# Patient Record
Sex: FEMALE | Race: WHITE | NOT HISPANIC OR LATINO | Employment: FULL TIME | URBAN - METROPOLITAN AREA
[De-identification: names, ages, dates, MRNs, and addresses within clinical notes are randomized per-mention and may not be internally consistent; named-entity substitution may affect disease eponyms.]

---

## 2017-01-04 ENCOUNTER — TELEPHONE (OUTPATIENT)
Dept: SURGERY | Facility: CLINIC | Age: 37
End: 2017-01-04

## 2017-01-04 NOTE — TELEPHONE ENCOUNTER
----- Message from Emily Monroy sent at 1/4/2017 10:00 AM CST -----  [phone]//pt states that she needs to speak with nurse in ref to changing the time of her appt//please call//thank you

## 2017-01-05 ENCOUNTER — OFFICE VISIT (OUTPATIENT)
Dept: SURGERY | Facility: CLINIC | Age: 37
End: 2017-01-05
Payer: COMMERCIAL

## 2017-01-05 VITALS
WEIGHT: 119 LBS | DIASTOLIC BLOOD PRESSURE: 79 MMHG | HEART RATE: 83 BPM | BODY MASS INDEX: 19.13 KG/M2 | HEIGHT: 66 IN | TEMPERATURE: 99 F | SYSTOLIC BLOOD PRESSURE: 118 MMHG

## 2017-01-05 DIAGNOSIS — K31.84 GASTROPARESIS: Primary | ICD-10-CM

## 2017-01-05 DIAGNOSIS — Z09 POSTOP CHECK: ICD-10-CM

## 2017-01-05 PROCEDURE — 99024 POSTOP FOLLOW-UP VISIT: CPT | Mod: S$GLB,,, | Performed by: SURGERY

## 2017-01-05 PROCEDURE — 99999 PR PBB SHADOW E&M-EST. PATIENT-LVL III: CPT | Mod: PBBFAC,,, | Performed by: SURGERY

## 2017-01-05 RX ORDER — AMITRIPTYLINE HYDROCHLORIDE 25 MG/1
25 TABLET, FILM COATED ORAL NIGHTLY
COMMUNITY
Start: 2016-06-03 | End: 2018-03-15

## 2017-01-05 RX ORDER — METOCLOPRAMIDE HYDROCHLORIDE 5 MG/5ML
10 SOLUTION ORAL 3 TIMES DAILY
COMMUNITY
Start: 2016-07-08 | End: 2018-03-15

## 2017-01-05 NOTE — LETTER
[facility] - General Surgery  [address]  [facility] [address]  Phone: [phone] January 5, 2017      Jeancarlos Angel MD  [address]  [address]  [facility] [id number]    Patient: Nena Mendez   MR Number: [id number]   YOB: 1980   Date of Visit: 1/5/2017     Dear Dr. Angel:    Thank you for referring Nena Mendez to me for evaluation. Below are the relevant portions of my assessment and plan of care.    Patient is status post lap gastric neurostimulator placement.    PLAN: Patient still has significant gastroparesis symptoms.  Will adjust once more and if there is not significant change will do pyloroplasty.    If you have questions, please do not hesitate to call me. I look forward to following Nena along with you.    Sincerely,      Robert Kumar MD   Section Head - General, Laparoscopic, Bariatric  Acute Care and Oncologic Surgery   - Surgical Weight Loss Program  [facility]    WSSENAIT/shelley

## 2017-01-05 NOTE — PROGRESS NOTES
"Nena Mendez is a 36 y.o. female patient.   1. S/P lap gastric neurostimulator placement     2. Postop check      Past Medical History   Diagnosis Date    Gastroparesis      Past Surgical History Pertinent Negatives   Procedure Date Noted    Brain surgery 8/4/2016    Breast surgery 8/4/2016    Coronary artery bypass graft 8/4/2016    Colon surgery 8/4/2016    Eye surgery 8/4/2016    Fracture surgery 8/4/2016    Hernia repair 8/4/2016    Joint replacement 8/4/2016    Kidney transplant 8/4/2016    Liver transplant 8/4/2016    Prostate surgery 8/4/2016    Small intestine surgery 8/4/2016    Spine surgery 8/4/2016     Scheduled Meds:  Continuous Infusions:  PRN Meds:    Review of patient's allergies indicates:   Allergen Reactions    Iodine Swelling    Iodine and iodide containing products Swelling     There are no hospital problems to display for this patient.    Blood pressure 118/79, pulse 83, temperature 98.5 °F (36.9 °C), height 5' 6" (1.676 m), weight 54 kg (119 lb).    Subjective S/p gastric neurostimulator and recently j tube placement.  Her last stimulator settings were: Imp 397 volt 4.5 curr 11.3 pw 330 rate 14 on 1 off 4.  She has nausea, vomiting and epigastric pain.  She has been worse especially the last few days.  She is doing well with the j tube but will get nausea if she has the flow too high.  She is taking nausea medication prior to j tube feeds.   Objective Abdomen benign, j tube site clear,  Adjustment: imp 401 volt 4.5 cur 11.2 pw 330 rate 28 on 1off 4  Labs reviewed and they are ok.   Assessment & Plan She still has significant gastroparesis symptoms.  Will adjust once more and if there is not significant change will do pyloroplasty.       Robert Kumar MD  1/5/2017  "

## 2017-01-13 ENCOUNTER — TELEPHONE (OUTPATIENT)
Dept: SURGERY | Facility: CLINIC | Age: 37
End: 2017-01-13

## 2017-01-13 NOTE — TELEPHONE ENCOUNTER
Notified pt that Dr. Kumar has recommended she start on Bactrim DS BID x 14 days.  Pt verbalizes understanding and prescription called in to pt preferred pharmacy.      -also pt to see local physician to have it looked at on Monday.      -will call with any updates.

## 2017-01-13 NOTE — TELEPHONE ENCOUNTER
"Returned home health nurse's phone call-states pt is having some jtube complications.  Pt is complaining of burning/redness to site.  Also states the area feels very "hard".  Home health nurse states that she is having slight dge as well.    Pt is not taking anything for pain at this time.      -Explained to Suzy ( nurse) that i would discuss with Dr. Kumar on what we can do to help pt and would get back with her asap.  "

## 2017-01-13 NOTE — TELEPHONE ENCOUNTER
----- Message from Lincoln Negro sent at 1/13/2017 11:25 AM CST -----  Contact: Suzy with [facility]  Caller needs to speak with someone regarding drainage from pt J tube. Please call regarding this at [phone]

## 2017-01-16 ENCOUNTER — TELEPHONE (OUTPATIENT)
Dept: SURGERY | Facility: CLINIC | Age: 37
End: 2017-01-16

## 2017-01-16 NOTE — TELEPHONE ENCOUNTER
----- Message from Dorina Retana sent at 1/16/2017  1:06 PM CST -----  Contact: self   Nena States that they need a call returned by a nurse in reference to the her going to the Er, last Friday night and her feeding tube wasn't flushing right, she states she is swollen and in pain.  Please call Nena  @ [phone] . Thanks :)

## 2017-01-17 ENCOUNTER — OFFICE VISIT (OUTPATIENT)
Dept: SURGERY | Facility: CLINIC | Age: 37
End: 2017-01-17
Payer: COMMERCIAL

## 2017-01-17 VITALS
WEIGHT: 120 LBS | DIASTOLIC BLOOD PRESSURE: 67 MMHG | HEIGHT: 66 IN | BODY MASS INDEX: 19.29 KG/M2 | HEART RATE: 70 BPM | SYSTOLIC BLOOD PRESSURE: 115 MMHG | TEMPERATURE: 98 F

## 2017-01-17 DIAGNOSIS — K94.19 JEJUNOSTOMY TUBE SITE PAIN: ICD-10-CM

## 2017-01-17 DIAGNOSIS — K31.84 GASTROPARESIS: Primary | ICD-10-CM

## 2017-01-17 DIAGNOSIS — K31.84 GASTROPARESIS: ICD-10-CM

## 2017-01-17 PROCEDURE — 1159F MED LIST DOCD IN RCRD: CPT | Mod: S$GLB,,, | Performed by: SURGERY

## 2017-01-17 PROCEDURE — 99999 PR PBB SHADOW E&M-EST. PATIENT-LVL III: CPT | Mod: PBBFAC,,, | Performed by: SURGERY

## 2017-01-17 PROCEDURE — 99024 POSTOP FOLLOW-UP VISIT: CPT | Mod: S$GLB,,, | Performed by: SURGERY

## 2017-01-17 RX ORDER — PROMETHAZINE HYDROCHLORIDE 6.25 MG/5ML
25 SYRUP ORAL DAILY
Qty: 473 ML | Refills: 11 | Status: SHIPPED | OUTPATIENT
Start: 2017-01-17 | End: 2017-05-09 | Stop reason: SDUPTHER

## 2017-01-17 RX ORDER — NYSTATIN 100000 U/G
OINTMENT TOPICAL
COMMUNITY
Start: 2017-01-14 | End: 2017-05-09

## 2017-01-17 RX ORDER — SULFAMETHOXAZOLE AND TRIMETHOPRIM 800; 160 MG/1; MG/1
TABLET ORAL
COMMUNITY
Start: 2017-01-13 | End: 2017-05-09 | Stop reason: ALTCHOICE

## 2017-01-17 RX ORDER — GRISEOFULVIN 125 MG/1
125 TABLET ORAL DAILY
Qty: 30 TABLET | Refills: 0 | Status: SHIPPED | OUTPATIENT
Start: 2017-01-17 | End: 2017-02-16

## 2017-01-17 NOTE — PROGRESS NOTES
"Nena Mendez is a 36 y.o. female patient.   1. S/P lap gastric neurostimulator placement     2. Jejunostomy tube site pain      Past Medical History   Diagnosis Date    Gastroparesis      Past Surgical History Pertinent Negatives   Procedure Date Noted    Brain surgery 8/4/2016    Breast surgery 8/4/2016    Coronary artery bypass graft 8/4/2016    Colon surgery 8/4/2016    Eye surgery 8/4/2016    Fracture surgery 8/4/2016    Hernia repair 8/4/2016    Joint replacement 8/4/2016    Kidney transplant 8/4/2016    Liver transplant 8/4/2016    Prostate surgery 8/4/2016    Small intestine surgery 8/4/2016    Spine surgery 8/4/2016     Scheduled Meds:  Continuous Infusions:  PRN Meds:    Review of patient's allergies indicates:   Allergen Reactions    Iodine Swelling    Iodine and iodide containing products Swelling     There are no hospital problems to display for this patient.    Blood pressure 115/67, pulse 70, temperature 98.2 °F (36.8 °C), height 5' 6" (1.676 m), weight 54.4 kg (120 lb).    Subjective S/p gastric stimulator and more recently j tube.  She has pain at the j tube site.  She went to a local ER 4 days ago and CT was ok.  She has drainage around the site with redness.  She was started on antibiotics by me last week.  In terms of gastroparesis her symptoms continue to be severe.  Objective Tender with some pus at tube site.  Very little erythema.  Stimulator checked and settings are ok.   Assessment & Plan S/p stimulator without much change in symptoms.  Pain at j tube site.  Antibiotic ointment around tube site.  For robotic pyloroplasty and exchange tube for non-latex tube while under anesthesia.       Robert Kumar MD  1/17/2017  "

## 2017-01-17 NOTE — LETTER
January 17, 2017        Jeancarlos Angel MD  [address]  [address]  [facility] [address]             [facility] - General Surgery  [address]  [place] [address]  Phone: [phone]   Patient: Nena Mendez   MR Number: [id number]   YOB: 1980   Date of Visit: 1/17/2017       Dear Dr. Angel:    Thank you for referring Nena Mendez to me for evaluation. Below are the relevant portions of my assessment and plan of care.    Assessment & Plan S/p stimulator without much change in symptoms.  Pain at j tube site.  Will change to non-latex tube.  Antibiotic ointment around tube site.  Rtc one month and obtain ct result.         If you have questions, please do not hesitate to call me. I look forward to following Nena along with you.    Sincerely,      Robert Kumar MD           CC  No Recipients

## 2017-01-18 RX ORDER — TRAMADOL HYDROCHLORIDE 50 MG/1
50 TABLET ORAL EVERY 6 HOURS PRN
Qty: 30 TABLET | Refills: 0 | Status: SHIPPED | OUTPATIENT
Start: 2017-01-18 | End: 2017-01-28

## 2017-01-26 ENCOUNTER — ANESTHESIA EVENT (OUTPATIENT)
Dept: SURGERY | Facility: HOSPITAL | Age: 37
DRG: 327 | End: 2017-01-26
Payer: COMMERCIAL

## 2017-01-26 ENCOUNTER — TELEPHONE (OUTPATIENT)
Dept: VASCULAR SURGERY | Facility: CLINIC | Age: 37
End: 2017-01-26

## 2017-01-26 NOTE — PRE-PROCEDURE INSTRUCTIONS
Preop instructions: NPO after midnight, shower instructions, directions, leave all valuables at home, medication instructions for PM prior & am of procedure reviewed. Patient verbalized understanding.    Patient denies any issues, side effects or complications with past anesthesia or sedations    Pt does not know arrival time. Explained this information came from the surgeons clinic and that if they have not heard from them by 2pm to call the office. Patient stated an understanding

## 2017-01-26 NOTE — TELEPHONE ENCOUNTER
Nena Mendez notified that her surgery is scheduled for 700am on 01/27/17. she needs to arrive to the 2nd floor [facility] in the hospital @ 0530am. she should not eat or drink anything after midnight.eNna Mendez verbalize understanding.

## 2017-01-26 NOTE — ANESTHESIA PREPROCEDURE EVALUATION
Pre-operative evaluation for ROBOTIC ASSISTED LAPAROSCOPIC PYLOROPLASTY (N/A), TUBE-JEJUNOSTOMY-PLACEMENT-LAPAROSCOPIC//replacement (N/A)    ID:   Nena Mendez is a 36 y.o. female with Gastroparesis s/p Neurostimulator      Chief Complaint: Abdominal pain, n/v s/p stimulator. Plan for procedure above. No issues with previous anesthesia.      LDA:               Gastrostomy/Enterostomy 08/10/16 1323 Jejunostomy tube LUQ feeding (Active)       Previous Airway:  16; n: Direct laryngoscopy; Inserted by: Other (KARISSA Chen); Mask Ventilation: Easy; Tarsha #3; Grade II; Complicating Factors: None; Intubation Findings: Positive EtCO2, Bilateral breath sounds, Atraumatic/Condition of teeth unchanged;        Past Surgical History   Procedure Laterality Date    Appendectomy      Cholecystectomy      Cosmetic surgery       section      Hysterectomy      Tonsillectomy      Gastrostomy-jejeunostomy tube change/placement  08/10/2016    Gastric stimulator implant surgery      Laparoscopic jejunostomy tube           Vital Signs Range (Last 24H):         CBC:   No results for input(s): WBC, RBC, HGB, HCT, PLT, MCV, MCH, MCHC in the last 720 hours.    CMP: No results for input(s): NA, K, CL, CO2, BUN, CREATININE, GLU, MG, PHOS, CALCIUM, ALBUMIN, PROT, ALKPHOS, ALT, AST, BILITOT in the last 720 hours.    INR:  No results for input(s): INR, PROTIME, APTT in the last 720 hours.    Invalid input(s): PT      Diagnostic Studies:      EKD Echo:      [facility] Pre-op Assessment    I have reviewed the Patient Summary Reports.      I have reviewed the Medications.     Review of Systems  Anesthesia Hx:  No problems with previous Anesthesia    Cardiovascular:  Cardiovascular Normal     Pulmonary:  Pulmonary Normal    Hepatic/GI:   Gastroparesis   Neurological:  Neurology Normal    Endocrine:   Denies Diabetes. Denies Hypothyroidism. Denies Hyperthyroidism.        Physical Exam  General:  Well nourished     Airway/Jaw/Neck:  Airway Findings: Mouth Opening: Normal Tongue: Normal  General Airway Assessment: Adult  Mallampati: II  TM Distance: 4 - 6 cm  Jaw/Neck Findings:  Neck ROM: Normal ROM      Dental:  Dental Findings: In tact        Mental Status:  Mental Status Findings:  Cooperative         Anesthesia Plan  Type of Anesthesia, risks & benefits discussed:  Anesthesia Type:  general  Patient's Preference:   Intra-op Monitoring Plan:   Intra-op Monitoring Plan Comments:   Post Op Pain Control Plan:   Post Op Pain Control Plan Comments:   Induction:   IV  Beta Blocker:  Patient is not currently on a Beta-Blocker (No further documentation required).       Informed Consent: Patient understands risks and agrees with Anesthesia plan.  Questions answered. Anesthesia consent signed with patient.  ASA Score: 3     Day of Surgery Review of History & Physical:

## 2017-01-27 ENCOUNTER — ANESTHESIA (OUTPATIENT)
Dept: SURGERY | Facility: HOSPITAL | Age: 37
DRG: 327 | End: 2017-01-27
Payer: COMMERCIAL

## 2017-01-27 ENCOUNTER — SURGERY (OUTPATIENT)
Age: 37
End: 2017-01-27

## 2017-01-27 ENCOUNTER — HOSPITAL ENCOUNTER (INPATIENT)
Facility: HOSPITAL | Age: 37
LOS: 2 days | Discharge: HOME | DRG: 327 | End: 2017-01-29
Attending: SURGERY | Admitting: SURGERY
Payer: COMMERCIAL

## 2017-01-27 DIAGNOSIS — K31.84 GASTROPARESIS: ICD-10-CM

## 2017-01-27 DIAGNOSIS — K94.19 JEJUNOSTOMY TUBE SITE PAIN: ICD-10-CM

## 2017-01-27 DIAGNOSIS — E46 MALNUTRITION: Primary | ICD-10-CM

## 2017-01-27 LAB — POCT GLUCOSE: 83 MG/DL (ref 70–110)

## 2017-01-27 PROCEDURE — 8E0W4CZ ROBOTIC ASSISTED PROCEDURE OF TRUNK REGION, PERCUTANEOUS ENDOSCOPIC APPROACH: ICD-10-PCS | Performed by: SURGERY

## 2017-01-27 PROCEDURE — 25000003 PHARM REV CODE 250: Performed by: SURGERY

## 2017-01-27 PROCEDURE — 94760 N-INVAS EAR/PLS OXIMETRY 1: CPT

## 2017-01-27 PROCEDURE — 94799 UNLISTED PULMONARY SVC/PX: CPT

## 2017-01-27 PROCEDURE — 63600175 PHARM REV CODE 636 W HCPCS: Performed by: SURGERY

## 2017-01-27 PROCEDURE — 25000003 PHARM REV CODE 250: Performed by: ANESTHESIOLOGY

## 2017-01-27 PROCEDURE — 71000039 HC RECOVERY, EACH ADD'L HOUR: Performed by: SURGERY

## 2017-01-27 PROCEDURE — D9220A PRA ANESTHESIA: Mod: ANES,,, | Performed by: ANESTHESIOLOGY

## 2017-01-27 PROCEDURE — 63600175 PHARM REV CODE 636 W HCPCS: Performed by: ANESTHESIOLOGY

## 2017-01-27 PROCEDURE — 44015 INSERT NEEDLE CATH BOWEL: CPT | Mod: 51,,, | Performed by: SURGERY

## 2017-01-27 PROCEDURE — 0DQ74ZZ REPAIR STOMACH, PYLORUS, PERCUTANEOUS ENDOSCOPIC APPROACH: ICD-10-PCS | Performed by: SURGERY

## 2017-01-27 PROCEDURE — 27201423 OPTIME MED/SURG SUP & DEVICES STERILE SUPPLY: Performed by: SURGERY

## 2017-01-27 PROCEDURE — 37000008 HC ANESTHESIA 1ST 15 MINUTES: Performed by: SURGERY

## 2017-01-27 PROCEDURE — 43659 UNLISTED LAPS PX STOMACH: CPT | Mod: ,,, | Performed by: SURGERY

## 2017-01-27 PROCEDURE — 11000001 HC ACUTE MED/SURG PRIVATE ROOM

## 2017-01-27 PROCEDURE — 36000712 HC OR TIME LEV V 1ST 15 MIN: Performed by: SURGERY

## 2017-01-27 PROCEDURE — 71000033 HC RECOVERY, INTIAL HOUR: Performed by: SURGERY

## 2017-01-27 PROCEDURE — C1894 INTRO/SHEATH, NON-LASER: HCPCS | Performed by: SURGERY

## 2017-01-27 PROCEDURE — 27000221 HC OXYGEN, UP TO 24 HOURS

## 2017-01-27 PROCEDURE — 36000713 HC OR TIME LEV V EA ADD 15 MIN: Performed by: SURGERY

## 2017-01-27 PROCEDURE — 63600175 PHARM REV CODE 636 W HCPCS

## 2017-01-27 PROCEDURE — 0D2DXUZ CHANGE FEEDING DEVICE IN LOWER INTESTINAL TRACT, EXTERNAL APPROACH: ICD-10-PCS | Performed by: SURGERY

## 2017-01-27 PROCEDURE — 37000009 HC ANESTHESIA EA ADD 15 MINS: Performed by: SURGERY

## 2017-01-27 PROCEDURE — D9220A PRA ANESTHESIA: Mod: CRNA,,, | Performed by: NURSE ANESTHETIST, CERTIFIED REGISTERED

## 2017-01-27 PROCEDURE — 63600175 PHARM REV CODE 636 W HCPCS: Performed by: NURSE ANESTHETIST, CERTIFIED REGISTERED

## 2017-01-27 PROCEDURE — 25000003 PHARM REV CODE 250: Performed by: NURSE ANESTHETIST, CERTIFIED REGISTERED

## 2017-01-27 RX ORDER — HYDROMORPHONE HCL IN 0.9% NACL 6 MG/30 ML
PATIENT CONTROLLED ANALGESIA SYRINGE INTRAVENOUS CONTINUOUS
Status: DISCONTINUED | OUTPATIENT
Start: 2017-01-27 | End: 2017-01-28

## 2017-01-27 RX ORDER — ONDANSETRON 2 MG/ML
INJECTION INTRAMUSCULAR; INTRAVENOUS
Status: DISCONTINUED | OUTPATIENT
Start: 2017-01-27 | End: 2017-01-27

## 2017-01-27 RX ORDER — SODIUM CHLORIDE 9 MG/ML
INJECTION, SOLUTION INTRAVENOUS CONTINUOUS
Status: DISCONTINUED | OUTPATIENT
Start: 2017-01-27 | End: 2017-01-27

## 2017-01-27 RX ORDER — DIPHENHYDRAMINE HYDROCHLORIDE 50 MG/ML
12.5 INJECTION INTRAMUSCULAR; INTRAVENOUS 2 TIMES DAILY PRN
Status: DISCONTINUED | OUTPATIENT
Start: 2017-01-27 | End: 2017-01-27

## 2017-01-27 RX ORDER — ROCURONIUM BROMIDE 10 MG/ML
INJECTION, SOLUTION INTRAVENOUS
Status: DISCONTINUED | OUTPATIENT
Start: 2017-01-27 | End: 2017-01-27

## 2017-01-27 RX ORDER — HYDROMORPHONE HYDROCHLORIDE 2 MG/ML
INJECTION, SOLUTION INTRAMUSCULAR; INTRAVENOUS; SUBCUTANEOUS
Status: DISCONTINUED | OUTPATIENT
Start: 2017-01-27 | End: 2017-01-27

## 2017-01-27 RX ORDER — GLYCOPYRROLATE 0.2 MG/ML
INJECTION INTRAMUSCULAR; INTRAVENOUS
Status: DISCONTINUED | OUTPATIENT
Start: 2017-01-27 | End: 2017-01-27

## 2017-01-27 RX ORDER — HYDROMORPHONE HYDROCHLORIDE 1 MG/ML
0.2 INJECTION, SOLUTION INTRAMUSCULAR; INTRAVENOUS; SUBCUTANEOUS EVERY 5 MIN PRN
Status: DISCONTINUED | OUTPATIENT
Start: 2017-01-27 | End: 2017-01-27 | Stop reason: HOSPADM

## 2017-01-27 RX ORDER — BUPIVACAINE HYDROCHLORIDE 2.5 MG/ML
INJECTION, SOLUTION EPIDURAL; INFILTRATION; INTRACAUDAL
Status: DISCONTINUED | OUTPATIENT
Start: 2017-01-27 | End: 2017-01-27 | Stop reason: HOSPADM

## 2017-01-27 RX ORDER — SODIUM CHLORIDE 0.9 % (FLUSH) 0.9 %
3 SYRINGE (ML) INJECTION
Status: DISCONTINUED | OUTPATIENT
Start: 2017-01-27 | End: 2017-01-27 | Stop reason: HOSPADM

## 2017-01-27 RX ORDER — ENOXAPARIN SODIUM 100 MG/ML
40 INJECTION SUBCUTANEOUS
Status: DISCONTINUED | OUTPATIENT
Start: 2017-01-27 | End: 2017-01-29 | Stop reason: HOSPADM

## 2017-01-27 RX ORDER — SODIUM CHLORIDE, SODIUM LACTATE, POTASSIUM CHLORIDE, CALCIUM CHLORIDE 600; 310; 30; 20 MG/100ML; MG/100ML; MG/100ML; MG/100ML
INJECTION, SOLUTION INTRAVENOUS CONTINUOUS
Status: DISCONTINUED | OUTPATIENT
Start: 2017-01-27 | End: 2017-01-29 | Stop reason: HOSPADM

## 2017-01-27 RX ORDER — LIDOCAINE HCL/PF 100 MG/5ML
SYRINGE (ML) INTRAVENOUS
Status: DISCONTINUED | OUTPATIENT
Start: 2017-01-27 | End: 2017-01-27

## 2017-01-27 RX ORDER — ACETAMINOPHEN 10 MG/ML
1000 INJECTION, SOLUTION INTRAVENOUS ONCE
Status: COMPLETED | OUTPATIENT
Start: 2017-01-27 | End: 2017-01-27

## 2017-01-27 RX ORDER — PROPOFOL 10 MG/ML
VIAL (ML) INTRAVENOUS
Status: DISCONTINUED | OUTPATIENT
Start: 2017-01-27 | End: 2017-01-27

## 2017-01-27 RX ORDER — ACETAMINOPHEN 10 MG/ML
INJECTION, SOLUTION INTRAVENOUS
Status: DISCONTINUED | OUTPATIENT
Start: 2017-01-27 | End: 2017-01-27

## 2017-01-27 RX ORDER — ACETAMINOPHEN 10 MG/ML
1000 INJECTION, SOLUTION INTRAVENOUS EVERY 8 HOURS
Status: DISCONTINUED | OUTPATIENT
Start: 2017-01-27 | End: 2017-01-28

## 2017-01-27 RX ORDER — NEOSTIGMINE METHYLSULFATE 1 MG/ML
INJECTION, SOLUTION INTRAVENOUS
Status: DISCONTINUED | OUTPATIENT
Start: 2017-01-27 | End: 2017-01-27

## 2017-01-27 RX ORDER — DIPHENHYDRAMINE HYDROCHLORIDE 50 MG/ML
25 INJECTION INTRAMUSCULAR; INTRAVENOUS EVERY 6 HOURS PRN
Status: DISCONTINUED | OUTPATIENT
Start: 2017-01-27 | End: 2017-01-29 | Stop reason: HOSPADM

## 2017-01-27 RX ORDER — FENTANYL CITRATE 50 UG/ML
INJECTION, SOLUTION INTRAMUSCULAR; INTRAVENOUS
Status: DISCONTINUED | OUTPATIENT
Start: 2017-01-27 | End: 2017-01-27

## 2017-01-27 RX ORDER — NALOXONE HCL 0.4 MG/ML
0.02 VIAL (ML) INJECTION
Status: DISCONTINUED | OUTPATIENT
Start: 2017-01-27 | End: 2017-01-29 | Stop reason: HOSPADM

## 2017-01-27 RX ORDER — METRONIDAZOLE 500 MG/100ML
500 INJECTION, SOLUTION INTRAVENOUS
Status: COMPLETED | OUTPATIENT
Start: 2017-01-27 | End: 2017-01-27

## 2017-01-27 RX ORDER — ONDANSETRON 2 MG/ML
4 INJECTION INTRAMUSCULAR; INTRAVENOUS EVERY 8 HOURS PRN
Status: DISCONTINUED | OUTPATIENT
Start: 2017-01-27 | End: 2017-01-28

## 2017-01-27 RX ORDER — MIDAZOLAM HYDROCHLORIDE 1 MG/ML
INJECTION, SOLUTION INTRAMUSCULAR; INTRAVENOUS
Status: DISCONTINUED | OUTPATIENT
Start: 2017-01-27 | End: 2017-01-27

## 2017-01-27 RX ORDER — HYDROMORPHONE HCL IN 0.9% NACL 6 MG/30 ML
PATIENT CONTROLLED ANALGESIA SYRINGE INTRAVENOUS
Status: COMPLETED
Start: 2017-01-27 | End: 2017-01-27

## 2017-01-27 RX ORDER — METRONIDAZOLE 500 MG/100ML
500 INJECTION, SOLUTION INTRAVENOUS
Status: COMPLETED | OUTPATIENT
Start: 2017-01-27 | End: 2017-01-28

## 2017-01-27 RX ADMIN — ONDANSETRON 4 MG: 2 INJECTION INTRAMUSCULAR; INTRAVENOUS at 09:01

## 2017-01-27 RX ADMIN — SODIUM CHLORIDE: 0.9 INJECTION, SOLUTION INTRAVENOUS at 06:01

## 2017-01-27 RX ADMIN — PROMETHAZINE HYDROCHLORIDE 6.25 MG: 25 INJECTION INTRAMUSCULAR; INTRAVENOUS at 06:01

## 2017-01-27 RX ADMIN — PROMETHAZINE HYDROCHLORIDE 6.25 MG: 25 INJECTION INTRAMUSCULAR; INTRAVENOUS at 12:01

## 2017-01-27 RX ADMIN — NEOSTIGMINE METHYLSULFATE 3 MG: 1 INJECTION INTRAVENOUS at 08:01

## 2017-01-27 RX ADMIN — FENTANYL CITRATE 100 MCG: 50 INJECTION, SOLUTION INTRAMUSCULAR; INTRAVENOUS at 07:01

## 2017-01-27 RX ADMIN — ROCURONIUM BROMIDE 40 MG: 10 INJECTION, SOLUTION INTRAVENOUS at 07:01

## 2017-01-27 RX ADMIN — Medication: at 04:01

## 2017-01-27 RX ADMIN — FENTANYL CITRATE 50 MCG: 50 INJECTION, SOLUTION INTRAMUSCULAR; INTRAVENOUS at 07:01

## 2017-01-27 RX ADMIN — ONDANSETRON 4 MG: 2 INJECTION INTRAMUSCULAR; INTRAVENOUS at 10:01

## 2017-01-27 RX ADMIN — SODIUM CHLORIDE, SODIUM LACTATE, POTASSIUM CHLORIDE, AND CALCIUM CHLORIDE: .6; .31; .03; .02 INJECTION, SOLUTION INTRAVENOUS at 09:01

## 2017-01-27 RX ADMIN — METRONIDAZOLE 500 MG: 500 INJECTION, SOLUTION INTRAVENOUS at 01:01

## 2017-01-27 RX ADMIN — PROMETHAZINE HYDROCHLORIDE 6.25 MG: 25 INJECTION INTRAMUSCULAR; INTRAVENOUS at 10:01

## 2017-01-27 RX ADMIN — METRONIDAZOLE 500 MG: 500 INJECTION, SOLUTION INTRAVENOUS at 06:01

## 2017-01-27 RX ADMIN — SODIUM CHLORIDE, SODIUM GLUCONATE, SODIUM ACETATE, POTASSIUM CHLORIDE, MAGNESIUM CHLORIDE, SODIUM PHOSPHATE, DIBASIC, AND POTASSIUM PHOSPHATE: .53; .5; .37; .037; .03; .012; .00082 INJECTION, SOLUTION INTRAVENOUS at 07:01

## 2017-01-27 RX ADMIN — PROMETHAZINE HYDROCHLORIDE 6.25 MG: 25 INJECTION INTRAMUSCULAR; INTRAVENOUS at 11:01

## 2017-01-27 RX ADMIN — BUPIVACAINE HYDROCHLORIDE 30 ML: 2.5 INJECTION, SOLUTION EPIDURAL; INFILTRATION; INTRACAUDAL; PERINEURAL at 07:01

## 2017-01-27 RX ADMIN — Medication: at 09:01

## 2017-01-27 RX ADMIN — ONDANSETRON 4 MG: 2 INJECTION INTRAMUSCULAR; INTRAVENOUS at 08:01

## 2017-01-27 RX ADMIN — PROPOFOL 150 MG: 10 INJECTION, EMULSION INTRAVENOUS at 07:01

## 2017-01-27 RX ADMIN — ACETAMINOPHEN 1000 MG: 10 INJECTION, SOLUTION INTRAVENOUS at 01:01

## 2017-01-27 RX ADMIN — ENOXAPARIN SODIUM 40 MG: 100 INJECTION SUBCUTANEOUS at 06:01

## 2017-01-27 RX ADMIN — MIDAZOLAM HYDROCHLORIDE 2 MG: 1 INJECTION, SOLUTION INTRAMUSCULAR; INTRAVENOUS at 07:01

## 2017-01-27 RX ADMIN — GLYCOPYRROLATE 0.4 MG: 0.2 INJECTION, SOLUTION INTRAMUSCULAR; INTRAVENOUS at 08:01

## 2017-01-27 RX ADMIN — LIDOCAINE HYDROCHLORIDE 40 MG: 20 INJECTION, SOLUTION INTRAVENOUS at 07:01

## 2017-01-27 RX ADMIN — CEFTRIAXONE 2 G: 2 INJECTION, SOLUTION INTRAVENOUS at 08:01

## 2017-01-27 RX ADMIN — HYDROMORPHONE HYDROCHLORIDE 0.4 MG: 2 INJECTION INTRAMUSCULAR; INTRAVENOUS; SUBCUTANEOUS at 08:01

## 2017-01-27 RX ADMIN — ACETAMINOPHEN 1000 MG: 10 INJECTION, SOLUTION INTRAVENOUS at 08:01

## 2017-01-27 RX ADMIN — ACETAMINOPHEN 1000 MG: 10 INJECTION, SOLUTION INTRAVENOUS at 06:01

## 2017-01-27 RX ADMIN — DIPHENHYDRAMINE HYDROCHLORIDE 25 MG: 50 INJECTION, SOLUTION INTRAMUSCULAR; INTRAVENOUS at 08:01

## 2017-01-27 RX ADMIN — CEFTRIAXONE 2 G: 2 INJECTION, SOLUTION INTRAVENOUS at 07:01

## 2017-01-27 RX ADMIN — ROCURONIUM BROMIDE 10 MG: 10 INJECTION, SOLUTION INTRAVENOUS at 07:01

## 2017-01-27 RX ADMIN — Medication 6 MG: at 04:01

## 2017-01-27 RX ADMIN — HYDROMORPHONE HYDROCHLORIDE 0.6 MG: 2 INJECTION INTRAMUSCULAR; INTRAVENOUS; SUBCUTANEOUS at 08:01

## 2017-01-27 RX ADMIN — METRONIDAZOLE 500 MG: 500 INJECTION, SOLUTION INTRAVENOUS at 10:01

## 2017-01-27 RX ADMIN — ACETAMINOPHEN 1000 MG: 10 INJECTION, SOLUTION INTRAVENOUS at 10:01

## 2017-01-27 RX ADMIN — SODIUM CHLORIDE, SODIUM LACTATE, POTASSIUM CHLORIDE, AND CALCIUM CHLORIDE: .6; .31; .03; .02 INJECTION, SOLUTION INTRAVENOUS at 08:01

## 2017-01-27 RX ADMIN — PROMETHAZINE HYDROCHLORIDE 6.25 MG: 25 INJECTION INTRAMUSCULAR; INTRAVENOUS at 04:01

## 2017-01-27 RX ADMIN — DIPHENHYDRAMINE HYDROCHLORIDE 12.5 MG: 50 INJECTION, SOLUTION INTRAMUSCULAR; INTRAVENOUS at 02:01

## 2017-01-27 NOTE — BRIEF OP NOTE
Operative Note       Surgery Date: 1/27/2017     Surgeon(s) and Role:     * Duke Sinclair MD - Resident - Assisting     * Robert Kumar MD - Primary    Pre-op Diagnosis:  Gastroparesis [K31.84]  Jejunostomy tube site pain [K94.19]    Post-op Diagnosis:  Gastroparesis [K31.84]  Jejunostomy tube site pain [K94.19]    Procedure(s) (LRB):  ROBOTIC ASSISTED LAPAROSCOPIC PYLOROPLASTY (N/A)  TUBE-JEJUNOSTOMY-PLACEMENT-LAPAROSCOPIC//replacement (N/A)    Anesthesia: General    Procedure in Detail/Findings:  Pyloroplasty without complication.  J tube switched out.    Estimated Blood Loss: Minimal           Specimens     None        Implants: * No implants in log *           Disposition: PACU - hemodynamically stable.           Condition: Good    Attestation:  I was present and scrubbed for the entire procedure.

## 2017-01-27 NOTE — NURSING TRANSFER
Nursing Transfer Note      1/27/2017     Transfer To: 509    Transfer via stretcher    Transfer with purse, wallet, phone, iv pole    Transported by pct    Medicines sent: iv fluids, dilaudid pca    Chart send with patient: Yes    Notified: spouse    Patient reassessed at: 1/27/17

## 2017-01-27 NOTE — NURSING
Pt admitted to floor, stable, VSS, no complaints at this time noted or stated, I.S at bedside, SCDs intact, will hand over to nurse. BITE pain menu, TV guide, pain control pamphlet, given, explained, and offered to patient. I.S at bedside and explained to patient. Meliza Nur RN

## 2017-01-27 NOTE — ANESTHESIA RELEASE NOTE
Anesthesia Release from PACU Note    Patient: Nena Mendez    Procedure(s) Performed: Procedure(s) (LRB):  ROBOTIC ASSISTED LAPAROSCOPIC PYLOROPLASTY (N/A)  TUBE-JEJUNOSTOMY-PLACEMENT-LAPAROSCOPIC//replacement (N/A)    Anesthesia type: general    Post pain: Adequate analgesia    Post assessment: no apparent anesthetic complications    Last Vitals:   Vitals:    01/27/17 1045   BP: 119/75   Pulse: 62   Resp: 18   Temp:    SpO2: 99%       Post vital signs: stable    Level of consciousness: awake    Complications: none    Airway Patency: patent    Respiratory: unassisted    Cardiovascular: stable and blood pressure at baseline    Hydration: euvolemic

## 2017-01-27 NOTE — BRIEF OP NOTE
[facility]  Brief Operative Note    SUMMARY     Surgery Date: 1/27/2017     Surgeon(s) and Role:     * Duke Sinclair MD - Resident - Assisting     * Robert Kumar MD - Primary        Pre-op Diagnosis:  Gastroparesis [K31.84]  Jejunostomy tube site pain [K94.19]    Post-op Diagnosis:  Post-Op Diagnosis Codes:     * Gastroparesis [K31.84]     * Jejunostomy tube site pain [K94.19]    Procedure(s) (LRB):  ROBOTIC ASSISTED LAPAROSCOPIC PYLOROPLASTY (N/A)  TUBE-JEJUNOSTOMY-PLACEMENT-LAPAROSCOPIC//replacement (N/A)    Anesthesia: General    Description of Procedure: robotic pyloroplasty    Description of the findings of the procedure: robotic pyloroplasty and j-tube exchange.     Estimated Blood Loss: 15 mL         Specimens:   Specimen     None          Duke Sinclair PGY5

## 2017-01-27 NOTE — OP NOTE
Surgery Date: 1/27/2017     Surgeon(s) and Role:     * Duke Sinclair MD - Resident - Assisting     * Robert Kumar MD - Primary    Pre-op Diagnosis:  Gastroparesis [K31.84]  Jejunostomy tube site pain [K94.19]    Post-op Diagnosis:  Gastroparesis [K31.84]  Jejunostomy tube site pain [K94.19]    Procedure(s) (LRB):  ROBOTIC ASSISTED LAPAROSCOPIC PYLOROPLASTY (N/A)  TUBE-JEJUNOSTOMY-PLACEMENT-LAPAROSCOPIC//replacement (N/A)    Anesthesia: General    PROCEDURE IN DETAIL: The patient was placed under general anesthesia. The   abdomen was prepped and draped in the usual manner. Access to peritoneum was   gained through the umbilicus using Optiview trocar under direct vision.   Pneumoperitoneum to 15 mmHg with CO2 gas was obtained. A 5-mm trocar was placed  10 cm to the left lateral of the primary trocar and 2 cm cephalad. A 5-mm   trocar was placed 8 cm to the right lateral and a second 5 mm trocar was placed   16 cm on the primary trocar and 2 cm cephalad. The robot was docked. There   were upper abdominal omental adhesions that were taken down with the hook   cautery. The pylorus was identified, it was divided transversely using the hook  cautery and sewn shut horizontally with a 2-0 silk. The abdomen was inspected   for hemostasis. The trocars were removed under direct vision. Prior to   removing the last trocar, pneumoperitoneum was allowed to escape. The fascia at  the naval was closed with 0 Vicryl. Skin incisions were closed with 4-0 plain   catgut and reinforced with Mastisol, Steri-Strips, and Band-Aids.  The j tube was exchanged for non-latex tube and sutured in place. The patient   tolerated procedure well and was brought to Recovery Room in stable condition.   Sponge and needle counts were correct at the end of the case.  PATHOLOGY: None.  ESTIMATED BLOOD LOSS: Minimal.  COMPLICATIONS: None.

## 2017-01-27 NOTE — IP AVS SNAPSHOT
[facility]  [address]  [facility] [address]  Phone: [phone]           Patient Discharge Instructions     Our goal is to set you up for success. This packet includes information on your condition, medications, and your home care. It will help you to care for yourself so you don't get sicker and need to go back to the hospital.     Please ask your nurse if you have any questions.        There are many details to remember when preparing to leave the hospital. Here is what you will need to do:    1. Take your medicine. If you are prescribed medications, review your Medication List in the following pages. You may have new medications to  at the pharmacy and others that you'll need to stop taking. Review the instructions for how and when to take your medications. Talk with your doctor or nurses if you are unsure of what to do.     2. Go to your follow-up appointments. Specific follow-up information is listed in the following pages. Your may be contacted by a transition nurse or clinical provider about future appointments. Be sure we have all of the phone numbers to reach you, if needed. Please contact your provider's office if you are unable to make an appointment.     3. Watch for warning signs. Your doctor or nurse will give you detailed warning signs to watch for and when to call for assistance. These instructions may also include educational information about your condition. If you experience any of warning signs to your health, call your doctor.               [facility]  Unless otherwise directed by your provider, please contact [facility], our nurse care line that is available for 24/7 assistance.     [phone] (toll-free)    Registered nurses in the [facility] provide clinical advisement, health education, appointment booking, and other advisory services.                    ** Verify the list of medication(s) below is accurate and up  to date. Carry this with you in case of emergency. If your medications have changed, please notify your healthcare provider.             Medication List      CHANGE how you take these medications        Additional Info                      * hydrocodone-acetaminophen 5-325mg 5-325 mg per tablet   Commonly known as:  NORCO   Quantity:  40 tablet   Refills:  0   Dose:  1 tablet   What changed:  Another medication with the same name was added. Make sure you understand how and when to take each.    Instructions:  Take 1 tablet by mouth every 6 (six) hours as needed for Pain.     Begin Date    AM    Noon    PM    Bedtime       * hydrocodone-apap 2.5-108 MG/5 ML oral solution   Commonly known as:  HYCET   Quantity:  473 mL   Refills:  0   Dose:  15 mL   What changed:  You were already taking a medication with the same name, and this prescription was added. Make sure you understand how and when to take each.    Last time this was given:  15 mLs on 1/29/2017 10:06 AM   Instructions:  Take 15 mLs by mouth every 4 (four) hours as needed.     Begin Date    AM    Noon    PM    Bedtime       * promethazine 25 MG tablet   Commonly known as:  PHENERGAN   Refills:  0   What changed:  Another medication with the same name was added. Make sure you understand how and when to take each.    Instructions:  TK 1 T PO Q 4 TO 6 H PRN     Begin Date    AM    Noon    PM    Bedtime       * promethazine 25 MG suppository   Commonly known as:  PHENERGAN   Quantity:  40 suppository   Refills:  2   Dose:  25 mg   What changed:  Another medication with the same name was added. Make sure you understand how and when to take each.    Instructions:  Place 1 suppository (25 mg total) rectally every 6 (six) hours as needed for Nausea.     Begin Date    AM    Noon    PM    Bedtime       * promethazine 6.25 mg/5 mL syrup   Commonly known as:  PHENERGAN   Quantity:  473 mL   Refills:  11   Dose:  25 mg   What changed:  Another medication with the same name  was added. Make sure you understand how and when to take each.    Instructions:  Take 20 mLs (25 mg total) by mouth once daily.     Begin Date    AM    Noon    PM    Bedtime       * promethazine 6.25 mg/5 mL syrup   Commonly known as:  PHENERGAN   Quantity:  240 mL   Refills:  1   Dose:  12.5 mg   What changed:  You were already taking a medication with the same name, and this prescription was added. Make sure you understand how and when to take each.    Instructions:  Take 10 mLs (12.5 mg total) by mouth once daily.     Begin Date    AM    Noon    PM    Bedtime       * Notice:  This list has 6 medication(s) that are the same as other medications prescribed for you. Read the directions carefully, and ask your doctor or other care provider to review them with you.      CONTINUE taking these medications        Additional Info                      acetaminophen 160 mg/5 mL Elix   Commonly known as:  TYLENOL   Quantity:  480 mL   Refills:  0   Dose:  650 mg    Instructions:  Take 20.3 mLs (649.6 mg total) by mouth every 6 (six) hours as needed.     Begin Date    AM    Noon    PM    Bedtime       amitriptyline 25 MG tablet   Commonly known as:  ELAVIL   Refills:  0      Begin Date    AM    Noon    PM    Bedtime       griseofulvin 125 MG tablet   Commonly known as:  MARY ALICE-PEG   Quantity:  30 tablet   Refills:  0   Dose:  125 mg    Instructions:  Take 1 tablet (125 mg total) by mouth once daily.     Begin Date    AM    Noon    PM    Bedtime       * metoclopramide HCl 5 MG tablet   Commonly known as:  REGLAN   Refills:  0   Dose:  5 mg    Instructions:  Take 5 mg by mouth 4 (four) times daily.     Begin Date    AM    Noon    PM    Bedtime       * metoclopramide HCl 5 mg/5 mL Soln   Commonly known as:  REGLAN   Refills:  0    Instructions:  3 (three) times daily.     Begin Date    AM    Noon    PM    Bedtime       nystatin ointment   Commonly known as:  MYCOSTATIN   Refills:  0      Begin Date    AM    Noon    PM    Bedtime        ondansetron 8 MG Tbdl   Commonly known as:  ZOFRAN-ODT   Quantity:  30 tablet   Refills:  2   Dose:  8 mg    Last time this was given:  8 mg on 1/28/2017  7:16 PM   Instructions:  Take 1 tablet (8 mg total) by mouth every 8 (eight) hours as needed.     Begin Date    AM    Noon    PM    Bedtime       senna-docusate 8.6-50 mg 8.6-50 mg per tablet   Commonly known as:  PERICOLACE   Quantity:  30 tablet   Refills:  0   Dose:  1 tablet    Instructions:  Take 1 tablet by mouth 2 (two) times daily.     Begin Date    AM    Noon    PM    Bedtime       sulfamethoxazole-trimethoprim 800-160mg 800-160 mg Tab   Commonly known as:  BACTRIM DS   Refills:  0      Begin Date    AM    Noon    PM    Bedtime       * Notice:  This list has 2 medication(s) that are the same as other medications prescribed for you. Read the directions carefully, and ask your doctor or other care provider to review them with you.      ASK your doctor about these medications        Additional Info                      tramadol 50 mg tablet   Commonly known as:  ULTRAM   Quantity:  30 tablet   Refills:  0   Dose:  50 mg   Ask about: Should I take this medication?    Instructions:  Take 1 tablet (50 mg total) by mouth every 6 (six) hours as needed for Pain.     Begin Date    AM    Noon    PM    Bedtime            Where to Get Your Medications      These medications were sent to [facility] [address] [facility], [place] - [address] [facility] [place] [place] [address]    Hours:  24-hours Phone:  [phone]     promethazine 6.25 mg/5 mL syrup         You can get these medications from any pharmacy     Bring a paper prescription for each of these medications     hydrocodone-apap 2.5-108 MG/5 ML oral solution                  Please bring to all follow up appointments:    1. A copy of your discharge instructions.  2. All medicines you are currently taking in their original bottles.  3. Identification and insurance  card.    Please arrive 15 minutes ahead of scheduled appointment time.    Please call 24 hours in advance if you must reschedule your appointment and/or time.        Your Scheduled Appointments     Feb 09, 2017 11:00 AM CST   Post OP with MD Camron Flynn Roycesanjay - General Surgery ([address] )    [address]  [facility] [id number] [phone]              Follow-up Information     Follow up with Robert Kumar MD In 2 weeks.    Specialties:  General Surgery, Bariatrics    Contact information:    [address]  [facility] [id number] [phone]          Discharge Instructions     Future Orders    Call MD for:  difficulty breathing or increased cough     Call MD for:  increased confusion or weakness     Call MD for:  persistent dizziness, light-headedness, or visual disturbances     Call MD for:  persistent nausea and vomiting or diarrhea     Call MD for:  redness, tenderness, or signs of infection (pain, swelling, redness, odor or green/yellow discharge around incision site)     Call MD for:  severe persistent headache     Call MD for:  severe uncontrolled pain     Call MD for:  temperature >100.4     Call MD for:  worsening rash     No dressing needed     Comments:    May remove band aids.  Keep steri-strips (white tape) intact over wound, they will be removed in clinic. If they fall off before that's ok    Other restrictions (specify):     Comments:    Ok to shower on Sunday.   Crush all meds  Liquid diet for one week, and then soft diet until clinic visit  No driving while still taking pain medications daily.   Take a stool softener while taking pain medications daily.   No lifting more than 10 lbs        Primary Diagnosis     Your primary diagnosis was:  Digestive System Disorder      Admission Information     Date & Time Provider Department CSN    1/27/2017  5:22 AM Robert Kumar MD [facility] [id number]      Care Providers     Provider Role  "Specialty Primary office phone    Robert Kumar MD Attending Provider General Surgery [phone]    Robert Kumar MD Surgeon  General Surgery [phone]      Your Vitals Were     BP Pulse Temp Resp Height Weight    113/75 (BP Location: Right arm, Patient Position: Lying, BP Method: Automatic) 67 98.5 °F (36.9 °C) (Oral) 16 5' 6" (1.676 m) 54.4 kg (120 lb)    SpO2 BMI             95% 19.37 kg/m2         Recent Lab Values     No lab values to display.      Allergies as of 1/29/2017        Reactions    Latex, Natural Rubber Rash    Iodine Swelling    Iodine And Iodide Containing Products Swelling      Advance Directives     An advance directive is a document which, in the event you are no longer able to make decisions for yourself, tells your healthcare team what kind of treatment you do or do not want to receive, or who you would like to make those decisions for you.  If you do not currently have an advance directive, [facility] encourages you to create one.  For more information call:  [phone]), [phone] ([phone]),  or log on to [email or web address].        Smoking Cessation     If you would like to quit smoking:   You may be eligible for free services if you are a [place] resident and started smoking cigarettes before September 1, 1988.  Call the Smoking Cessation Trust ([facility]) toll free at (493) 622-5400 or (260) 050-8013.   Call 1-800-QUIT-NOW if you do not meet the above criteria.            Language Assistance Services     ATTENTION: Language assistance services are available, free of charge. Please call [phone].      ATENCIÓN: Si habla español, tiene a rodriguez disposición servicios gratuitos de asistencia lingüística. Llame al [phone].     LAYTON Ý: N?u b?n nói Ti?ng Vi?t, có các d?ch v? h? tr? ngôn ng? mi?n phí dành cho b?n. G?i s? [phone].        [facility] Sign-Up     Activating your [facility] account is as easy as 1-2-3!     1) Visit " [email or web address], select Sign Up Now, enter this activation code and your date of birth, then select Next.  [email or web address]  Expires: 1/30/2017  4:56 PM      2) Create a username and password to use when you visit [facility] in the future and select a security question in case you lose your password and select Next.    3) Enter your e-mail address and click Sign Up!    Additional Information  If you have questions, please e-mail [email or web address] or call [phone] to talk to our [facility] staff. Remember, [facility] is NOT to be used for urgent needs. For medical emergencies, dial 911.          [facility] complies with applicable Federal civil rights laws and does not discriminate on the basis of race, color, national origin, age, disability, or sex.

## 2017-01-27 NOTE — PROGRESS NOTES
Dr. Naylor with anesthesia notified of Pt's c/o nausea. MD informed that Pt reports no relief from zofran in the past and typically takes phenergan at home. Order given for 6.25 mg of phenergan IVP once in pre-op.

## 2017-01-27 NOTE — TRANSFER OF CARE
"Anesthesia Transfer of Care Note    Patient: Nena Mendez    Procedure(s) Performed: Procedure(s) (LRB):  ROBOTIC ASSISTED LAPAROSCOPIC PYLOROPLASTY (N/A)  TUBE-JEJUNOSTOMY-PLACEMENT-LAPAROSCOPIC//replacement (N/A)    Patient location: PACU    Anesthesia Type: general    Transport from OR: Transported from OR on 6-10 L/min O2 by face mask with adequate spontaneous ventilation    Post pain: adequate analgesia    Post assessment: no apparent anesthetic complications    Post vital signs: stable    Level of consciousness: awake and alert    Nausea/Vomiting: no nausea/vomiting    Complications: none          Last vitals:   Visit Vitals    /69 (BP Location: Left arm, Patient Position: Lying, BP Method: Automatic)    Temp 36.9 °C (98.5 °F) (Oral)    Resp 18    Ht 5' 6" (1.676 m)    Wt 54.4 kg (120 lb)    SpO2 100%    Breastfeeding No    BMI 19.37 kg/m2     "

## 2017-01-27 NOTE — ANESTHESIA POSTPROCEDURE EVALUATION
"Anesthesia Post Evaluation    Patient: Nena Mendez    Procedure(s) Performed: Procedure(s) (LRB):  ROBOTIC ASSISTED LAPAROSCOPIC PYLOROPLASTY (N/A)  TUBE-JEJUNOSTOMY-PLACEMENT-LAPAROSCOPIC//replacement (N/A)    Final Anesthesia Type: general  Patient location during evaluation: PACU  Patient participation: Yes- Able to Participate  Level of consciousness: awake and alert and oriented  Post-procedure vital signs: reviewed and stable  Pain management: adequate  Airway patency: patent  PONV status at discharge: nausea (controlled)  Anesthetic complications: no      Cardiovascular status: hemodynamically stable  Respiratory status: unassisted  Hydration status: euvolemic  Follow-up not needed.        Visit Vitals    /75    Pulse 62    Temp 36.5 °C (97.7 °F)    Resp 18    Ht 5' 6" (1.676 m)    Wt 54.4 kg (120 lb)    SpO2 99%    Breastfeeding No    BMI 19.37 kg/m2       Pain/Baljit Score: Pain Assessment Performed: Yes (1/27/2017 10:15 AM)  Presence of Pain: denies (1/27/2017 10:15 AM)  Pain Rating Prior to Med Admin: 8 (1/27/2017  9:23 AM)  Baljit Score: 10 (1/27/2017 10:15 AM)      "

## 2017-01-27 NOTE — ANESTHESIA PREPROCEDURE EVALUATION
2017  Nena Mendez is a 36 y.o., female.  Pre-operative evaluation for ROBOTIC ASSISTED LAPAROSCOPIC PYLOROPLASTY (N/A), TUBE-JEJUNOSTOMY-PLACEMENT-LAPAROSCOPIC//replacement (N/A)    Chief Complaint:gastroparesis    PMH:  endometriosis s/p laparoscopic surgery with complication (gastroparesis)  S/p gastric stimulator with ongoing weight loss, continued nausea    Past Surgical History   Procedure Laterality Date    Appendectomy      Cholecystectomy      Cosmetic surgery       section      Hysterectomy      Tonsillectomy      Gastrostomy-jejeunostomy tube change/placement  08/10/2016    Gastric stimulator implant surgery      Laparoscopic jejunostomy tube           Vital Signs Range (Last 24H):         CBC:   No results for input(s): WBC, RBC, HGB, HCT, PLT, MCV, MCH, MCHC in the last 720 hours.    CMP: No results for input(s): NA, K, CL, CO2, BUN, CREATININE, GLU, MG, PHOS, CALCIUM, ALBUMIN, PROT, ALKPHOS, ALT, AST, BILITOT in the last 720 hours.    INR:  No results for input(s): INR, PROTIME, APTT in the last 720 hours.    Invalid input(s): PT      Diagnostic Studies:      [facility] Echo:    [facility] Anesthesia Evaluation    I have reviewed the Patient Summary Reports.    I have reviewed the Nursing Notes.   I have reviewed the Medications.     Review of Systems  Anesthesia Hx:  No problems with previous Anesthesia    Social:  Smoker Smoked for last 8 years-quit 2 days ago   Cardiovascular:  Cardiovascular Normal     Pulmonary:  Pulmonary Normal    Neurological:  Neurology Normal        Physical Exam  General:  Well nourished    Airway/Jaw/Neck:  Airway Findings: Mouth Opening: Normal Tongue: Normal  General Airway Assessment: Good  Mallampati: I  TM Distance: Normal, at least 6 cm  Jaw/Neck Findings:  Neck ROM: Normal ROM      Dental:  Dental Findings: In tact    Chest/Lungs:  Chest/Lungs Findings: Clear to auscultation, Normal Respiratory Rate     Heart/Vascular:  Heart Findings: Rate: Normal  Rhythm: Regular Rhythm  Sounds: Normal        Mental Status:  Mental Status Findings:  Cooperative, Alert and Oriented         Anesthesia Plan  Type of Anesthesia, risks & benefits discussed:  Anesthesia Type:  general  Patient's Preference:   Intra-op Monitoring Plan:   Intra-op Monitoring Plan Comments:   Post Op Pain Control Plan:   Post Op Pain Control Plan Comments:   Induction:   IV  Beta Blocker:  Patient is not currently on a Beta-Blocker (No further documentation required).       Informed Consent: Patient understands risks and agrees with Anesthesia plan.  Questions answered. Anesthesia consent signed with patient.  ASA Score: 3     Day of Surgery Review of History & Physical:    H&P update referred to the surgeon.     Anesthesia Plan Notes: RSI-tube feedings stopped 2 days ago per patient        Ready For Surgery From Anesthesia Perspective.

## 2017-01-28 LAB
ALBUMIN SERPL BCP-MCNC: 3 G/DL
ALBUMIN SERPL BCP-MCNC: 3.1 G/DL
ALP SERPL-CCNC: 81 U/L
ALP SERPL-CCNC: 82 U/L
ALT SERPL W/O P-5'-P-CCNC: 33 U/L
ALT SERPL W/O P-5'-P-CCNC: 37 U/L
ANION GAP SERPL CALC-SCNC: 8 MMOL/L
ANION GAP SERPL CALC-SCNC: 8 MMOL/L
AST SERPL-CCNC: 27 U/L
AST SERPL-CCNC: 35 U/L
BASOPHILS # BLD AUTO: 0.02 K/UL
BASOPHILS # BLD AUTO: 0.03 K/UL
BASOPHILS NFR BLD: 0.4 %
BASOPHILS NFR BLD: 0.4 %
BILIRUB SERPL-MCNC: 0.2 MG/DL
BILIRUB SERPL-MCNC: 0.3 MG/DL
BUN SERPL-MCNC: 5 MG/DL
BUN SERPL-MCNC: 6 MG/DL
CALCIUM SERPL-MCNC: 8.7 MG/DL
CALCIUM SERPL-MCNC: 8.8 MG/DL
CHLORIDE SERPL-SCNC: 106 MMOL/L
CHLORIDE SERPL-SCNC: 108 MMOL/L
CO2 SERPL-SCNC: 25 MMOL/L
CO2 SERPL-SCNC: 27 MMOL/L
CREAT SERPL-MCNC: 0.7 MG/DL
CREAT SERPL-MCNC: 0.7 MG/DL
DIFFERENTIAL METHOD: ABNORMAL
DIFFERENTIAL METHOD: ABNORMAL
EOSINOPHIL # BLD AUTO: 0.1 K/UL
EOSINOPHIL # BLD AUTO: 0.1 K/UL
EOSINOPHIL NFR BLD: 2 %
EOSINOPHIL NFR BLD: 2.6 %
ERYTHROCYTE [DISTWIDTH] IN BLOOD BY AUTOMATED COUNT: 13.3 %
ERYTHROCYTE [DISTWIDTH] IN BLOOD BY AUTOMATED COUNT: 13.4 %
EST. GFR  (AFRICAN AMERICAN): >60 ML/MIN/1.73 M^2
EST. GFR  (AFRICAN AMERICAN): >60 ML/MIN/1.73 M^2
EST. GFR  (NON AFRICAN AMERICAN): >60 ML/MIN/1.73 M^2
EST. GFR  (NON AFRICAN AMERICAN): >60 ML/MIN/1.73 M^2
GLUCOSE SERPL-MCNC: 78 MG/DL
GLUCOSE SERPL-MCNC: 83 MG/DL
HCT VFR BLD AUTO: 31.9 %
HCT VFR BLD AUTO: 32 %
HGB BLD-MCNC: 10.4 G/DL
HGB BLD-MCNC: 10.8 G/DL
LYMPHOCYTES # BLD AUTO: 1.4 K/UL
LYMPHOCYTES # BLD AUTO: 2.4 K/UL
LYMPHOCYTES NFR BLD: 28.4 %
LYMPHOCYTES NFR BLD: 34.8 %
MAGNESIUM SERPL-MCNC: 1.8 MG/DL
MCH RBC QN AUTO: 28.7 PG
MCH RBC QN AUTO: 29.2 PG
MCHC RBC AUTO-ENTMCNC: 32.5 %
MCHC RBC AUTO-ENTMCNC: 33.9 %
MCV RBC AUTO: 86 FL
MCV RBC AUTO: 88 FL
MONOCYTES # BLD AUTO: 0.4 K/UL
MONOCYTES # BLD AUTO: 0.4 K/UL
MONOCYTES NFR BLD: 5.6 %
MONOCYTES NFR BLD: 6.9 %
NEUTROPHILS # BLD AUTO: 3.1 K/UL
NEUTROPHILS # BLD AUTO: 4 K/UL
NEUTROPHILS NFR BLD: 57.1 %
NEUTROPHILS NFR BLD: 61.5 %
PHOSPHATE SERPL-MCNC: 4.4 MG/DL
PLATELET # BLD AUTO: 306 K/UL
PLATELET # BLD AUTO: 312 K/UL
PMV BLD AUTO: 8.5 FL
PMV BLD AUTO: 8.6 FL
POTASSIUM SERPL-SCNC: 4 MMOL/L
POTASSIUM SERPL-SCNC: 4.1 MMOL/L
PROT SERPL-MCNC: 5.5 G/DL
PROT SERPL-MCNC: 5.7 G/DL
RBC # BLD AUTO: 3.63 M/UL
RBC # BLD AUTO: 3.7 M/UL
SODIUM SERPL-SCNC: 141 MMOL/L
SODIUM SERPL-SCNC: 141 MMOL/L
WBC # BLD AUTO: 5.04 K/UL
WBC # BLD AUTO: 6.93 K/UL

## 2017-01-28 PROCEDURE — 85025 COMPLETE CBC W/AUTO DIFF WBC: CPT

## 2017-01-28 PROCEDURE — 63600175 PHARM REV CODE 636 W HCPCS: Performed by: SURGERY

## 2017-01-28 PROCEDURE — 36415 COLL VENOUS BLD VENIPUNCTURE: CPT

## 2017-01-28 PROCEDURE — 80053 COMPREHEN METABOLIC PANEL: CPT | Mod: 91

## 2017-01-28 PROCEDURE — 25000003 PHARM REV CODE 250: Performed by: STUDENT IN AN ORGANIZED HEALTH CARE EDUCATION/TRAINING PROGRAM

## 2017-01-28 PROCEDURE — 80053 COMPREHEN METABOLIC PANEL: CPT

## 2017-01-28 PROCEDURE — 97802 MEDICAL NUTRITION INDIV IN: CPT

## 2017-01-28 PROCEDURE — 25000003 PHARM REV CODE 250: Performed by: SURGERY

## 2017-01-28 PROCEDURE — 11000001 HC ACUTE MED/SURG PRIVATE ROOM

## 2017-01-28 PROCEDURE — 84100 ASSAY OF PHOSPHORUS: CPT

## 2017-01-28 PROCEDURE — 83735 ASSAY OF MAGNESIUM: CPT

## 2017-01-28 RX ORDER — PROMETHAZINE HYDROCHLORIDE 6.25 MG/5ML
12.5 SYRUP ORAL DAILY
Qty: 240 ML | Refills: 1 | Status: SHIPPED | OUTPATIENT
Start: 2017-01-28 | End: 2017-05-09

## 2017-01-28 RX ORDER — HYDROCODONE BITARTRATE AND ACETAMINOPHEN 7.5; 325 MG/15ML; MG/15ML
15 SOLUTION ORAL EVERY 4 HOURS PRN
Qty: 473 ML | Refills: 0 | Status: SHIPPED | OUTPATIENT
Start: 2017-01-28 | End: 2018-03-15

## 2017-01-28 RX ORDER — MAGNESIUM SULFATE HEPTAHYDRATE 40 MG/ML
2 INJECTION, SOLUTION INTRAVENOUS ONCE
Status: COMPLETED | OUTPATIENT
Start: 2017-01-28 | End: 2017-01-28

## 2017-01-28 RX ORDER — ONDANSETRON 8 MG/1
8 TABLET, ORALLY DISINTEGRATING ORAL EVERY 6 HOURS PRN
Status: DISCONTINUED | OUTPATIENT
Start: 2017-01-28 | End: 2017-01-29 | Stop reason: HOSPADM

## 2017-01-28 RX ORDER — HYDROCODONE BITARTRATE AND ACETAMINOPHEN 7.5; 325 MG/15ML; MG/15ML
15 SOLUTION ORAL EVERY 4 HOURS PRN
Status: DISCONTINUED | OUTPATIENT
Start: 2017-01-28 | End: 2017-01-29 | Stop reason: HOSPADM

## 2017-01-28 RX ORDER — ONDANSETRON 8 MG/1
8 TABLET, ORALLY DISINTEGRATING ORAL ONCE
Status: DISCONTINUED | OUTPATIENT
Start: 2017-01-28 | End: 2017-01-28

## 2017-01-28 RX ADMIN — ONDANSETRON 4 MG: 2 INJECTION INTRAMUSCULAR; INTRAVENOUS at 11:01

## 2017-01-28 RX ADMIN — HYDROCODONE BITARTRATE AND ACETAMINOPHEN 15 ML: 7.5; 325 SOLUTION ORAL at 06:01

## 2017-01-28 RX ADMIN — PROMETHAZINE HYDROCHLORIDE 6.25 MG: 25 INJECTION INTRAMUSCULAR; INTRAVENOUS at 09:01

## 2017-01-28 RX ADMIN — PROMETHAZINE HYDROCHLORIDE 6.25 MG: 25 INJECTION INTRAMUSCULAR; INTRAVENOUS at 06:01

## 2017-01-28 RX ADMIN — ENOXAPARIN SODIUM 40 MG: 100 INJECTION SUBCUTANEOUS at 05:01

## 2017-01-28 RX ADMIN — HYDROCODONE BITARTRATE AND ACETAMINOPHEN 15 ML: 7.5; 325 SOLUTION ORAL at 01:01

## 2017-01-28 RX ADMIN — METRONIDAZOLE 500 MG: 500 INJECTION, SOLUTION INTRAVENOUS at 06:01

## 2017-01-28 RX ADMIN — MAGNESIUM SULFATE IN WATER 2 G: 40 INJECTION, SOLUTION INTRAVENOUS at 06:01

## 2017-01-28 RX ADMIN — ONDANSETRON 8 MG: 8 TABLET, ORALLY DISINTEGRATING ORAL at 07:01

## 2017-01-28 RX ADMIN — CEFTRIAXONE 2 G: 2 INJECTION, SOLUTION INTRAVENOUS at 06:01

## 2017-01-28 RX ADMIN — HYDROCODONE BITARTRATE AND ACETAMINOPHEN 15 ML: 7.5; 325 SOLUTION ORAL at 09:01

## 2017-01-28 NOTE — PROGRESS NOTES
Resumed care of patient from [facility]on. VSS. Patient resting in bed. No complaints of pain. Pt still reports feeling nauseated and not ready to start tube feeding. Per MD Hillary Junior aware and ok with tube feeding being started when pt's nausea subsides. Will cont to monitor.

## 2017-01-28 NOTE — PLAN OF CARE
Problem: Patient Care Overview  Goal: Plan of Care Review  Outcome: Ongoing (interventions implemented as appropriate)  Pt aaox3, vss, and has pain controlled with prn meds.  Pt able to void and ambulate without difficulty, remains npo.  C/o headache with pain medications, md notified and no new orders received.  Remains free from falls or injuries at this time, will continue to monitor.

## 2017-01-28 NOTE — PROGRESS NOTES
Progress Note  General Surgery    Admit Date: 1/27/2017  Post-operative Day: 1 Day Post-Op  Hospital Day: 2    SUBJECTIVE: NAEON. C/o headache, a little nausea, no emesis. Pain controlled.      Follow-up For:  Procedure(s) (LRB):  ROBOTIC ASSISTED LAPAROSCOPIC PYLOROPLASTY (N/A)  TUBE-JEJUNOSTOMY-PLACEMENT-LAPAROSCOPIC//replacement (N/A)    Scheduled Meds:   cefTRIAXone (ROCEPHIN) IVPB  2 g Intravenous Q12H    And    metronidazole  500 mg Intravenous Q8H    enoxaparin  40 mg Subcutaneous Q24H    magnesium sulfate IVPB  2 g Intravenous Once     Continuous Infusions:   lactated ringers 125 mL/hr at 01/27/17 2050     PRN Meds:diphenhydrAMINE, hydrocodone-apap 2.5-108 MG/5 ML, naloxone, ondansetron, promethazine (PHENERGAN) IVPB    Review of patient's allergies indicates:   Allergen Reactions    Latex, natural rubber Rash    Iodine Swelling    Iodine and iodide containing products Swelling         OBJECTIVE:     Vital Signs (Most Recent)  Temp: 98.1 °F (36.7 °C) (01/28/17 0400)  Pulse: 68 (01/28/17 0400)  Resp: 16 (01/28/17 0400)  BP: 120/75 (01/28/17 0400)  SpO2: 99 % (01/28/17 0400)    Vital Signs Range (Last 24H):  Temp:  [97.2 °F (36.2 °C)-98.9 °F (37.2 °C)]   Pulse:  []   Resp:  [10-23]   BP: (106-139)/(56-93)   SpO2:  [95 %-100 %]     I & O (Last 24H):  Intake/Output Summary (Last 24 hours) at 01/28/17 0628  Last data filed at 01/27/17 2145   Gross per 24 hour   Intake             2100 ml   Output                0 ml   Net             2100 ml     Physical Exam:  NAD  RRR  Unlabored breathing  Abd soft, nondistended, appropriately TTP, incisions C/D/I  Alert and oriented    Laboratory:  CBC:   Recent Labs  Lab 01/28/17  0339   WBC 6.93   RBC 3.63*   HGB 10.4*   HCT 32.0*      MCV 88   MCH 28.7   MCHC 32.5     BMP:   Recent Labs  Lab 01/28/17  0339   GLU 78      K 4.0      CO2 25   BUN 6   CREATININE 0.7   CALCIUM 8.8     Labs within the past 24 hours have been  reviewed.      ASSESSMENT/PLAN:   37 yo F POD#1 robotic pyloroplasty for gastroparesis and j-tube replacement    -start clears  -d/c PCA, start Hycet  -hypomagnesemia- replace  -encourage ambulation  -start home j-tube feeds    Marta Thornton MD  PGY-3 General Surgery  Pager: [phone]

## 2017-01-28 NOTE — PROGRESS NOTES
[facility]  Adult Nutrition  Consult Note    SUMMARY     Recommendations    Recommendation/Intervention:   1. Current TF order to provide excess calories and protein. Recommend modifying TF to Isosource 1.5 at 45mL/hr.     -Will provide 1440kcal, 65g protein, and 733mL fluid.   2. Advance diet as tolerated to Light/GI soft. Will monitor.   Goals: Pt to tolerate TF to meet % EEN and EPN.   Nutrition Goal Status: new  Communication of RD Recs: reviewed with RN    Reason for Assessment    Reason for Assessment: new tube feeding  Diagnosis: other (see comments) (s/p j-tube replacement, pyloroplasty)  Relevent Medical History: gastroparesis, s/p kidney and liver tx   Nutrition Discharge Planning: Home with TF, see recs.     Nutrition Prescription Ordered    Current Diet Order: Clear Liquid  Nutrition Order Comments: TF ordered, not running  Current Nutrition Support Formula Ordered: Impact Peptide 1.5  Current Nutrition Support Rate Ordered: 60 (ml)  Current Nutrition Support Frequency Ordered: mL/hr     Nutrition Risk Screen     Nutrition Risk Screen: tube feeding or parenteral nutrition    Nutrition/Diet History    Typical Food/Fluid Intake: TF ordered, not yet started. Pt reports receiving TF pta - Impact Peptide, only tolerating at 10mL/hr. Pt was eating some by mouth but not much was tolerated. Does report 50lb wt loss over 6 months last year but has maintained wt since Aug/Sept.   Factors Affecting Nutritional Intake: altered gastrointestinal function, nausea/vomiting    Labs/Tests/Procedures/Meds    Pertinent Labs Reviewed: reviewed  Pertinent Labs Comments: Alb 3  Pertinent Medications Reviewed: reviewed    Physical Findings    Overall Physical Appearance: generalized wasting (weakness)  Tubes: jejunostomy tube  Oral/Mouth Cavity: WDL  Skin: other (see comments) (incision in abd)    Anthropometrics    Height (inches): 65.98 in  Weight Method: Stated  Weight (kg): 54.4 kg  Ideal Body  Weight (IBW), Female: 129.9 lb  % Ideal Body Weight, Female (lb): 92.32   BMI (kg/m2): 19.37  BMI Grade: 18.5-24.9 - normal  Usual Body Weight (UBW), k kg  % Usual Body Weight: 70.65  Weight Loss:  (maintenance of 120lb since Aug/sept)    Estimated/Assessed Needs    Weight Used For Calorie Calculations: 54.4 kg (119 lb 14.9 oz)   Height (cm): 167.6 cm  Energy Need Method: Brewton-St Jeor (1.3 PAL: 1640kcal)  RMR (Brewton-St. Jeor Equation): 1262.68  Weight Used For Protein Calculations: 54.4 kg (119 lb 14.9 oz)  Protein Requirements: 54-65g (1-1.2g/kg)  Fluid Need Method: RDA Method (or per MD)    Malnutrition (Undernutrition) Diagnosis    % Intake of Estimated Energy Needs: 0 - 25%  % Meal Intake: 0%    Nutrition Diagnosis    Nutrition Problem: Inadequate energy intake  Etiology/Related To: decreased ability to consume adequate energy  Nutrition Diagnosis Signs/Symptoms As Evidenced By: pt with poor tolerance of foods, TF not yet started  Nutrition Diagnosis Status: New    Monitor and Evaluation    Food and Nutrient Intake: energy intake, enteral nutrition intake  Food and Nutrient Adminstration: diet order, enteral and parenteral nutrition administration  Anthropometric Measurements: weight, weight change  Biochemical Data, Medical Tests and Procedures: other (specify) (All labs)  Nutrition-Focused Physical Findings: overall appearance    Nutrition Risk    Level of Risk: high    Nutrition Follow-Up    RD Follow-up?: Yes

## 2017-01-29 VITALS
HEART RATE: 63 BPM | OXYGEN SATURATION: 95 % | BODY MASS INDEX: 19.29 KG/M2 | SYSTOLIC BLOOD PRESSURE: 112 MMHG | DIASTOLIC BLOOD PRESSURE: 74 MMHG | HEIGHT: 66 IN | RESPIRATION RATE: 16 BRPM | TEMPERATURE: 98 F | WEIGHT: 120 LBS

## 2017-01-29 LAB
ALBUMIN SERPL BCP-MCNC: 2.9 G/DL
ALP SERPL-CCNC: 77 U/L
ALT SERPL W/O P-5'-P-CCNC: 27 U/L
ANION GAP SERPL CALC-SCNC: 6 MMOL/L
AST SERPL-CCNC: 17 U/L
BASOPHILS # BLD AUTO: 0.02 K/UL
BASOPHILS NFR BLD: 0.4 %
BILIRUB SERPL-MCNC: 0.3 MG/DL
BUN SERPL-MCNC: 4 MG/DL
CALCIUM SERPL-MCNC: 8.4 MG/DL
CHLORIDE SERPL-SCNC: 107 MMOL/L
CO2 SERPL-SCNC: 28 MMOL/L
CREAT SERPL-MCNC: 0.6 MG/DL
DIFFERENTIAL METHOD: ABNORMAL
EOSINOPHIL # BLD AUTO: 0.3 K/UL
EOSINOPHIL NFR BLD: 4.7 %
ERYTHROCYTE [DISTWIDTH] IN BLOOD BY AUTOMATED COUNT: 13.4 %
EST. GFR  (AFRICAN AMERICAN): >60 ML/MIN/1.73 M^2
EST. GFR  (NON AFRICAN AMERICAN): >60 ML/MIN/1.73 M^2
GLUCOSE SERPL-MCNC: 82 MG/DL
HCT VFR BLD AUTO: 31 %
HGB BLD-MCNC: 10.4 G/DL
LYMPHOCYTES # BLD AUTO: 2.3 K/UL
LYMPHOCYTES NFR BLD: 43.2 %
MAGNESIUM SERPL-MCNC: 1.8 MG/DL
MCH RBC QN AUTO: 29.1 PG
MCHC RBC AUTO-ENTMCNC: 33.5 %
MCV RBC AUTO: 87 FL
MONOCYTES # BLD AUTO: 0.4 K/UL
MONOCYTES NFR BLD: 8.1 %
NEUTROPHILS # BLD AUTO: 2.3 K/UL
NEUTROPHILS NFR BLD: 43.4 %
PHOSPHATE SERPL-MCNC: 3.2 MG/DL
PLATELET # BLD AUTO: 303 K/UL
PMV BLD AUTO: 8.8 FL
POTASSIUM SERPL-SCNC: 3.9 MMOL/L
PROT SERPL-MCNC: 5.2 G/DL
RBC # BLD AUTO: 3.58 M/UL
SODIUM SERPL-SCNC: 141 MMOL/L
WBC # BLD AUTO: 5.28 K/UL

## 2017-01-29 PROCEDURE — 25000003 PHARM REV CODE 250: Performed by: SURGERY

## 2017-01-29 PROCEDURE — 84100 ASSAY OF PHOSPHORUS: CPT

## 2017-01-29 PROCEDURE — 83735 ASSAY OF MAGNESIUM: CPT

## 2017-01-29 PROCEDURE — 36415 COLL VENOUS BLD VENIPUNCTURE: CPT

## 2017-01-29 PROCEDURE — 63600175 PHARM REV CODE 636 W HCPCS: Performed by: SURGERY

## 2017-01-29 PROCEDURE — 80053 COMPREHEN METABOLIC PANEL: CPT

## 2017-01-29 PROCEDURE — 85025 COMPLETE CBC W/AUTO DIFF WBC: CPT

## 2017-01-29 RX ORDER — MAGNESIUM SULFATE HEPTAHYDRATE 40 MG/ML
2 INJECTION, SOLUTION INTRAVENOUS ONCE
Status: COMPLETED | OUTPATIENT
Start: 2017-01-29 | End: 2017-01-29

## 2017-01-29 RX ADMIN — HYDROCODONE BITARTRATE AND ACETAMINOPHEN 15 ML: 7.5; 325 SOLUTION ORAL at 10:01

## 2017-01-29 RX ADMIN — MAGNESIUM SULFATE IN WATER 2 G: 40 INJECTION, SOLUTION INTRAVENOUS at 09:01

## 2017-01-29 RX ADMIN — PROMETHAZINE HYDROCHLORIDE 12.5 MG: 25 INJECTION INTRAMUSCULAR; INTRAVENOUS at 11:01

## 2017-01-29 RX ADMIN — HYDROCODONE BITARTRATE AND ACETAMINOPHEN 15 ML: 7.5; 325 SOLUTION ORAL at 04:01

## 2017-01-29 RX ADMIN — PROMETHAZINE HYDROCHLORIDE 6.25 MG: 25 INJECTION INTRAMUSCULAR; INTRAVENOUS at 04:01

## 2017-01-29 NOTE — PROGRESS NOTES
Dr. Morton called to see about patient being discharged. Stated that if patient feels well enough, she can be d/c today (per Dr. Thornton) & TF can be started when patient is home (doesn't need to be started prior to discharge). Pt states she would like to stay another night and leave in the morning. Dr. Morton aware and ordered PO zofran for patient to start and IV phenergan for break through. Will inform pt.

## 2017-01-29 NOTE — PLAN OF CARE
Problem: Patient Care Overview  Goal: Plan of Care Review  Outcome: Ongoing (interventions implemented as appropriate)  Plan of care reviewed with patient with no questions or concerns at this time. Pain was assessed and managed throughout shift. Patient voids without difficulty. IV intact with continuous fluids infusing. SCD's in place. Fall precautions in place with bed alarm set and call light within reach. Family at bedside. Will continue to monitor.

## 2017-01-29 NOTE — DISCHARGE SUMMARY
[facility]  Discharge Summary  General Surgery      Admit Date: 1/27/2017    Discharge Date and Time: 1/29/2017 8:35 AM    Attending Physician: Robert Kumar MD     Discharge Provider: Marta Thornton    Reason for Admission: Pyloroplasty    Procedures Performed: Procedure(s) (LRB):  ROBOTIC ASSISTED LAPAROSCOPIC PYLOROPLASTY (N/A)  TUBE-JEJUNOSTOMY-PLACEMENT-LAPAROSCOPIC//replacement (N/A)    Hospital Course (synopsis of major diagnoses, care, treatment, and services provided during the course of the hospital stay): 35 yo F with h/o gastroparesis admitted for planned robotic assisted pyloroplasty and replacement of her feeding j-tube. She tolerated the procedure well. She was started on a liquid diet. Her pain was controlled. She was discharged to home on POD#2.     Consults: none    Significant Diagnostic Studies: Labs:   CBC   Recent Labs  Lab 01/28/17  0339 01/28/17  1300 01/29/17  0423   WBC 6.93 5.04 5.28   HGB 10.4* 10.8* 10.4*   HCT 32.0* 31.9* 31.0*    306 303       Final Diagnoses:   Principal Problem: Gastroparesis   Secondary Diagnoses:   Active Hospital Problems    Diagnosis  POA    *S/P lap gastric neurostimulator placement  [K31.84]  Yes      Resolved Hospital Problems    Diagnosis Date Resolved POA   No resolved problems to display.       Discharged Condition: stable    Disposition: Home or Self Care    Follow Up/Patient Instructions:     Medications:  Reconciled Home Medications:   Current Discharge Medication List      START taking these medications    Details   hydrocodone-acetaminophen (HYCET) solution 7.5-325 mg/15mL Take 15 mLs by mouth every 4 (four) hours as needed.  Qty: 473 mL, Refills: 0      !! promethazine (PHENERGAN) 6.25 mg/5 mL syrup Take 10 mLs (12.5 mg total) by mouth once daily.  Qty: 240 mL, Refills: 1       !! - Potential duplicate medications found. Please discuss with provider.      CONTINUE these medications which have NOT CHANGED     Details   metoclopramide HCl (REGLAN) 5 mg/5 mL Soln 3 (three) times daily.       nystatin (MYCOSTATIN) ointment       ondansetron (ZOFRAN-ODT) 8 MG TbDL Take 1 tablet (8 mg total) by mouth every 8 (eight) hours as needed.  Qty: 30 tablet, Refills: 2      promethazine (PHENERGAN) 25 MG tablet TK 1 T PO Q 4 TO 6 H PRN  Refills: 0      acetaminophen (TYLENOL) 160 mg/5 mL Elix Take 20.3 mLs (649.6 mg total) by mouth every 6 (six) hours as needed.  Qty: 480 mL, Refills: 0      amitriptyline (ELAVIL) 25 MG tablet       griseofulvin (MARY ALICE-PEG) 125 MG tablet Take 1 tablet (125 mg total) by mouth once daily.  Qty: 30 tablet, Refills: 0      hydrocodone-acetaminophen 5-325mg (NORCO) 5-325 mg per tablet Take 1 tablet by mouth every 6 (six) hours as needed for Pain.  Qty: 40 tablet, Refills: 0      metoclopramide HCl (REGLAN) 5 MG tablet Take 5 mg by mouth 4 (four) times daily.      promethazine (PHENERGAN) 25 MG suppository Place 1 suppository (25 mg total) rectally every 6 (six) hours as needed for Nausea.  Qty: 40 suppository, Refills: 2      !! promethazine (PHENERGAN) 6.25 mg/5 mL syrup Take 20 mLs (25 mg total) by mouth once daily.  Qty: 473 mL, Refills: 11      senna-docusate 8.6-50 mg (PERICOLACE) 8.6-50 mg per tablet Take 1 tablet by mouth 2 (two) times daily.  Qty: 30 tablet, Refills: 0      sulfamethoxazole-trimethoprim 800-160mg (BACTRIM DS) 800-160 mg Tab        !! - Potential duplicate medications found. Please discuss with provider.      STOP taking these medications       tramadol (ULTRAM) 50 mg tablet Comments:   Reason for Stopping:               Discharge Procedure Orders  Other restrictions (specify):   Order Comments: Ok to shower on Sunday.   Crush all meds  Liquid diet for one week, and then soft diet until clinic visit  No driving while still taking pain medications daily.   Take a stool softener while taking pain medications daily.   No lifting more than 10 lbs     Call MD for:  temperature >100.4      Call MD for:  persistent nausea and vomiting or diarrhea     Call MD for:  severe uncontrolled pain     Call MD for:  redness, tenderness, or signs of infection (pain, swelling, redness, odor or green/yellow discharge around incision site)     Call MD for:  difficulty breathing or increased cough     Call MD for:  severe persistent headache     Call MD for:  worsening rash     Call MD for:  persistent dizziness, light-headedness, or visual disturbances     Call MD for:  increased confusion or weakness     No dressing needed   Order Comments: May remove band aids.  Keep steri-strips (white tape) intact over wound, they will be removed in clinic. If they fall off before that's ok       Follow-up Information     Follow up with Robert Kumar MD In 2 weeks.    Specialties:  General Surgery, Bariatrics    Contact information:    Janel HECK  [facility] [id number] [phone]

## 2017-01-29 NOTE — PLAN OF CARE
Problem: Patient Care Overview  Goal: Plan of Care Review  Outcome: Ongoing (interventions implemented as appropriate)  Pt alert and oriented to name, , sittuation and place. Pt pain managed and assessed. BITE MENU activities promoted. Will continue to assess and manage pain. Pt nausea and vomiting assessed and managed. Will continue to assess and manage. Pt bed in lowest position with call light within reach. No falls reported or observed at this time. Pt voids independently. Pt encouraged to ambulate as tolerated.

## 2017-01-29 NOTE — PROGRESS NOTES
Progress Note  General Surgery    Admit Date: 1/27/2017  Post-operative Day: 2 Days Post-Op  Hospital Day: 3    SUBJECTIVE: NAEON. Baseline nausea. Tolerating liquids. Pain controlled.      Follow-up For:  Procedure(s) (LRB):  ROBOTIC ASSISTED LAPAROSCOPIC PYLOROPLASTY (N/A)  TUBE-JEJUNOSTOMY-PLACEMENT-LAPAROSCOPIC//replacement (N/A)    Scheduled Meds:   enoxaparin  40 mg Subcutaneous Q24H    magnesium sulfate IVPB  2 g Intravenous Once    promethazine (PHENERGAN) IVPB  12.5 mg Intravenous Once     Continuous Infusions:   lactated ringers 125 mL/hr at 01/27/17 2050     PRN Meds:diphenhydrAMINE, hydrocodone-apap 2.5-108 MG/5 ML, naloxone, ondansetron, promethazine (PHENERGAN) IVPB    Review of patient's allergies indicates:   Allergen Reactions    Latex, natural rubber Rash    Iodine Swelling    Iodine and iodide containing products Swelling         OBJECTIVE:     Vital Signs (Most Recent)  Temp: 97.1 °F (36.2 °C) (01/29/17 0433)  Pulse: 64 (01/29/17 0433)  Resp: 16 (01/29/17 0433)  BP: 125/69 (01/29/17 0433)  SpO2: 97 % (01/29/17 0433)    Vital Signs Range (Last 24H):  Temp:  [96.9 °F (36.1 °C)-98.8 °F (37.1 °C)]   Pulse:  [61-76]   Resp:  [14-16]   BP: (114-125)/(69-77)   SpO2:  [95 %-97 %]     I & O (Last 24H):    Intake/Output Summary (Last 24 hours) at 01/29/17 0834  Last data filed at 01/29/17 0600   Gross per 24 hour   Intake             3280 ml   Output                0 ml   Net             3280 ml     Physical Exam:  NAD  RRR  Unlabored breathing  Abd soft, nondistended, appropriately TTP, incisions C/D/I  Alert and oriented    Laboratory:  CBC:     Recent Labs  Lab 01/29/17 0423   WBC 5.28   RBC 3.58*   HGB 10.4*   HCT 31.0*      MCV 87   MCH 29.1   MCHC 33.5     BMP:     Recent Labs  Lab 01/29/17 0423   GLU 82      K 3.9      CO2 28   BUN 4*   CREATININE 0.6   CALCIUM 8.4*     Labs within the past 24 hours have been reviewed.      ASSESSMENT/PLAN:   35 yo F POD#2 robotic  pyloroplasty for gastroparesis and j-tube replacement    -Cont clears  -Cont Hycet  -hypomagnesemia- replace  -encourage ambulation  -start home j-tube feeds  -dispo- home today    Marta Thornton MD  PGY-3 General Surgery  Pager: [phone]

## 2017-02-01 ENCOUNTER — TELEPHONE (OUTPATIENT)
Dept: SURGERY | Facility: CLINIC | Age: 37
End: 2017-02-01

## 2017-02-01 NOTE — TELEPHONE ENCOUNTER
----- Message from Robert Kumar MD sent at 2/1/2017  4:24 PM CST -----  Contact: Kelli with [facility]  See note below.  ----- Message -----     From: Marta Thornton MD     Sent: 2/1/2017   2:54 PM       To: Robert Kumar MD    I didn't think she needed it because I thought she already had everything she needed, but I can order it if she'd like.     ----- Message -----     From: Robert Kumar MD     Sent: 2/1/2017   2:38 PM       To: Marta Thornton MD, Chhaya Morillo, RN    Does she need home health orders?  ----- Message -----     From: Chhaya Morillo RN     Sent: 2/1/2017   2:20 PM       To: Robert Kumar MD    Any orders?  ----- Message -----     From: Robert Kumar MD     Sent: 1/31/2017   4:04 PM       To: Duke Sinclair MD, Chhaya Morillo, RN    Duke, did we order home health?  ----- Message -----     From: Chhaya Morillo RN     Sent: 1/30/2017   3:59 PM       To: Robert Kumar MD    Do we need to order home health for this pt?    ----- Message -----     From: Lincoln Negro     Sent: 1/30/2017  10:25 AM       To: José Nieto Staff    Caller needs to speak with you regarding pt. Please call pt at [phone]

## 2017-02-09 ENCOUNTER — OFFICE VISIT (OUTPATIENT)
Dept: SURGERY | Facility: CLINIC | Age: 37
End: 2017-02-09
Payer: COMMERCIAL

## 2017-02-09 VITALS
HEIGHT: 66 IN | DIASTOLIC BLOOD PRESSURE: 79 MMHG | SYSTOLIC BLOOD PRESSURE: 114 MMHG | WEIGHT: 119 LBS | HEART RATE: 74 BPM | TEMPERATURE: 99 F | BODY MASS INDEX: 19.13 KG/M2

## 2017-02-09 DIAGNOSIS — K31.84 GASTROPARESIS: ICD-10-CM

## 2017-02-09 DIAGNOSIS — Z09 POSTOP CHECK: Primary | ICD-10-CM

## 2017-02-09 PROCEDURE — 99024 POSTOP FOLLOW-UP VISIT: CPT | Mod: S$GLB,,, | Performed by: SURGERY

## 2017-02-09 PROCEDURE — 99999 PR PBB SHADOW E&M-EST. PATIENT-LVL III: CPT | Mod: PBBFAC,,, | Performed by: SURGERY

## 2017-02-09 RX ORDER — BUTALBITAL, ACETAMINOPHEN, CAFFEINE AND CODEINE PHOSPHATE 50; 325; 40; 30 MG/1; MG/1; MG/1; MG/1
CAPSULE ORAL
Refills: 3 | COMMUNITY
Start: 2017-01-20 | End: 2023-02-02 | Stop reason: CLARIF

## 2017-02-09 NOTE — PROGRESS NOTES
"Nena Mendez is a 36 y.o. female patient.   No diagnosis found.  Past Medical History   Diagnosis Date    Gastroparesis      Past Surgical History Pertinent Negatives   Procedure Date Noted    Brain surgery 8/4/2016    Breast surgery 8/4/2016    Coronary artery bypass graft 8/4/2016    Colon surgery 8/4/2016    Eye surgery 8/4/2016    Fracture surgery 8/4/2016    Hernia repair 8/4/2016    Joint replacement 8/4/2016    Kidney transplant 8/4/2016    Liver transplant 8/4/2016    Prostate surgery 8/4/2016    Small intestine surgery 8/4/2016    Spine surgery 8/4/2016     Scheduled Meds:  Continuous Infusions:  PRN Meds:    Review of patient's allergies indicates:   Allergen Reactions    Latex, natural rubber Rash    Iodine Swelling    Iodine and iodide containing products Swelling     There are no hospital problems to display for this patient.    Blood pressure 114/79, pulse 74, temperature 98.5 °F (36.9 °C), height 5' 6" (1.676 m), weight 54 kg (119 lb).    Subjective S/p gastric stimulator 10/16 and pyloroplasty and j tube switched for non-latex tube 1/27/17.  She has had no improvement in gastroparesis symptoms (nausea, vomiting and epigastric pain) but has had decrease in pain at the j tube site.  Objective  Wounds clear, abdomen benign.  Stimulator programming: imp 409 volt 4.5 cur 11 pw 330 rate 28 on 2 off 3   Assessment & Plan No significant change in gastroparesis symptoms but it is still early postop.  Light duty for one more month and rtc one month.  Gastroparesis diet sheet given.       Robert Kumar MD  2/9/2017  "

## 2017-02-09 NOTE — LETTER
[facility] - General Surgery  [address]  [facility] [address]  Phone: [phone] February 9, 2017      Jeancarlos Angel MD  [address]  [address]  [facility] [address]    Patient: Nena Mendez   MR Number: [id number]   YOB: 1980   Date of Visit: 2/9/2017     Dear Dr. Angel:    Thank you for referring Nena Mendez to me for evaluation. Below are the relevant portions of my assessment and plan of care.    Nena Mendez is a 36-year-old female patient gastric stimulator 10/16 and pyloroplasty and J - tube switched for non-latex tube 1/27/17. She has had no improvement in gastroparesis symptoms (nausea, vomiting and epigastric pain), but has had decrease in pain at the J - tube site.     PLAN: No significant change in gastroparesis symptoms, but it is still early post-op. Light duty for one more month and RTC in one month. Gastroparesis diet sheet given.    If you have questions, please do not hesitate to call me. I look forward to following Nena along with you.    Sincerely,      Robert Kumar MD   Section Head - General, Laparoscopic, Bariatric  Acute Care and Oncologic Surgery   - Surgical Weight Loss Program  [facility]    DUNG/shelley

## 2017-02-13 ENCOUNTER — TELEPHONE (OUTPATIENT)
Dept: SURGERY | Facility: CLINIC | Age: 37
End: 2017-02-13

## 2017-02-13 NOTE — TELEPHONE ENCOUNTER
----- Message from Lincoln Negro sent at 2/13/2017  1:50 PM CST -----  Pt wants to know if prescription was called in. Please call pt regarding this at [phone]

## 2017-02-21 ENCOUNTER — TELEPHONE (OUTPATIENT)
Dept: SURGERY | Facility: CLINIC | Age: 37
End: 2017-02-21

## 2017-02-21 RX ORDER — GRISEOFULVIN 125 MG/1
125 TABLET ORAL DAILY
Qty: 15 TABLET | Refills: 0 | Status: SHIPPED | OUTPATIENT
Start: 2017-02-21 | End: 2017-02-22 | Stop reason: CLARIF

## 2017-02-21 RX ORDER — SCOLOPAMINE TRANSDERMAL SYSTEM 1 MG/1
1 PATCH, EXTENDED RELEASE TRANSDERMAL
Qty: 30 PATCH | Refills: 3 | Status: SHIPPED | OUTPATIENT
Start: 2017-02-21 | End: 2018-03-15

## 2017-02-21 NOTE — TELEPHONE ENCOUNTER
----- Message from Dorina Retana sent at 2/20/2017  4:42 PM CST -----  Contact: Jhonny/   Jhonny States that they need a call returned by a nurse in reference to her being in the E.R with bad stomach pains.Please call Jhonny  @ [phone] . Thanks :)

## 2017-02-22 ENCOUNTER — TELEPHONE (OUTPATIENT)
Dept: SURGERY | Facility: CLINIC | Age: 37
End: 2017-02-22

## 2017-02-22 RX ORDER — TRAMADOL HYDROCHLORIDE 50 MG/1
50 TABLET ORAL EVERY 6 HOURS PRN
Status: ON HOLD | COMMUNITY
End: 2017-05-11 | Stop reason: HOSPADM

## 2017-02-22 NOTE — TELEPHONE ENCOUNTER
----- Message from Lincoln Negro sent at 2/22/2017 10:25 AM CST -----  Contact:   Caller said the wrong prescription was called in. Caller said pain medication was suppose to be called in. Please call regarding this at [phone]

## 2017-02-22 NOTE — TELEPHONE ENCOUNTER
Called pharmacy and canceled medication that was escribed incorrectly.  Called in Ultram 50mg for pt per Dr. Kumar and pt was notified.

## 2017-02-24 RX ORDER — SULFAMETHOXAZOLE AND TRIMETHOPRIM 800; 160 MG/1; MG/1
1 TABLET ORAL 2 TIMES DAILY
Qty: 20 TABLET | Refills: 0 | Status: SHIPPED | OUTPATIENT
Start: 2017-02-24 | End: 2017-03-06

## 2017-03-09 ENCOUNTER — OFFICE VISIT (OUTPATIENT)
Dept: WOUND CARE | Facility: CLINIC | Age: 37
End: 2017-03-09
Payer: COMMERCIAL

## 2017-03-09 ENCOUNTER — OFFICE VISIT (OUTPATIENT)
Dept: SURGERY | Facility: CLINIC | Age: 37
End: 2017-03-09
Payer: COMMERCIAL

## 2017-03-09 ENCOUNTER — TELEPHONE (OUTPATIENT)
Dept: SURGERY | Facility: CLINIC | Age: 37
End: 2017-03-09

## 2017-03-09 VITALS
SYSTOLIC BLOOD PRESSURE: 126 MMHG | HEIGHT: 66 IN | HEART RATE: 88 BPM | DIASTOLIC BLOOD PRESSURE: 74 MMHG | TEMPERATURE: 99 F | BODY MASS INDEX: 19.13 KG/M2 | WEIGHT: 119 LBS

## 2017-03-09 DIAGNOSIS — K94.19 JEJUNOSTOMY TUBE SITE PAIN: ICD-10-CM

## 2017-03-09 DIAGNOSIS — Z93.4 JEJUNOSTOMY PRESENT: Primary | ICD-10-CM

## 2017-03-09 DIAGNOSIS — K31.84 GASTROPARESIS: Primary | ICD-10-CM

## 2017-03-09 PROCEDURE — 1160F RVW MEDS BY RX/DR IN RCRD: CPT | Mod: S$GLB,,, | Performed by: CLINICAL NURSE SPECIALIST

## 2017-03-09 PROCEDURE — 99999 PR PBB SHADOW E&M-EST. PATIENT-LVL III: CPT | Mod: PBBFAC,,, | Performed by: CLINICAL NURSE SPECIALIST

## 2017-03-09 PROCEDURE — 99202 OFFICE O/P NEW SF 15 MIN: CPT | Mod: S$GLB,,, | Performed by: CLINICAL NURSE SPECIALIST

## 2017-03-09 PROCEDURE — 99999 PR PBB SHADOW E&M-EST. PATIENT-LVL III: CPT | Mod: PBBFAC,,, | Performed by: SURGERY

## 2017-03-09 PROCEDURE — 99024 POSTOP FOLLOW-UP VISIT: CPT | Mod: S$GLB,,, | Performed by: SURGERY

## 2017-03-09 RX ORDER — PROMETHAZINE HYDROCHLORIDE 25 MG/1
25 TABLET ORAL EVERY 6 HOURS PRN
Qty: 120 TABLET | Refills: 3 | Status: SHIPPED | OUTPATIENT
Start: 2017-03-09 | End: 2018-04-05 | Stop reason: SDUPTHER

## 2017-03-09 RX ORDER — CIPROFLOXACIN 500 MG/1
TABLET ORAL
COMMUNITY
Start: 2017-02-24 | End: 2017-05-09 | Stop reason: ALTCHOICE

## 2017-03-09 RX ORDER — METOCLOPRAMIDE 10 MG/1
10 TABLET ORAL 4 TIMES DAILY
Qty: 120 TABLET | Refills: 3 | Status: SHIPPED | OUTPATIENT
Start: 2017-03-09 | End: 2017-05-09 | Stop reason: CLARIF

## 2017-03-09 NOTE — LETTER
March 9, 2017      Robert Kumar MD  [address]  [facility] [address]           [facility]  [address]  [facility] [address]  Phone: [phone]          Patient: Nena Mendez   MR Number: [id number]   YOB: 1980   Date of Visit: 3/9/2017       Dear Dr. Robert Kumar:    Thank you for referring Nena Mendez to me for evaluation. Attached you will find relevant portions of my assessment and plan of care.    If you have questions, please do not hesitate to call me. I look forward to following Nena Mendez along with you.    Sincerely,    Karla Gao, CNS    Enclosure  CC:  No Recipients    If you would like to receive this communication electronically, please contact [email or web address] or [phone] to request more information on [employer] access.    For providers and/or their staff who would like to refer a patient to [facility], please contact us through our one-stop-shop provider referral line, [facility], at [phone].    If you feel you have received this communication in error or would no longer like to receive these types of communications, please e-mail [email or web address]

## 2017-03-09 NOTE — PROGRESS NOTES
Subjective:       Patient ID: Nena Mendez is a 36 y.o. female.    Chief Complaint: Jejunostomy and Skin Problem    HPI Comments: This is new pt sent from surgery clinic for help with jejunostomy tube,  Silicone tube replaced her previous latex and she is still having trouble and pain at site, comes for assess and advise. She comes with spouse     Review of Systems   Constitutional: Negative for activity change, appetite change and fever.   HENT: Negative for congestion and rhinorrhea.    Respiratory: Negative for cough and shortness of breath.    Cardiovascular: Negative for chest pain and leg swelling.   Gastrointestinal: Negative.         Gastroparesis and cannot eat, uses Jtube for nutrition   Genitourinary: Negative.    Musculoskeletal: Negative.    Skin: Positive for rash.   Neurological: Negative.    Psychiatric/Behavioral: Negative.        Objective:      Physical Exam   Constitutional: She is oriented to person, place, and time. She appears well-developed and well-nourished.   Pulmonary/Chest: Effort normal. No respiratory distress.   Abdominal: Soft. There is tenderness.   J tube site is slightly red but skin intact, the entrance site is dry and looks tender but pain is inside per pt,    Musculoskeletal: Normal range of motion. She exhibits no edema.   Neurological: She is alert and oriented to person, place, and time.   Skin: Skin is warm and dry.   Psychiatric: She has a normal mood and affect.       I replaced the cap with correct adaptor  I placed a [place] HDTAD for securement and pt said immediately it felt more secure   Gave her skin care ideas, oint or Cavilon   To call me if HDTAD works well and will request from DME    Assessment:       1. Jejunostomy present    2. Jejunostomy tube site pain        Plan:       Tube site eval and placed new securement device in hopes it reduces pain  Skin care remedies discussed and samples given  Call for any other assistance with supplies  Return as  needed  I have reviewed the plan of care with the patient and/ spouse and they express understanding. I spent over 50% of this 20 minute visit in face to face counseling.

## 2017-03-09 NOTE — TELEPHONE ENCOUNTER
----- Message from Lincoln Negro sent at 3/9/2017  3:19 PM CST -----  Richmond Shaver needs to give you update on wound care and ask about the other doctors she is suppose to see. Pt also has a question about a medication. Please call pt at [phone]

## 2017-03-09 NOTE — LETTER
[facility] - General Surgery  [address]  [facility] [address]  Phone: [phone] March 9, 2017      Jeancarlos Angel MD  [address]  [address]  [place] [address]    Patient: Nena Mendez   MR Number: [id number]   YOB: 1980   Date of Visit: 3/9/2017     Dear Dr. Angel:    Thank you for referring Nena Mendez to me for evaluation. Below are the relevant portions of my assessment and plan of care.    Nena Mendez is a 36-year-old female patient.   1. Status post lap gastric neurostimulator placement    2. Jejunostomy tube site pain      PLAN: We are considering subtotal gastrectomy but will have her see Dr. Orozco in GI first for GI motility consult. Consult ostomy nurse. She doesn't want her tube changed at this time and will continue to work on it with 7 up. I have increased her Phenergan and reglan. Obtain EGD.    If you have questions, please do not hesitate to call me. I look forward to following Nena along with you.    Sincerely,      Robert Kumar MD   Section Head - General, Laparoscopic, Bariatric  Acute Care and Oncologic Surgery   - Surgical Weight Loss Program  [facility]    WSSENAIT/shelley

## 2017-03-09 NOTE — TELEPHONE ENCOUNTER
Returned pt phone call-notified her of sending in prescription to pharmacy and also that i would get her an appt with Dr. Ernesto hay.      Also notified her that Dr. Kumar would like an EGD prior to surgery.  She will call her gastro phys.  And see what she needs to do so she can have that at home.  She will contact us if they need an order from Dr. Kumar.    Dr. Kumar ok to schedule surgery in 1 mo.  Pt to come to clinic then and discuss surgery.

## 2017-03-09 NOTE — PROGRESS NOTES
"Nena Mendez is a 36 y.o. female patient.   1. S/P lap gastric neurostimulator placement     2. Jejunostomy tube site pain      Past Medical History:   Diagnosis Date    Gastroparesis      Past Surgical History Pertinent Negatives:   Procedure Date Noted    BRAIN SURGERY 08/04/2016    BREAST SURGERY 08/04/2016    COLON SURGERY 08/04/2016    CORONARY ARTERY BYPASS GRAFT 08/04/2016    EYE SURGERY 08/04/2016    FRACTURE SURGERY 08/04/2016    HERNIA REPAIR 08/04/2016    JOINT REPLACEMENT 08/04/2016    KIDNEY TRANSPLANT 08/04/2016    LIVER TRANSPLANT 08/04/2016    PROSTATE SURGERY 08/04/2016    SMALL INTESTINE SURGERY 08/04/2016    SPINE SURGERY 08/04/2016     Scheduled Meds:  Continuous Infusions:  PRN Meds:    Review of patient's allergies indicates:   Allergen Reactions    Latex, natural rubber Rash    Iodine Swelling    Iodine and iodide containing products Swelling     There are no hospital problems to display for this patient.    Blood pressure 126/74, pulse 88, temperature 98.6 °F (37 °C), height 5' 6" (1.676 m), weight 54 kg (119 lb).    Subjective S/p gastric stimulator 10/7/16, s/p j tube 12/16/16 and s/p pyloroplasty 1/27/17.  She has had no significant change in her symptoms.  She is taking phenergan 2 times a day, tramadol at night and reglan 5 mg tid.  She is in 9/10 pain.  She says that zofran and reglan don't help.  Her j tube is partially clogged and she still has pain and exudate at the exit site (she is using antibiotics around the tube).  She has stopped her tube feeds for now and is maintaining her weight with difficulty.  Objective    Assessment & Plan We are considering subtotal gastrectomy but will have her see Dr. Orozco in GI first for GI motility consult.  Consult ostomy nurse.  She doesn't want her tube changed at this time and will continue to work on it with 7 up.  I have increased her phenergan and reglan.  Obtain egd.       Robert Kumar MD  3/9/2017  "

## 2017-03-14 ENCOUNTER — TELEPHONE (OUTPATIENT)
Dept: GASTROENTEROLOGY | Facility: CLINIC | Age: 37
End: 2017-03-14

## 2017-03-14 NOTE — TELEPHONE ENCOUNTER
----- Message from Patti Orozco MD sent at 3/13/2017  5:33 PM CDT -----  Lets to 8:30 on 4/7   ML  ----- Message -----     From: Yamila Chowdhury MA     Sent: 3/9/2017   2:51 PM       To: MD Dr José Fay wants to know if you can work this patient in soon

## 2017-04-04 ENCOUNTER — TELEPHONE (OUTPATIENT)
Dept: GASTROENTEROLOGY | Facility: CLINIC | Age: 37
End: 2017-04-04

## 2017-04-05 NOTE — TELEPHONE ENCOUNTER
Pt states that she might not be able to make appt time for 08:30. Pt will call back if appt needs to be moved to 1pm.

## 2017-04-07 ENCOUNTER — TELEPHONE (OUTPATIENT)
Dept: SURGERY | Facility: CLINIC | Age: 37
End: 2017-04-07

## 2017-04-07 NOTE — TELEPHONE ENCOUNTER
----- Message from Dorina Retana sent at 4/7/2017  4:31 PM CDT -----  Contact: / Jhonny Barry States that she needs a call returned by a nurse in reference to his wife condition.                   Please call jhonny  @ [phone]. Thanks :)

## 2017-04-07 NOTE — TELEPHONE ENCOUNTER
----- Message from Payal Elaine sent at 4/7/2017  3:59 PM CDT -----  Contact: self  Pt states she would like to speak with nurse.Please call Nena canas @ [phone].THank you.

## 2017-04-10 ENCOUNTER — TELEPHONE (OUTPATIENT)
Dept: SURGERY | Facility: CLINIC | Age: 37
End: 2017-04-10

## 2017-04-10 NOTE — TELEPHONE ENCOUNTER
Returned ED nurse phone call in reference to pt-She states that j-tube has partially come out and pt is wanting it removed d/t pain its causing and her not being able to use it any way.    -Advised it was best to leave it d/t nutritional status.   Pt has a scheduled appt with us on 4/13 to discuss further surgery-ER to advise pt to wait until she see's us in clinic to have it removed.

## 2017-04-10 NOTE — TELEPHONE ENCOUNTER
----- Message from Payal Elaine sent at 4/10/2017  9:53 AM CDT -----  Contact: Meche/indio  Caller states she would like to speak with  in ref to pt's feeding tube needing to possibly come out.Please call Meche back @ [phone].Thank you.

## 2017-04-11 ENCOUNTER — TELEPHONE (OUTPATIENT)
Dept: SURGERY | Facility: CLINIC | Age: 37
End: 2017-04-11

## 2017-04-11 NOTE — TELEPHONE ENCOUNTER
----- Message from Payal Elaine sent at 4/11/2017 12:27 PM CDT -----  Contact: TIFFANIE/  Caller states pt is back in the emergency room and he states they said the tube won't come out. He also states pt is in a lot of pain.Please call Tiffanie back @ [phone].Thank you.

## 2017-04-12 ENCOUNTER — OFFICE VISIT (OUTPATIENT)
Dept: SURGERY | Facility: CLINIC | Age: 37
End: 2017-04-12
Payer: COMMERCIAL

## 2017-04-12 DIAGNOSIS — K94.19 JEJUNOSTOMY TUBE SITE PAIN: ICD-10-CM

## 2017-04-12 DIAGNOSIS — K31.84 GASTROPARESIS: Primary | ICD-10-CM

## 2017-04-12 PROCEDURE — 99024 POSTOP FOLLOW-UP VISIT: CPT | Mod: S$GLB,,, | Performed by: SURGERY

## 2017-04-12 PROCEDURE — 99999 PR PBB SHADOW E&M-EST. PATIENT-LVL I: CPT | Mod: PBBFAC,,, | Performed by: SURGERY

## 2017-04-12 NOTE — LETTER
[facility] - General Surgery  [address]  [facility] [address]  Phone: [phone] April 12, 2017      Jeancarlos Angel MD  [address]  [address]  [facility] [address]    Patient: Nena Mendez   MR Number: [id number]   YOB: 1980   Date of Visit: 4/12/2017     Dear Dr. Angel:    Thank you for referring Nena Mendez to me for evaluation. Below are the relevant portions of my assessment and plan of care.    Nena Mendez is a 36-year-old female patient.   1. S/P lap gastric neurostimulator placement    2. Jejunostomy tube site pain      Plan: Although she has gained 2 pounds she is not any better. Will see if her insurance will pay for a sleeve or subtotal gastrectomy. RTC to schedule.    If you have questions, please do not hesitate to call me. I look forward to following Nena along with you.    Sincerely,      Robert Kumar MD   Section Head - General, Laparoscopic, Bariatric  Acute Care and Oncologic Surgery   - Surgical Weight Loss Program  [facility]    WSSENAIT/shelley

## 2017-04-12 NOTE — PROGRESS NOTES
Nena Mendez is a 36 y.o. female patient.   1. S/P lap gastric neurostimulator placement     2. Jejunostomy tube site pain      Past Medical History:   Diagnosis Date    Gastroparesis      Past Surgical History Pertinent Negatives:   Procedure Date Noted    BRAIN SURGERY 08/04/2016    BREAST SURGERY 08/04/2016    COLON SURGERY 08/04/2016    CORONARY ARTERY BYPASS GRAFT 08/04/2016    EYE SURGERY 08/04/2016    FRACTURE SURGERY 08/04/2016    HERNIA REPAIR 08/04/2016    JOINT REPLACEMENT 08/04/2016    KIDNEY TRANSPLANT 08/04/2016    LIVER TRANSPLANT 08/04/2016    PROSTATE SURGERY 08/04/2016    SMALL INTESTINE SURGERY 08/04/2016    SPINE SURGERY 08/04/2016     Scheduled Meds:  Continuous Infusions:  PRN Meds:    Review of patient's allergies indicates:   Allergen Reactions    Latex, natural rubber Rash    Iodine Swelling    Iodine and iodide containing products Swelling   Domperidone    There are no hospital problems to display for this patient.    There were no vitals taken for this visit.    Subjective Still with a lot of nausea and vomiting s/p stimulator and pyloroplasty.  Now with a j tube that has pulled back with a lot of pain.  She went to a local ER and they were not able to remove it.  She has been to [place] and had a trial of domperidone but had a reaction to that.  Objective J tube site with tenderness.  Otherwise it looks clear.  Injected with 2% xylocain with epi and removed without any difficulty (slipped out).  Dressing placed.  EGD from [place]: OK   Assessment & Plan Although she has gained 2 pounds she is not any better.  Will see if her insurance will pay for a sleeve or subtotal gastrectomy.  Rtc to schedule.        Robert Kumar MD  4/12/2017

## 2017-04-28 ENCOUNTER — TELEPHONE (OUTPATIENT)
Dept: SURGERY | Facility: CLINIC | Age: 37
End: 2017-04-28

## 2017-04-28 NOTE — TELEPHONE ENCOUNTER
----- Message from Dorina Rteana sent at 4/28/2017  4:29 PM CDT -----  Contact: self  Nena States that  She needs a call returned by a nurse in reference to her surgery next Friday. Please call Nena @ [phone]  . Thanks :)

## 2017-04-28 NOTE — TELEPHONE ENCOUNTER
Pt called to k up on auth status for sleeve or subtotal gastrectomy.    Informed her I will have Chhaya or Ulises call pt on Monday to f/u w/ pt.

## 2017-05-01 ENCOUNTER — TELEPHONE (OUTPATIENT)
Dept: SURGERY | Facility: CLINIC | Age: 37
End: 2017-05-01

## 2017-05-01 DIAGNOSIS — K31.84 GASTROPARESIS: Primary | ICD-10-CM

## 2017-05-09 ENCOUNTER — ANESTHESIA EVENT (OUTPATIENT)
Dept: SURGERY | Facility: HOSPITAL | Age: 37
DRG: 328 | End: 2017-05-09
Payer: COMMERCIAL

## 2017-05-09 ENCOUNTER — TELEPHONE (OUTPATIENT)
Dept: SURGERY | Facility: CLINIC | Age: 37
End: 2017-05-09

## 2017-05-09 RX ORDER — POLYETHYLENE GLYCOL 3350 17 G/17G
POWDER, FOR SOLUTION ORAL DAILY PRN
COMMUNITY
End: 2018-03-15

## 2017-05-09 NOTE — ANESTHESIA PREPROCEDURE EVALUATION
05/09/2017   Pre-operative evaluation for Procedure(s) (LRB):  GASTRECTOMY-SLEEVE-LAPAROSCOPIC (N/A)    Nena Mendez is a 36 y.o. female with PMH of former smoker quit 1/2017, gastroparesis, appendectomy, hysterectomy, cholecystectomy, robotic pyloroplasty, jejunostomy tube, gastric stimulator placement (10/2016).  Presents for procedures listed above.  Patient reports nausea and vomiting with pain at J tube site.    LDA: Jejunostomy tube    Prev airway:   Robotic Lap Pyloroplasty with Jejunostomy Tube Placement 1/27/2017  Present Prior to Hospital Arrival?: No; Placement Date: 01/27/17; Placement Time: 0715; Method of Intubation: Direct laryngoscopy; Inserted by: Other (KARISSA Chen); Airway Device: Endotracheal Tube; Mask Ventilation: Easy; Intubated: Postinduction; Blade: Tarsha #3; Airway Device Size: 7.0; Style: Cuffed; Cuff Inflation: Minimal occlusive pressure; Inflation Amount: 7; Placement Verified By: Auscultation, Capnometry, ETT Condensation; Grade: Grade II; Complicating Factors: None; Intubation Findings: Positive EtCO2, Bilateral breath sounds, Atraumatic/Condition of teeth unchanged;  Depth of Insertion: 21; Securment: Lips; Complications: None; Breath Sounds: Equal Bilateral; Insertion Attempts: 1; Removal Date: 01/27/17;  Removal Time: 0904    Drips:     Patient Active Problem List   Diagnosis    S/P lap gastric neurostimulator placement     Malnutrition    Jejunostomy tube site pain       Review of patient's allergies indicates:   Allergen Reactions    Domperidone Itching    Latex, natural rubber Rash    Iodine Swelling    Iodine and iodide containing products Swelling        No current facility-administered medications on file prior to encounter.      Current Outpatient Prescriptions on File Prior to Encounter   Medication Sig Dispense Refill    amitriptyline (ELAVIL)  25 MG tablet Take 25 mg by mouth every evening.       metoclopramide HCl (REGLAN) 5 mg/5 mL Soln 10 mg 3 (three) times daily.       promethazine (PHENERGAN) 25 MG tablet Take 1 tablet (25 mg total) by mouth every 6 (six) hours as needed for Nausea. 120 tablet 3    acetaminophen (TYLENOL) 160 mg/5 mL Elix Take 20.3 mLs (649.6 mg total) by mouth every 6 (six) hours as needed. 480 mL 0    butalbital-acetaminop-caf-cod -80-30 mg Cap TK 1 C PO Q 4 TO 6 H PRN P  3    hydrocodone-acetaminophen (HYCET) solution 7.5-325 mg/15mL Take 15 mLs by mouth every 4 (four) hours as needed. 473 mL 0    ondansetron (ZOFRAN-ODT) 8 MG TbDL Take 1 tablet (8 mg total) by mouth every 8 (eight) hours as needed. 30 tablet 2    promethazine (PHENERGAN) 25 MG suppository Place 1 suppository (25 mg total) rectally every 6 (six) hours as needed for Nausea. 40 suppository 2    scopolamine (TRANSDERM-SCOP) 1.5 mg (1 mg over 3 days) Place 1 patch (1.5 mg total) onto the skin every 72 hours. 30 patch 3    tramadol (ULTRAM) 50 mg tablet Take 50 mg by mouth every 6 (six) hours as needed for Pain.         Past Surgical History:   Procedure Laterality Date    APPENDECTOMY       SECTION      CHOLECYSTECTOMY      COSMETIC SURGERY      GASTRIC STIMULATOR IMPLANT SURGERY      GASTROSTOMY-JEJEUNOSTOMY TUBE CHANGE/PLACEMENT  08/10/2016    HYSTERECTOMY      laparoscopic jejunostomy tube      TONSILLECTOMY         Social History     Social History    Marital status:      Spouse name: N/A    Number of children: 2    Years of education: N/A     Occupational History    Not on file.     Social History Main Topics    Smoking status: Former Smoker     Packs/day: 0.00    Smokeless tobacco: Not on file      Comment: recently quit    Alcohol use No    Drug use: No    Sexual activity: Not on file     Other Topics Concern    Not on file     Social History Narrative         Vital Signs Range (Last 24H):         CBC: No results  for input(s): WBC, RBC, HGB, HCT, PLT, MCV, MCH, MCHC in the last 72 hours.    CMP: No results for input(s): NA, K, CL, CO2, BUN, CREATININE, GLU, MG, PHOS, CALCIUM, ALBUMIN, PROT, ALKPHOS, ALT, AST, BILITOT in the last 72 hours.    INR  No results for input(s): INR, PROTIME, APTT in the last 72 hours.    Invalid input(s): PT        Diagnostic Studies:  N/A    EKG:  N/A    2D Echo:  N/A        Anesthesia Evaluation    I have reviewed the Patient Summary Reports.    I have reviewed the Nursing Notes.   I have reviewed the Medications.     Review of Systems  Anesthesia Hx:  No problems with previous Anesthesia Denies Hx of Anesthetic complications  History of prior surgery of interest to airway management or planning: esophageal. Previous anesthesia: General robotic lap pyloroplasty 1/27/2017 with general anesthesia.  Denies Family Hx of Anesthesia complications.   Denies Personal Hx of Anesthesia complications.   Social:  Former Smoker    Hematology/Oncology:     Oncology Normal    -- Anemia:   EENT/Dental:EENT/Dental Normal   Cardiovascular:  Cardiovascular Normal     Pulmonary:  Pulmonary Normal    Renal/:  Renal/ Normal     Hepatic/GI:   S/p appendectomy, cholecystectomy, jejunostomy tube, gastric stimulator implant, hysterectomy.   Musculoskeletal:  Musculoskeletal Normal    OB/GYN/PEDS:  S/p hysterectomy   Neurological:  Neurology Normal    Endocrine:  Endocrine Normal    Dermatological:  Skin Normal    Psych:  Psychiatric Normal           Physical Exam  General:  Well nourished    Airway/Jaw/Neck:  Airway Findings: Mouth Opening: Normal Tongue: Normal  General Airway Assessment: Adult  Mallampati: I  Improves to I with phonation.  TM Distance: Normal, at least 6 cm        Eyes/Ears/Nose:  EYES/EARS/NOSE FINDINGS: Normal   Dental:  DENTAL FINDINGS: Normal   Chest/Lungs:  Chest/Lungs Clear    Heart/Vascular:  Heart Findings: Normal Heart murmur: negative       Mental Status:  Mental Status Findings: Normal         Anesthesia Plan  Type of Anesthesia, risks & benefits discussed:  Anesthesia Type:  general  Patient's Preference:   Intra-op Monitoring Plan: standard ASA monitors  Intra-op Monitoring Plan Comments:   Post Op Pain Control Plan:   Post Op Pain Control Plan Comments:   Induction:   IV  Beta Blocker:  Patient is not currently on a Beta-Blocker (No further documentation required).       Informed Consent: Patient understands risks and agrees with Anesthesia plan.  Questions answered. Anesthesia consent signed with patient.  ASA Score: 2     Day of Surgery Review of History & Physical: I have interviewed and examined the patient. I have reviewed the patient's H&P dated: 5/10/2017. There are no significant changes.          Ready For Surgery From Anesthesia Perspective.

## 2017-05-09 NOTE — PRE-PROCEDURE INSTRUCTIONS
Preop instructions: NPO after midnight or 8 hours prior to procedure time, shower instructions, directions, leave all valuables at home, medication instructions for PM prior & am of procedure explained. Patient stated an understanding.    Patient denies any side effects or issues with anesthesia or sedation.    Patient does not know arrival time. Explained that this information comes from the surgeons office and if they have not heard from them by 3pm to call office. Patient stated an understanding.

## 2017-05-10 ENCOUNTER — HOSPITAL ENCOUNTER (INPATIENT)
Facility: HOSPITAL | Age: 37
LOS: 1 days | Discharge: HOME | DRG: 328 | End: 2017-05-11
Attending: SURGERY | Admitting: SURGERY
Payer: COMMERCIAL

## 2017-05-10 ENCOUNTER — ANESTHESIA (OUTPATIENT)
Dept: SURGERY | Facility: HOSPITAL | Age: 37
DRG: 328 | End: 2017-05-10
Payer: COMMERCIAL

## 2017-05-10 DIAGNOSIS — K31.84 GASTROPARESIS: Primary | ICD-10-CM

## 2017-05-10 PROCEDURE — 25000003 PHARM REV CODE 250: Performed by: STUDENT IN AN ORGANIZED HEALTH CARE EDUCATION/TRAINING PROGRAM

## 2017-05-10 PROCEDURE — D9220A PRA ANESTHESIA: Mod: ,,, | Performed by: ANESTHESIOLOGY

## 2017-05-10 PROCEDURE — 63600175 PHARM REV CODE 636 W HCPCS: Performed by: STUDENT IN AN ORGANIZED HEALTH CARE EDUCATION/TRAINING PROGRAM

## 2017-05-10 PROCEDURE — 88307 TISSUE EXAM BY PATHOLOGIST: CPT | Mod: 26,,, | Performed by: PATHOLOGY

## 2017-05-10 PROCEDURE — 36000710: Performed by: SURGERY

## 2017-05-10 PROCEDURE — 37000008 HC ANESTHESIA 1ST 15 MINUTES: Performed by: SURGERY

## 2017-05-10 PROCEDURE — C9113 INJ PANTOPRAZOLE SODIUM, VIA: HCPCS | Performed by: STUDENT IN AN ORGANIZED HEALTH CARE EDUCATION/TRAINING PROGRAM

## 2017-05-10 PROCEDURE — 71000033 HC RECOVERY, INTIAL HOUR: Performed by: SURGERY

## 2017-05-10 PROCEDURE — 27000221 HC OXYGEN, UP TO 24 HOURS

## 2017-05-10 PROCEDURE — 94761 N-INVAS EAR/PLS OXIMETRY MLT: CPT

## 2017-05-10 PROCEDURE — 0DB64Z3 EXCISION OF STOMACH, PERCUTANEOUS ENDOSCOPIC APPROACH, VERTICAL: ICD-10-PCS | Performed by: SURGERY

## 2017-05-10 PROCEDURE — 88307 TISSUE EXAM BY PATHOLOGIST: CPT | Performed by: PATHOLOGY

## 2017-05-10 PROCEDURE — 25000003 PHARM REV CODE 250: Performed by: SURGERY

## 2017-05-10 PROCEDURE — 27201423 OPTIME MED/SURG SUP & DEVICES STERILE SUPPLY: Performed by: SURGERY

## 2017-05-10 PROCEDURE — 43659 UNLISTED LAPS PX STOMACH: CPT | Mod: ,,, | Performed by: SURGERY

## 2017-05-10 PROCEDURE — 36000711: Performed by: SURGERY

## 2017-05-10 PROCEDURE — 71000039 HC RECOVERY, EACH ADD'L HOUR: Performed by: SURGERY

## 2017-05-10 PROCEDURE — 37000009 HC ANESTHESIA EA ADD 15 MINS: Performed by: SURGERY

## 2017-05-10 PROCEDURE — 12000002 HC ACUTE/MED SURGE SEMI-PRIVATE ROOM

## 2017-05-10 RX ORDER — DIPHENHYDRAMINE HYDROCHLORIDE 50 MG/ML
12.5 INJECTION INTRAMUSCULAR; INTRAVENOUS EVERY 4 HOURS PRN
Status: DISCONTINUED | OUTPATIENT
Start: 2017-05-10 | End: 2017-05-11

## 2017-05-10 RX ORDER — ONDANSETRON 2 MG/ML
4 INJECTION INTRAMUSCULAR; INTRAVENOUS EVERY 6 HOURS PRN
Status: DISCONTINUED | OUTPATIENT
Start: 2017-05-10 | End: 2017-05-11

## 2017-05-10 RX ORDER — HYDROMORPHONE HCL IN 0.9% NACL 6 MG/30 ML
PATIENT CONTROLLED ANALGESIA SYRINGE INTRAVENOUS CONTINUOUS
Status: DISCONTINUED | OUTPATIENT
Start: 2017-05-10 | End: 2017-05-11

## 2017-05-10 RX ORDER — SODIUM CHLORIDE 0.9 % (FLUSH) 0.9 %
3 SYRINGE (ML) INJECTION
Status: DISCONTINUED | OUTPATIENT
Start: 2017-05-10 | End: 2017-05-11 | Stop reason: HOSPADM

## 2017-05-10 RX ORDER — LIDOCAINE HYDROCHLORIDE ANHYDROUS AND DEXTROSE MONOHYDRATE .8; 5 G/100ML; G/100ML
INJECTION, SOLUTION INTRAVENOUS CONTINUOUS PRN
Status: DISCONTINUED | OUTPATIENT
Start: 2017-05-10 | End: 2017-05-10

## 2017-05-10 RX ORDER — BUPIVACAINE HYDROCHLORIDE 2.5 MG/ML
INJECTION, SOLUTION EPIDURAL; INFILTRATION; INTRACAUDAL
Status: DISCONTINUED | OUTPATIENT
Start: 2017-05-10 | End: 2017-05-10 | Stop reason: HOSPADM

## 2017-05-10 RX ORDER — SUCCINYLCHOLINE CHLORIDE 20 MG/ML
INJECTION INTRAMUSCULAR; INTRAVENOUS
Status: DISCONTINUED | OUTPATIENT
Start: 2017-05-10 | End: 2017-05-10

## 2017-05-10 RX ORDER — NEOSTIGMINE METHYLSULFATE 1 MG/ML
INJECTION, SOLUTION INTRAVENOUS
Status: DISCONTINUED | OUTPATIENT
Start: 2017-05-10 | End: 2017-05-10

## 2017-05-10 RX ORDER — ACETAMINOPHEN 10 MG/ML
INJECTION, SOLUTION INTRAVENOUS
Status: DISCONTINUED | OUTPATIENT
Start: 2017-05-10 | End: 2017-05-10

## 2017-05-10 RX ORDER — PHENYLEPHRINE HYDROCHLORIDE 10 MG/ML
INJECTION INTRAVENOUS
Status: DISCONTINUED | OUTPATIENT
Start: 2017-05-10 | End: 2017-05-10

## 2017-05-10 RX ORDER — SODIUM CHLORIDE 9 MG/ML
INJECTION, SOLUTION INTRAVENOUS CONTINUOUS
Status: DISCONTINUED | OUTPATIENT
Start: 2017-05-10 | End: 2017-05-11

## 2017-05-10 RX ORDER — SODIUM CHLORIDE 9 MG/ML
INJECTION, SOLUTION INTRAVENOUS CONTINUOUS PRN
Status: DISCONTINUED | OUTPATIENT
Start: 2017-05-10 | End: 2017-05-10

## 2017-05-10 RX ORDER — KETAMINE HCL IN 0.9 % NACL 50 MG/5 ML
SYRINGE (ML) INTRAVENOUS
Status: DISCONTINUED | OUTPATIENT
Start: 2017-05-10 | End: 2017-05-10

## 2017-05-10 RX ORDER — DEXMEDETOMIDINE HYDROCHLORIDE 100 UG/ML
INJECTION, SOLUTION INTRAVENOUS
Status: DISCONTINUED | OUTPATIENT
Start: 2017-05-10 | End: 2017-05-10

## 2017-05-10 RX ORDER — LIDOCAINE HCL/PF 100 MG/5ML
SYRINGE (ML) INTRAVENOUS
Status: DISCONTINUED | OUTPATIENT
Start: 2017-05-10 | End: 2017-05-10

## 2017-05-10 RX ORDER — PANTOPRAZOLE SODIUM 40 MG/10ML
40 INJECTION, POWDER, LYOPHILIZED, FOR SOLUTION INTRAVENOUS 2 TIMES DAILY
Status: DISCONTINUED | OUTPATIENT
Start: 2017-05-10 | End: 2017-05-11 | Stop reason: HOSPADM

## 2017-05-10 RX ORDER — ENOXAPARIN SODIUM 100 MG/ML
40 INJECTION SUBCUTANEOUS
Status: DISCONTINUED | OUTPATIENT
Start: 2017-05-10 | End: 2017-05-11 | Stop reason: HOSPADM

## 2017-05-10 RX ORDER — GLYCOPYRROLATE 0.2 MG/ML
INJECTION INTRAMUSCULAR; INTRAVENOUS
Status: DISCONTINUED | OUTPATIENT
Start: 2017-05-10 | End: 2017-05-10

## 2017-05-10 RX ORDER — HYDROMORPHONE HYDROCHLORIDE 1 MG/ML
0.2 INJECTION, SOLUTION INTRAMUSCULAR; INTRAVENOUS; SUBCUTANEOUS EVERY 5 MIN PRN
Status: DISCONTINUED | OUTPATIENT
Start: 2017-05-10 | End: 2017-05-10 | Stop reason: HOSPADM

## 2017-05-10 RX ORDER — PROPOFOL 10 MG/ML
VIAL (ML) INTRAVENOUS
Status: DISCONTINUED | OUTPATIENT
Start: 2017-05-10 | End: 2017-05-10

## 2017-05-10 RX ORDER — ACETAMINOPHEN 10 MG/ML
1000 INJECTION, SOLUTION INTRAVENOUS EVERY 8 HOURS
Status: DISCONTINUED | OUTPATIENT
Start: 2017-05-10 | End: 2017-05-11 | Stop reason: HOSPADM

## 2017-05-10 RX ORDER — NALOXONE HCL 0.4 MG/ML
0.02 VIAL (ML) INJECTION
Status: DISCONTINUED | OUTPATIENT
Start: 2017-05-10 | End: 2017-05-11

## 2017-05-10 RX ORDER — ROCURONIUM BROMIDE 10 MG/ML
INJECTION, SOLUTION INTRAVENOUS
Status: DISCONTINUED | OUTPATIENT
Start: 2017-05-10 | End: 2017-05-10

## 2017-05-10 RX ORDER — DEXAMETHASONE SODIUM PHOSPHATE 4 MG/ML
INJECTION, SOLUTION INTRA-ARTICULAR; INTRALESIONAL; INTRAMUSCULAR; INTRAVENOUS; SOFT TISSUE
Status: DISCONTINUED | OUTPATIENT
Start: 2017-05-10 | End: 2017-05-10

## 2017-05-10 RX ORDER — ONDANSETRON 2 MG/ML
INJECTION INTRAMUSCULAR; INTRAVENOUS
Status: DISCONTINUED | OUTPATIENT
Start: 2017-05-10 | End: 2017-05-10

## 2017-05-10 RX ADMIN — DEXTROSE 2 G: 50 INJECTION, SOLUTION INTRAVENOUS at 12:05

## 2017-05-10 RX ADMIN — PHENYLEPHRINE HYDROCHLORIDE 50 MCG: 10 INJECTION INTRAVENOUS at 01:05

## 2017-05-10 RX ADMIN — DEXMEDETOMIDINE HYDROCHLORIDE 0.5 MCG/KG/HR: 100 INJECTION, SOLUTION, CONCENTRATE INTRAVENOUS at 12:05

## 2017-05-10 RX ADMIN — GLYCOPYRROLATE 0.6 MG: 0.2 INJECTION, SOLUTION INTRAMUSCULAR; INTRAVENOUS at 02:05

## 2017-05-10 RX ADMIN — ENOXAPARIN SODIUM 40 MG: 100 INJECTION SUBCUTANEOUS at 06:05

## 2017-05-10 RX ADMIN — NEOSTIGMINE METHYLSULFATE 5 MG: 1 INJECTION INTRAVENOUS at 02:05

## 2017-05-10 RX ADMIN — SODIUM CHLORIDE: 0.9 INJECTION, SOLUTION INTRAVENOUS at 11:05

## 2017-05-10 RX ADMIN — PROPOFOL 200 MG: 10 INJECTION, EMULSION INTRAVENOUS at 12:05

## 2017-05-10 RX ADMIN — LIDOCAINE HYDROCHLORIDE 0.03 MG/KG/MIN: 8 INJECTION, SOLUTION INTRAVENOUS at 12:05

## 2017-05-10 RX ADMIN — DEXMEDETOMIDINE HYDROCHLORIDE 4 MCG: 100 INJECTION, SOLUTION, CONCENTRATE INTRAVENOUS at 01:05

## 2017-05-10 RX ADMIN — SODIUM CHLORIDE: 0.9 INJECTION, SOLUTION INTRAVENOUS at 12:05

## 2017-05-10 RX ADMIN — ROCURONIUM BROMIDE 20 MG: 10 INJECTION, SOLUTION INTRAVENOUS at 12:05

## 2017-05-10 RX ADMIN — PANTOPRAZOLE SODIUM 40 MG: 40 INJECTION, POWDER, FOR SOLUTION INTRAVENOUS at 09:05

## 2017-05-10 RX ADMIN — ROCURONIUM BROMIDE 5 MG: 10 INJECTION, SOLUTION INTRAVENOUS at 12:05

## 2017-05-10 RX ADMIN — PROMETHAZINE HYDROCHLORIDE 6.25 MG: 25 INJECTION INTRAMUSCULAR; INTRAVENOUS at 04:05

## 2017-05-10 RX ADMIN — PROMETHAZINE HYDROCHLORIDE 6.25 MG: 25 INJECTION, SOLUTION INTRAMUSCULAR; INTRAVENOUS at 09:05

## 2017-05-10 RX ADMIN — ONDANSETRON 4 MG: 2 INJECTION INTRAMUSCULAR; INTRAVENOUS at 12:05

## 2017-05-10 RX ADMIN — DEXAMETHASONE SODIUM PHOSPHATE 8 MG: 4 INJECTION, SOLUTION INTRAMUSCULAR; INTRAVENOUS at 12:05

## 2017-05-10 RX ADMIN — ROCURONIUM BROMIDE 15 MG: 10 INJECTION, SOLUTION INTRAVENOUS at 12:05

## 2017-05-10 RX ADMIN — Medication 10 MG: at 01:05

## 2017-05-10 RX ADMIN — Medication 20 MG: at 12:05

## 2017-05-10 RX ADMIN — ACETAMINOPHEN 1000 MG: 10 INJECTION, SOLUTION INTRAVENOUS at 09:05

## 2017-05-10 RX ADMIN — ACETAMINOPHEN 1000 MG: 10 INJECTION, SOLUTION INTRAVENOUS at 12:05

## 2017-05-10 RX ADMIN — SODIUM CHLORIDE, SODIUM GLUCONATE, SODIUM ACETATE, POTASSIUM CHLORIDE, MAGNESIUM CHLORIDE, SODIUM PHOSPHATE, DIBASIC, AND POTASSIUM PHOSPHATE: .53; .5; .37; .037; .03; .012; .00082 INJECTION, SOLUTION INTRAVENOUS at 01:05

## 2017-05-10 RX ADMIN — PROMETHAZINE HYDROCHLORIDE 6.25 MG: 25 INJECTION INTRAMUSCULAR; INTRAVENOUS at 03:05

## 2017-05-10 RX ADMIN — MIDAZOLAM HYDROCHLORIDE 2 MG: 1 INJECTION, SOLUTION INTRAMUSCULAR; INTRAVENOUS at 12:05

## 2017-05-10 RX ADMIN — LIDOCAINE HYDROCHLORIDE 50 MG: 20 INJECTION, SOLUTION INTRAVENOUS at 12:05

## 2017-05-10 RX ADMIN — Medication: at 02:05

## 2017-05-10 RX ADMIN — SODIUM CHLORIDE: 0.9 INJECTION, SOLUTION INTRAVENOUS at 10:05

## 2017-05-10 RX ADMIN — SODIUM CHLORIDE: 0.9 INJECTION, SOLUTION INTRAVENOUS at 02:05

## 2017-05-10 RX ADMIN — SUCCINYLCHOLINE CHLORIDE 200 MG: 20 INJECTION, SOLUTION INTRAMUSCULAR; INTRAVENOUS at 12:05

## 2017-05-10 RX ADMIN — DIPHENHYDRAMINE HYDROCHLORIDE 12.5 MG: 50 INJECTION, SOLUTION INTRAMUSCULAR; INTRAVENOUS at 09:05

## 2017-05-10 RX ADMIN — ONDANSETRON 4 MG: 2 INJECTION INTRAMUSCULAR; INTRAVENOUS at 02:05

## 2017-05-10 NOTE — NURSING TRANSFER
Nursing Transfer Note      5/10/2017     Transfer To: [address]    Transfer via stretcher    Transfer with cardiac monitoring per centralized telemetry, iv pump, PCA    Transported by PCT    Medicines sent: none    Chart send with patient: Yes    Notified:  per waiting room liaison Eleni    Patient reassessed at: 5/10/2017 5:30 PM

## 2017-05-10 NOTE — H&P (VIEW-ONLY)
Nena Mendez is a 36 y.o. female patient.   1. S/P lap gastric neurostimulator placement     2. Jejunostomy tube site pain      Past Medical History:   Diagnosis Date    Gastroparesis      Past Surgical History Pertinent Negatives:   Procedure Date Noted    BRAIN SURGERY 08/04/2016    BREAST SURGERY 08/04/2016    COLON SURGERY 08/04/2016    CORONARY ARTERY BYPASS GRAFT 08/04/2016    EYE SURGERY 08/04/2016    FRACTURE SURGERY 08/04/2016    HERNIA REPAIR 08/04/2016    JOINT REPLACEMENT 08/04/2016    KIDNEY TRANSPLANT 08/04/2016    LIVER TRANSPLANT 08/04/2016    PROSTATE SURGERY 08/04/2016    SMALL INTESTINE SURGERY 08/04/2016    SPINE SURGERY 08/04/2016     Scheduled Meds:  Continuous Infusions:  PRN Meds:    Review of patient's allergies indicates:   Allergen Reactions    Latex, natural rubber Rash    Iodine Swelling    Iodine and iodide containing products Swelling   Domperidone    There are no hospital problems to display for this patient.    There were no vitals taken for this visit.    Subjective Still with a lot of nausea and vomiting s/p stimulator and pyloroplasty.  Now with a j tube that has pulled back with a lot of pain.  She went to a local ER and they were not able to remove it.  She has been to [place] and had a trial of domperidone but had a reaction to that.  Objective J tube site with tenderness.  Otherwise it looks clear.  Injected with 2% xylocain with epi and removed without any difficulty (slipped out).  Dressing placed.  EGD from [place]: OK   Assessment & Plan Although she has gained 2 pounds she is not any better.  Will see if her insurance will pay for a sleeve or subtotal gastrectomy.  Rtc to schedule.        Robert Kumar MD  4/12/2017

## 2017-05-10 NOTE — ANESTHESIA POSTPROCEDURE EVALUATION
"Anesthesia Post Evaluation    Patient: Nena Mendez    Procedure(s) Performed: Procedure(s) (LRB):  GASTRECTOMY-SLEEVE-LAPAROSCOPIC (N/A)    Final Anesthesia Type: general  Patient location during evaluation: PACU  Patient participation: Yes- Able to Participate  Level of consciousness: awake and alert  Post-procedure vital signs: reviewed and stable  Pain management: adequate  Airway patency: patent  PONV status at discharge: No PONV  Anesthetic complications: no      Cardiovascular status: blood pressure returned to baseline  Respiratory status: unassisted  Hydration status: euvolemic  Follow-up not needed.        Visit Vitals    BP (!) 124/57    Pulse 62    Temp 36.3 °C (97.3 °F) (Axillary)    Resp 15    Ht 5' 5" (1.651 m)    Wt 53.5 kg (118 lb)    SpO2 100%    Breastfeeding No    BMI 19.64 kg/m2       Pain/Baljit Score: Pain Assessment Performed: Yes (5/10/2017  5:25 PM)  Presence of Pain: complains of pain/discomfort (5/10/2017  5:25 PM)  Pain Rating Prior to Med Admin: 0 (5/10/2017  2:42 PM)  Baljit Score: 10 (5/10/2017  5:25 PM)      "

## 2017-05-10 NOTE — OP NOTE
DATE OF PROCEDURE: 5/10/2017    PRE OP DIAGNOSIS: Gastroparesis [K31.84]    POST OP DIAGNOSIS: Gastroparesis [K31.84]    PROCEDURE: Procedure(s) (LRB):  GASTRECTOMY-SLEEVE-LAPAROSCOPIC (N/A)    Surgeon(s) and Role:     * Jayden Sheikh MD - Resident - Assisting     * Robert Kumar MD - PrimaryANESTHESIA: General.   INDICATIONS: A 36 y.o. with severe gastroparesis symptoms despite maximal medical therapy, pylorplasty and gastric stimulator.  DESCRIPTION OF PROCEDURE: The patient was placed under general anesthesia. The   abdomen was prepped and draped in usual manner. Access to peritoneum was   gained through the umbilicus using Optiview trocar under direct vision.   Pneumoperitoneum to 15 mmHg with CO2 gas was obtained. Four 5 mm trocars were   placed medially, subcostally at the midclavicular and anterior axial lines   bilaterally. A 10 mm trocar was placed 1 handbreadth caudad to the right   midclavicular trocar. The liver retractor was placed. The greater curve was   taken down starting 6 cm from the pylorus going all the way to the base of the   left olive taking all posterior gastric attachments with the Harmonic scalpel. A   42-Samoan bougie was passed towards the pylorus and the stomach was resected  along the bougie starting 6 cm from the pylorus and coming out just a   little bit at the angle of His. The staple line was oversewn with a running   Quill stitch and Tisseel placed across it. The bougie was removed. Endoscopy was   performed. The sleeve appeared appropriate size and configuration and there   were no air leaks seen. The air was aspirated from the endoscope and the   endoscope was withdrawn. The liver retractor was removed. The gastrectomy was   removed through the primary trochar site. That incision was then closed with 0 Vicryl. The trocars removed under direct vision. Prior to   removing the last trocar, the pneumoperitoneum was allowed to escape. The skin   incisions were  infiltrated with Marcaine solution, closed with 4-0 plain catgut,   and reinforced with Mastisol, Steri-Strips, and Band-Aids. The patient   tolerated procedure well and was brought to Recovery Room in stable condition.   Sponge and needle counts were correct at the end of the case.    Blood loss was min, complications are none, consent was obtained and pathology was gastrectomy.

## 2017-05-10 NOTE — TRANSFER OF CARE
"Anesthesia Transfer of Care Note    Patient: Nena Mendez    Procedure(s) Performed: Procedure(s) (LRB):  GASTRECTOMY-SLEEVE-LAPAROSCOPIC (N/A)    Patient location: PACU    Anesthesia Type: general    Transport from OR: Transported from OR on 100% O2 by closed face mask with adequate spontaneous ventilation    Post pain: adequate analgesia    Post assessment: no apparent anesthetic complications and tolerated procedure well    Post vital signs: stable    Level of consciousness: awake    Nausea/Vomiting: no nausea/vomiting    Complications: none    Transfer of care protocol was followed      Last vitals:   Visit Vitals    /65    Pulse 63    Temp 36.8 °C (98.2 °F) (Oral)    Resp 12    Ht 5' 5" (1.651 m)    Wt 53.5 kg (118 lb)    SpO2 100%    Breastfeeding No    BMI 19.64 kg/m2     "

## 2017-05-10 NOTE — INTERVAL H&P NOTE
The patient has been examined and the H&P has been reviewed:    I concur with the findings and no changes have occurred since H&P was written.     PE: chest clear heart rrr abdomen benign    Past Medical History:   Diagnosis Date    Gastroparesis        Past Surgical History:   Procedure Laterality Date    APPENDECTOMY       SECTION      CHOLECYSTECTOMY      COSMETIC SURGERY      GASTRIC STIMULATOR IMPLANT SURGERY      GASTROSTOMY-JEJEUNOSTOMY TUBE CHANGE/PLACEMENT  08/10/2016    HYSTERECTOMY      laparoscopic jejunostomy tube      TONSILLECTOMY         Family History   Problem Relation Age of Onset    Hypothyroidism Mother     Cirrhosis Father     No Known Problems Brother     Asthma Daughter     Sleep apnea Daughter        Social History     Social History    Marital status:      Spouse name: N/A    Number of children: 2    Years of education: N/A     Social History Main Topics    Smoking status: Former Smoker     Packs/day: 0.00    Smokeless tobacco: Not on file      Comment: recently quit    Alcohol use No    Drug use: No    Sexual activity: Not on file     Other Topics Concern    Not on file     Social History Narrative       No current facility-administered medications for this encounter.        Review of patient's allergies indicates:   Allergen Reactions    Domperidone Itching    Latex, natural rubber Rash    Iodine Swelling    Iodine and iodide containing products Swelling         Anesthesia/Surgery risks, benefits and alternative options discussed and understood by patient/family.          There are no hospital problems to display for this patient.

## 2017-05-10 NOTE — ANESTHESIA RELEASE NOTE
"Anesthesia Release from PACU Note    Patient: Nena Mendez    Procedure(s) Performed: Procedure(s) (LRB):  GASTRECTOMY-SLEEVE-LAPAROSCOPIC (N/A)    Anesthesia type: general    Post pain: Adequate analgesia    Post assessment: no apparent anesthetic complications    Last Vitals:   Visit Vitals    BP (!) 124/57    Pulse 62    Temp 36.3 °C (97.3 °F) (Axillary)    Resp 15    Ht 5' 5" (1.651 m)    Wt 53.5 kg (118 lb)    SpO2 100%    Breastfeeding No    BMI 19.64 kg/m2       Post vital signs: stable    Level of consciousness: awake, alert  and oriented    Nausea/Vomiting: no nausea/no vomiting    Complications: none    Airway Patency: patent    Respiratory: unassisted    Cardiovascular: stable and blood pressure at baseline    Hydration: euvolemic  "

## 2017-05-10 NOTE — PROGRESS NOTES
Paged Dr. Kumar's resident via  for surgical consent.  Spoke to OR nurse, as resident was in a procedure.  Message delivered.

## 2017-05-10 NOTE — IP AVS SNAPSHOT
[facility]  [address]  [facility] [address]  Phone: [phone]           Patient Discharge Instructions   Our goal is to set you up for success. This packet includes information on your condition, medications, and your home care.  It will help you care for yourself to prevent having to return to the hospital.     Please ask your nurse if you have any questions.      There are many details to remember when preparing to leave the hospital. Here is what you will need to do:    1. Take your medicine. If you are prescribed medications, review your Medication List on the following pages. You may have new medications to  at the pharmacy and others that you'll need to stop taking. Review the instructions for how and when to take your medications. Talk with your doctor or nurses if you are unsure of what to do.     2. Go to your follow-up appointments. Specific follow-up information is listed in the following pages. Your may be contacted by a nurse or clinical provider about future appointments. Be sure we have all of the phone numbers to reach you. Please contact your provider's office if you are unable to make an appointment.     3. Watch for warning signs. Your doctor or nurse will give you detailed warning signs to watch for and when to call for assistance. These instructions may also include educational information about your condition. If you experience any of warning signs to your health, call your doctor.           [facility]  Unless otherwise directed by your provider, please   contact [facility], our nurse care line   that is available for 24/7 assistance.     [phone] (toll-free)     Registered nurses in the [facility]   provide: appointment scheduling, clinical advisement, health education, and other advisory services.                  ** Verify the list of medication(s) below is accurate and up to date. Carry this with you in case of  emergency. If your medications have changed, please notify your healthcare provider.             Medication List      START taking these medications        Additional Info                      omeprazole 40 MG capsule   Commonly known as:  PRILOSEC   Quantity:  30 capsule   Refills:  2   Dose:  40 mg    Instructions:  Take 1 capsule (40 mg total) by mouth once daily. Open capsule and take contents by mouth daily     Begin Date    AM    Noon    PM    Bedtime         CHANGE how you take these medications        Additional Info                      * hydrocodone-apap 2.5-108 MG/5 ML oral solution   Commonly known as:  HYCET   Quantity:  473 mL   Refills:  0   Dose:  15 mL   What changed:  Another medication with the same name was added. Make sure you understand how and when to take each.    Last time this was given:  15 mLs on 5/11/2017  1:53 PM   Instructions:  Take 15 mLs by mouth every 4 (four) hours as needed.     Begin Date    AM    Noon    PM    Bedtime       * hydrocodone-apap 2.5-108 MG/5 ML oral solution   Commonly known as:  HYCET   Quantity:  473 mL   Refills:  0   Dose:  15 mL   What changed:  You were already taking a medication with the same name, and this prescription was added. Make sure you understand how and when to take each.    Last time this was given:  15 mLs on 5/11/2017  1:53 PM   Instructions:  Take 15 mLs by mouth 4 (four) times daily as needed for Pain.     Begin Date    AM    Noon    PM    Bedtime       * promethazine 25 MG suppository   Commonly known as:  PHENERGAN   Quantity:  40 suppository   Refills:  2   Dose:  25 mg   What changed:  Another medication with the same name was added. Make sure you understand how and when to take each.    Instructions:  Place 1 suppository (25 mg total) rectally every 6 (six) hours as needed for Nausea.     Begin Date    AM    Noon    PM    Bedtime       * promethazine 25 MG tablet   Commonly known as:  PHENERGAN   Quantity:  120 tablet   Refills:  3    Dose:  25 mg   What changed:  Another medication with the same name was added. Make sure you understand how and when to take each.    Instructions:  Take 1 tablet (25 mg total) by mouth every 6 (six) hours as needed for Nausea.     Begin Date    AM    Noon    PM    Bedtime       * promethazine 6.25 mg/5 mL syrup   Commonly known as:  PHENERGAN   Quantity:  473 mL   Refills:  2   Dose:  12.5 mg   What changed:  You were already taking a medication with the same name, and this prescription was added. Make sure you understand how and when to take each.    Last time this was given:  12.5 mg on 5/11/2017  1:47 PM   Instructions:  Take 10 mLs (12.5 mg total) by mouth every 6 (six) hours as needed.     Begin Date    AM    Noon    PM    Bedtime       * Notice:  This list has 5 medication(s) that are the same as other medications prescribed for you. Read the directions carefully, and ask your doctor or other care provider to review them with you.      CONTINUE taking these medications        Additional Info                      amitriptyline 25 MG tablet   Commonly known as:  ELAVIL   Refills:  0   Dose:  25 mg    Instructions:  Take 25 mg by mouth every evening.     Begin Date    AM    Noon    PM    Bedtime       butalbital-acetaminop-caf-cod -40-30 mg Cap   Refills:  3    Instructions:  TK 1 C PO Q 4 TO 6 H PRN P     Begin Date    AM    Noon    PM    Bedtime       metoclopramide HCl 5 mg/5 mL Soln   Commonly known as:  REGLAN   Refills:  0   Dose:  10 mg    Instructions:  10 mg 3 (three) times daily.     Begin Date    AM    Noon    PM    Bedtime       ondansetron 8 MG Tbdl   Commonly known as:  ZOFRAN-ODT   Quantity:  30 tablet   Refills:  2   Dose:  8 mg    Instructions:  Take 1 tablet (8 mg total) by mouth every 8 (eight) hours as needed.     Begin Date    AM    Noon    PM    Bedtime       polyethylene glycol 17 gram Pwpk   Commonly known as:  GLYCOLAX   Refills:  0    Instructions:  Take by mouth daily as  needed.     Begin Date    AM    Noon    PM    Bedtime       scopolamine 1.5 mg (1 mg over 3 days)   Commonly known as:  TRANSDERM-SCOP   Quantity:  30 patch   Refills:  3   Dose:  1 patch    Last time this was given:  1.5 mg on 5/11/2017  8:58 AM   Instructions:  Place 1 patch (1.5 mg total) onto the skin every 72 hours.     Begin Date    AM    Noon    PM    Bedtime         STOP taking these medications     acetaminophen 160 mg/5 mL Elix   Commonly known as:  TYLENOL       tramadol 50 mg tablet   Commonly known as:  ULTRAM            Where to Get Your Medications      These medications were sent to [facility] - [place], [place] - [address] [id number] [facility] [address]     Phone:  [phone]     hydrocodone-apap 2.5-108 MG/5 ML oral solution         These medications were sent to [facility] [id number]  [address] [address] [address] [facility] [address]    Hours:  24-hours Phone:  [phone]     omeprazole 40 MG capsule    promethazine 6.25 mg/5 mL syrup         You can get these medications from any pharmacy     Bring a paper prescription for each of these medications     ondansetron 8 MG Tbdl                  Please bring to all follow up appointments:    1. A copy of your discharge instructions.  2. All medicines you are currently taking in their original bottles.  3. Identification and insurance card.    Please arrive 15 minutes ahead of scheduled appointment time.    Please call 24 hours in advance if you must reschedule your appointment and/or time.        Your Scheduled Appointments     May 25, 2017  3:30 PM CDT   Post OP with MD Camron Flynn - General Surgery ([facility] )    [address]  [place] [id number] [phone]            Jun 20, 2017 10:00 AM CDT   New Patient with MD Camron Fay - Gastroenterology ([facility] )  "   [address]  [facility] [id number] [phone]              Follow-up Information     Follow up with Robert Kumar MD On 5/25/2017.    Specialties:  General Surgery, Bariatrics    Why:  Wound check/Appt: 5/25/17 at 3:30 PM.     Contact information:    [address]  [facility] [address]  [phone]          Discharge Instructions     Future Orders    Activity as tolerated     Call MD for:  persistent nausea and vomiting or diarrhea     Call MD for:  redness, tenderness, or signs of infection (pain, swelling, redness, odor or green/yellow discharge around incision site)     Call MD for:  severe uncontrolled pain     Call MD for:  temperature >100.4     Diet general     Questions:    Total calories:      Fat restriction, if any:      Protein restriction, if any:      Na restriction, if any:      Fluid restriction:      Additional restrictions:  Clear Liquid    Lifting restrictions     No dressing needed     Shower on day dressing removed (No bath)     Comments:    Ok to shower starting Friday with soap and water over the incisions and steri strips.  No bathing, swimming or soaking incisions in water for two weeks        Primary Diagnosis     Your primary diagnosis was:  S/P Laparoscopic Sleeve Gastrectomy      Admission Information     Date & Time Provider Department CSN    5/10/2017  9:11 AM Robert Kumar MD [facility] [id number]      Care Providers     Provider Role Specialty Primary office phone    Robert Kumar MD Attending Provider General Surgery [phone]    Robert Kumar MD Surgeon  General Surgery [phone]      Your Vitals Were     BP Pulse Temp Resp Height Weight    101/64 (BP Location: Right arm, Patient Position: Sitting, BP Method: Automatic) 69 98.3 °F (36.8 °C) (Oral) 16 5' 5" (1.651 m) 53.5 kg (118 lb)    SpO2 BMI             100% 19.64 kg/m2         Recent Lab Values     No lab values to display.      Pending Labs     " Order Current Status    Specimen to Pathology - Surgery In process      Allergies as of 5/11/2017        Reactions    Domperidone Itching    Latex, Natural Rubber Rash    Iodine Swelling    Iodine And Iodide Containing Products Swelling      Advance Directives     An advance directive is a document which, in the event you are no longer able to make decisions for yourself, tells your healthcare team what kind of treatment you do or do not want to receive, or who you would like to make those decisions for you.  If you do not currently have an advance directive, [facility] encourages you to create one.  For more information call:  [phone]), [phone] ([phone]),  or log on to [email or web address].        Smoking Cessation     If you would like to quit smoking:   You may be eligible for free services if you are a [place] resident and started smoking cigarettes before September 1, 1988.  Call the Smoking Cessation Trust ([facility]) toll free at ([phone] or ([phone] 416-7040.   Call 0-165-QUIT-NOW if you do not meet the above criteria.   Contact us via email: [email or web address]   View our website for more information: [email or web address]        Language Assistance Services     ATTENTION: Language assistance services are available, free of charge. Please call [phone].      ATENCIÓN: Si habla español, tiene a rodriguez disposición servicios gratuitos de asistencia lingüística. Llame al [phone].     CHÚ Ý: N?u b?n nói Ti?ng Vi?t, có các d?ch v? h? tr? ngôn ng? mi?n phí dành cho b?n. G?i s? [phone].        [facility] Sign-Up     Activating your [facility] account is as easy as 1-2-3!     1) Visit [email or web address], select Sign Up Now, enter this activation code and your date of birth, then select Next.  [email or web address]  Expires: 6/25/2017  2:23 PM      2) Create a username and password to use when you visit [facility] in the future and select a security question in case you  lose your password and select Next.    3) Enter your e-mail address and click Sign Up!    Additional Information  If you have questions, please e-mail [email or web address] or call [phone] to talk to our [facility] staff. Remember, [facility] is NOT to be used for urgent needs. For medical emergencies, dial 911.          [facility] complies with applicable Federal civil rights laws and does not discriminate on the basis of race, color, national origin, age, disability, or sex.

## 2017-05-10 NOTE — BRIEF OP NOTE
Operative Note       Surgery Date: 5/10/2017     Surgeon(s) and Role:     * Jayden Sheikh MD - Resident - Assisting     * Robert Kumar MD - Primary    Pre-op Diagnosis:  Gastroparesis [K31.84]    Post-op Diagnosis:  Gastroparesis [K31.84]    Procedure(s) (LRB):  GASTRECTOMY-SLEEVE-LAPAROSCOPIC (N/A)    Anesthesia: General    Procedure in Detail/Findings:  Sleeve without apparent complication    Estimated Blood Loss: Minimal           Specimens     Start     Ordered    05/10/17 1358  Specimen to Pathology - Surgery  Once      05/10/17 1358        Implants: * No implants in log *           Disposition: PACU - hemodynamically stable.           Condition: Good    Attestation:  I was present and scrubbed for the entire procedure.

## 2017-05-11 ENCOUNTER — TELEPHONE (OUTPATIENT)
Dept: BARIATRICS | Facility: CLINIC | Age: 37
End: 2017-05-11

## 2017-05-11 ENCOUNTER — EDUCATION (OUTPATIENT)
Dept: BARIATRICS | Facility: CLINIC | Age: 37
End: 2017-05-11

## 2017-05-11 VITALS
OXYGEN SATURATION: 100 % | DIASTOLIC BLOOD PRESSURE: 64 MMHG | BODY MASS INDEX: 19.66 KG/M2 | SYSTOLIC BLOOD PRESSURE: 101 MMHG | WEIGHT: 118 LBS | TEMPERATURE: 98 F | HEART RATE: 69 BPM | RESPIRATION RATE: 16 BRPM | HEIGHT: 65 IN

## 2017-05-11 PROBLEM — Z98.84 S/P LAPAROSCOPIC SLEEVE GASTRECTOMY: Status: ACTIVE | Noted: 2017-05-11

## 2017-05-11 LAB
ANION GAP SERPL CALC-SCNC: 12 MMOL/L
BASOPHILS # BLD AUTO: 0.01 K/UL
BASOPHILS NFR BLD: 0.1 %
BUN SERPL-MCNC: 6 MG/DL
CALCIUM SERPL-MCNC: 8.8 MG/DL
CHLORIDE SERPL-SCNC: 107 MMOL/L
CO2 SERPL-SCNC: 20 MMOL/L
CREAT SERPL-MCNC: 0.6 MG/DL
DIFFERENTIAL METHOD: ABNORMAL
EOSINOPHIL # BLD AUTO: 0 K/UL
EOSINOPHIL NFR BLD: 0 %
ERYTHROCYTE [DISTWIDTH] IN BLOOD BY AUTOMATED COUNT: 12.9 %
EST. GFR  (AFRICAN AMERICAN): >60 ML/MIN/1.73 M^2
EST. GFR  (NON AFRICAN AMERICAN): >60 ML/MIN/1.73 M^2
GLUCOSE SERPL-MCNC: 95 MG/DL
HCT VFR BLD AUTO: 32.1 %
HGB BLD-MCNC: 11.2 G/DL
LYMPHOCYTES # BLD AUTO: 2.2 K/UL
LYMPHOCYTES NFR BLD: 24.9 %
MAGNESIUM SERPL-MCNC: 1.9 MG/DL
MCH RBC QN AUTO: 29.4 PG
MCHC RBC AUTO-ENTMCNC: 34.9 %
MCV RBC AUTO: 84 FL
MONOCYTES # BLD AUTO: 0.7 K/UL
MONOCYTES NFR BLD: 7.6 %
NEUTROPHILS # BLD AUTO: 6 K/UL
NEUTROPHILS NFR BLD: 67.3 %
PHOSPHATE SERPL-MCNC: 4.2 MG/DL
PLATELET # BLD AUTO: 270 K/UL
PMV BLD AUTO: 8.7 FL
POTASSIUM SERPL-SCNC: 4.2 MMOL/L
RBC # BLD AUTO: 3.81 M/UL
SODIUM SERPL-SCNC: 139 MMOL/L
WBC # BLD AUTO: 8.92 K/UL

## 2017-05-11 PROCEDURE — 25000003 PHARM REV CODE 250: Performed by: STUDENT IN AN ORGANIZED HEALTH CARE EDUCATION/TRAINING PROGRAM

## 2017-05-11 PROCEDURE — 83735 ASSAY OF MAGNESIUM: CPT

## 2017-05-11 PROCEDURE — 63600175 PHARM REV CODE 636 W HCPCS: Performed by: STUDENT IN AN ORGANIZED HEALTH CARE EDUCATION/TRAINING PROGRAM

## 2017-05-11 PROCEDURE — C9113 INJ PANTOPRAZOLE SODIUM, VIA: HCPCS | Performed by: STUDENT IN AN ORGANIZED HEALTH CARE EDUCATION/TRAINING PROGRAM

## 2017-05-11 PROCEDURE — 80048 BASIC METABOLIC PNL TOTAL CA: CPT

## 2017-05-11 PROCEDURE — 36415 COLL VENOUS BLD VENIPUNCTURE: CPT

## 2017-05-11 PROCEDURE — 84100 ASSAY OF PHOSPHORUS: CPT

## 2017-05-11 PROCEDURE — 85025 COMPLETE CBC W/AUTO DIFF WBC: CPT

## 2017-05-11 RX ORDER — PROMETHAZINE HYDROCHLORIDE 6.25 MG/5ML
12.5 SYRUP ORAL EVERY 6 HOURS PRN
Qty: 473 ML | Refills: 2 | Status: SHIPPED | OUTPATIENT
Start: 2017-05-11 | End: 2018-03-15

## 2017-05-11 RX ORDER — AMITRIPTYLINE HYDROCHLORIDE 25 MG/1
25 TABLET, FILM COATED ORAL NIGHTLY
Status: DISCONTINUED | OUTPATIENT
Start: 2017-05-11 | End: 2017-05-11 | Stop reason: HOSPADM

## 2017-05-11 RX ORDER — PROMETHAZINE HYDROCHLORIDE 6.25 MG/5ML
12.5 SYRUP ORAL EVERY 6 HOURS PRN
Status: DISCONTINUED | OUTPATIENT
Start: 2017-05-11 | End: 2017-05-11 | Stop reason: HOSPADM

## 2017-05-11 RX ORDER — ONDANSETRON 4 MG/1
4 TABLET, ORALLY DISINTEGRATING ORAL EVERY 6 HOURS PRN
Status: DISCONTINUED | OUTPATIENT
Start: 2017-05-11 | End: 2017-05-11

## 2017-05-11 RX ORDER — HYDROMORPHONE HYDROCHLORIDE 1 MG/ML
0.5 INJECTION, SOLUTION INTRAMUSCULAR; INTRAVENOUS; SUBCUTANEOUS
Status: DISCONTINUED | OUTPATIENT
Start: 2017-05-11 | End: 2017-05-11 | Stop reason: HOSPADM

## 2017-05-11 RX ORDER — PROMETHAZINE HYDROCHLORIDE 25 MG/1
25 SUPPOSITORY RECTAL EVERY 6 HOURS PRN
Status: DISCONTINUED | OUTPATIENT
Start: 2017-05-11 | End: 2017-05-11

## 2017-05-11 RX ORDER — ONDANSETRON 8 MG/1
8 TABLET, ORALLY DISINTEGRATING ORAL EVERY 8 HOURS PRN
Qty: 30 TABLET | Refills: 2 | Status: SHIPPED | OUTPATIENT
Start: 2017-05-11 | End: 2017-10-28 | Stop reason: SDUPTHER

## 2017-05-11 RX ORDER — SCOLOPAMINE TRANSDERMAL SYSTEM 1 MG/1
1 PATCH, EXTENDED RELEASE TRANSDERMAL
Status: DISCONTINUED | OUTPATIENT
Start: 2017-05-11 | End: 2017-05-11 | Stop reason: HOSPADM

## 2017-05-11 RX ORDER — HYDROCODONE BITARTRATE AND ACETAMINOPHEN 7.5; 325 MG/15ML; MG/15ML
15 SOLUTION ORAL EVERY 4 HOURS PRN
Status: DISCONTINUED | OUTPATIENT
Start: 2017-05-11 | End: 2017-05-11 | Stop reason: HOSPADM

## 2017-05-11 RX ORDER — HYDROCODONE BITARTRATE AND ACETAMINOPHEN 7.5; 325 MG/15ML; MG/15ML
15 SOLUTION ORAL 4 TIMES DAILY PRN
Qty: 473 ML | Refills: 0 | Status: SHIPPED | OUTPATIENT
Start: 2017-05-11 | End: 2018-03-15

## 2017-05-11 RX ORDER — OMEPRAZOLE 40 MG/1
40 CAPSULE, DELAYED RELEASE ORAL DAILY
Qty: 30 CAPSULE | Refills: 2 | Status: SHIPPED | OUTPATIENT
Start: 2017-05-11 | End: 2017-10-28 | Stop reason: SDUPTHER

## 2017-05-11 RX ORDER — HYDROCODONE BITARTRATE AND ACETAMINOPHEN 7.5; 325 MG/15ML; MG/15ML
30 SOLUTION ORAL EVERY 4 HOURS PRN
Status: DISCONTINUED | OUTPATIENT
Start: 2017-05-11 | End: 2017-05-11 | Stop reason: HOSPADM

## 2017-05-11 RX ORDER — PROMETHAZINE HYDROCHLORIDE 6.25 MG/5ML
25 SYRUP ORAL EVERY 6 HOURS PRN
Status: DISCONTINUED | OUTPATIENT
Start: 2017-05-11 | End: 2017-05-11 | Stop reason: HOSPADM

## 2017-05-11 RX ADMIN — PROMETHAZINE HYDROCHLORIDE 12.5 MG: 6.25 SOLUTION ORAL at 01:05

## 2017-05-11 RX ADMIN — HYDROCODONE BITARTRATE AND ACETAMINOPHEN 30 ML: 7.5; 325 SOLUTION ORAL at 09:05

## 2017-05-11 RX ADMIN — ACETAMINOPHEN 1000 MG: 10 INJECTION, SOLUTION INTRAVENOUS at 05:05

## 2017-05-11 RX ADMIN — HYDROCODONE BITARTRATE AND ACETAMINOPHEN 15 ML: 7.5; 325 SOLUTION ORAL at 01:05

## 2017-05-11 RX ADMIN — PANTOPRAZOLE SODIUM 40 MG: 40 INJECTION, POWDER, FOR SOLUTION INTRAVENOUS at 08:05

## 2017-05-11 RX ADMIN — ONDANSETRON 4 MG: 2 INJECTION INTRAMUSCULAR; INTRAVENOUS at 03:05

## 2017-05-11 RX ADMIN — SCOPALAMINE 1.5 MG: 1 PATCH, EXTENDED RELEASE TRANSDERMAL at 08:05

## 2017-05-11 RX ADMIN — PROMETHAZINE HYDROCHLORIDE 6.25 MG: 25 INJECTION, SOLUTION INTRAMUSCULAR; INTRAVENOUS at 04:05

## 2017-05-11 RX ADMIN — Medication: at 12:05

## 2017-05-11 RX ADMIN — HYDROCODONE BITARTRATE AND ACETAMINOPHEN 30 ML: 7.5; 325 SOLUTION ORAL at 01:05

## 2017-05-11 NOTE — PLAN OF CARE
"Patient lives in a 1 story house w/spouse & 2 children. Spouse at . Discharging to [facility] when discharged due to they live 3 1/2 hours away. Patient requesting  nurse care for wound check. See below.     [facility] given to patient after informed about it;patient verbalized their understanding.        05/11/17 1200   Discharge Assessment   Assessment Type Discharge Planning Assessment   Confirmed/corrected address and phone number on facesheet? Yes   Assessment information obtained from? Patient;Medical Record   Expected Length of Stay (days) (1)   Communicated expected length of stay with patient/caregiver yes   Type of Healthcare Directive Received (Unknown)   Prior to hospitilization cognitive status: Alert/Oriented;No Deficits   Prior to hospitalization functional status: Independent   Current cognitive status: Alert/Oriented;No Deficits   Current Functional Status: Independent;Needs Assistance   Arrived From home or self-care   Lives With spouse;child(enmanuel), dependent  (2 kids)   Able to Return to Prior Arrangements yes   Is patient able to care for self after discharge? Yes   How many people do you have in your home that can help with your care after discharge? 2   Who are your caregiver(s) and their phone number(s)? (Spouse: Quintin LUCERO [phone]. Does not live w/her: Grandmother: Tammy ALVAREZ [phone].)   Patient's perception of discharge disposition home or selfcare;home health  (Patient asking for HH nurse only when discharged. She states,"I would like my surgeyr wound checked. I had rejection of J-tube in past." Used [facility] , [place], [place]. )   Readmission Within The Last 30 Days no previous admission in last 30 days   Patient currently being followed by outpatient case management? No   Patient currently receives home health services? No   Does the patient currently use HME? No   Patient currently receives private duty nursing? N/A   Patient currently receives any other outside " agency services? No   Equipment Currently Used at Home none  (Has feeding pump-not in use. )   Do you have any problems affording any of your prescribed medications? No   Is the patient taking medications as prescribed? yes   Do you have any financial concerns preventing you from receiving the healthcare you need? No   Does the patient have transportation to healthcare appointments? Yes   Transportation Available car;family or friend will provide   On Dialysis? No   Does the patient receive services at the Coumadin Clinic? No   Are there any open cases? No   Discharge Plan A Home with family;Home Health   Discharge Plan B Home with family;Home Health   Patient/Family In Agreement With Plan yes

## 2017-05-11 NOTE — SUBJECTIVE & OBJECTIVE
Interval History: No adverse events overnight. Did not sleep much because of neighbor. Still with some nausea but no emesis. Pain is controlled with PCA. Ambulating and voiding without issues.    Medications:  Continuous Infusions:   Scheduled Meds:   acetaminophen  1,000 mg Intravenous Q8H    amitriptyline  25 mg Oral QHS    enoxparin  40 mg Subcutaneous Q24H    pantoprazole  40 mg Intravenous BID    scopolamine  1 patch Transdermal Q72H     PRN Meds:diphenhydrAMINE, hydrocodone-apap 2.5-108 MG/5 ML, hydrocodone-apap 2.5-108 MG/5 ML, HYDROmorphone, naloxone, ondansetron, promethazine, sodium chloride 0.9%     Review of patient's allergies indicates:   Allergen Reactions    Domperidone Itching    Latex, natural rubber Rash    Iodine Swelling    Iodine and iodide containing products Swelling     Objective:     Vital Signs (Most Recent):  Temp: 98 °F (36.7 °C) (05/11/17 0400)  Pulse: 81 (05/11/17 0700)  Resp: 13 (05/11/17 0400)  BP: (!) 108/59 (05/11/17 0400)  SpO2: 99 % (05/11/17 0400) Vital Signs (24h Range):  Temp:  [96.2 °F (35.7 °C)-99.1 °F (37.3 °C)] 98 °F (36.7 °C)  Pulse:  [53-95] 81  Resp:  [11-20] 13  SpO2:  [97 %-100 %] 99 %  BP: (102-140)/(57-88) 108/59     Weight: 53.5 kg (118 lb)  Body mass index is 19.64 kg/(m^2).    Intake/Output - Last 3 Shifts       05/09 0700 - 05/10 0659 05/10 0700 - 05/11 0659 05/11 0700 - 05/12 0659    P.O.  0     I.V. (mL/kg)  3340.1 (62.4)     Other  0     IV Piggyback  250     Total Intake(mL/kg)  3590.1 (67.1)     Net   +3590.1             Urine Occurrence  3 x     Stool Occurrence  0 x     Emesis Occurrence  0 x           Physical Exam   Constitutional: She is oriented to person, place, and time. She appears well-developed and well-nourished.   HENT:   Head: Normocephalic and atraumatic.   Eyes: EOM are normal.   Cardiovascular: Normal rate and regular rhythm.    Pulmonary/Chest: Effort normal and breath sounds normal.   Abdominal: Soft. Bowel sounds are normal. She  exhibits no distension. There is tenderness.   Neurological: She is alert and oriented to person, place, and time.   Skin: Skin is warm and dry.   Psychiatric: She has a normal mood and affect. Her behavior is normal.       Significant Labs:  CBC:   Recent Labs  Lab 05/11/17  0452   WBC 8.92   RBC 3.81*   HGB 11.2*   HCT 32.1*      MCV 84   MCH 29.4   MCHC 34.9     BMP:   Recent Labs  Lab 05/11/17  0452   GLU 95      K 4.2      CO2 20*   BUN 6   CREATININE 0.6   CALCIUM 8.8   MG 1.9       Significant Diagnostics:  None

## 2017-05-11 NOTE — ASSESSMENT & PLAN NOTE
POD# 1 s/p sleeve gastrectomy  -Advance to bariatric clears  -Discontinue IVF and IV pain meds  -Transition to oral pain meds and anti-emetics  -Restart home medications  -OOBTC and ambulate TID  -IS hourly  -GI and DVT prophylaxis  -Likely home today

## 2017-05-11 NOTE — TELEPHONE ENCOUNTER
----- Message from Ioana Izaguirre RD sent at 5/4/2017  3:44 PM CDT -----  Regarding: general surgery patient-sleeve for gastroparesis  Sleeve for gastroparesis

## 2017-05-11 NOTE — DISCHARGE SUMMARY
[facility]  General Surgery  Discharge Summary      Patient Name: Nena Mendez  MRN: [id number]  Admission Date: 5/10/2017  Hospital Length of Stay: 1 days  Discharge Date and Time:  05/11/2017 1:45 PM  Attending Physician: Robert Kumar MD   Discharging Provider: Graciela Geronimo MD  Primary Care Provider: Jeancarlos Angel MD    HPI:   37 yo W with gastroparesis who is s/p laparoscopic J-tube (8/2016) for feedings, gastric stimulator (10/2016), revision of j-tube lap (12/2016), and pyloroplasty (1/2017) who presented to clinic still with a lot of nausea and vomiting s/p stimulator and pyloroplasty.  Now with a j tube that has pulled back with a lot of pain.  She went to a local ER and they were not able to remove it.  She has been to [place] and had a trial of domperidone but had a reaction to that.          Procedure(s) (LRB):  GASTRECTOMY-SLEEVE-LAPAROSCOPIC (N/A)      Indwelling Lines/Drains at time of discharge:   Lines/Drains/Airways     Drain                 Gastrostomy/Enterostomy 01/27/17 Jejunostomy tube 104 days              Hospital Course:  the patient was admitted postop for observation.  Overnight on POD 0 she was kept NPO and her diet was advanced to clears on POD 1.  She tolerated this well and her nausea was controlled and she was not experiencing emesis.  She ambulated and voided without issue and her pain was controlled.  She was deemed in safe condition to discharge home.      Consults:     Significant Diagnostic Studies: Labs:   BMP:   Recent Labs  Lab 05/11/17  0452   GLU 95      K 4.2      CO2 20*   BUN 6   CREATININE 0.6   CALCIUM 8.8   MG 1.9    and CBC   Recent Labs  Lab 05/11/17  0452   WBC 8.92   HGB 11.2*   HCT 32.1*          Pending Diagnostic Studies:     None        Final Active Diagnoses:    Diagnosis Date Noted POA    PRINCIPAL PROBLEM:  S/P laparoscopic sleeve gastrectomy [Z98.84] 05/11/2017 Not Applicable    S/P lap  gastric neurostimulator placement  [K31.84] 08/04/2016 Yes      Problems Resolved During this Admission:    Diagnosis Date Noted Date Resolved POA      Discharged Condition: good    Disposition: Home or Self Care    Follow Up:  Follow-up Information     Follow up with Robert Kumar MD On 5/25/2017.    Specialties:  General Surgery, Bariatrics    Why:  Wound check/Appt: 5/25/17 at 3:30 PM.     Contact information:    [address]  [facility] [id number] [phone]          Patient Instructions:     Diet general   Order Specific Question Answer Comments   Additional restrictions: Clear Liquid      Activity as tolerated     Ice to affected area     Lifting restrictions     Shower on day dressing removed (No bath)   Order Comments: Ok to shower starting Friday with soap and water over the incisions and steri strips.  No bathing, swimming or soaking incisions in water for two weeks     Call MD for:  temperature >100.4     Call MD for:  persistent nausea and vomiting or diarrhea     Call MD for:  severe uncontrolled pain     Call MD for:  redness, tenderness, or signs of infection (pain, swelling, redness, odor or green/yellow discharge around incision site)     No dressing needed       Medications:  Reconciled Home Medications:   Current Discharge Medication List      START taking these medications    Details   omeprazole (PRILOSEC) 40 MG capsule Take 1 capsule (40 mg total) by mouth once daily. Open capsule and take contents by mouth daily  Qty: 30 capsule, Refills: 2      promethazine (PHENERGAN) 6.25 mg/5 mL syrup Take 10 mLs (12.5 mg total) by mouth every 6 (six) hours as needed.  Qty: 473 mL, Refills: 2         CONTINUE these medications which have CHANGED    Details   ondansetron (ZOFRAN-ODT) 8 MG TbDL Take 1 tablet (8 mg total) by mouth every 8 (eight) hours as needed.  Qty: 30 tablet, Refills: 2         CONTINUE these medications which have NOT CHANGED    Details   amitriptyline (ELAVIL) 25 MG  tablet Take 25 mg by mouth every evening.       metoclopramide HCl (REGLAN) 5 mg/5 mL Soln 10 mg 3 (three) times daily.       polyethylene glycol (GLYCOLAX) 17 gram PwPk Take by mouth daily as needed.      promethazine (PHENERGAN) 25 MG tablet Take 1 tablet (25 mg total) by mouth every 6 (six) hours as needed for Nausea.  Qty: 120 tablet, Refills: 3      butalbital-acetaminop-caf-cod -46-30 mg Cap TK 1 C PO Q 4 TO 6 H PRN P  Refills: 3      !! hydrocodone-acetaminophen (HYCET) solution 7.5-325 mg/15mL Take 15 mLs by mouth every 4 (four) hours as needed.  Qty: 473 mL, Refills: 0      !! hydrocodone-acetaminophen (HYCET) solution 7.5-325 mg/15mL Take 15 mLs by mouth 4 (four) times daily as needed for Pain.  Qty: 473 mL, Refills: 0      promethazine (PHENERGAN) 25 MG suppository Place 1 suppository (25 mg total) rectally every 6 (six) hours as needed for Nausea.  Qty: 40 suppository, Refills: 2      scopolamine (TRANSDERM-SCOP) 1.5 mg (1 mg over 3 days) Place 1 patch (1.5 mg total) onto the skin every 72 hours.  Qty: 30 patch, Refills: 3       !! - Potential duplicate medications found. Please discuss with provider.      STOP taking these medications       tramadol (ULTRAM) 50 mg tablet Comments:   Reason for Stopping:         acetaminophen (TYLENOL) 160 mg/5 mL Elix Comments:   Reason for Stopping:             Time spent on the discharge of patient: 10 minutes    Graciela Geronimo MD  General Surgery  [facility]

## 2017-05-11 NOTE — PLAN OF CARE
Problem: Patient Care Overview  Goal: Plan of Care Review  Outcome: Ongoing (interventions implemented as appropriate)  Pt AAOX4, VSS. Surgical site CDI. family at bedside. Pt is resting quietly now. PCA in use. NPO status remains. No s/s infection, skin breakdown/pressure ulcers - pt moves independently well.IS education complete. SCD's on and functioning properly. Fall precautions maintained. Will resume care.

## 2017-05-11 NOTE — PLAN OF CARE
[facility]    HOME HEALTH ORDERS  FACE TO FACE ENCOUNTER    Patient Name: Nena Mendez  YOB: 1980    PCP: Jeancarlos Angel MD   PCP Address: [address] / [place] [address]  PCP Phone Number: [phone]  PCP Fax: [phone]    Encounter Date: 05/11/2017    Admit to Home Health    Diagnoses:  Active Hospital Problems    Diagnosis  POA    *S/P laparoscopic sleeve gastrectomy [Z98.84]  Not Applicable    S/P lap gastric neurostimulator placement  [K31.84]  Yes      Resolved Hospital Problems    Diagnosis Date Resolved POA   No resolved problems to display.       Future Appointments  Date Time Provider Department Center   5/25/2017 3:30 PM Robert Kumar MD [facility] GENSUR [place] [facility]   6/20/2017 10:00 AM Patti Orozco MD [facility] GASTRO [facility]     Follow-up Information     Follow up with Robert Kumar MD On 5/25/2017.    Specialties:  General Surgery, Bariatrics    Why:  Wound check/Appt: 5/25/17 at 3:30 PM.     Contact information:    [address]  [facility] [address]  [phone]              I have seen and examined this patient face to face today. My clinical findings that support the need for the home health skilled services and home bound status are the following:  Weakness/numbness causing balance and gait disturbance due to Surgery making it taxing to leave home.    Allergies:  Review of patient's allergies indicates:   Allergen Reactions    Domperidone Itching    Latex, natural rubber Rash    Iodine Swelling    Iodine and iodide containing products Swelling       Diet: bariatric clear liquid diet with protein supplements    Activities: activity as tolerated and no driving for today    Nursing:   SN to complete comprehensive assessment including routine vital signs. Instruct on disease process and s/s of complications to report to MD. Review/verify medication list sent home with the patient at time of discharge  and instruct  patient/caregiver as needed. Frequency may be adjusted depending on start of care date.    Notify MD if SBP > 160 or < 90; DBP > 90 or < 50; HR > 120 or < 50; Temp > 101      CONSULTS:    Aide to provide assistance with personal care, ADLs, and vital signs.    MISCELLANEOUS CARE:  Wound Care Orders:  yes:  Surgical Wound:  Location: abdomen    Consult ET nurse        Apply the following to wound:   Other: wound check to monitor for signs of infection daily    WOUND CARE ORDERS  n/a      Medications: Review discharge medications with patient and family and provide education.      Current Discharge Medication List      START taking these medications    Details   omeprazole (PRILOSEC) 40 MG capsule Take 1 capsule (40 mg total) by mouth once daily. Open capsule and take contents by mouth daily  Qty: 30 capsule, Refills: 2      promethazine (PHENERGAN) 6.25 mg/5 mL syrup Take 10 mLs (12.5 mg total) by mouth every 6 (six) hours as needed.  Qty: 473 mL, Refills: 2         CONTINUE these medications which have CHANGED    Details   ondansetron (ZOFRAN-ODT) 8 MG TbDL Take 1 tablet (8 mg total) by mouth every 8 (eight) hours as needed.  Qty: 30 tablet, Refills: 2         CONTINUE these medications which have NOT CHANGED    Details   amitriptyline (ELAVIL) 25 MG tablet Take 25 mg by mouth every evening.       metoclopramide HCl (REGLAN) 5 mg/5 mL Soln 10 mg 3 (three) times daily.       polyethylene glycol (GLYCOLAX) 17 gram PwPk Take by mouth daily as needed.      promethazine (PHENERGAN) 25 MG tablet Take 1 tablet (25 mg total) by mouth every 6 (six) hours as needed for Nausea.  Qty: 120 tablet, Refills: 3      butalbital-acetaminop-caf-cod -65-30 mg Cap TK 1 C PO Q 4 TO 6 H PRN P  Refills: 3      !! hydrocodone-acetaminophen (HYCET) solution 7.5-325 mg/15mL Take 15 mLs by mouth every 4 (four) hours as needed.  Qty: 473 mL, Refills: 0      !! hydrocodone-acetaminophen (HYCET) solution 7.5-325 mg/15mL Take 15 mLs by mouth  4 (four) times daily as needed for Pain.  Qty: 473 mL, Refills: 0      promethazine (PHENERGAN) 25 MG suppository Place 1 suppository (25 mg total) rectally every 6 (six) hours as needed for Nausea.  Qty: 40 suppository, Refills: 2      scopolamine (TRANSDERM-SCOP) 1.5 mg (1 mg over 3 days) Place 1 patch (1.5 mg total) onto the skin every 72 hours.  Qty: 30 patch, Refills: 3       !! - Potential duplicate medications found. Please discuss with provider.      STOP taking these medications       tramadol (ULTRAM) 50 mg tablet Comments:   Reason for Stopping:         acetaminophen (TYLENOL) 160 mg/5 mL Elix Comments:   Reason for Stopping:               I certify that this patient is confined to her home and needs intermittent skilled nursing care.

## 2017-05-11 NOTE — PROGRESS NOTES
Pt discharged. Instructions and prescriptions given and explained. Pt verbalized understanding with no questions. Pt AAOx3, waiting on transportation

## 2017-05-11 NOTE — PROGRESS NOTES
[facility]  General Surgery  Progress Note    Subjective:     History of Present Illness:  37 yo W with gastroparesis who is s/p laparoscopic J-tube (8/2016) for feedings, gastric stimulator (10/2016), revision of j-tube lap (12/2016), and pyloroplasty (1/2017) who presented to clinic still with a lot of nausea and vomiting s/p stimulator and pyloroplasty.  Now with a j tube that has pulled back with a lot of pain.  She went to a local ER and they were not able to remove it.  She has been to [place] and had a trial of domperidone but had a reaction to that.          Post-Op Info:  Procedure(s) (LRB):  GASTRECTOMY-SLEEVE-LAPAROSCOPIC (N/A)   1 Day Post-Op     Interval History: No adverse events overnight. Did not sleep much because of neighbor. Still with some nausea but no emesis. Pain is controlled with PCA. Ambulating and voiding without issues.    Medications:  Continuous Infusions:   Scheduled Meds:   acetaminophen  1,000 mg Intravenous Q8H    amitriptyline  25 mg Oral QHS    enoxparin  40 mg Subcutaneous Q24H    pantoprazole  40 mg Intravenous BID    scopolamine  1 patch Transdermal Q72H     PRN Meds:diphenhydrAMINE, hydrocodone-apap 2.5-108 MG/5 ML, hydrocodone-apap 2.5-108 MG/5 ML, HYDROmorphone, naloxone, ondansetron, promethazine, sodium chloride 0.9%     Review of patient's allergies indicates:   Allergen Reactions    Domperidone Itching    Latex, natural rubber Rash    Iodine Swelling    Iodine and iodide containing products Swelling     Objective:     Vital Signs (Most Recent):  Temp: 98 °F (36.7 °C) (05/11/17 0400)  Pulse: 81 (05/11/17 0700)  Resp: 13 (05/11/17 0400)  BP: (!) 108/59 (05/11/17 0400)  SpO2: 99 % (05/11/17 0400) Vital Signs (24h Range):  Temp:  [96.2 °F (35.7 °C)-99.1 °F (37.3 °C)] 98 °F (36.7 °C)  Pulse:  [53-95] 81  Resp:  [11-20] 13  SpO2:  [97 %-100 %] 99 %  BP: (102-140)/(57-88) 108/59     Weight: 53.5 kg (118 lb)  Body mass index is 19.64  kg/(m^2).    Intake/Output - Last 3 Shifts       05/09 0700 - 05/10 0659 05/10 0700 - 05/11 0659 05/11 0700 - 05/12 0659    P.O.  0     I.V. (mL/kg)  3340.1 (62.4)     Other  0     IV Piggyback  250     Total Intake(mL/kg)  3590.1 (67.1)     Net   +3590.1             Urine Occurrence  3 x     Stool Occurrence  0 x     Emesis Occurrence  0 x           Physical Exam   Constitutional: She is oriented to person, place, and time. She appears well-developed and well-nourished.   HENT:   Head: Normocephalic and atraumatic.   Eyes: EOM are normal.   Cardiovascular: Normal rate and regular rhythm.    Pulmonary/Chest: Effort normal and breath sounds normal.   Abdominal: Soft. Bowel sounds are normal. She exhibits no distension. There is tenderness.   Neurological: She is alert and oriented to person, place, and time.   Skin: Skin is warm and dry.   Psychiatric: She has a normal mood and affect. Her behavior is normal.       Significant Labs:  CBC:   Recent Labs  Lab 05/11/17  0452   WBC 8.92   RBC 3.81*   HGB 11.2*   HCT 32.1*      MCV 84   MCH 29.4   MCHC 34.9     BMP:   Recent Labs  Lab 05/11/17  0452   GLU 95      K 4.2      CO2 20*   BUN 6   CREATININE 0.6   CALCIUM 8.8   MG 1.9       Significant Diagnostics:  None    Assessment/Plan:     S/P lap gastric neurostimulator placement   -Continue PPI and anti-emetics    S/P laparoscopic sleeve gastrectomy  POD# 1 s/p sleeve gastrectomy  -Advance to bariatric clears  -Discontinue IVF and IV pain meds  -Transition to oral pain meds and anti-emetics  -Restart home medications  -OOBTC and ambulate TID  -IS hourly  -GI and DVT prophylaxis  -Likely home today      Jayden Sheikh MD  General Surgery  [facility]

## 2017-05-11 NOTE — PROGRESS NOTES
Nutrition Note  Bariatric RD met with patient and  at bedside to discuss diet progression post sleeve gastrectomy. Discussed diet progression with patient in detail. Discussed the importance of protein during post-op phase in relation to healing. Expressed the importance of incorporating high protein supplements for patient to meet daily goals. Reviewed vitamin regimen with patient and recommend patient start post d/c. Provided patient with Nutrition booklet. Answered patient's questions and concerns. Provided patient with contact numbers to call with any questions or concerns. Patient v/u;  v/u.   Comprehension:  Patient with good comprehension as evidenced by appropriate questions and concern expressed.

## 2017-05-12 NOTE — PROGRESS NOTES
Pt states nausea is not normally controlled by Zofran; pt requesting liquid Phenergan instead. Paged on-call to request. Pt also c/o headache d/t Dilaudid and takes Fioricet for migraines at home. Pt has med at bedside. Awaiting return response, will continue to monitor.

## 2017-05-12 NOTE — PROGRESS NOTES
Pt stated she walked over to Dr. Kumar's office and requested to be discharged to [facility]. Pt now inquiring about discharge. Paged on-call to inquire about discharge; awaiting return response. Will continue to monitor.

## 2017-05-12 NOTE — PROGRESS NOTES
Spoke with Dr. Sheikh and notified that pt is requesting to be discharged to [facility]; MD aware and stated she will order discharge. Will continue to monitor.

## 2017-05-12 NOTE — PROGRESS NOTES
"Spoke with Dr. Sheikh re: pt's request for liquid Phenergan; MD to order liquid Phenergan. MD also notified of c/o headache and Fioricet at bedside. MD stated, "it's okay for her to take that." Pt educated to crush pill prior to taking. Will continue to monitor.  "

## 2017-05-12 NOTE — PROGRESS NOTES
"Spoke with Dr. Geornimo; MD stated discharge orders cannot be obtained until Dr. Kumar "signs off." Once that is completed, pt can be discharged. Will continue to monitor.  "

## 2017-05-15 ENCOUNTER — PATIENT OUTREACH (OUTPATIENT)
Dept: ADMINISTRATIVE | Facility: CLINIC | Age: 37
End: 2017-05-15
Payer: COMMERCIAL

## 2017-05-15 NOTE — PATIENT INSTRUCTIONS
Discharge Instructions for Abdominal Surgery  Abdominal surgery is done through an incision in your belly. It may take a few weeks or longer to heal from the surgery. This sheet gives instructions on how to care for yourself once youre home.  Medicines  Here is what to expect:  · You may be prescribed pain medicine. Do not wait until your pain becomes severe before taking the medicine. It may not work as well if you wait too long to take it between doses.  · Most surgeons prescribe stool softeners along with opioid prescriptions. Take these as prescribed.   · You may be prescribed antibiotics to help treat or prevent infection. Be sure to take all of the antibiotics even if you start to feel better.  Diet  Dietary tips include:  · Follow any diet instructions given by your healthcare provider. You may need to start with liquids and then slowly add solid foods back into your diet.   · If you have constipation, your healthcare provider may tell you to add more fiber to your diet. You may also be told to use a laxative or stool softener. These can often be bought over the counter.  · Drink plenty of fluids.  Activity  Recommendations include the following:  · Rest as often as needed.  · Ask your healthcare provider when you can shower or bathe. Have someone nearby in case you need help.  · Ask your family and friends to help with chores and errands.  · Dont mow the lawn, vacuum, or do any strenuous activities for 4 to 6 weeks.  · Avoid lifting anything over 10 pounds for 4 to 6 weeks.  · Avoid driving until your healthcare provider says its OK.  · Walk as often as you feel able.  · Do the coughing and breathing exercises you were taught in the hospital. If you were given an incentive spirometer to help with breathing, use it as directed. This is important and will help prevent lung infections.   · Ask your healthcare provider when you can return to work.  Incision and drain care  Do's and don'ts include the  following:  · Keep your incision clean and dry. Its OK to wash the skin around your incision with mild soap and water.  · If you have a dressing over your incision, change it as you were told. Replace the dressing if it becomes wet or dirty. In most cases, the dressing can be removed after 48 hours.  · If you have a drain, record the amount of drainage daily. You may also need to empty the drain and clean the attached tubing daily. Check with your healthcare provider if you can get your drain wet or if it needs to stay dry at all times.  · Dont sit in a bathtub, pool, or hot tub until your incision is closed and any drains are removed.  · When coughing or sneezing, hold a pillow firmly against your incision with both hands. This is called splinting. Doing this helps protect your incision and decreases belly discomfort.  · Avoid picking, scratching, or pulling at your incision.  · Dont use oils, or creams on your incision. Ask your healthcare provider before using lotions on your incision.  Follow-up  You will have one or more follow-up visits with your healthcare provider. These are needed to check how well youre healing. Your drain, stitches, or staples may also be removed during these visits.  When to call the healthcare provider  Call your healthcare provider right away if you have any of the following:  · Fever of 100.4°F (38°C) or higher, or as advised by your healthcare provider  · Chest pain or trouble breathing  · Pain or tenderness in the leg  · Increased pain, redness, swelling, bleeding, or foul-smelling drainage at the incision site  · Incision separates or comes apart  · Problems with the drain if you have one  · Pain or hardness in your belly that gets worse or isnt relieved by pain medicine  · Nausea and vomiting that wont go away  · Diarrhea that lasts more than 3 days  · Constipation or inability to pass gas for more than 3 days  · Dark-colored or bloody urine  · Bright red or dark black  stools  · Itchy, swollen skin; skin rash   Date Last Reviewed: 7/1/2016  © 2939-3582 [employer]. [address] [place] [address], [place] [address]. All rights reserved. This information is not intended as a substitute for professional medical care. Always follow your healthcare professional's instructions.

## 2017-05-16 RX ORDER — MIDAZOLAM HYDROCHLORIDE 1 MG/ML
INJECTION, SOLUTION INTRAMUSCULAR; INTRAVENOUS
Status: DISCONTINUED | OUTPATIENT
Start: 2017-05-10 | End: 2017-05-16

## 2017-05-18 ENCOUNTER — PATIENT MESSAGE (OUTPATIENT)
Dept: SURGERY | Facility: CLINIC | Age: 37
End: 2017-05-18

## 2017-05-25 ENCOUNTER — OFFICE VISIT (OUTPATIENT)
Dept: SURGERY | Facility: CLINIC | Age: 37
End: 2017-05-25
Payer: COMMERCIAL

## 2017-05-25 ENCOUNTER — DOCUMENTATION ONLY (OUTPATIENT)
Dept: SURGERY | Facility: CLINIC | Age: 37
End: 2017-05-25

## 2017-05-25 VITALS
WEIGHT: 115 LBS | DIASTOLIC BLOOD PRESSURE: 76 MMHG | SYSTOLIC BLOOD PRESSURE: 110 MMHG | TEMPERATURE: 99 F | BODY MASS INDEX: 19.16 KG/M2 | HEART RATE: 80 BPM | HEIGHT: 65 IN

## 2017-05-25 DIAGNOSIS — K31.84 GASTROPARESIS: Primary | ICD-10-CM

## 2017-05-25 PROCEDURE — 99024 POSTOP FOLLOW-UP VISIT: CPT | Mod: S$GLB,,, | Performed by: SURGERY

## 2017-05-25 PROCEDURE — 99999 PR PBB SHADOW E&M-EST. PATIENT-LVL III: CPT | Mod: PBBFAC,,, | Performed by: SURGERY

## 2017-05-25 NOTE — LETTER
[facility] - General Surgery  [address]  [facility] [address]  Phone: [phone] May 25, 2017      Jeancarlos Angel MD  [address]  [address]  [facility] [address]    Patient: Nena Mendez   MR Number: [id number]   YOB: 1980   Date of Visit: 5/25/2017     Dear Dr. Angel:    Thank you for referring Nena Mendez to me for evaluation. Below are the relevant portions of my assessment and plan of care.    Nena Mendez is a 36-year-old female patient.   1. S/P lap gastric neurostimulator placement       PLAN: Patient is happy with her procedure.  Light duty one month and follow up one month.  Continue diet progression per bariatric protocol.    If you have questions, please do not hesitate to call me. I look forward to following Nena along with you.    Sincerely,      Robert Kumar MD   Section Head - General, Laparoscopic, Bariatric  Acute Care and Oncologic Surgery   - Surgical Weight Loss Program  [facility]    WSR/shelley

## 2017-05-25 NOTE — PROGRESS NOTES
Nutrition  2 weeks s/p Sleeve gastrectomy for gastroparesis    BMI: 19  Not over weight and not trying to lose weight.    Diet recall: broth, jello, mashed potatoes    Diet discussion:  Protein Intake 80g/day.   - Suggested sipping on 2 Premier shakes (or similar) daily in between meals for 60g prot/day.  Bariatric pureed diet   - aim for 3 meals/day of 5-10g prot/ea  Reviewed guidelines to avoid GI distress - eat slowly, small bites, chew well, no drinking and eating same time, no straws, no carbonated tatiana, avoid tough/dry meats    May advance diet to soft foods in 2 weeks as tolerated.

## 2017-05-25 NOTE — PROGRESS NOTES
"Nena Mendez is a 36 y.o. female patient.   1. S/P lap gastric neurostimulator placement       Past Medical History:   Diagnosis Date    Gastroparesis      Past Surgical History Pertinent Negatives:   Procedure Date Noted    BRAIN SURGERY 08/04/2016    BREAST SURGERY 08/04/2016    COLON SURGERY 08/04/2016    CORONARY ARTERY BYPASS GRAFT 08/04/2016    EYE SURGERY 08/04/2016    FRACTURE SURGERY 08/04/2016    HERNIA REPAIR 08/04/2016    JOINT REPLACEMENT 08/04/2016    KIDNEY TRANSPLANT 08/04/2016    LIVER TRANSPLANT 08/04/2016    PROSTATE SURGERY 08/04/2016    SMALL INTESTINE SURGERY 08/04/2016    SPINE SURGERY 08/04/2016     Scheduled Meds:  Continuous Infusions:  PRN Meds:    Review of patient's allergies indicates:   Allergen Reactions    Iodine and iodide containing products Swelling, Hives, Itching, Rash and Shortness Of Breath    Domperidone Itching    Latex, natural rubber Rash    Iodine Swelling     There are no hospital problems to display for this patient.    Blood pressure 110/76, pulse 80, temperature 98.6 °F (37 °C), height 5' 5" (1.651 m), weight 52.2 kg (115 lb).    Subjective She has gastroparesis and is s/p gastric stimulator 10/7/16, J tube 12/16/16, pylorolasty 1/27/17 and sleeve gastrectomy 5/10/17.  She feels better!!  She has only dry heaved once but no vomiting.  Her epigastric pain is improved.  She still has chronic nausea.  She is taking phenergan only at night, zofran doesn't help, has not tried scopalomine patch but has it at home.  She is taking ppi once a day.  She is off pain medication.  Objective Right mid abdominal wound has opened (likely small seroma or hematoma), rest of wounds ok, abdomen benign.   Assessment & Plan She is happy with her procedure.  Light duty one month and follow up one month.  Continue diet progression per bariatric protocol.       Robert Kumar MD  5/25/2017  "

## 2017-06-27 ENCOUNTER — OFFICE VISIT (OUTPATIENT)
Dept: SURGERY | Facility: CLINIC | Age: 37
End: 2017-06-27
Payer: COMMERCIAL

## 2017-06-27 VITALS
BODY MASS INDEX: 19.83 KG/M2 | HEART RATE: 75 BPM | TEMPERATURE: 99 F | DIASTOLIC BLOOD PRESSURE: 78 MMHG | WEIGHT: 119 LBS | HEIGHT: 65 IN | SYSTOLIC BLOOD PRESSURE: 123 MMHG

## 2017-06-27 DIAGNOSIS — Z09 POSTOP CHECK: Primary | ICD-10-CM

## 2017-06-27 PROCEDURE — 99024 POSTOP FOLLOW-UP VISIT: CPT | Mod: S$GLB,,, | Performed by: SURGERY

## 2017-06-27 PROCEDURE — 99999 PR PBB SHADOW E&M-EST. PATIENT-LVL III: CPT | Mod: PBBFAC,,, | Performed by: SURGERY

## 2017-06-27 NOTE — LETTER
"  [facility] - General Surgery  [address]  [place] [address]  Phone: [phone] June 27, 2017      Jeancarlos Angel MD  [address]  [address]  [facility] [address]    Patient: Nena Mendez   MR Number: [id number]   YOB: 1980   Date of Visit: 6/27/2017     Dear Dr. Angel:    Thank you for referring Nena Mendez to me for evaluation. Below are the relevant portions of my assessment and plan of care.    Patient has gastroparesis and is status post gastric stimulator 10/7/16, J tube 12/16/16, pyloroplasty 1/27/17, and sleeve gastrectomy 5/10/17.  She is doing well with her diet.  She has some nausea, early satiety and postprandial fullness (although this may be normal for postop sleeve symptoms).  She is only using Scopolamine patches for gastroparesis and feels "way better" than pre-op.    PLAN: She is doing great after surgery.  Follow up in  Six months.     If you have questions, please do not hesitate to call me. I look forward to following Nena along with you.    Sincerely,      Robert Kumar MD   Section Head - General, Laparoscopic, Bariatric  Acute Care and Oncologic Surgery   - Surgical Weight Loss Program  [facility]    WSR/shelley     "

## 2017-06-27 NOTE — PROGRESS NOTES
"Nena Mendez is a 37 y.o. female patient.   1. Postop check      Past Medical History:   Diagnosis Date    Gastroparesis      Past Surgical History Pertinent Negatives:   Procedure Date Noted    BRAIN SURGERY 08/04/2016    BREAST SURGERY 08/04/2016    COLON SURGERY 08/04/2016    CORONARY ARTERY BYPASS GRAFT 08/04/2016    EYE SURGERY 08/04/2016    FRACTURE SURGERY 08/04/2016    HERNIA REPAIR 08/04/2016    JOINT REPLACEMENT 08/04/2016    KIDNEY TRANSPLANT 08/04/2016    LIVER TRANSPLANT 08/04/2016    PROSTATE SURGERY 08/04/2016    SMALL INTESTINE SURGERY 08/04/2016    SPINE SURGERY 08/04/2016     Scheduled Meds:  Continuous Infusions:  PRN Meds:    Review of patient's allergies indicates:   Allergen Reactions    Iodine and iodide containing products Swelling, Hives, Itching, Rash and Shortness Of Breath    Domperidone Itching    Latex, natural rubber Rash    Iodine Swelling     There are no hospital problems to display for this patient.    Blood pressure 123/78, pulse 75, temperature 98.6 °F (37 °C), height 5' 5" (1.651 m), weight 54 kg (119 lb).    Subjective She has gastroparesis and is s/p gastric stimulator 10/7/16, J tube 12/16/16, pylorolasty 1/27/17 and sleeve gastrectomy 5/10/17.  She is doing well with her diet.  She has some nausea, early satiety and postprandial fullness (although this may be normal for postop sleeve symptoms).  She is only using scopolamine patches for gastroparesis and feels "way better" than preop.  Objective   Assessment & Plan  Doing great after surgery.  Follow up 6 months.       Robert Kumar MD  6/27/2017  "

## 2017-07-13 ENCOUNTER — TELEPHONE (OUTPATIENT)
Dept: GASTROENTEROLOGY | Facility: CLINIC | Age: 37
End: 2017-07-13

## 2017-07-13 ENCOUNTER — TELEPHONE (OUTPATIENT)
Dept: SURGERY | Facility: CLINIC | Age: 37
End: 2017-07-13

## 2017-07-13 NOTE — TELEPHONE ENCOUNTER
Attempted to contact patient about rescheduling appt with Dr. Orozco. Patient originally had an appt for 6/20 and cancelled.    Left message for patient to call the clinic back.

## 2017-09-08 ENCOUNTER — PATIENT MESSAGE (OUTPATIENT)
Dept: SURGERY | Facility: CLINIC | Age: 37
End: 2017-09-08

## 2017-10-24 ENCOUNTER — TELEPHONE (OUTPATIENT)
Dept: SURGERY | Facility: CLINIC | Age: 37
End: 2017-10-24

## 2017-10-24 NOTE — TELEPHONE ENCOUNTER
Pt  calling stating that pt is not doing well.  Having severe abdominal pain (doubled over) after she eats anything.  Pt having to take phenergan for nausea.  Pt denies any vomiting.    Pt refuses to go to the nearest hospital and would like to see Dr. Kumar on 10/26/17.  appt scheduled.

## 2017-10-24 NOTE — TELEPHONE ENCOUNTER
----- Message from Dorina Retana sent at 10/24/2017 11:05 AM CDT -----  Contact: quintin/  Quintin States that he needs a call returned by a nurse in reference to his wife having really bad stomach pains, Please call Quintin @ [phone]  . Thanks :)

## 2017-10-25 ENCOUNTER — TELEPHONE (OUTPATIENT)
Dept: SURGERY | Facility: CLINIC | Age: 37
End: 2017-10-25

## 2017-10-25 NOTE — TELEPHONE ENCOUNTER
----- Message from Dorina Retana sent at 10/25/2017 12:46 PM CDT -----  Contact: /quintin Ribeiro States that he needs a call returned by a nurse in reference to the appointment tomorrow, Please call Quintin @ [phone] . Thanks :)

## 2017-10-28 ENCOUNTER — OFFICE VISIT (OUTPATIENT)
Dept: SURGERY | Facility: CLINIC | Age: 37
End: 2017-10-28
Payer: COMMERCIAL

## 2017-10-28 VITALS
WEIGHT: 110 LBS | DIASTOLIC BLOOD PRESSURE: 83 MMHG | HEART RATE: 71 BPM | TEMPERATURE: 99 F | BODY MASS INDEX: 18.33 KG/M2 | SYSTOLIC BLOOD PRESSURE: 113 MMHG | HEIGHT: 65 IN

## 2017-10-28 DIAGNOSIS — K31.84 GASTROPARESIS: Primary | ICD-10-CM

## 2017-10-28 PROCEDURE — 99999 PR PBB SHADOW E&M-EST. PATIENT-LVL III: CPT | Mod: PBBFAC,,, | Performed by: SURGERY

## 2017-10-28 PROCEDURE — 99213 OFFICE O/P EST LOW 20 MIN: CPT | Mod: S$GLB,,, | Performed by: SURGERY

## 2017-10-28 RX ORDER — ONDANSETRON 8 MG/1
8 TABLET, ORALLY DISINTEGRATING ORAL EVERY 8 HOURS PRN
Qty: 30 TABLET | Refills: 6 | Status: ON HOLD | OUTPATIENT
Start: 2017-10-28 | End: 2019-12-30

## 2017-10-28 RX ORDER — OMEPRAZOLE 40 MG/1
40 CAPSULE, DELAYED RELEASE ORAL DAILY
Qty: 30 CAPSULE | Refills: 11 | Status: SHIPPED | OUTPATIENT
Start: 2017-10-28 | End: 2018-03-15

## 2017-10-28 NOTE — LETTER
[facility] - General Surgery  [address]  [place] [address]  Phone: [phone] October 28, 2017      Jeancarlos Angel MD  [address]  [address]  [facility] [address]  Patient: Nena Mendez   MR Number: [id number]   YOB: 1980   Date of Visit: 10/28/2017     Dear Dr. Angel:    Thank you for referring Nena Mendez to me for evaluation. Below are the relevant portions of my assessment and plan of care.    Assessment:       1. S/P lap gastric neuro stimulator placement         Plan:       Adjusted today.  RX Zofran and PPI.  Will get EGD and CT.      If you have questions, please do not hesitate to call me. I look forward to following Nena along with you.    Sincerely,      Robert Kumar MD   Section Head - General, Laparoscopic, Bariatric  Acute Care and Oncologic Surgery   - Surgical Weight Loss Program  [facility]    WSR/shelley

## 2017-10-28 NOTE — PROGRESS NOTES
Subjective:       Patient ID: Nena Mendez is a 37 y.o. female.    Chief Complaint: Follow-up    HPI She has gastroparesis and is s/p gastric stimulator 10/7/16, J tube 12/16/16, pylorolasty 1/27/17 and sleeve gastrectomy 5/10/17.  She developed a rash with her scopolamine patches.  She has developed an epigastric and luq pain.  It worsens with eating and laying down.  Her gastroparesis symptoms have simultaneously worsened with worsening nausea and vomiting.  This started 3 weeks ago.  She has lost at least 8 pounds.  She is taking phenergan at night (so she is not sleepy at work), taking zofran in the morning (rx has run out) and is not on ppi.  Review of Systems   Constitutional: Negative for fever.   Respiratory: Negative for chest tightness.    Cardiovascular: Negative for chest pain.   Gastrointestinal: Positive for constipation.        Had one bout of diarrhea last week   Genitourinary: Negative for difficulty urinating.        Had bladder infection a month ago       Objective:      Physical Exam   Constitutional: She is oriented to person, place, and time. She appears well-developed and well-nourished.   Abdominal: Soft. Bowel sounds are normal. There is tenderness in the left upper quadrant. There is no rigidity and no guarding.   Neurological: She is alert and oriented to person, place, and time.   Skin: Skin is warm and dry.   Psychiatric: She has a normal mood and affect. Her behavior is normal. Judgment and thought content normal.   Vitals reviewed.      Gastric stimulator adjustment: imp 450 volt 4.5 curr 10 pw 330 rate 28 on 3 off 2    Assessment:       1. S/P lap gastric neurostimulator placement         Plan:       Adjusted today.  Rx zofran and ppi.  Will get egd and ct.

## 2017-11-06 ENCOUNTER — TELEPHONE (OUTPATIENT)
Dept: SURGERY | Facility: CLINIC | Age: 37
End: 2017-11-06

## 2017-11-06 DIAGNOSIS — K31.84 GASTROPARESIS: Primary | ICD-10-CM

## 2017-11-06 DIAGNOSIS — R10.13 EPIGASTRIC ABDOMINAL PAIN: ICD-10-CM

## 2017-12-04 ENCOUNTER — TELEPHONE (OUTPATIENT)
Dept: SURGERY | Facility: CLINIC | Age: 37
End: 2017-12-04

## 2017-12-04 NOTE — TELEPHONE ENCOUNTER
----- Message from Dorina Retana sent at 12/4/2017  8:42 AM CST -----  Contact: magali Vaile States that she needs a call returned by a nurse in reference to some paper work she needs filled out in order to fight her insurance so her last surgery will be paid, Please call Nena @ [phone]  . Thanks :)

## 2017-12-04 NOTE — TELEPHONE ENCOUNTER
Spoke with pt in reference to her recent surgery not being approved.  Sent her the preservice/referral statement.  Pt to call with any other questions or concerns.

## 2017-12-13 ENCOUNTER — TELEPHONE (OUTPATIENT)
Dept: SURGERY | Facility: CLINIC | Age: 37
End: 2017-12-13

## 2017-12-13 DIAGNOSIS — R10.9 ABDOMINAL PAIN, UNSPECIFIED ABDOMINAL LOCATION: Primary | ICD-10-CM

## 2017-12-13 NOTE — TELEPHONE ENCOUNTER
----- Message from Robert Kumar MD sent at 12/13/2017  1:18 PM CST -----  Small so noncontributory.  ----- Message -----  From: Laz Gamez MA  Sent: 12/13/2017   8:11 AM  To: Robert Kumar MD    There is an addendum on page 3 that states a hiatal hernia was found, and I will have her get a CT scan done as well

## 2017-12-13 NOTE — TELEPHONE ENCOUNTER
----- Message from Robert Kumar MD sent at 12/13/2017  7:57 AM CST -----  On the report in the media tab there is no mention of a hiatal hernia.  Let me know if I read it wrong.  Did she get a ct?  ----- Message -----  From: Laz Gamez MA  Sent: 12/13/2017   7:10 AM  To: Robert Kumar MD     [facility] doc , got her egd back and she was found to have a hiatal hernia, don't know the actual size. She is c/o severe nausea as well????   ----- Message -----  From: Ephraim Schofield  Sent: 12/12/2017   4:41 PM  To: José Nieto Staff    Patient states that (s)he needs to speak with nurse in ref to the test that she was ordered to have done;pt states that her nausea has been pretty severe and needs to know what else to do//please call back at [phone]//thank you

## 2017-12-13 NOTE — TELEPHONE ENCOUNTER
Patient was made an appointment for CT abd/pelvis @ [facility] for 12/18/17, resluts will be faxed to us asap. Will forward message to Dr. godwin regarding pain concerns. Pt v/u

## 2017-12-19 ENCOUNTER — TELEPHONE (OUTPATIENT)
Dept: SURGERY | Facility: CLINIC | Age: 37
End: 2017-12-19

## 2017-12-19 ENCOUNTER — PATIENT MESSAGE (OUTPATIENT)
Dept: SURGERY | Facility: CLINIC | Age: 37
End: 2017-12-19

## 2017-12-19 NOTE — TELEPHONE ENCOUNTER
Spoke with pt informed her that we haven't received any results yet I also gave her our new fax number to fax over again.

## 2017-12-21 ENCOUNTER — PATIENT MESSAGE (OUTPATIENT)
Dept: SURGERY | Facility: CLINIC | Age: 37
End: 2017-12-21

## 2017-12-21 ENCOUNTER — DOCUMENTATION ONLY (OUTPATIENT)
Dept: SURGERY | Facility: CLINIC | Age: 37
End: 2017-12-21

## 2017-12-22 ENCOUNTER — DOCUMENTATION ONLY (OUTPATIENT)
Dept: SURGERY | Facility: CLINIC | Age: 37
End: 2017-12-22

## 2017-12-22 NOTE — PROGRESS NOTES
"Phone call: she continues to have pain luq and epigastric pain side near her gallbladder scar (?).  It increases after she eats and is associated with severe nausea and eating seems to "take her breath away".  She has only vomited twice.  She also has diarrhea alternating with constipation.  She continues to take her gastroparesis medication as she was when I saw her in October.    I suggest ges and to see Dr. Orozco.  "

## 2018-03-06 ENCOUNTER — TELEPHONE (OUTPATIENT)
Dept: SURGERY | Facility: CLINIC | Age: 38
End: 2018-03-06

## 2018-03-06 NOTE — TELEPHONE ENCOUNTER
----- Message from Ephraim Schofield sent at 3/6/2018  3:01 PM CST -----  Contact: Quintin//  Caller states that the pt is vomiting again and would like to get the pt in to have pacemaker checked//please call back at [phone]//thank you

## 2018-03-07 ENCOUNTER — PATIENT MESSAGE (OUTPATIENT)
Dept: SURGERY | Facility: CLINIC | Age: 38
End: 2018-03-07

## 2018-03-15 ENCOUNTER — PATIENT MESSAGE (OUTPATIENT)
Dept: SURGERY | Facility: CLINIC | Age: 38
End: 2018-03-15

## 2018-03-15 ENCOUNTER — OFFICE VISIT (OUTPATIENT)
Dept: SURGERY | Facility: CLINIC | Age: 38
End: 2018-03-15
Payer: COMMERCIAL

## 2018-03-15 VITALS
WEIGHT: 111 LBS | HEART RATE: 67 BPM | BODY MASS INDEX: 18.49 KG/M2 | DIASTOLIC BLOOD PRESSURE: 78 MMHG | TEMPERATURE: 98 F | HEIGHT: 65 IN | SYSTOLIC BLOOD PRESSURE: 106 MMHG

## 2018-03-15 DIAGNOSIS — K31.84 GASTROPARESIS: Primary | ICD-10-CM

## 2018-03-15 PROCEDURE — 99999 PR PBB SHADOW E&M-EST. PATIENT-LVL III: CPT | Mod: PBBFAC,,, | Performed by: SURGERY

## 2018-03-15 PROCEDURE — 99213 OFFICE O/P EST LOW 20 MIN: CPT | Mod: S$GLB,,, | Performed by: SURGERY

## 2018-03-15 RX ORDER — TRAMADOL HYDROCHLORIDE 50 MG/1
TABLET ORAL
COMMUNITY
Start: 2017-12-31 | End: 2018-03-15

## 2018-03-15 RX ORDER — HYOSCYAMINE SULFATE 0.12 MG/1
TABLET ORAL
COMMUNITY
Start: 2017-12-31 | End: 2018-03-15

## 2018-03-15 RX ORDER — PREDNISONE 50 MG/1
TABLET ORAL
COMMUNITY
Start: 2017-12-18 | End: 2018-03-15

## 2018-03-15 RX ORDER — PANTOPRAZOLE SODIUM 40 MG/1
40 TABLET, DELAYED RELEASE ORAL DAILY
Qty: 30 TABLET | Refills: 0
Start: 2018-03-15 | End: 2019-03-21

## 2018-03-15 NOTE — LETTER
[facility] - General Surgery  [address]  [facility] [address]  Phone: [phone] March 15, 2018      Jeancarlos Angel MD  [address]  [address]  [facility] [address]    Patient: Nena Mendez   MR Number: [id number]   YOB: 1980   Date of Visit: 3/15/2018     Dear Dr. Angel:    Thank you for referring Nena Mendez to me for evaluation. Below are the relevant portions of my assessment and plan of care.    ASSESSMENT:      Nena Mendez is a 37-year-old female referred for worsening symptoms particularly vomiting in setting of gastroparesis.     PLAN:  · Gastric pacemaker setting increased today.  · Recommend UGI study and referral to Dr. Orozco.  · May get UGI study closer to home.    If you have questions, please do not hesitate to call me. I look forward to following Nena along with you.    Sincerely,      Robert Kumar MD   Section Head - General, Laparoscopic, Bariatric  Acute Care and Oncologic Surgery   - Surgical Weight Loss Program  [facility]    WSR/shelley

## 2018-03-15 NOTE — PROGRESS NOTES
GENERAL SURGERY  H&P      REASON FOR CONSULT:    Encounter Diagnosis   Name Primary?    Gastroparesis Yes       SUBJECTIVE:     HISTORY OF PRESENT ILLNESS:  Nena Mendez is a 37 y.o. female who has been referred to surgery clinic for worsening vomiting and symptoms of gastroparesis.    Primary symptom reported today that has been bothersome is vomiting which is now occurring on a daily basis.  Normally vomiting about 2-3 x/day at present which is new over the last few months.  Vomiting was not a problem and nausea was controllable up until recently.  Also complains of epigastric pain after every meal but this is stable from before and not worsening.   Vomiting happens shortly after eating (~5 minutes).  Reports prior to this she had had significant improvement in symptoms ever since sleeve gastrectomy and really wasn't vomiting at all up until the last few months.     Currently taking Zofran twice a day.  Phenergan orally nightly before bed.  Also takes Protonix 40 mg daily since outside EGD 11/17/2017 which showed some gastritis.  She reports this has helped a little with reflux symptoms but otherwise has not helped with other symptoms.     The patient's past surgical history is pertinent for prior gastric stimulator 10/7/16, J-tube 12/16/16, pylorolasty 1/27/17 and sleeve gastrectomy 5/10/17.        Today (3/15/2018):  SYMPTOM MONITOR WORKSHEET QUESTIONNAIRE:     Vomiting:  Nausea:  Early satiety:   Bloating:  Postprandial Fullness:  Epigastric Pain:  Epigastric Burning: Extremely Severe (4-4)  Extremely Severe (4-4)  Moderate to Severe (2-3)  Mild to Moderate (1-2)  Severe (3-4)  Severe (3-3)  Extremely Severe (4-4)         MEDICATIONS:  Home Medications:  Current Outpatient Prescriptions on File Prior to Visit   Medication Sig Dispense Refill    butalbital-acetaminop-caf-cod -13-30 mg Cap TK 1 C PO Q 4 TO 6 H PRN P  3    ondansetron (ZOFRAN-ODT) 8 MG TbDL Take 1 tablet (8 mg total) by mouth every  8 (eight) hours as needed. 30 tablet 6    promethazine (PHENERGAN) 25 MG tablet Take 1 tablet (25 mg total) by mouth every 6 (six) hours as needed for Nausea. 120 tablet 3    [DISCONTINUED] amitriptyline (ELAVIL) 25 MG tablet Take 25 mg by mouth every evening.       [DISCONTINUED] hydrocodone-acetaminophen (HYCET) solution 7.5-325 mg/15mL Take 15 mLs by mouth every 4 (four) hours as needed. 473 mL 0    [DISCONTINUED] hydrocodone-acetaminophen (HYCET) solution 7.5-325 mg/15mL Take 15 mLs by mouth 4 (four) times daily as needed for Pain. 473 mL 0    [DISCONTINUED] metoclopramide HCl (REGLAN) 5 mg/5 mL Soln 10 mg 3 (three) times daily.       [DISCONTINUED] omeprazole (PRILOSEC) 40 MG capsule Take 1 capsule (40 mg total) by mouth once daily. Open capsule and take contents by mouth daily 30 capsule 11    [DISCONTINUED] polyethylene glycol (GLYCOLAX) 17 gram PwPk Take by mouth daily as needed.      [DISCONTINUED] promethazine (PHENERGAN) 25 MG suppository Place 1 suppository (25 mg total) rectally every 6 (six) hours as needed for Nausea. 40 suppository 2    [DISCONTINUED] promethazine (PHENERGAN) 6.25 mg/5 mL syrup Take 10 mLs (12.5 mg total) by mouth every 6 (six) hours as needed. 473 mL 2    [DISCONTINUED] scopolamine (TRANSDERM-SCOP) 1.5 mg (1 mg over 3 days) Place 1 patch (1.5 mg total) onto the skin every 72 hours. 30 patch 3     No current facility-administered medications on file prior to visit.        ALLERGIES:    Review of patient's allergies indicates:   Allergen Reactions    Iodine and iodide containing products Swelling, Hives, Itching, Rash and Shortness Of Breath    Domperidone Itching    Latex, natural rubber Rash    Adhesive     Iodine Swelling    Scopolamine        PAST MEDICAL HISTORY:    Past Medical History:   Diagnosis Date    Gastroparesis        SURGICAL HISTORY:  Past Surgical History:   Procedure Laterality Date    APPENDECTOMY       SECTION      CHOLECYSTECTOMY       COSMETIC SURGERY      GASTRIC STIMULATOR IMPLANT SURGERY      GASTROSTOMY-JEJEUNOSTOMY TUBE CHANGE/PLACEMENT  08/10/2016    HYSTERECTOMY      laparoscopic jejunostomy tube      sleeve gastrectomy      TONSILLECTOMY         FAMILY HISTORY:  Family History   Problem Relation Age of Onset    Hypothyroidism Mother     Cirrhosis Father     No Known Problems Brother     Asthma Daughter     Sleep apnea Daughter        SOCIAL HISTORY:  Social History   Substance Use Topics    Smoking status: Former Smoker     Packs/day: 0.00    Smokeless tobacco: Not on file      Comment: recently quit    Alcohol use No        REVIEW OF SYSTEMS:  A 10-point review of systems is negative except for the above mentioned in the HPI.    OBJECTIVE:     Most Recent Vitals:  Temp: 98.2 °F (36.8 °C) (03/15/18 1140)  Pulse: 67 (03/15/18 1140)  BP: 106/78 (03/15/18 1140)      PHYSICAL EXAM:  AAO, NAD, well developed and well nourished.  Head normocephalic, atraumatic.  Trachea midline, neck supple.  Respirations unlabored with good inspiratory effort.  Heart regular rate and rhythm.  Abdomen soft, nondistended, nontender to palpation.      LABORATORY VALUES:  Lab Results   Component Value Date    WBC 8.92 05/11/2017    HGB 11.2 (L) 05/11/2017    HCT 32.1 (L) 05/11/2017     05/11/2017     Lab Results   Component Value Date     05/11/2017    K 4.2 05/11/2017     05/11/2017    CO2 20 (L) 05/11/2017    BUN 6 05/11/2017    CREATININE 0.6 05/11/2017    GLU 95 05/11/2017    CALCIUM 8.8 05/11/2017    MG 1.9 05/11/2017    PHOS 4.2 05/11/2017    AST 17 01/29/2017    ALT 27 01/29/2017    ALKPHOS 77 01/29/2017    BILITOT 0.3 01/29/2017    PROT 5.2 (L) 01/29/2017    ALBUMIN 2.9 (L) 01/29/2017     No results found for: INR, PTT  No results found for: TSH, FREET4, PTH, CEA,       DIAGNOSTIC STUDIES:  Outside CT: Reviewed (No abnormal findings)    Outside EGD 11/17/2017:      ASSESSMENT:     Nena Mendez is a 37 y.o. female  referred for worsening symptoms particularly vomiting in setting of gastroparesis.      PLAN:  · Gastric pacemaker setting increased today.  · Recommend UGI study and referral to Dr. Orozco.  · May get UGI study closer to home.      Cnythia Muro MD

## 2018-03-15 NOTE — PROGRESS NOTES
I have seen the patient, reviewed the Resident's history and physical, assessment and plan. I have personally interviewed and examined the patient at bedside and: agree with the findings.     S/p gastric stimulator 10/7/16, J tube 12/16/16, pylorolasty 1/27/17 and sleeve gastrectomy 5/10/17.  She was doing well until January last year with increasing symptoms.  She has severe epigastric pain with extremely severe nausea and vomiting.  She is on zofran bid and phenergan at night.  She is on ppi daily (last egd showed gastritis).  EGD and CT in December ok.  Consider ugi.  Stimulator adjustment: imp 455 volt 5 curr 11 pw 220 rate 50 on 3 off 2.  Consult GI motility.

## 2018-04-05 RX ORDER — PROMETHAZINE HYDROCHLORIDE 25 MG/1
TABLET ORAL
Qty: 120 TABLET | Refills: 0 | Status: SHIPPED | OUTPATIENT
Start: 2018-04-05 | End: 2018-05-13 | Stop reason: SDUPTHER

## 2018-05-14 RX ORDER — PROMETHAZINE HYDROCHLORIDE 25 MG/1
TABLET ORAL
Qty: 120 TABLET | Refills: 0 | Status: SHIPPED | OUTPATIENT
Start: 2018-05-14 | End: 2018-06-15 | Stop reason: SDUPTHER

## 2018-06-24 RX ORDER — PROMETHAZINE HYDROCHLORIDE 25 MG/1
TABLET ORAL
Qty: 120 TABLET | Refills: 0 | Status: SHIPPED | OUTPATIENT
Start: 2018-06-24 | End: 2018-10-27 | Stop reason: SDUPTHER

## 2018-07-20 ENCOUNTER — TELEPHONE (OUTPATIENT)
Dept: ENDOSCOPY | Facility: HOSPITAL | Age: 38
End: 2018-07-20

## 2018-10-27 RX ORDER — PROMETHAZINE HYDROCHLORIDE 25 MG/1
TABLET ORAL
Qty: 120 TABLET | Refills: 0 | Status: SHIPPED | OUTPATIENT
Start: 2018-10-27 | End: 2018-11-27 | Stop reason: SDUPTHER

## 2018-11-27 RX ORDER — PROMETHAZINE HYDROCHLORIDE 25 MG/1
TABLET ORAL
Qty: 120 TABLET | Refills: 0 | Status: SHIPPED | OUTPATIENT
Start: 2018-11-27 | End: 2019-03-08 | Stop reason: SDUPTHER

## 2019-01-03 ENCOUNTER — OFFICE VISIT (OUTPATIENT)
Dept: SURGERY | Facility: CLINIC | Age: 39
End: 2019-01-03
Payer: COMMERCIAL

## 2019-01-03 VITALS
BODY MASS INDEX: 19.69 KG/M2 | SYSTOLIC BLOOD PRESSURE: 117 MMHG | HEART RATE: 72 BPM | TEMPERATURE: 98 F | DIASTOLIC BLOOD PRESSURE: 72 MMHG | HEIGHT: 65 IN | WEIGHT: 118.19 LBS

## 2019-01-03 DIAGNOSIS — K31.84 GASTROPARESIS: Primary | ICD-10-CM

## 2019-01-03 PROCEDURE — 3008F BODY MASS INDEX DOCD: CPT | Mod: CPTII,S$GLB,, | Performed by: SURGERY

## 2019-01-03 PROCEDURE — 3008F PR BODY MASS INDEX (BMI) DOCUMENTED: ICD-10-PCS | Mod: CPTII,S$GLB,, | Performed by: SURGERY

## 2019-01-03 PROCEDURE — 99214 PR OFFICE/OUTPT VISIT, EST, LEVL IV, 30-39 MIN: ICD-10-PCS | Mod: S$GLB,,, | Performed by: SURGERY

## 2019-01-03 PROCEDURE — 99999 PR PBB SHADOW E&M-EST. PATIENT-LVL III: CPT | Mod: PBBFAC,,, | Performed by: SURGERY

## 2019-01-03 PROCEDURE — 99999 PR PBB SHADOW E&M-EST. PATIENT-LVL III: ICD-10-PCS | Mod: PBBFAC,,, | Performed by: SURGERY

## 2019-01-03 PROCEDURE — 99214 OFFICE O/P EST MOD 30 MIN: CPT | Mod: S$GLB,,, | Performed by: SURGERY

## 2019-01-03 RX ORDER — BACLOFEN 10 MG/1
10 TABLET ORAL 3 TIMES DAILY PRN
Qty: 90 TABLET | Refills: 11 | Status: SHIPPED | OUTPATIENT
Start: 2019-01-03 | End: 2023-05-11

## 2019-01-03 RX ORDER — ESOMEPRAZOLE MAGNESIUM 40 MG/1
40 GRANULE, DELAYED RELEASE ORAL
COMMUNITY
Start: 2018-11-27 | End: 2020-11-10

## 2019-01-03 NOTE — PROGRESS NOTES
Subjective:       Patient ID: Nena Mendez is a 38 y.o. female.    Chief Complaint: No chief complaint on file.    HPI S/p gastric stimulator 10/7/16, J tube 12/16/16 (she didn't tolerate it), pylorolasty 1/27/17 and sleeve gastrectomy 5/10/17.  She was doing well until the last few months.  She has severe epigastric pain with severe to extremely severe nausea and severe vomiting.  She is on zofran in the AM and phenergan at bid.  She is on ppi daily.  She got a shot of demerol once in the ER last month and was given tramadol rx but hasn't taken it.  Review of Systems   Constitutional: Positive for unexpected weight change. Negative for fever.        No significant weight loss since I saw her last month but 4 pound weight loss in the last month.   Respiratory: Negative for cough, chest tightness and shortness of breath.    Cardiovascular: Negative for chest pain and palpitations.   Gastrointestinal: Positive for constipation.   Genitourinary: Negative for difficulty urinating and dysuria.   Skin: Negative for rash and wound.   Neurological: Positive for headaches. Negative for seizures.        Migraines associated with gastroparesis symptoms.   Hematological:        Has easy bruising but no easy bleeding.       Objective:      Physical Exam   Constitutional: She is oriented to person, place, and time. She appears well-developed and well-nourished.   Abdominal: Soft.   Neurological: She is alert and oriented to person, place, and time.   Skin: Skin is warm and dry.   Psychiatric: She has a normal mood and affect. Her behavior is normal. Judgment and thought content normal.   Vitals reviewed.    CT without oral/iv contrast and Labs from December 2018 ok  Gastric Stimulator Adjustment: impedence 476 Voltage 6 Current 12.6 Pulse [id number] Rate 50 Cycle on 3 Cycle off 2    Assessment:       1. S/P lap gastric neurostimulator placement       adjusted today for worsening symptoms  Plan:      Try antispasmodics.  Rtc  one month.

## 2019-02-07 ENCOUNTER — OFFICE VISIT (OUTPATIENT)
Dept: SURGERY | Facility: CLINIC | Age: 39
End: 2019-02-07
Payer: COMMERCIAL

## 2019-02-07 VITALS
HEART RATE: 74 BPM | SYSTOLIC BLOOD PRESSURE: 130 MMHG | DIASTOLIC BLOOD PRESSURE: 86 MMHG | BODY MASS INDEX: 18.66 KG/M2 | HEIGHT: 65 IN | WEIGHT: 112 LBS

## 2019-02-07 DIAGNOSIS — R10.13 EPIGASTRIC PAIN: ICD-10-CM

## 2019-02-07 DIAGNOSIS — K31.84 GASTROPARESIS: Primary | ICD-10-CM

## 2019-02-07 DIAGNOSIS — R11.2 NAUSEA AND VOMITING, INTRACTABILITY OF VOMITING NOT SPECIFIED, UNSPECIFIED VOMITING TYPE: ICD-10-CM

## 2019-02-07 PROCEDURE — 99213 PR OFFICE/OUTPT VISIT, EST, LEVL III, 20-29 MIN: ICD-10-PCS | Mod: S$GLB,,, | Performed by: SURGERY

## 2019-02-07 PROCEDURE — 3008F PR BODY MASS INDEX (BMI) DOCUMENTED: ICD-10-PCS | Mod: CPTII,S$GLB,, | Performed by: SURGERY

## 2019-02-07 PROCEDURE — 95982 PR ELEC ALYS NSTIM PLS GEN GASTRIC SUBSEQUENT W/REPROG: ICD-10-PCS | Mod: S$GLB,,, | Performed by: SURGERY

## 2019-02-07 PROCEDURE — 99999 PR PBB SHADOW E&M-EST. PATIENT-LVL III: CPT | Mod: PBBFAC,,, | Performed by: SURGERY

## 2019-02-07 PROCEDURE — 3008F BODY MASS INDEX DOCD: CPT | Mod: CPTII,S$GLB,, | Performed by: SURGERY

## 2019-02-07 PROCEDURE — 99999 PR PBB SHADOW E&M-EST. PATIENT-LVL III: ICD-10-PCS | Mod: PBBFAC,,, | Performed by: SURGERY

## 2019-02-07 PROCEDURE — 99213 OFFICE O/P EST LOW 20 MIN: CPT | Mod: S$GLB,,, | Performed by: SURGERY

## 2019-02-07 PROCEDURE — 95982 IO GA N-STIM SUBSQ W/REPROG: CPT | Mod: S$GLB,,, | Performed by: SURGERY

## 2019-02-07 NOTE — MEDICAL/APP STUDENT
Patient ID: Nena Mendez is a 38 y.o. female.    Chief Complaint: Follow-up      HPI: Post-up f/u  The pt has been suffering from severe epigastric pain for about 4-5 months.   + n/v several times a day and did not improve since last visit after increasing voltage on nerve stimulator. Fatigue and dizzy due to severe n/v.   She still has migraine associated with gastroparesis. Also states that she has lost about 5-6 lbs since last visit on 01/09/2019.   Only takes baclofen at night after work because she can't tolerate it during work.   Takes Nexium powder in the morning. Still taking Zofran and Phenergan as indicated.           Review of Systems   Constitutional: Positive for fatigue and unexpected weight change.   HENT: Negative for ear pain, facial swelling, hearing loss, sinus pressure, sinus pain, tinnitus and trouble swallowing.    Respiratory: Negative for cough, choking and shortness of breath.    Cardiovascular: Negative for chest pain.   Gastrointestinal: Positive for abdominal pain, nausea and vomiting.   Neurological: Positive for dizziness, light-headedness and headaches.   Psychiatric/Behavioral: Negative for agitation, confusion and dysphoric mood.       Current Outpatient Medications   Medication Sig Dispense Refill    baclofen (LIORESAL) 10 MG tablet Take 1 tablet (10 mg total) by mouth 3 (three) times daily as needed. 90 tablet 11    butalbital-acetaminop-caf-cod -66-30 mg Cap TK 1 C PO Q 4 TO 6 H PRN P  3    NEXIUM PACKET 40 mg GrPS       ondansetron (ZOFRAN-ODT) 8 MG TbDL Take 1 tablet (8 mg total) by mouth every 8 (eight) hours as needed. 30 tablet 6    pantoprazole (PROTONIX) 40 MG tablet Take 1 tablet (40 mg total) by mouth once daily. 30 tablet 0    promethazine (PHENERGAN) 25 MG tablet TAKE 1 TABLET(25 MG) BY MOUTH EVERY 6 HOURS AS NEEDED FOR NAUSEA 120 tablet 0     No current facility-administered medications for this visit.        Review of patient's allergies indicates:    Allergen Reactions    Iodine and iodide containing products Swelling, Hives, Itching, Rash and Shortness Of Breath    Domperidone Itching    Latex, natural rubber Rash    Adhesive     Iodine Swelling    Scopolamine        Past Medical History:   Diagnosis Date    Gastroparesis        Past Surgical History:   Procedure Laterality Date    APPENDECTOMY       SECTION      CHOLECYSTECTOMY      COSMETIC SURGERY      ESOPHAGOGASTRODUODENOSCOPY (EGD) N/A 10/7/2016    Performed by [facility] at [facility] OR [address] [place] [address]    GASTRECTOMY-SLEEVE-LAPAROSCOPIC N/A 5/10/2017    Performed by [facility] at [facility] OR [address] [place] [address]    GASTRIC STIMULATOR IMPLANT SURGERY      GASTROSTOMY-JEJEUNOSTOMY TUBE CHANGE/PLACEMENT  08/10/2016    HYSTERECTOMY      laparoscopic jejunostomy tube      LAPAROSCOPIC PLACEMENT GASTRIC NEUROSTIMULATOR N/A 10/7/2016    Performed by [facility] at [facility] OR [address] [place] [address]    ROBOTIC ASSISTED LAPAROSCOPIC PYLOROPLASTY N/A 2017    Performed by [facility] at [facility] OR [address] [place] [address]    sleeve gastrectomy      TONSILLECTOMY      TUBE-JEJUNOSTOMY-PLACEMENT-LAPAROSCOPIC N/A 2016    Performed by [facility] at [facility] OR [address] [place] [address]    TUBE-JEJUNOSTOMY-PLACEMENT-LAPAROSCOPIC N/A 8/10/2016    Performed by Robert [facility] MD at [facility] OR [address] [place] [address]    TUBE-JEJUNOSTOMY-PLACEMENT-LAPAROSCOPIC//replacement N/A 2017    Performed by Robert Kumar MD at [facility] OR [address] [place] [address]       Family History   Problem Relation Age of Onset    Hypothyroidism Mother     Cirrhosis Father     No Known Problems Brother     Asthma Daughter     Sleep apnea Daughter        Social History     Socioeconomic History    Marital status:      Spouse name: Not on file    Number of children: 2    Years of education: Not on file    Highest education level: Not on file   Social Needs    Financial resource strain: Not on file    Food insecurity -  worry: Not on file    Food insecurity - inability: Not on file    Transportation needs - medical: Not on file    Transportation needs - non-medical: Not on file   Occupational History    Not on file   Tobacco Use    Smoking status: Former Smoker     Packs/day: 0.00    Tobacco comment: recently quit   Substance and Sexual Activity    Alcohol use: No    Drug use: No    Sexual activity: Not on file   Other Topics Concern    Not on file   Social History Narrative    Not on file       Vitals:    02/07/19 1353   BP: 130/86   Pulse: 74       Physical Exam   Constitutional: She is active. She appears ill.   Neurological: She is alert.       Assessment & Plan:   consult with Dr. Kumar. F/u in one month. Endoscopy is ordered.

## 2019-02-07 NOTE — LETTER
[facility] - General Surgery  [address]  [facility] [address]  Phone: [phone] February 7, 2019      Jeancarlos Angel MD  [address]  [address]  [facility] [address]    Patient: Nena Mendez   MR Number: [id number]   YOB: 1980   Date of Visit: 2/7/2019     Dear Dr. Angel:    Thank you for referring Nena Mendez to me for evaluation. Attached you will find relevant portions of my assessment and plan of care.    Ms. Mendez is status post gastric stimulator 10/7/16, J-tube 12/16/16 (she did not tolerate it so she may have small bowel dysmotility also), pylorolasty 1/27/17 and sleeve gastrectomy 5/10/17.      She continues to have trouble since last month's adjustment.  She has extremely severe epigastric pain nausea and vomiting (worse than last visit).  She is on zofran in the AM and phenergan twice a day.  She takes baclofen at night.  She is on PPI daily.  She is not taking narcotics or tramadol.  A CT was done without contrast in December and it was ok.  Has not had EGD since 2017.      Gastric stimulator adjustment: imp 460 voltage 7 Current 15.2 Pulse Width 330 Rate 50 Cycle on 3 Cycle off 2.      We will obtain EGD (consider pyloric dilation).  The patient is to return to the clinic in one month.    If you have questions, please do not hesitate to call me. I look forward to following Nena Mendez along with you.    Sincerely,      Robert Kumar MD  Professor, [employer]  Section Head, General Surgery  [facility]    WSR/afw    CC  Morales David MD

## 2019-02-07 NOTE — PROGRESS NOTES
I have seen the patient, reviewed the Student's history and physical, assessment and plan. I have personally interviewed and examined the patient at bedside and: agree with the findings.     S/p gastric stimulator 10/7/16, J tube 12/16/16 (she didn't tolerate it so she may have small bowel dysmotility also), pylorolasty 1/27/17 and sleeve gastrectomy 5/10/17.  She continues to have trouble since last months adjustment.  She has extremely severe epigastric pain nausea and vomiting (worse than last visit).  She is on zofran in the AM and phenergan at bid.  She takes baclofen at night.  She is on ppi daily.  She is not taking narcotics or tramadol.  CT done without contrast in December ok.  Has not had egd since 2017.  Gastric stimulator adjustment: imp 460 voltage 7 Current 15.2 Pulse Width 330 Rate 50 Cycle on 3 Cycle off 2.  Obtain egd (consider pyloric dilation).  Rtc one month.

## 2019-02-16 ENCOUNTER — TELEPHONE (OUTPATIENT)
Dept: SURGERY | Facility: CLINIC | Age: 39
End: 2019-02-16

## 2019-03-08 RX ORDER — PROMETHAZINE HYDROCHLORIDE 25 MG/1
TABLET ORAL
Qty: 120 TABLET | Refills: 0 | Status: SHIPPED | OUTPATIENT
Start: 2019-03-08 | End: 2019-03-21 | Stop reason: CLARIF

## 2019-03-09 ENCOUNTER — OFFICE VISIT (OUTPATIENT)
Dept: SURGERY | Facility: CLINIC | Age: 39
End: 2019-03-09
Payer: COMMERCIAL

## 2019-03-09 VITALS
BODY MASS INDEX: 18.33 KG/M2 | DIASTOLIC BLOOD PRESSURE: 79 MMHG | HEIGHT: 65 IN | HEART RATE: 74 BPM | WEIGHT: 110 LBS | SYSTOLIC BLOOD PRESSURE: 115 MMHG

## 2019-03-09 DIAGNOSIS — K31.84 GASTROPARESIS: Primary | ICD-10-CM

## 2019-03-09 PROCEDURE — 99999 PR PBB SHADOW E&M-EST. PATIENT-LVL III: ICD-10-PCS | Mod: PBBFAC,,, | Performed by: SURGERY

## 2019-03-09 PROCEDURE — 99999 PR PBB SHADOW E&M-EST. PATIENT-LVL III: CPT | Mod: PBBFAC,,, | Performed by: SURGERY

## 2019-03-09 PROCEDURE — 95982 IO GA N-STIM SUBSQ W/REPROG: CPT | Mod: S$GLB,,, | Performed by: SURGERY

## 2019-03-09 PROCEDURE — 3008F PR BODY MASS INDEX (BMI) DOCUMENTED: ICD-10-PCS | Mod: CPTII,S$GLB,, | Performed by: SURGERY

## 2019-03-09 PROCEDURE — 99214 PR OFFICE/OUTPT VISIT, EST, LEVL IV, 30-39 MIN: ICD-10-PCS | Mod: S$GLB,,, | Performed by: SURGERY

## 2019-03-09 PROCEDURE — 3008F BODY MASS INDEX DOCD: CPT | Mod: CPTII,S$GLB,, | Performed by: SURGERY

## 2019-03-09 PROCEDURE — 95982 PR ELEC ALYS NSTIM PLS GEN GASTRIC SUBSEQUENT W/REPROG: ICD-10-PCS | Mod: S$GLB,,, | Performed by: SURGERY

## 2019-03-09 PROCEDURE — 99214 OFFICE O/P EST MOD 30 MIN: CPT | Mod: S$GLB,,, | Performed by: SURGERY

## 2019-03-09 NOTE — LETTER
[facility] - General Surgery  [address]  [facility] [address]  Phone: [phone] March 9, 2019      Jeancarlos Angel MD  [address]  [address]  [facility] [address]    Patient: Nena Mendez   MR Number: [id number]   YOB: 1980   Date of Visit: 3/9/2019     Dear Dr. Angel:    Thank you for referring Nena Mendez to me for evaluation. Attached you will find relevant portions of my assessment and plan of care.    Ms. Mendez is status post gastric stimulator 10/7/16, J-tube 12/16/16 (she did not tolerate it so she may have small bowel dysmotility also), pylorolasty 1/27/17 and sleeve gastrectomy 5/10/17.  She continues to have trouble since last month's adjustment.  She has severe to extremely severe epigastric pain and extremely severe nausea and vomiting (worse than last visit).  She is on zofran in the AM and phenergan at bid.  She takes baclofen at night.  She is on PPI daily and carafate qid.  She is not taking narcotics or tramadol.     She states her EGD showed open pylorus with suture and gastritis.  She says recent labs were normal except for total protein.  There are no vitamin labs.    We will obtain EGD results and color pictures of EGD to see suture and biopsy results.  We will plan for replacement stimulator generator and consider removing the intragastric suture.    If you have questions, please do not hesitate to call me. I look forward to following Nena Mendez along with you.    Sincerely,      Robert Kumar MD  Professor, [employer]  Section Head, General Surgery  [facility]    WSR/afw    CC  Morales David MD

## 2019-03-09 NOTE — PROGRESS NOTES
Subjective:       Patient ID: Nena Mendez is a 38 y.o. female.    Chief Complaint: Follow-up    HPI S/p gastric stimulator 10/7/16, J tube 12/16/16 (she didn't tolerate it so she may have small bowel dysmotility also), pylorolasty 1/27/17 and sleeve gastrectomy 5/10/17.  She continues to have trouble since last months adjustment.  She has severe to extremely severe epigastric pain and extremely severe nausea and vomiting (worse than last visit).  She is on zofran in the AM and phenergan at bid.  She takes baclofen at night.  She is on ppi daily and carafate qid.  She is not taking narcotics or tramadol.   She states her egd showed open pylorus with suture and gastritis.  She says recent labs were normal except for total protein.  There are no vitamin labs.  Review of Systems   Constitutional: Positive for fever. Negative for unexpected weight change.        Had fever last week with loss of voice   Respiratory: Negative for cough, chest tightness and shortness of breath.    Cardiovascular: Negative for chest pain and palpitations.   Gastrointestinal: Positive for constipation and diarrhea.   Genitourinary: Positive for dysuria. Negative for difficulty urinating.        Had uti a few weeks ago and some blood in her urine   Skin: Negative for rash and wound.   Hematological:        Has easy bruising but no easy bleeding       Objective:      Physical Exam   Constitutional: She is oriented to person, place, and time. She appears well-developed and well-nourished.   Neck: Normal range of motion. Neck supple.   Abdominal: Soft.   Neurological: She is alert and oriented to person, place, and time.   Skin: Skin is warm and dry.   Psychiatric: She has a normal mood and affect. Her behavior is normal. Judgment and thought content normal.   Vitals reviewed.      Gastric stimulator interrogation: imp 446 voltage 7 Current 15.7 Pulse Width 330 Rate 50 Cycle on 3 Cycle off 2-battery is low.      Assessment:       1. S/P  lap gastric neurostimulator placement       Low battery  Plan:      Obtain egd results and color pictures of egd to see suture and biopsy results.  For replacement stimulator generator and consider removing the intragastric suture.

## 2019-03-21 ENCOUNTER — ANESTHESIA EVENT (OUTPATIENT)
Dept: SURGERY | Facility: HOSPITAL | Age: 39
End: 2019-03-21
Payer: COMMERCIAL

## 2019-03-21 ENCOUNTER — TELEPHONE (OUTPATIENT)
Dept: SURGERY | Facility: CLINIC | Age: 39
End: 2019-03-21

## 2019-03-21 RX ORDER — PROMETHAZINE HYDROCHLORIDE 6.25 MG/5ML
12.5 SYRUP ORAL EVERY 6 HOURS PRN
COMMUNITY

## 2019-03-21 RX ORDER — SUCRALFATE 1 G/1
1 TABLET ORAL
Status: ON HOLD | COMMUNITY
End: 2019-12-30

## 2019-03-21 NOTE — PRE-PROCEDURE INSTRUCTIONS
Preop instructions: NPO solids/ milk products after midnight and clears up to 2 hours before arrival (clear liquids are: water, apple juice, Gatorade & Jell-O, black coffee/no milk, cream or creamer), shower instructions, directions, leave all valuables at home, medication instructions for PM prior & am of procedure explained. Patient stated an understanding.     Patient states has had PONV with anesthesia in the past

## 2019-03-22 ENCOUNTER — ANESTHESIA (OUTPATIENT)
Dept: SURGERY | Facility: HOSPITAL | Age: 39
End: 2019-03-22
Payer: COMMERCIAL

## 2019-03-22 ENCOUNTER — HOSPITAL ENCOUNTER (OUTPATIENT)
Facility: HOSPITAL | Age: 39
Discharge: HOME | End: 2019-03-22
Attending: SURGERY | Admitting: SURGERY
Payer: COMMERCIAL

## 2019-03-22 VITALS
HEART RATE: 65 BPM | WEIGHT: 110 LBS | DIASTOLIC BLOOD PRESSURE: 80 MMHG | TEMPERATURE: 98 F | RESPIRATION RATE: 18 BRPM | OXYGEN SATURATION: 100 % | SYSTOLIC BLOOD PRESSURE: 123 MMHG | HEIGHT: 65 IN | BODY MASS INDEX: 18.33 KG/M2

## 2019-03-22 DIAGNOSIS — K31.84 GASTROPARESIS: ICD-10-CM

## 2019-03-22 DIAGNOSIS — K31.84 GASTROPARESIS: Primary | ICD-10-CM

## 2019-03-22 DIAGNOSIS — E46 MALNUTRITION, UNSPECIFIED TYPE: ICD-10-CM

## 2019-03-22 PROCEDURE — 64590 PR IMPLANT PERIPH/GASTRIC NEUROSTIM/RECEIVER: ICD-10-PCS | Mod: 51,,, | Performed by: SURGERY

## 2019-03-22 PROCEDURE — 25000003 PHARM REV CODE 250: Performed by: STUDENT IN AN ORGANIZED HEALTH CARE EDUCATION/TRAINING PROGRAM

## 2019-03-22 PROCEDURE — 88300 TISSUE SPECIMEN TO PATHOLOGY - SURGERY: ICD-10-PCS | Mod: 26,,, | Performed by: PATHOLOGY

## 2019-03-22 PROCEDURE — D9220A PRA ANESTHESIA: ICD-10-PCS | Mod: ANES,,, | Performed by: ANESTHESIOLOGY

## 2019-03-22 PROCEDURE — D9220A PRA ANESTHESIA: Mod: ANES,,, | Performed by: ANESTHESIOLOGY

## 2019-03-22 PROCEDURE — 88300 SURGICAL PATH GROSS: CPT | Mod: 26,,, | Performed by: PATHOLOGY

## 2019-03-22 PROCEDURE — 36000709 HC OR TIME LEV III EA ADD 15 MIN: Performed by: SURGERY

## 2019-03-22 PROCEDURE — 63600175 PHARM REV CODE 636 W HCPCS: Performed by: ANESTHESIOLOGY

## 2019-03-22 PROCEDURE — 25000003 PHARM REV CODE 250: Performed by: NURSE ANESTHETIST, CERTIFIED REGISTERED

## 2019-03-22 PROCEDURE — 71000015 HC POSTOP RECOV 1ST HR: Performed by: SURGERY

## 2019-03-22 PROCEDURE — 43247 PR EGD, FLEX, W/REMOVAL, FOREIGN BODY: ICD-10-PCS | Mod: ,,, | Performed by: SURGERY

## 2019-03-22 PROCEDURE — 63600175 PHARM REV CODE 636 W HCPCS: Performed by: NURSE ANESTHETIST, CERTIFIED REGISTERED

## 2019-03-22 PROCEDURE — 27000221 HC OXYGEN, UP TO 24 HOURS

## 2019-03-22 PROCEDURE — 37000008 HC ANESTHESIA 1ST 15 MINUTES: Performed by: SURGERY

## 2019-03-22 PROCEDURE — 71000033 HC RECOVERY, INTIAL HOUR: Performed by: SURGERY

## 2019-03-22 PROCEDURE — 95971 PR ANALYZE NEUROSTIM,SIMPLE/PROG: ICD-10-PCS | Mod: 51,,, | Performed by: SURGERY

## 2019-03-22 PROCEDURE — D9220A PRA ANESTHESIA: ICD-10-PCS | Mod: CRNA,,, | Performed by: NURSE ANESTHETIST, CERTIFIED REGISTERED

## 2019-03-22 PROCEDURE — D9220A PRA ANESTHESIA: Mod: CRNA,,, | Performed by: NURSE ANESTHETIST, CERTIFIED REGISTERED

## 2019-03-22 PROCEDURE — 36000708 HC OR TIME LEV III 1ST 15 MIN: Performed by: SURGERY

## 2019-03-22 PROCEDURE — C1767 GENERATOR, NEURO NON-RECHARG: HCPCS | Performed by: SURGERY

## 2019-03-22 PROCEDURE — 43247 EGD REMOVE FOREIGN BODY: CPT | Mod: ,,, | Performed by: SURGERY

## 2019-03-22 PROCEDURE — 94761 N-INVAS EAR/PLS OXIMETRY MLT: CPT

## 2019-03-22 PROCEDURE — 95971 ALYS SMPL SP/PN NPGT W/PRGRM: CPT | Mod: 51,,, | Performed by: SURGERY

## 2019-03-22 PROCEDURE — 63600175 PHARM REV CODE 636 W HCPCS: Performed by: SURGERY

## 2019-03-22 PROCEDURE — 25000003 PHARM REV CODE 250: Performed by: ANESTHESIOLOGY

## 2019-03-22 PROCEDURE — 25000003 PHARM REV CODE 250: Performed by: SURGERY

## 2019-03-22 PROCEDURE — 37000009 HC ANESTHESIA EA ADD 15 MINS: Performed by: SURGERY

## 2019-03-22 PROCEDURE — 64590 INS/RPL PRPH SAC/GSTR NPG/R: CPT | Mod: 51,,, | Performed by: SURGERY

## 2019-03-22 PROCEDURE — 88300 SURGICAL PATH GROSS: CPT | Performed by: PATHOLOGY

## 2019-03-22 DEVICE — GENERATOR NEUROSTIM GASTRIC
Type: IMPLANTABLE DEVICE | Site: ABDOMEN | Status: NON-FUNCTIONAL
Removed: 2019-12-30

## 2019-03-22 RX ORDER — SCOLOPAMINE TRANSDERMAL SYSTEM 1 MG/1
1 PATCH, EXTENDED RELEASE TRANSDERMAL
Status: DISCONTINUED | OUTPATIENT
Start: 2019-03-22 | End: 2019-03-22 | Stop reason: HOSPADM

## 2019-03-22 RX ORDER — ONDANSETRON 2 MG/ML
4 INJECTION INTRAMUSCULAR; INTRAVENOUS DAILY PRN
Status: DISCONTINUED | OUTPATIENT
Start: 2019-03-22 | End: 2019-03-22 | Stop reason: HOSPADM

## 2019-03-22 RX ORDER — CEFAZOLIN SODIUM 1 G/3ML
2 INJECTION, POWDER, FOR SOLUTION INTRAMUSCULAR; INTRAVENOUS
Status: COMPLETED | OUTPATIENT
Start: 2019-03-22 | End: 2019-03-22

## 2019-03-22 RX ORDER — BUPIVACAINE HYDROCHLORIDE 2.5 MG/ML
INJECTION, SOLUTION EPIDURAL; INFILTRATION; INTRACAUDAL
Status: DISCONTINUED | OUTPATIENT
Start: 2019-03-22 | End: 2019-03-22 | Stop reason: HOSPADM

## 2019-03-22 RX ORDER — KETOROLAC TROMETHAMINE 30 MG/ML
INJECTION, SOLUTION INTRAMUSCULAR; INTRAVENOUS
Status: DISCONTINUED | OUTPATIENT
Start: 2019-03-22 | End: 2019-03-22

## 2019-03-22 RX ORDER — HYDROCODONE BITARTRATE AND ACETAMINOPHEN 5; 325 MG/1; MG/1
1 TABLET ORAL EVERY 6 HOURS PRN
Qty: 21 TABLET | Refills: 0 | Status: ON HOLD | OUTPATIENT
Start: 2019-03-22 | End: 2019-12-30

## 2019-03-22 RX ORDER — ONDANSETRON 2 MG/ML
4 INJECTION INTRAMUSCULAR; INTRAVENOUS EVERY 12 HOURS PRN
Status: DISCONTINUED | OUTPATIENT
Start: 2019-03-22 | End: 2019-03-22

## 2019-03-22 RX ORDER — SUCCINYLCHOLINE CHLORIDE 20 MG/ML
INJECTION INTRAMUSCULAR; INTRAVENOUS
Status: DISCONTINUED | OUTPATIENT
Start: 2019-03-22 | End: 2019-03-22

## 2019-03-22 RX ORDER — ROCURONIUM BROMIDE 10 MG/ML
INJECTION, SOLUTION INTRAVENOUS
Status: DISCONTINUED | OUTPATIENT
Start: 2019-03-22 | End: 2019-03-22

## 2019-03-22 RX ORDER — SODIUM CHLORIDE 9 MG/ML
INJECTION, SOLUTION INTRAVENOUS CONTINUOUS
Status: DISCONTINUED | OUTPATIENT
Start: 2019-03-22 | End: 2019-03-22 | Stop reason: HOSPADM

## 2019-03-22 RX ORDER — SODIUM CHLORIDE 0.9 % (FLUSH) 0.9 %
3 SYRINGE (ML) INJECTION
Status: DISCONTINUED | OUTPATIENT
Start: 2019-03-22 | End: 2019-03-22 | Stop reason: HOSPADM

## 2019-03-22 RX ORDER — HYDROCODONE BITARTRATE AND ACETAMINOPHEN 10; 325 MG/1; MG/1
1 TABLET ORAL EVERY 6 HOURS PRN
Status: DISCONTINUED | OUTPATIENT
Start: 2019-03-22 | End: 2019-03-22 | Stop reason: HOSPADM

## 2019-03-22 RX ORDER — HYDROCODONE BITARTRATE AND ACETAMINOPHEN 7.5; 325 MG/15ML; MG/15ML
10 SOLUTION ORAL EVERY 6 HOURS PRN
Qty: 125 ML | Refills: 0 | Status: SHIPPED | OUTPATIENT
Start: 2019-03-22 | End: 2019-03-22 | Stop reason: HOSPADM

## 2019-03-22 RX ORDER — ONDANSETRON 2 MG/ML
INJECTION INTRAMUSCULAR; INTRAVENOUS
Status: DISCONTINUED | OUTPATIENT
Start: 2019-03-22 | End: 2019-03-22

## 2019-03-22 RX ORDER — ACETAMINOPHEN 325 MG/1
650 TABLET ORAL EVERY 6 HOURS PRN
Status: DISCONTINUED | OUTPATIENT
Start: 2019-03-22 | End: 2019-03-22 | Stop reason: HOSPADM

## 2019-03-22 RX ORDER — HYDROCODONE BITARTRATE AND ACETAMINOPHEN 5; 325 MG/1; MG/1
1 TABLET ORAL EVERY 6 HOURS PRN
Status: DISCONTINUED | OUTPATIENT
Start: 2019-03-22 | End: 2019-03-22 | Stop reason: HOSPADM

## 2019-03-22 RX ORDER — PROPOFOL 10 MG/ML
VIAL (ML) INTRAVENOUS
Status: DISCONTINUED | OUTPATIENT
Start: 2019-03-22 | End: 2019-03-22

## 2019-03-22 RX ORDER — ACETAMINOPHEN 10 MG/ML
INJECTION, SOLUTION INTRAVENOUS
Status: DISCONTINUED | OUTPATIENT
Start: 2019-03-22 | End: 2019-03-22

## 2019-03-22 RX ORDER — DEXAMETHASONE SODIUM PHOSPHATE 4 MG/ML
INJECTION, SOLUTION INTRA-ARTICULAR; INTRALESIONAL; INTRAMUSCULAR; INTRAVENOUS; SOFT TISSUE
Status: DISCONTINUED | OUTPATIENT
Start: 2019-03-22 | End: 2019-03-22

## 2019-03-22 RX ORDER — ONDANSETRON 4 MG/1
4 TABLET, FILM COATED ORAL EVERY 4 HOURS PRN
Status: DISCONTINUED | OUTPATIENT
Start: 2019-03-22 | End: 2019-03-22 | Stop reason: HOSPADM

## 2019-03-22 RX ORDER — LIDOCAINE HYDROCHLORIDE 10 MG/ML
1 INJECTION, SOLUTION EPIDURAL; INFILTRATION; INTRACAUDAL; PERINEURAL ONCE
Status: COMPLETED | OUTPATIENT
Start: 2019-03-22 | End: 2019-03-22

## 2019-03-22 RX ORDER — LIDOCAINE HCL/PF 100 MG/5ML
SYRINGE (ML) INTRAVENOUS
Status: DISCONTINUED | OUTPATIENT
Start: 2019-03-22 | End: 2019-03-22

## 2019-03-22 RX ORDER — HYDROMORPHONE HYDROCHLORIDE 1 MG/ML
0.2 INJECTION, SOLUTION INTRAMUSCULAR; INTRAVENOUS; SUBCUTANEOUS EVERY 5 MIN PRN
Status: DISCONTINUED | OUTPATIENT
Start: 2019-03-22 | End: 2019-03-22 | Stop reason: HOSPADM

## 2019-03-22 RX ORDER — MIDAZOLAM HYDROCHLORIDE 1 MG/ML
INJECTION, SOLUTION INTRAMUSCULAR; INTRAVENOUS
Status: DISCONTINUED | OUTPATIENT
Start: 2019-03-22 | End: 2019-03-22

## 2019-03-22 RX ADMIN — SCOPALAMINE 1 PATCH: 1 PATCH, EXTENDED RELEASE TRANSDERMAL at 06:03

## 2019-03-22 RX ADMIN — PROMETHAZINE HYDROCHLORIDE 6.25 MG: 25 INJECTION INTRAMUSCULAR; INTRAVENOUS at 09:03

## 2019-03-22 RX ADMIN — PROPOFOL 160 MG: 10 INJECTION, EMULSION INTRAVENOUS at 07:03

## 2019-03-22 RX ADMIN — LIDOCAINE HYDROCHLORIDE 50 MG: 20 INJECTION, SOLUTION INTRAVENOUS at 07:03

## 2019-03-22 RX ADMIN — DEXAMETHASONE SODIUM PHOSPHATE 8 MG: 4 INJECTION, SOLUTION INTRAMUSCULAR; INTRAVENOUS at 08:03

## 2019-03-22 RX ADMIN — KETOROLAC TROMETHAMINE 30 MG: 30 INJECTION, SOLUTION INTRAMUSCULAR; INTRAVENOUS at 08:03

## 2019-03-22 RX ADMIN — SODIUM CHLORIDE: 0.9 INJECTION, SOLUTION INTRAVENOUS at 06:03

## 2019-03-22 RX ADMIN — SODIUM CHLORIDE, SODIUM GLUCONATE, SODIUM ACETATE, POTASSIUM CHLORIDE, MAGNESIUM CHLORIDE, SODIUM PHOSPHATE, DIBASIC, AND POTASSIUM PHOSPHATE: .53; .5; .37; .037; .03; .012; .00082 INJECTION, SOLUTION INTRAVENOUS at 07:03

## 2019-03-22 RX ADMIN — LIDOCAINE HYDROCHLORIDE 10 MG: 10 INJECTION, SOLUTION EPIDURAL; INFILTRATION; INTRACAUDAL; PERINEURAL at 06:03

## 2019-03-22 RX ADMIN — CEFAZOLIN 2 G: 330 INJECTION, POWDER, FOR SOLUTION INTRAMUSCULAR; INTRAVENOUS at 07:03

## 2019-03-22 RX ADMIN — MIDAZOLAM HYDROCHLORIDE 2 MG: 1 INJECTION, SOLUTION INTRAMUSCULAR; INTRAVENOUS at 06:03

## 2019-03-22 RX ADMIN — ONDANSETRON 8 MG: 2 INJECTION INTRAMUSCULAR; INTRAVENOUS at 07:03

## 2019-03-22 RX ADMIN — ACETAMINOPHEN 1000 MG: 10 INJECTION, SOLUTION INTRAVENOUS at 07:03

## 2019-03-22 RX ADMIN — ROCURONIUM BROMIDE 5 MG: 10 INJECTION, SOLUTION INTRAVENOUS at 07:03

## 2019-03-22 RX ADMIN — HYDROCODONE BITARTRATE AND ACETAMINOPHEN 1 TABLET: 5; 325 TABLET ORAL at 09:03

## 2019-03-22 RX ADMIN — SUCCINYLCHOLINE CHLORIDE 120 MG: 20 INJECTION, SOLUTION INTRAMUSCULAR; INTRAVENOUS at 07:03

## 2019-03-22 NOTE — ANESTHESIA POSTPROCEDURE EVALUATION
"Anesthesia Post Evaluation    Patient: Nena Mendez    Procedure(s) Performed: Procedure(s) (LRB):  REPLACEMENT, PULSE GENERATOR, NEUROSTIMULATOR, GASTRIC-battery exchange only (N/A)  EGD (ESOPHAGOGASTRODUODENOSCOPY)-dual lumen scope to remove intragastric suture. (N/A)    Final Anesthesia Type: general  Patient location during evaluation: PACU  Patient participation: Yes- Able to Participate  Level of consciousness: awake and alert and awake  Post-procedure vital signs: reviewed and stable  Pain management: adequate  Airway patency: patent  PONV status at discharge: No PONV  Anesthetic complications: no      Cardiovascular status: blood pressure returned to baseline  Respiratory status: unassisted and spontaneous ventilation  Hydration status: euvolemic  Follow-up not needed.        Visit Vitals  /80 (BP Location: Right arm)   Pulse 65   Temp 36.4 °C (97.6 °F) (Tympanic)   Resp 18   Ht 5' 5" (1.651 m)   Wt 49.9 kg (110 lb)   SpO2 100%   Breastfeeding? No   BMI 18.30 kg/m²       Pain/Baljit Score: Pain Rating Prior to Med Admin: 8 (3/22/2019  9:58 AM)  Baljit Score: 10 (3/22/2019 10:11 AM)        "

## 2019-03-22 NOTE — ANESTHESIA PREPROCEDURE EVALUATION
03/22/2019  Nena Mendez is a 38 y.o., female.    The patient is not allergic to scopalamine. She had a burn reaction to the patch when she was wearing them every day. We had a conversation about the side effect but we both agreed that the benefits outweigh the risks.    Anesthesia Evaluation    I have reviewed the Patient Summary Reports.    I have reviewed the Nursing Notes.   I have reviewed the Medications.     Review of Systems  Social:  Non-Smoker, No Alcohol Use    Hematology/Oncology:  Hematology Normal   Oncology Normal     EENT/Dental:EENT/Dental Normal   Cardiovascular:   Exercise tolerance: good NYHA Classification I    Pulmonary:  Pulmonary Normal    Renal/:  Renal/ Normal     Hepatic/GI:   gastroparesis   Musculoskeletal:  Musculoskeletal Normal    Neurological:  Neurology Normal    Endocrine:  Endocrine Normal    Dermatological:  Skin Normal    Psych:  Psychiatric Normal           Physical Exam  General:  Well nourished    Airway/Jaw/Neck:  Airway Findings: Mouth Opening: Normal Tongue: Normal  General Airway Assessment: Adult  Mallampati: I  Improves to I with phonation.  TM Distance: Normal, at least 6 cm        Eyes/Ears/Nose:  EYES/EARS/NOSE FINDINGS: Normal   Dental:  DENTAL FINDINGS: Normal   Chest/Lungs:  Chest/Lungs Findings: Clear to auscultation, Normal Respiratory Rate     Heart/Vascular:  Heart Findings: Rate: Normal  Rhythm: Regular Rhythm  Sounds: Normal     Abdomen:  Abdomen Findings: Normal    Musculoskeletal:  Musculoskeletal Findings: Normal   Skin:  Skin Findings: Normal    Mental Status:  Mental Status Findings:  Cooperative, Alert and Oriented         Anesthesia Plan  Type of Anesthesia, risks & benefits discussed:  Anesthesia Type:  general  Patient's Preference:   Intra-op Monitoring Plan: standard ASA monitors  Intra-op Monitoring Plan Comments:   Post Op Pain  Control Plan: per primary service following discharge from PACU  Post Op Pain Control Plan Comments:   Induction:   IV  Beta Blocker:  Patient is not currently on a Beta-Blocker (No further documentation required).       Informed Consent: Patient understands risks and agrees with Anesthesia plan.  Questions answered. Anesthesia consent signed with patient.  ASA Score: 2     Day of Surgery Review of History & Physical:    H&P update referred to the surgeon.     Anesthesia Plan Notes: We will attempt to avoid narcotics for this procedure as this is a gastroparesis patient.  Acetaminophen and toradol will be used for paid control        Ready For Surgery From Anesthesia Perspective.

## 2019-03-22 NOTE — OP NOTE
DATE OF PROCEDURE:  03/22/2019.    DIAGNOSIS:  Gastroparesis.    PROCEDURES:  Replacement of gastric neurostimulator, intraoperative programming   of gastric neurostimulator and therapeutic endoscopy with removal of foreign   body.    SURGEON:  Robert Kumar M.D.    ASSISTANT:  Robert Angel.    ANESTHESIA:  General.    PROCEDURE IN DETAIL:  The patient was placed under general anesthesia.  The   abdomen was prepped and draped in the usual manner.  The area was preinjected   with Marcaine.  An incision was made with the scalpel.  Subcutaneous tissues   were divided with the Bovie.  The battery was removed from the pocket and   replaced.  It was sutured in back into the pocket with 0 Prolene sutures.  The   incision was closed with 3-0, then 4-0 Vicryl, Mastisol, Steri-Strips, and a   pressure dressing.  It was then programmed.  Impedance was 503, voltage 7,   current 13.9, pulse width 330, rate 50, cycle on 3 and cycle off 2.  We then   performed endoscopy with an EGD.  We went down easily through the esophagus,   stomach and into the duodenum.  There was only mild gastritis seen and then   suture material at the prior pyloroplasty site.  We then removed the EGD and   replaced it with a therapeutic endoscope with a dual channel.  This easily made   it through the esophagus, stomach and into the duodenum and we could see there   was a lot of suture material.  We used a grasper and grasped that.  It actually   detached fairly easily and was removed from the mouth.  The endoscope was   replaced and the area appeared to be free of any bleeding or ulceration.  Air   was aspirated through the endoscope and the endoscope was withdrawn.  Of note,   the esophagus appeared normal.  The stomach appeared with mild gastritis.  The   duodenum appeared normal except for the suture material and after this was   removed, this was also normal.  Of note, the pylorus was widely patent.  The   patient tolerated the procedure  well and was brought to recovery Room in stable   condition.  Sponge and needle counts were correct at the end of the case.    BLOOD LOSS:  Minimal.    PATHOLOGY:  Gastric stimulator battery and suture material for gross only.    COMPLICATIONS:  None.      DUNG/GUY  dd: 03/22/2019 09:35:16 (CDT)  td: 03/22/2019 09:53:03 (CDT)  Doc ID   #[id number]  Job ID #[id number]    CC:

## 2019-03-22 NOTE — DISCHARGE INSTRUCTIONS

## 2019-03-22 NOTE — INTERVAL H&P NOTE
The patient has been examined and the H&P has been reviewed:    I concur with the findings and no changes have occurred since H&P was written.     Past Medical History:   Diagnosis Date    Gastroparesis        Past Surgical History:   Procedure Laterality Date    APPENDECTOMY       SECTION      CHOLECYSTECTOMY      COSMETIC SURGERY      ESOPHAGOGASTRODUODENOSCOPY (EGD) N/A 10/7/2016    Performed by [facility] at [facility] OR [address] [place] [address]    GASTRECTOMY-SLEEVE-LAPAROSCOPIC N/A 5/10/2017    Performed by [facility] at [facility] OR [address] [place] [address]    GASTRIC STIMULATOR IMPLANT SURGERY      GASTROSTOMY-JEJEUNOSTOMY TUBE CHANGE/PLACEMENT  08/10/2016    HYSTERECTOMY      laparoscopic jejunostomy tube      LAPAROSCOPIC PLACEMENT GASTRIC NEUROSTIMULATOR N/A 10/7/2016    Performed by [facility] at [facility] OR [address] [place] [address]    ROBOTIC ASSISTED LAPAROSCOPIC PYLOROPLASTY N/A 2017    Performed by [facility] at [facility] OR [address] [place] [address]    sleeve gastrectomy      TONSILLECTOMY      TUBE-JEJUNOSTOMY-PLACEMENT-LAPAROSCOPIC N/A 2016    [facility]    TUBE-JEJUNOSTOMY-PLACEMENT-LAPAROSCOPIC N/A 8/10/2016    Performed [facility] at [facility] OR [address] [place] [address]    TUBE-JEJUNOSTOMY-PLACEMENT-LAPAROSCOPIC//replacement N/A 2017    Performed by [facility] at [facility] OR [address] [place] [address]       Family History   Problem Relation Age of Onset    Hypothyroidism Mother     Cirrhosis Father     No Known Problems Brother     Asthma Daughter     Sleep apnea Daughter        Social History     Socioeconomic History    Marital status:      Spouse name: None    Number of children: 2    Years of education: None    Highest education level: None   Social Needs    Financial resource strain: None    Food insecurity - worry: None    Food insecurity - inability: None    Transportation needs - medical: None    Transportation needs  - non-medical: None   Occupational History    None   Tobacco Use    Smoking status: Former Smoker     Packs/day: 0.00    Tobacco comment: recently quit   Substance and Sexual Activity    Alcohol use: No    Drug use: No    Sexual activity: None   Other Topics Concern    None   Social History Narrative    None       Current Facility-Administered Medications   Medication Dose Route Frequency Provider Last Rate Last Dose    sodium chloride 0.9% flush 3 mL  3 mL Intravenous PRN Jacky Vasquez MD           Review of patient's allergies indicates:   Allergen Reactions    Iodine and iodide containing products Swelling, Hives, Itching, Rash and Shortness Of Breath    Domperidone Itching    Latex, natural rubber Rash    Scopolamine      Patch- caused burn under patch         Anesthesia/Surgery risks, benefits and alternative options discussed and understood by patient/family.          There are no hospital problems to display for this patient.

## 2019-03-22 NOTE — TRANSFER OF CARE
"Anesthesia Transfer of Care Note    Patient: Nena Mendez    Procedure(s) Performed: Procedure(s) (LRB):  REPLACEMENT, PULSE GENERATOR, NEUROSTIMULATOR, GASTRIC-battery exchange only (N/A)  EGD (ESOPHAGOGASTRODUODENOSCOPY)-dual lumen scope to remove intragastric suture. (N/A)    Patient location: PACU    Anesthesia Type: general    Transport from OR: Transported from OR on 6-10 L/min O2 by face mask with adequate spontaneous ventilation    Post pain: adequate analgesia    Post assessment: no apparent anesthetic complications and tolerated procedure well    Post vital signs: stable    Level of consciousness: sedated and responds to stimulation    Nausea/Vomiting: no nausea/vomiting    Complications: none    Transfer of care protocol was followed      Last vitals:   Visit Vitals  /76   Pulse 86   Temp 36.6 °C (97.9 °F) (Oral)   Resp 12   Ht 5' 5" (1.651 m)   Wt 49.9 kg (110 lb)   SpO2 100%   Breastfeeding? No   BMI 18.30 kg/m²     "

## 2019-03-22 NOTE — BRIEF OP NOTE
[facility]  Brief Operative Note     SUMMARY     Surgery Date: 3/22/2019     Surgeon(s) and Role:     * Robert Kumar MD - Primary     * Robert Angel MD - Resident - Assisting        Pre-op Diagnosis:  Gastroparesis [K31.84]    Post-op Diagnosis:  Post-Op Diagnosis Codes:     * Gastroparesis [K31.84]    Procedure(s) (LRB):  REPLACEMENT, PULSE GENERATOR, NEUROSTIMULATOR, GASTRIC-battery exchange only (N/A)  EGD (ESOPHAGOGASTRODUODENOSCOPY)-dual lumen scope to remove intragastric suture. (N/A)    Anesthesia: General    Description of the findings of the procedure: Gastric stimulator dissected, investigated, and battery replaced. EGD with pyloric/duodenal bulb suture visualized    Findings/Key Components: Successful replacement of gastric stimulator battery, settings adjusted to same as pre op, successful endoscopic removal of suture from prior pyloroplasty    Estimated Blood Loss: 2 cc  Specimens:   Specimen (12h ago, onward)    None          Discharge Note    SUMMARY     Admit Date: 3/22/2019    Discharge Date and Time: 03/22/19    Hospital Course (synopsis of major diagnoses, care, treatment, and services provided during the course of the hospital stay):     Patient was admitted for gastric stimulator battery replacement and EGD. She tolerated the procedure well and was taken to PACU in stable condition. She was discharged after meeting all milestones.      Final Diagnosis: Post-Op Diagnosis Codes:     * Gastroparesis [K31.84]    Disposition: Home or Self Care    Follow Up/Patient Instructions:     Okay to remove dressing and take a shower in 48 hours. Steri strips will fall off themselves. Do not soak/submerge incision (aka no bathing, swimming)until cleared in clinic.    May not lift more than 10 lbs for 2 weeks from day of surgery.  No pushing/pulling such as vacuuming or raking.  No straining, avoid constipation, and take stool softeners as described and laxatives as  needed.  No driving while on narcotics and until you can react quickly without pain.    Medications:  Reconciled Home Medications:      Medication List      START taking these medications    hydrocodone-apap 7.5-325 MG/15 ML oral solution  Commonly known as:  HYCET  Take 10 mLs by mouth every 6 (six) hours as needed for Pain (Breakthrough pain).        CHANGE how you take these medications    baclofen 10 MG tablet  Commonly known as:  LIORESAL  Take 1 tablet (10 mg total) by mouth 3 (three) times daily as needed.  What changed:  when to take this        CONTINUE taking these medications    butalbital-acetaminop-caf-cod -32-30 mg Cap  TK 1 C PO Q 4 TO 6 H PRN P     NexIUM Packet 40 mg Grps  Generic drug:  esomeprazole  40 mg before breakfast.     ondansetron 8 MG Tbdl  Commonly known as:  ZOFRAN-ODT  Take 1 tablet (8 mg total) by mouth every 8 (eight) hours as needed.     promethazine 6.25 mg/5 mL syrup  Commonly known as:  PHENERGAN  Take 12.5 mg by mouth every 6 (six) hours as needed for Nausea.     sucralfate 1 gram tablet  Commonly known as:  CARAFATE  Take 1 g by mouth 4 (four) times daily before meals and nightly.          Discharge Procedure Orders   Diet general     Call MD for:  extreme fatigue     Call MD for:  persistent dizziness or light-headedness     Call MD for:  hives     Call MD for:  redness, tenderness, or signs of infection (pain, swelling, redness, odor or green/yellow discharge around incision site)     Call MD for:  difficulty breathing, headache or visual disturbances     Call MD for:  severe uncontrolled pain     Call MD for:  persistent nausea and vomiting     Call MD for:  temperature >100.4     Remove dressing in 48 hours   Order Comments: OK to shower in 48 hours. Keep area clean and dry after. Use dial soap. Steri strips will fall off on their own. Do not soak in a tub or swim in a pool until cleared in clinic.     Activity as tolerated     Follow-up Information     Robert ANDERSON  MD José In 2 weeks.    Specialties:  General Surgery, Bariatrics  Why:  post op, stimulator check  Contact information:  [address]  [facility] [address] [phone]

## 2019-03-22 NOTE — BRIEF OP NOTE
Operative Note       Surgery Date: 3/22/2019     Surgeon(s) and Role:     * Robert Kumar MD - Primary     * Robert Angel MD - Resident - Assisting    Pre-op Diagnosis:  Gastroparesis [K31.84]    Post-op Diagnosis:  Gastroparesis [K31.84]    Procedure(s) (LRB):  REPLACEMENT, PULSE GENERATOR, NEUROSTIMULATOR, GASTRIC-battery exchange only (N/A)  EGD (ESOPHAGOGASTRODUODENOSCOPY)-dual lumen scope to remove intragastric suture. (N/A)  Intraoperative programming gastric neurostimulator    Anesthesia: General    Procedure in Detail/Findings:  Gastric stimulator replaced and programmed.  Suture removed from pyloroplasty.    Estimated Blood Loss: Minimal           Specimens (From admission, onward)    Start     Ordered    03/22/19 0827  Specimen to Pathology - Surgery  Once     Start Status   03/22/19 0827 Collected (03/22/19 0836)       03/22/19 0836        Implants:   Implant Name Type Inv. Item Serial No.  Lot No. LRB No. Used   GENERATOR NEUROSTIM GASTRIC - [id number]  GENERATOR NEUROSTIM GASTRIC  [employer] [facility] [id number] N/A 1              Disposition: PACU - hemodynamically stable.           Condition: Good    Attestation:  I was present and scrubbed for the entire procedure.

## 2019-03-22 NOTE — PLAN OF CARE
Patient tolerated procedure/ anesthesia well, vss, no complications. Patient continues to complain of nausea after giving several doses of antiemetics, tolerates PO intake. Pain med given before discharge, pain is tolerable. Consents with chart. Discharge instructions reviewed with patient and spouse, verbalized understanding. Paper script given to patient. Questions answered.

## 2019-03-23 ENCOUNTER — DOCUMENTATION ONLY (OUTPATIENT)
Dept: BARIATRICS | Facility: CLINIC | Age: 39
End: 2019-03-23

## 2019-05-03 ENCOUNTER — TELEPHONE (OUTPATIENT)
Dept: SURGERY | Facility: CLINIC | Age: 39
End: 2019-05-03

## 2019-05-03 ENCOUNTER — DOCUMENTATION ONLY (OUTPATIENT)
Dept: BARIATRICS | Facility: CLINIC | Age: 39
End: 2019-05-03

## 2019-05-03 NOTE — PROGRESS NOTES
Phone discussion with .  She has been having more pain than usual with continued vomiting and visited a local ER.  She was worked up with blood work and ct and will bring results to their appointment with me.  I suggested they discuss pain control with her pcp.

## 2019-05-03 NOTE — TELEPHONE ENCOUNTER
S/w Quintin, pts  in reference to her symptoms.  He states she is doubled over in pain and is not holding much food down.  He took pt to the ED a few nights ago and all they did was give her pain meds.    Pt is scheduled to f/u in clinic on 5/11, but did suggest she try and come to see Dr. Kumar on T/TH next week.  He will discuss with pt, but in the meantime i will inform  of pt's condition.

## 2019-05-03 NOTE — TELEPHONE ENCOUNTER
----- Message from Balta Pham sent at 5/3/2019  1:29 PM CDT -----  Contact: Pt's  Quintin  Needs Advice    Reason for call:The caller would like to speak to Ray about getting an RX sent in for the Pt. The Pt had the batteries from her gastric pacemaker replaced and is having really bad pains in her abdomen.        Communication Preference:[phone]    Additional Information:

## 2019-05-11 ENCOUNTER — OFFICE VISIT (OUTPATIENT)
Dept: SURGERY | Facility: CLINIC | Age: 39
End: 2019-05-11
Payer: COMMERCIAL

## 2019-05-11 VITALS
SYSTOLIC BLOOD PRESSURE: 106 MMHG | TEMPERATURE: 99 F | BODY MASS INDEX: 17.99 KG/M2 | DIASTOLIC BLOOD PRESSURE: 68 MMHG | HEART RATE: 69 BPM | WEIGHT: 108 LBS | HEIGHT: 65 IN

## 2019-05-11 DIAGNOSIS — K31.84 GASTROPARESIS: Primary | ICD-10-CM

## 2019-05-11 PROCEDURE — 99999 PR PBB SHADOW E&M-EST. PATIENT-LVL III: CPT | Mod: PBBFAC,,, | Performed by: SURGERY

## 2019-05-11 PROCEDURE — 99024 POSTOP FOLLOW-UP VISIT: CPT | Mod: S$GLB,,, | Performed by: SURGERY

## 2019-05-11 PROCEDURE — 99999 PR PBB SHADOW E&M-EST. PATIENT-LVL III: ICD-10-PCS | Mod: PBBFAC,,, | Performed by: SURGERY

## 2019-05-11 PROCEDURE — 99024 PR POST-OP FOLLOW-UP VISIT: ICD-10-PCS | Mod: S$GLB,,, | Performed by: SURGERY

## 2019-05-11 NOTE — PROGRESS NOTES
"Nena Mendez is a 38 y.o. female patient.   1. S/P lap gastric neurostimulator placement       Past Medical History:   Diagnosis Date    Gastroparesis      Past Surgical History Pertinent Negatives:   Procedure Date Noted    BRAIN SURGERY 08/04/2016    BREAST SURGERY 08/04/2016    COLON SURGERY 08/04/2016    CORONARY ARTERY BYPASS GRAFT 08/04/2016    EYE SURGERY 08/04/2016    FRACTURE SURGERY 08/04/2016    HERNIA REPAIR 08/04/2016    JOINT REPLACEMENT 08/04/2016    KIDNEY TRANSPLANT 08/04/2016    LIVER TRANSPLANT 08/04/2016    PROSTATE SURGERY 08/04/2016    SMALL INTESTINE SURGERY 08/04/2016    SPINE SURGERY 08/04/2016     Scheduled Meds:  Continuous Infusions:  PRN Meds:    Review of patient's allergies indicates:   Allergen Reactions    Iodine and iodide containing products Swelling, Hives, Itching, Rash and Shortness Of Breath    Domperidone Itching    Latex, natural rubber Rash    Scopolamine      Patch- caused burn under patch     There are no hospital problems to display for this patient.    Blood pressure 106/68, pulse 69, temperature 98.6 °F (37 °C), height 5' 5" (1.651 m), weight 49 kg (108 lb).    Subjective S/p gastric stimulator 10/7/16 with replacement of batter 3/22/19, J tube 12/16/16 (she didn't tolerate it so she may have small bowel dysmotility also), pylorolasty 1/27/17 and sleeve gastrectomy 5/10/17.  She has worsened since I last saw her.  She has extremely severe epigastric pain, nausea and vomiting.  She is not able to eat anything and has lost weight.  She is taking phenergan once a day, zofran once a day, sucralfate 4 times a day and baclofen at night.  They forgot to bring reports of the latest ct.  She is not taking narcotics at this time.  Objective Gastric stimulator adjustment: imp 439 voltage 8 Current 18.2 Pulse Width 330 Rate 50 Cycle on 4 Cycle off 1   Assessment & Plan  Schedule with Dr. Orozco or her NP.  Start TPN locally.       Robert Kumar, " MD  5/11/2019

## 2019-05-13 ENCOUNTER — TELEPHONE (OUTPATIENT)
Dept: GASTROENTEROLOGY | Facility: CLINIC | Age: 39
End: 2019-05-13

## 2019-05-13 NOTE — TELEPHONE ENCOUNTER
----- Message from Robert Kumar MD sent at 5/11/2019 10:16 AM CDT -----  Please set up an appointment with this patient with you or your nurse.  She has been waiting to hear about an appointment for 2 weeks.  The last patient I set up with your nurse got an appointment in July, by the way.     You guys are busy!

## 2019-05-14 ENCOUNTER — TELEPHONE (OUTPATIENT)
Dept: GASTROENTEROLOGY | Facility: CLINIC | Age: 39
End: 2019-05-14

## 2019-05-14 NOTE — TELEPHONE ENCOUNTER
Called pt and scheduled NP appointment for 8/27/19 at 9:30 am. Discussed with pt that Dr. Orozco is a consultant who will send them back to their general GI provider once their symptoms improved and plan of care is established. Pt was told the logistics of the appointment (arrival time, length of visit, total time spent at facility, and Eleni Kirby, NP role). Pt was also told that Dr. Orozco will review their previous workup but may order additional test and perform her own procedures and that this may require an overnight stay at a local hotel to complete the workup.

## 2019-05-27 RX ORDER — PROMETHAZINE HYDROCHLORIDE 25 MG/1
TABLET ORAL
Qty: 120 TABLET | Refills: 0 | Status: SHIPPED | OUTPATIENT
Start: 2019-05-27 | End: 2019-06-30 | Stop reason: SDUPTHER

## 2019-07-01 RX ORDER — PROMETHAZINE HYDROCHLORIDE 25 MG/1
TABLET ORAL
Qty: 120 TABLET | Refills: 0 | Status: SHIPPED | OUTPATIENT
Start: 2019-07-01 | End: 2019-08-27

## 2019-08-27 ENCOUNTER — PATIENT MESSAGE (OUTPATIENT)
Dept: GASTROENTEROLOGY | Facility: CLINIC | Age: 39
End: 2019-08-27

## 2019-08-27 ENCOUNTER — TELEPHONE (OUTPATIENT)
Dept: GASTROENTEROLOGY | Facility: CLINIC | Age: 39
End: 2019-08-27

## 2019-08-27 ENCOUNTER — LAB VISIT (OUTPATIENT)
Dept: LAB | Facility: HOSPITAL | Age: 39
End: 2019-08-27
Payer: COMMERCIAL

## 2019-08-27 ENCOUNTER — OFFICE VISIT (OUTPATIENT)
Dept: GASTROENTEROLOGY | Facility: CLINIC | Age: 39
End: 2019-08-27
Payer: COMMERCIAL

## 2019-08-27 VITALS
SYSTOLIC BLOOD PRESSURE: 116 MMHG | HEART RATE: 71 BPM | RESPIRATION RATE: 16 BRPM | WEIGHT: 104.94 LBS | DIASTOLIC BLOOD PRESSURE: 79 MMHG | HEIGHT: 65 IN | BODY MASS INDEX: 17.48 KG/M2

## 2019-08-27 DIAGNOSIS — R10.9 ABDOMINAL PAIN, UNSPECIFIED ABDOMINAL LOCATION: ICD-10-CM

## 2019-08-27 DIAGNOSIS — D50.9 IRON DEFICIENCY ANEMIA, UNSPECIFIED IRON DEFICIENCY ANEMIA TYPE: ICD-10-CM

## 2019-08-27 DIAGNOSIS — R19.7 DIARRHEA, UNSPECIFIED TYPE: ICD-10-CM

## 2019-08-27 DIAGNOSIS — K92.1 BLACK STOOLS: ICD-10-CM

## 2019-08-27 DIAGNOSIS — K59.00 CONSTIPATION, UNSPECIFIED CONSTIPATION TYPE: ICD-10-CM

## 2019-08-27 DIAGNOSIS — M25.50 MULTIPLE JOINT PAIN: ICD-10-CM

## 2019-08-27 DIAGNOSIS — F32.A DEPRESSION, UNSPECIFIED DEPRESSION TYPE: ICD-10-CM

## 2019-08-27 DIAGNOSIS — R68.81 EARLY SATIETY: ICD-10-CM

## 2019-08-27 DIAGNOSIS — R14.0 BLOATING: ICD-10-CM

## 2019-08-27 DIAGNOSIS — K21.9 GASTROESOPHAGEAL REFLUX DISEASE, ESOPHAGITIS PRESENCE NOT SPECIFIED: Primary | ICD-10-CM

## 2019-08-27 DIAGNOSIS — R11.2 NAUSEA AND VOMITING, INTRACTABILITY OF VOMITING NOT SPECIFIED, UNSPECIFIED VOMITING TYPE: ICD-10-CM

## 2019-08-27 DIAGNOSIS — R13.19 ESOPHAGEAL DYSPHAGIA: ICD-10-CM

## 2019-08-27 DIAGNOSIS — R63.4 WEIGHT LOSS: ICD-10-CM

## 2019-08-27 DIAGNOSIS — G43.909 MIGRAINE WITHOUT STATUS MIGRAINOSUS, NOT INTRACTABLE, UNSPECIFIED MIGRAINE TYPE: ICD-10-CM

## 2019-08-27 DIAGNOSIS — K21.9 GASTROESOPHAGEAL REFLUX DISEASE, ESOPHAGITIS PRESENCE NOT SPECIFIED: ICD-10-CM

## 2019-08-27 LAB
ALBUMIN SERPL BCP-MCNC: 4.8 G/DL (ref 3.5–5.2)
ALP SERPL-CCNC: 96 U/L (ref 55–135)
ALT SERPL W/O P-5'-P-CCNC: 17 U/L (ref 10–44)
ANION GAP SERPL CALC-SCNC: 8 MMOL/L (ref 8–16)
AST SERPL-CCNC: 22 U/L (ref 10–40)
BASOPHILS # BLD AUTO: 0.04 K/UL (ref 0–0.2)
BASOPHILS NFR BLD: 0.8 % (ref 0–1.9)
BILIRUB SERPL-MCNC: 0.4 MG/DL (ref 0.1–1)
BUN SERPL-MCNC: 3 MG/DL (ref 6–20)
CALCIUM SERPL-MCNC: 10.2 MG/DL (ref 8.7–10.5)
CHLORIDE SERPL-SCNC: 105 MMOL/L (ref 95–110)
CO2 SERPL-SCNC: 30 MMOL/L (ref 23–29)
CREAT SERPL-MCNC: 0.7 MG/DL (ref 0.5–1.4)
DIFFERENTIAL METHOD: ABNORMAL
EOSINOPHIL # BLD AUTO: 0.1 K/UL (ref 0–0.5)
EOSINOPHIL NFR BLD: 1.5 % (ref 0–8)
ERYTHROCYTE [DISTWIDTH] IN BLOOD BY AUTOMATED COUNT: 12.7 % (ref 11.5–14.5)
EST. GFR  (AFRICAN AMERICAN): >60 ML/MIN/1.73 M^2
EST. GFR  (NON AFRICAN AMERICAN): >60 ML/MIN/1.73 M^2
FERRITIN SERPL-MCNC: 147 NG/ML (ref 20–300)
GLUCOSE SERPL-MCNC: 87 MG/DL (ref 70–110)
HCT VFR BLD AUTO: 39.1 % (ref 37–48.5)
HGB BLD-MCNC: 12.9 G/DL (ref 12–16)
IMM GRANULOCYTES # BLD AUTO: 0.01 K/UL (ref 0–0.04)
IMM GRANULOCYTES NFR BLD AUTO: 0.2 % (ref 0–0.5)
IRON SERPL-MCNC: 96 UG/DL (ref 30–160)
LYMPHOCYTES # BLD AUTO: 2.7 K/UL (ref 1–4.8)
LYMPHOCYTES NFR BLD: 50.8 % (ref 18–48)
MCH RBC QN AUTO: 29.7 PG (ref 27–31)
MCHC RBC AUTO-ENTMCNC: 33 G/DL (ref 32–36)
MCV RBC AUTO: 90 FL (ref 82–98)
MONOCYTES # BLD AUTO: 0.4 K/UL (ref 0.3–1)
MONOCYTES NFR BLD: 6.8 % (ref 4–15)
NEUTROPHILS # BLD AUTO: 2.1 K/UL (ref 1.8–7.7)
NEUTROPHILS NFR BLD: 39.9 % (ref 38–73)
NRBC BLD-RTO: 0 /100 WBC
PLATELET # BLD AUTO: 291 K/UL (ref 150–350)
PMV BLD AUTO: 9.1 FL (ref 9.2–12.9)
POTASSIUM SERPL-SCNC: 4 MMOL/L (ref 3.5–5.1)
PREALB SERPL-MCNC: 22 MG/DL (ref 20–43)
PROT SERPL-MCNC: 7.4 G/DL (ref 6–8.4)
RBC # BLD AUTO: 4.35 M/UL (ref 4–5.4)
RHEUMATOID FACT SERPL-ACNC: 11 IU/ML (ref 0–15)
SATURATED IRON: 26 % (ref 20–50)
SODIUM SERPL-SCNC: 143 MMOL/L (ref 136–145)
TOTAL IRON BINDING CAPACITY: 363 UG/DL (ref 250–450)
TRANSFERRIN SERPL-MCNC: 245 MG/DL (ref 200–375)
TSH SERPL DL<=0.005 MIU/L-ACNC: 1.07 UIU/ML (ref 0.4–4)
WBC # BLD AUTO: 5.31 K/UL (ref 3.9–12.7)

## 2019-08-27 PROCEDURE — 83516 IMMUNOASSAY NONANTIBODY: CPT

## 2019-08-27 PROCEDURE — 80053 COMPREHEN METABOLIC PANEL: CPT

## 2019-08-27 PROCEDURE — 86431 RHEUMATOID FACTOR QUANT: CPT

## 2019-08-27 PROCEDURE — 82728 ASSAY OF FERRITIN: CPT

## 2019-08-27 PROCEDURE — 99245 PR OFFICE CONSULTATION,LEVEL V: ICD-10-PCS | Mod: S$GLB,,, | Performed by: NURSE PRACTITIONER

## 2019-08-27 PROCEDURE — 84134 ASSAY OF PREALBUMIN: CPT

## 2019-08-27 PROCEDURE — 86038 ANTINUCLEAR ANTIBODIES: CPT

## 2019-08-27 PROCEDURE — 83540 ASSAY OF IRON: CPT

## 2019-08-27 PROCEDURE — 99999 PR PBB SHADOW E&M-EST. PATIENT-LVL V: ICD-10-PCS | Mod: PBBFAC,,, | Performed by: NURSE PRACTITIONER

## 2019-08-27 PROCEDURE — 99245 OFF/OP CONSLTJ NEW/EST HI 55: CPT | Mod: S$GLB,,, | Performed by: NURSE PRACTITIONER

## 2019-08-27 PROCEDURE — 84443 ASSAY THYROID STIM HORMONE: CPT

## 2019-08-27 PROCEDURE — 85025 COMPLETE CBC W/AUTO DIFF WBC: CPT

## 2019-08-27 PROCEDURE — 99999 PR PBB SHADOW E&M-EST. PATIENT-LVL V: CPT | Mod: PBBFAC,,, | Performed by: NURSE PRACTITIONER

## 2019-08-27 RX ORDER — MIRTAZAPINE 15 MG/1
15 TABLET, FILM COATED ORAL NIGHTLY
Qty: 30 TABLET | Refills: 11 | Status: SHIPPED | OUTPATIENT
Start: 2019-08-27 | End: 2019-08-27

## 2019-08-27 RX ORDER — PROMETHAZINE HYDROCHLORIDE 25 MG/1
25 TABLET ORAL EVERY 6 HOURS PRN
Qty: 120 TABLET | Refills: 3 | Status: SHIPPED | OUTPATIENT
Start: 2019-08-27 | End: 2022-01-24

## 2019-08-27 RX ORDER — ERGOCALCIFEROL 1.25 MG/1
50000 CAPSULE ORAL
COMMUNITY
End: 2023-07-11

## 2019-08-27 RX ORDER — VITAMIN B COMPLEX
1 CAPSULE ORAL DAILY
COMMUNITY

## 2019-08-27 NOTE — TELEPHONE ENCOUNTER
MOTILITY CLINIC PROCEDURE ORDERS    CLEARANCE FOR PROCEDURES:  Not needed     PROCEDURES   EGD with EndoFlip   Esophageal manometry with impedance - dysphagia   Esophageal manometry with impedance - rumination    FLOOR:  4th Floor     PREP  Full liquid diet 3 days     MEDICATIONS     Motility Studies (esophageal manometry/anorectal manometry)  Hold Narcotics x 1 days   Hold TCA x 1 days  Propofol only. No fentanyl or benzodiazepine during sedation. If additional sedation needed, discuss with Dr. Orozco.    ORDER OF TESTING:  Day 1: am cortisol lab, then EGD, then esophageal manometry  Day 2:upper GI w SBFT

## 2019-08-27 NOTE — PATIENT INSTRUCTIONS
We have ordered labs, an EGD, esophageal manometry,  upper GI series  for further evaluation.     We have sent a prescription for a medication for constipation that just became available to [facility] pharmacy.   - Start prucalopride 2mg daily (motegrity) for constipation.    -The most common side effects of prucalopride/motegrity include: headache, stomach area (abdominal) pain or bloating, nausea, diarrhea, dizziness, vomiting, gas and fatigue. If reported, diarrhea or headache typically occurred within the first week of treatment and resolved within a few days of continued use.   This medication may also have impact on your mood.  Stop taking Motegrity right away and tell your healthcare provider immediately if your depression gets worse, you feel sad, hopeless or begin to have thoughts of suicide, thoughts of hurting yourself or have tried to hurt yourself.   -If you need help with prescription coverage call  1-238.494.5427 or look for information at https://www.[employer]    Start the gastroparesis diet for the nausea and vomiting. Start protein shakes three times daily. We have referred you to the GI dietitian for help with this.    Take over the counter FDgard as needed for upper abdominal pain, fullness, nausea. If you do not find it in stores, you can order in at amazon.com    Eliminate all forms of dairy/lactose (milk, cheese, butter, creamers, ice cream etc) and artificial sweeteners (splenda, equal, stevia, truvia, crystal lite, diet sodas, sugar free candy/gum etc) from your diet for 14 days to see if gas and bloating mprove.    We have referred you to neurology. Discuss repeat brain imaging with neurology.     We have referred you to rheumatology for joint pain.     Seek a local therapist/counselor to treat the anxiety/depression/stress. Treating this will help improve your GI/gastro symptoms.

## 2019-08-27 NOTE — LETTER
August 28, 2019        Robert Kumar MD  [address] [facility] [address]             [facility]  [address]  [place] [address]  Phone: [phone]  Fax: [phone]   Patient: Nena Mendez   MR Number: [id number]   YOB: 1980   Date of Visit: 8/27/2019       Dear Dr. Kumar:    Thank you for referring Nena Mendez to me for evaluation. Attached you will find relevant portions of my assessment and plan of care.    If you have questions, please do not hesitate to call me. I look forward to following Nena Mendez along with you.    Sincerely,                  CC  No Recipients    Enclosure

## 2019-08-27 NOTE — PROGRESS NOTES
[facility] Consultation Note    Reason for Consult:    Chief Complaint   Patient presents with    Heartburn    Chest Pain    Dysphagia    Abdominal Pain    Nausea    Emesis    Gas    Bloated    Diarrhea    Constipation    Melena    Weight Loss    Fever    Chills    Blurred Vision    Bleeding/Bruising    Joint Pain    Hematuria    Dizziness    Depression    Insomnia         PCP:   Jeancarlos Angel   [address] [facility] [address] [place] [address]    Referring MD:  Robert Kumar Md  [address] [place] [address]    Current GI: Dr. David    HPI:  Nena Mendez is a 39 y.o. female with a PMH of  migraines, enlarged thyroid gland, status post cholecystectomy referred to motility clinic for second opinion regarding the following problems:      GERD.  Patient reports bothersome heartburn  Onset:4 yrs ago  Retrosternal pyrosis:yes  Regurgitation:yes  Belching:yes  Frequency: daily  Improve with:  No improvement with nexium granules 40 mg daily and carafate 1 g QID. No improvent with omeprazole daily, dexilant, prevacid. Has not taken protonix, aciphex,   Upright symptoms: yes  Nocturnal symptoms:  yes  Hoarseness:yes  Cough:no  Throat clearing:yes  Food Triggers:none notice  Caffeine intake:32 oz tea dailiy  Sleeps with head of the bed elevated.   Avoids eating prior to bedtime.      Dysphagia.  Reports difficulty swallowing.  Onset of symptoms:2 yrs ago  Problems with solids:yes,hannah bread  Problems with liquids:no  Choking while eating:no   Coughing while eating: no  Location: [place]  Frequency:  4 days weekly  Improves with:spitting materials back up for relief  Narcotic pain meds:no  History of food impactions requiring ED visit:no  History of allergies:iodine, latex  History of seasonal allergies:yes  History of food allergies:no  History of eczema:no    Nausea.    Frequency:constant  Onset: 5 yrs ago, worse for the last year    Early  satiety.  Frequency:daily  Onset:4 yrs ago    Vomiting  Frequency:every meal, worse in the last year  Onset:5 yrs ago  Timing of vomiting:  Within 30 min of eating:yes   Within 3 hours after eating:yes  Rumination Syndrome  Symptoms occur within 10 minutes of finishing a meal:yes  Regurgitate lacks sour or bitter taste.  Regurgitate described as tasting similar to the food recently ingested.  Little or no improvement after GERD or nausea directed treatment.  No symptoms during sleep or fasting:vomits dinner in sleep  Weight loss (40% of patients):yes  Related to reflux:?  Related to belching:no        Cyclical episodes of n/v with symptom free intervals between episodes:no  Prodrome:n/a  History of migraines:+ PH, no FH  Marijuana use:no  Unusual bathing behaviors:no    Improves with:  No improvement with baclofen 10 mg HS since 4/2019  No improvement with zofran 8 mg PRN  minimal improvement with phenergan 25 mg HS; causes sedation  Has not tried compazine    No improvement with reglan and was taken off due to itching. Has had eye twitching for few years.    No improvement with domperidone; not a candidate d/t h/o electrolyte imbalance  Unable to tolerate scopolamine patch due to skin rash/burning.  No  improvement with gastric stim (2016)  No improvement with pyloroplasty (2017)  1 year improvement w gastric sleeve (2017)  No improvement with pyloric dilation (2019)  Unable to tolerate J tube d/t infection    Gastroparesis diagnosed: 4 years ago    Etiology:    Dm-no  Idopathic  Postsurgical- major sx for endometriosis prior to s/s onset.   Parkinsonism-no  Amyloidosis-no  Paraneoplastic disease-no  Scleroderma-no  Mesenteric ischemia-no    History of:  Prior gastric or bariatric surgery: gastric sleeve for gastroparesis  Diabetes mellitus: no  Thyroid dysfunction: no  Neurological disease: no   Autoimmune disorders: no    Number of GP related hospitalization in the past year: 0  Number of GP related ED visit in  the past year: 4    Interventions: (pyloroplasty, botox, gastric stimulator, gastroparesis diet):  Gastric Stimulator    Placed: 2016  Last battery change: 2019  Last interrogated:   5/2019    Curenlty on following medications that may affect gastric emptying:   Narcotics (morphine, oxycodone, tapentadol, less with tramadol):no  Anticholinergics:  no  Cyclosporine:no  Diabetic medications (GLP-1 analogs, Exenatide, Lixisenatide, Livaglutide, Albiglutide, DulaglutatidePramlintide - SymlinPen (injection)):no    Abdominal pain. Reports abdominal pain  Onset:several years ago; has endometriosis  Location:LUQ, epigastric, LLQ  Character:stabbing, sharp  Frequency:  daily  Duration:constant  Worse with:BMs  Improves with:nothing in particular. No improvement with baclofen 10 mg HS since 4/2019  No improvement with IBgard,  Has not tried FDgard, Bentyl, Levsin, Levbid.  Associated with Bm:yes, LLQ    Nocturnal pain: yes  Antidepressants:no  Using narcotic pain medication: no   NSAIDs: ibuprofen PRN for migraines    Gas and bloating.   Onset:several yrs ago  Bloating: yes  Excessive gas: yes  Abdominal distension: yes, mild  Symptoms get worse after meals:yes  Symptoms get worse as the day progresses:  yes  Consumes lactose:cheese  Consumes artificial sugars:no    Diarrhea.  Reports loose to watery stools.    Symptoms started: 12 yrs ago w constipation  [place]: 7  Frequency:twice monthly with one watery BM  Nocturnal symptoms: no    Has not tried imodium   lomotil   cholestyramine  Not a candidate for viberzi due to s/p mckinely    Fecal incontinence: no      Constipation.  Reports bothersome constipation.  Onset:12 yrs ago associated w endometriosis  [place] type:2  Frequency:1  Urgency and sensation of incomplete evacuation:yes  Straining during defecation:yes  Sensation of anorectal blockage:sometimes  Rectal prolapse:quarter sized tissue after BM at times  Uses manual maneuvers to facilitate defecation:yes   Problems in  "early childhood: no  History of perineal trauma: no    Previous evaluation: no  Previous pelvic muscle retraining: no    No improvement with miralax BID several yrs ago  No improvement with Linzess ?dose? daily x two months  Unsure if she has tried Amitiza  Has not tried Trulance  prucalopride   Senna  Colace  No improvement with dulcolax, fiber  No improvement with lactulose      Black stools. Twice monthly. Since last few years. Denies pepto/charco cap use.     Weight loss.  Reports unintentional weight loss.   Lost (lb):  From 180 lbs to 104 lbs over 5 yrs; from 112 lbs to 104 lbs since 5/2019    Pt report intestines "knicked" during endometriosis surgery 5 yrs ago and repaired with sutures.     Depression. Due to GI symptoms. No meds. Has not seen psych.     Insomnia. Gets 2-3 hours sleep nightly. Has lots of joint pain or abd pain. Has not seen sleep specialist.     Anemia. H/o Low RBC, HGB. Not on iron.     Migraines. On Fioricet -75-30 mg PRN. On ibuprofen PRN. No improvement with amitriptyline Per pcp. Has seen neurology in the past.    Enlarged thyroid gland since age 17. TSH low in 2016    Multiple joint pain. JOHNIE negative in the past.     Denies BRBPR, anxiety    Total visit time was 90 minutes, more than 50% of which was spent in face-to-face counseling with patient regarding symptoms, diagnostic results, prognosis, risks and benefits of treatment options, instructions for management, importance of compliance with chosen treatment options, risk factor reduction, stress reduction, coping strategies.      Gastric stimulator:  Entera interrogated (8/27/19): Impedence: 439 omb, Amplitude: 8V, Current:8.3 MA, Pulse width: 330 us, Rate: 50Hz, On 4s Off 1s, Battery: OK  Gastric stimulator adjustment (5/11/19): imp 439 voltage 8 Current 18.2 Pulse Width 330 Rate 50 Cycle on 4 Cycle off 1  Gastric stimulator interrogation (3/9/19): imp 446 voltage 7 Current 15.7 Pulse Width 330 Rate 50 Cycle on 3 Cycle off " 2-battery is low.  Initial gastric stimulator settings (10/7/17): Impedance was 422, voltage 3.5, current 8.3, pulse width 330, rate   14, cycle on 0.1, cycle off 5.    Previous Studies:   EGD 03/01/2019:  Small hiatal hernia. Nonerosive reflux esophagitis (early reflux esophagitis).  Gastritis (chronic inflammation).  Normal-appearing gastric sleeve.  Pyloroplasty without evidence of pyloric stenosis or duodenal stricture.  CT abdomen and pelvis without contrast 12/26/2018:  Status post cholecystectomy.  Kidney cyst.  Status post gastric surgery.  Status post gastric pacemaker.  CT abdomen and pelvis 12/19/2017:  Status post cholecystectomy.  Status post gastric surgery.  Status post gastric pacemaker.  EGD 11/17/2017:  Gastritis (chronic inactive gastritis).  Reflux esophagus (reflux esophagitis).  Status post gastric sleeve.  Normal duodenum.  Colonoscopy 11/17/2017: ? Prep to cecum. 4 mm cecal polyp (benign polypoid mucosa).  9 mm descending colon polyp (ulcerated and inflamed hyperplastic polyp benign).  Internal hemorrhoids.  Repeat in 3 years  EGD 03/30/2017:  Normal esophagus. Gastric erythema (?).  Normal duodenum.  CT abdomen and pelvis 01/31/2017:  Kidney cysts, status post cholecystectomy, status post gastric pacemaker, status post jejunostomy tube.  MRV head 07/22/2016:  No intracranial MR venogram abnormalities  Gastric emptying study 07/01/2016:  Mildly delayed early gastric emptying.11% gastric emptying at 34 min.  24% gastric emptying at 64 min.  51% emptying at 130 min.  95% emptying at 242.    Normal gastric emptying is noted at 4 hrs.   EGD 06/10/2016:  Gastritis (?).  Reflux esophagitis (?).  Normal duodenum.    Labs:  05/11/2017:  Phosphorus normal, magnesium normal, BMP unremarkable, CBC RBC low 3.81, HGB low at 11.2  01/29/2017:  CMP calcium low 8.4, total protein of 5.2, albumin low 2.9  07/18/2016:  JOHNIE negative, CBC normal, CMP unremarkable, hepatic profile normal, iron normal, TIBC  normal, ferritin normal, TSH low 0.4    Relevant surgeries:  Gastric pacemaker change battery (2019)  Gastric sleeve (05/15/2017)  Pyloroplasty (2017)  J-tube inserted (2016)  Gastric stimulator placement (10/07/2016)  J tube placement (08/10/2016)  Cholecystectomy (9 yrs ago)    ROS:  ROS   Constitutional: + fevers, + chills, no night sweats, +weight loss  ENT: No congestion, no rhinorrhea, no chronic sinus problems  CV: No chest pain, no palpitations  Pulm: No cough, no shortness of breath  Ophtho: + blurry vision, no eye redness  GI: see HPI  Derm: No rash  Heme: + lymphadenopathy, n+ bruising  MSK: + joint pain, no joint swelling, no Raynauds  : No dysuria, no frequent urination, + blood in urine  Endo: + hot or cold intolerance  Neuro: + dizziness, no syncope, no seizure  Psych: No anxiety, + depression        Medical History:   Past Medical History:   Diagnosis Date    Gastroparesis         Surgical History:   Past Surgical History:   Procedure Laterality Date    APPENDECTOMY       SECTION      CHOLECYSTECTOMY      COSMETIC SURGERY      EGD (ESOPHAGOGASTRODUODENOSCOPY)-dual lumen scope to remove intragastric suture. N/A 3/22/2019    Performed by Robert Kumar MD at [facility] OR [address] [place] [address]    ESOPHAGOGASTRODUODENOSCOPY (EGD) N/A 10/7/2016    Performed by Robert Kumar MD at [facility] OR [address] [place] [address]    GASTRECTOMY-SLEEVE-LAPAROSCOPIC N/A 5/10/2017    Performed by Robert Kumar MD at [facility] OR [address] [place] [address]    GASTRIC STIMULATOR IMPLANT SURGERY      GASTROSTOMY-JEJEUNOSTOMY TUBE CHANGE/PLACEMENT  08/10/2016    HYSTERECTOMY      laparoscopic jejunostomy tube      LAPAROSCOPIC PLACEMENT GASTRIC NEUROSTIMULATOR N/A 10/7/2016    Performed by [facility] at [facility] OR [address] [place] [address]    REPLACEMENT, PULSE GENERATOR, NEUROSTIMULATOR, GASTRIC-battery exchange only N/A 3/22/2019    Performed by Robert Kumar MD at [facility] OR [address] [place] [address]    ROBOTIC ASSISTED LAPAROSCOPIC  PYLOROPLASTY N/A 2017    Performed by [facility] at [facility] OR 2ND [facility]LR    sleeve gastrectomy      TONSILLECTOMY      TUBE-JEJUNOSTOMY-PLACEMENT-LAPAROSCOPIC N/A 2016    Performed by [facility] at [facility] OR [facility]    TUBE-JEJUNOSTOMY-PLACEMENT-LAPAROSCOPIC N/A 8/10/2016    Performed by [facility]ra [facility] at [facility] OR 2ND [facility]LR    TUBE-JEJUNOSTOMY-PLACEMENT-LAPAROSCOPIC//replacement N/A 2017    [facility]        Family History:   Family History   Problem Relation Age of Onset    Hypothyroidism Mother     Cirrhosis Father     No Known Problems Brother     Asthma Daughter     Sleep apnea Daughter     Celiac disease Neg Hx     Colon cancer Neg Hx     Esophageal cancer Neg Hx     Stomach cancer Neg Hx     Rectal cancer Neg Hx     Ulcerative colitis Neg Hx     Crohn's disease Neg Hx         Social History:   Social History     Socioeconomic History    Marital status:      Spouse name: Not on file    Number of children: 2    Years of education: Not on file    Highest education level: Not on file   Occupational History    Not on file   Social Needs    Financial resource strain: Not on file    Food insecurity:     Worry: Not on file     Inability: Not on file    Transportation needs:     Medical: Not on file     Non-medical: Not on file   Tobacco Use    Smoking status: Former Smoker     Packs/day: 0.00     Last attempt to quit: 2015     Years since quittin.0    Smokeless tobacco: Never Used   Substance and Sexual Activity    Alcohol use: No    Drug use: No    Sexual activity: Not on file   Lifestyle    Physical activity:     Days per week: Not on file     Minutes per session: Not on file    Stress: Not on file   Relationships    Social connections:     Talks on phone: Not on file     Gets together: Not on file     Attends Confucianism service: Not on file     Active member of club or  organization: Not on file     Attends meetings of clubs or organizations: Not on file     Relationship status: Not on file   Other Topics Concern    Not on file   Social History Narrative    Not on file        Review of patient's allergies indicates:   Allergen Reactions    Iodine and iodide containing products Swelling, Hives, Itching, Rash and Shortness Of Breath    Domperidone Itching    Latex, natural rubber Rash    Scopolamine      Patch- caused burn under patch       Current Outpatient Medications   Medication Sig Dispense Refill    b complex vitamins capsule Take 1 capsule by mouth once daily.      baclofen (LIORESAL) 10 MG tablet Take 1 tablet (10 mg total) by mouth 3 (three) times daily as needed. (Patient taking differently: Take 10 mg by mouth nightly. ) 90 tablet 11    butalbital-acetaminop-caf-cod -04-30 mg Cap TK 1 C PO Q 4 TO 6 H PRN P  3    ergocalciferol (VITAMIN D2) 50,000 unit Cap Take 50,000 Units by mouth every 7 days.      multivitamin capsule Take 1 capsule by mouth once daily.      NEXIUM PACKET 40 mg GrPS 40 mg before breakfast.       ondansetron (ZOFRAN-ODT) 8 MG TbDL Take 1 tablet (8 mg total) by mouth every 8 (eight) hours as needed. 30 tablet 6    promethazine (PHENERGAN) 6.25 mg/5 mL syrup Take 12.5 mg by mouth every 6 (six) hours as needed for Nausea.      sucralfate (CARAFATE) 1 gram tablet Take 1 g by mouth 4 (four) times daily before meals and nightly.      HYDROcodone-acetaminophen (NORCO) 5-325 mg per tablet Take 1 tablet by mouth every 6 (six) hours as needed for Pain (Do not drive while taking this medication.). 21 tablet 0    promethazine (PHENERGAN) 25 MG tablet Take 1 tablet (25 mg total) by mouth every 6 (six) hours as needed for Nausea. 120 tablet 3    prucalopride 2 mg Tab Take 1 tablet by mouth once daily. 30 tablet 11     No current facility-administered medications for this visit.         Objective Findings:  Vital Signs:  /79 (BP Location:  "Left arm, Patient Position: Sitting)   Pulse 71   Resp 16   Ht 5' 5" (1.651 m)   Wt 47.6 kg (104 lb 15 oz)   BMI 17.46 kg/m²   Body mass index is 17.46 kg/m².    Physical Exam:  General appearance: alert, cooperative, no distress  HENT: Normocephalic, atraumatic, neck symmetrical, no nasal discharge  Eyes: conjunctivae/corneas clear, PERRL, EOM's intact  Lungs: clear to auscultation bilaterally, no dullness to percussion bilaterally  Heart: regular rate and rhythm without rub; no displacement of the PMI  Abdomen: soft, non-tender; bowel sounds normoactive; no organomegaly  Extremities: extremities symmetric; no clubbing, cyanosis, or edema  Integument: Skin color, texture, turgor normal; no rashes; hair distrubution normal  Neurologic: Alert and oriented X 3, normal strength, normal coordination and gait  Psychiatric: no pressured speech; normal affect; no evidence of impaired cognition    Labs:  Lab Results   Component Value Date    WBC 5.31 08/27/2019    HGB 12.9 08/27/2019    HCT 39.1 08/27/2019    MCV 90 08/27/2019     08/27/2019     Lab Results   Component Value Date    FERRITIN 147 08/27/2019     Lab Results   Component Value Date     08/27/2019    K 4.0 08/27/2019     08/27/2019    CO2 30 (H) 08/27/2019    GLU 87 08/27/2019    BUN 3 (L) 08/27/2019    CREATININE 0.7 08/27/2019    CALCIUM 10.2 08/27/2019    PROT 7.4 08/27/2019    ALBUMIN 4.8 08/27/2019    BILITOT 0.4 08/27/2019    ALKPHOS 96 08/27/2019    AST 22 08/27/2019    ALT 17 08/27/2019     Lab Results   Component Value Date    TSH 1.073 08/27/2019     No results found for: SEDRATE  No results found for: CRP  No results found for: LABA1C, HGBA1C        Assessment and Plan:  Nena Mendez is a 39 y.o. female with a PMH of  migraines, enlarged thyroid gland, status post cholecystectomy referred to motility clinic for second opinion regarding the following problems:    GERD.  Small hiatal hernia.   No improvement with nexium " granules 40 mg daily and carafate 1 g QID.   No improvent with omeprazole daily, dexilant, prevacid.   No improvement with baclofen 10 mg HS  -Has not taken protonix, aciphex,     Dysphagia.    -EGD w e/g/d bx and endoFlip  -Esophageal manometry     Nausea.  Early satiety. Vomiting. Worse in the last year. Diagnosed with gastroparesis in 2015. GES in 2016 with mild delayed gastric emptying at the beginning of the test and normal at four hours. Pt reports initial GES with delay.   Vomits food tasting materials with in 30 minutes of eating every meal.      Unable to tolerate scopolamine patch due to skin rash/burning.No improvement with reglan and was taken off due to itching. Has had eye twitching for few years.    No improvement with domperidone; not a candidate due to history of electrolyte imbalance   No improvement with baclofen 10 mg HS since 4/2019  No improvement with zofran 8 mg PRN  No  improvement with gastric stim (2016)  No improvement with pyloroplasty (2017)  1 year improvement w gastric sleeve (2017)  No improvement with pyloric dilation (2019)  Unable to tolerate J tube due to infection, trouble flushing/using  Minimal improvement with phenergan 25 mg HS; causes sedation  -EGD  -Esophageal manometry r/o rumination  -Check labs  -Insurance will not cover smartPill  -Discussed pathophysiology, prognosis, workup and treatment of gastroparesis, including surgical options.  Provided handout.   -Discussed gastroparesis diet, provided handout.    -Referral to GI dietitian  -Start protein shakes TID  -Start prucalopride  -Start OTC FDgard  -Will consider remeron  -Discussed etiology, pathophysiology, evaluation and treatment of rumination syndrome.  Provided handout     -Has not tried compazine      Abdominal pain. History of endometriosis. Associated with bowel movements. Nocturnal pain.   On ibuprofen PRN for migraines  No improvement with baclofen 10 mg HS   No improvement with IBgard  -EGD  -Labs  -Upper  "GI series w SBFT  -Start FDgard  -Will consider iberogast  -Will consider remeron if no improvement with prucalopride  -Has not tried FDgard, Bentyl, Levsin, Levbid.    Gas and bloating. Mild distention.   Avoids artificial sugars  -Eliminate lactose  -Will consider SIBO testing  -Will consider Iberogast    Diarrhea.  Twice monthly. Possible overflow.   Not a candidate for viberzi due to has had cholecystectomy  -Has not tried imodium, lomotil, cholestyramine    Constipation.    No improvement with miralax BID several yrs ago  No improvement with Linzess ?dose? daily x two months  No improvement with dulcolax, fiber  No improvement with lactulose  Unsure if she has tried Amitiza  -Start prucalopride  -Check labs  -Has not tried Trulance, prucalopride, Senna, Colace      Black stools. Twice monthly. Since last few years. Denies pepto/charco cap use.   -EGD    Weight loss.  Reports unintentional weight loss. From 180 lbs to 104 lbs over 5 yrs; from 112 lbs to 104 lbs since 5/2019  -Start protein shakes TID  -Check prealbumin  -See GI dietitian  -Will consider remeron    Pt report intestines "knicked" during endometriosis surgery 5 yrs ago and repaired with sutures.   -Check upper GI w SBFT    Depression. Due to GI symptoms. No meds. Has not seen psych.   -Discussed the benefit of TCA and antidepressants in management of functional GI disorders.    -Will consider starting remeron  -Discussed the role of therapy in management of functional GI disorders.    -PT to seek local therapist     Insomnia. Gets 2-3 hours sleep nightly. Has lots of joint pain or abd pain. Has not seen sleep specialist.     Anemia. H/o Low RBC, HGB. Not on iron.   -Check labs    Migraines. On Fioricet -25-30 mg PRN. On ibuprofen PRN. No improvement with amitriptyline Per pcp. Has seen neurology in the past.  -Referral to neurology to discuss adjusting meds for migraines    Enlarged thyroid gland since age 17. TSH low in 2016  -Check " labs    Multiple joint pain. JOHNIE negative in the past.   -Referral to rheumatology    Follow up in about 3 months (around 11/27/2019) for Motility w RYAN Knowles and Dr. Orozco in 5 months.    1. Gastroesophageal reflux disease, esophagitis presence not specified    2. Nausea and vomiting, intractability of vomiting not specified, unspecified vomiting type    3. Early satiety    4. Abdominal pain, unspecified abdominal location    5. Esophageal dysphagia    6. Bloating    7. Diarrhea, unspecified type    8. Constipation, unspecified constipation type    9. Black stools    10. Weight loss    11. Iron deficiency anemia, unspecified iron deficiency anemia type    12. Multiple joint pain    13. Migraine without status migrainosus, not intractable, unspecified migraine type    14. Depression, unspecified depression type          Order summary:  Orders Placed This Encounter    FL Upper GI With Small Bowel    TSH    CBC auto differential    Comprehensive metabolic panel    TISSUE TRANSGLUTAMINASE (TTG), IGA    Iron and TIBC    Ferritin    Prealbumin    Cortisol, 8AM    JOHNIE Screen w/Reflex    Rheumatoid factor    Ambulatory referral to Rheumatology    Ambulatory referral to Neurology    prucalopride 2 mg Tab    promethazine (PHENERGAN) 25 MG tablet    Case request GI: EGD (ESOPHAGOGASTRODUODENOSCOPY)         Thank you so much for allowing me to participate in the care of Nena Mendez          Eleni Patel, APRN, FNP-C    I have personally reviewed history, performed physical exam, and educated the patient.  I have reviewed and agree with today's findings and the care plan outlined by Eleni Patel NP. PLAN: Ref to neurology to consider brain imaging, UGIwSBFT, Manom r/o dysph/rumination, EGD w diss, E/G/D bx, flip, prucalopride, eliminate lactose, will consider SIBO testing, will consider Iberogast, dietitain, shakes tid, labs, ref to rheumatology.         Patti Orozco MD

## 2019-08-28 LAB — ANA SER QL IF: NORMAL

## 2019-08-29 ENCOUNTER — PATIENT MESSAGE (OUTPATIENT)
Dept: GASTROENTEROLOGY | Facility: CLINIC | Age: 39
End: 2019-08-29

## 2019-08-29 LAB — TTG IGA SER-ACNC: 2 UNITS

## 2019-08-30 ENCOUNTER — PATIENT MESSAGE (OUTPATIENT)
Dept: GASTROENTEROLOGY | Facility: CLINIC | Age: 39
End: 2019-08-30

## 2019-09-03 ENCOUNTER — PATIENT MESSAGE (OUTPATIENT)
Dept: GASTROENTEROLOGY | Facility: CLINIC | Age: 39
End: 2019-09-03

## 2019-09-03 ENCOUNTER — TELEPHONE (OUTPATIENT)
Dept: ENDOSCOPY | Facility: HOSPITAL | Age: 39
End: 2019-09-03

## 2019-09-03 RX ORDER — MIRTAZAPINE 15 MG/1
15 TABLET, FILM COATED ORAL NIGHTLY
Status: ON HOLD | COMMUNITY
End: 2019-12-30

## 2019-09-03 NOTE — TELEPHONE ENCOUNTER
Patient is scheduled for EGD with Endoflip and Esophageal Manometry procedure with Dr. Orozco on 10/30/19.

## 2019-10-02 ENCOUNTER — PATIENT MESSAGE (OUTPATIENT)
Dept: GASTROENTEROLOGY | Facility: CLINIC | Age: 39
End: 2019-10-02

## 2019-10-03 ENCOUNTER — PATIENT MESSAGE (OUTPATIENT)
Dept: ENDOSCOPY | Facility: HOSPITAL | Age: 39
End: 2019-10-03

## 2019-10-03 NOTE — TELEPHONE ENCOUNTER
Karley, please assist her in coordination of various appointments as best you can. Please give her the information about getting assistance with motegrity coverage.     Dr. Orozco, please advise on the question about IV infusions. Its seems like this is something that has to be handled by her pcp or referring GI, and I am not sure if there is any advise we can offer on this.     Thanks,   Darlene NP    Routing comment      I agree she needs to discuss infusions with her local providers as we are unabte to assist her with that     Re prucalopride coverage  -If you need help with prescription coverage call  [phone] or look for information at [email or web address]  -if no response from above than contact [employer] to see if they can assist you with prescription coverage.   You will pay $50 per month for a prescription through this program   3-957-207-HOPE  [email or web address]

## 2019-10-07 ENCOUNTER — TELEPHONE (OUTPATIENT)
Dept: GASTROENTEROLOGY | Facility: CLINIC | Age: 39
End: 2019-10-07

## 2019-10-07 NOTE — TELEPHONE ENCOUNTER
Please let her know that I do not place this type of orders and cannot recommend anything to PCP. She can ask her local GI doctor if he/she has a place where they can offer this to her.

## 2019-10-07 NOTE — TELEPHONE ENCOUNTER
Attempted to set up rheum and neuro. Pt does not want to come here. Pt states she will inquire about these locally to see if she can find providers for both referrals. Pt also states that pcp stated she did not know what to put on orders and suggested pt go to urgent care or ER in regards to IV infusions. Pt does not want to do that and wants to know if  can tell pcp what need s to be on order and let pcp monitor it from there.

## 2019-10-24 ENCOUNTER — TELEPHONE (OUTPATIENT)
Dept: ENDOSCOPY | Facility: HOSPITAL | Age: 39
End: 2019-10-24

## 2019-10-24 NOTE — TELEPHONE ENCOUNTER
Called to confirm appt on 10/30/19 for EGD and Manometry study.    No answer and message was left for her to return call to direct line to confirm appt.

## 2019-10-30 ENCOUNTER — ANESTHESIA EVENT (OUTPATIENT)
Dept: ENDOSCOPY | Facility: HOSPITAL | Age: 39
End: 2019-10-30
Payer: COMMERCIAL

## 2019-10-30 ENCOUNTER — HOSPITAL ENCOUNTER (OUTPATIENT)
Facility: HOSPITAL | Age: 39
Discharge: HOME | End: 2019-10-30
Attending: INTERNAL MEDICINE | Admitting: INTERNAL MEDICINE
Payer: COMMERCIAL

## 2019-10-30 ENCOUNTER — ANESTHESIA (OUTPATIENT)
Dept: ENDOSCOPY | Facility: HOSPITAL | Age: 39
End: 2019-10-30
Payer: COMMERCIAL

## 2019-10-30 VITALS
OXYGEN SATURATION: 97 % | DIASTOLIC BLOOD PRESSURE: 69 MMHG | HEART RATE: 70 BPM | WEIGHT: 104 LBS | TEMPERATURE: 98 F | RESPIRATION RATE: 16 BRPM | BODY MASS INDEX: 17.33 KG/M2 | HEIGHT: 65 IN | SYSTOLIC BLOOD PRESSURE: 112 MMHG

## 2019-10-30 DIAGNOSIS — R11.2 NAUSEA AND VOMITING, INTRACTABILITY OF VOMITING NOT SPECIFIED, UNSPECIFIED VOMITING TYPE: Primary | ICD-10-CM

## 2019-10-30 DIAGNOSIS — R11.2 NAUSEA AND VOMITING: ICD-10-CM

## 2019-10-30 PROCEDURE — 88342 TISSUE SPECIMEN TO PATHOLOGY - SURGERY: ICD-10-PCS | Mod: 26,,, | Performed by: PATHOLOGY

## 2019-10-30 PROCEDURE — 91010 PR ESOPHAGEAL MOTILITY STUDY, MA2METRY: ICD-10-PCS | Mod: 26,,, | Performed by: INTERNAL MEDICINE

## 2019-10-30 PROCEDURE — C1726 CATH, BAL DIL, NON-VASCULAR: HCPCS | Performed by: INTERNAL MEDICINE

## 2019-10-30 PROCEDURE — 91037 ESOPH IMPED FUNCTION TEST: CPT | Mod: 26,,, | Performed by: INTERNAL MEDICINE

## 2019-10-30 PROCEDURE — 43239 EGD BIOPSY SINGLE/MULTIPLE: CPT | Mod: 51,,, | Performed by: INTERNAL MEDICINE

## 2019-10-30 PROCEDURE — 91010 ESOPHAGUS MOTILITY STUDY: CPT | Performed by: INTERNAL MEDICINE

## 2019-10-30 PROCEDURE — 37000008 HC ANESTHESIA 1ST 15 MINUTES: Performed by: INTERNAL MEDICINE

## 2019-10-30 PROCEDURE — 88305 TISSUE EXAM BY PATHOLOGIST: CPT | Mod: 26,,, | Performed by: PATHOLOGY

## 2019-10-30 PROCEDURE — 43239 PR EGD, FLEX, W/BIOPSY, SGL/MULTI: ICD-10-PCS | Mod: 51,,, | Performed by: INTERNAL MEDICINE

## 2019-10-30 PROCEDURE — 63600175 PHARM REV CODE 636 W HCPCS: Performed by: NURSE ANESTHETIST, CERTIFIED REGISTERED

## 2019-10-30 PROCEDURE — 25000003 PHARM REV CODE 250: Performed by: INTERNAL MEDICINE

## 2019-10-30 PROCEDURE — 27201012 HC FORCEPS, HOT/COLD, DISP: Performed by: INTERNAL MEDICINE

## 2019-10-30 PROCEDURE — 91040 PR ESOPH BALLOON DISTENSION TST: ICD-10-PCS | Mod: 26,,, | Performed by: INTERNAL MEDICINE

## 2019-10-30 PROCEDURE — 43239 EGD BIOPSY SINGLE/MULTIPLE: CPT | Performed by: INTERNAL MEDICINE

## 2019-10-30 PROCEDURE — 91010 ESOPHAGUS MOTILITY STUDY: CPT | Mod: 26,,, | Performed by: INTERNAL MEDICINE

## 2019-10-30 PROCEDURE — D9220A PRA ANESTHESIA: Mod: CRNA,,, | Performed by: NURSE ANESTHETIST, CERTIFIED REGISTERED

## 2019-10-30 PROCEDURE — 91037 ESOPH IMPED FUNCTION TEST: CPT | Performed by: INTERNAL MEDICINE

## 2019-10-30 PROCEDURE — 91037 PR GERD TST W/ NASAL IMPEDENCE ELECTROD: ICD-10-PCS | Mod: 26,,, | Performed by: INTERNAL MEDICINE

## 2019-10-30 PROCEDURE — 91040 ESOPH BALLOON DISTENSION TST: CPT | Performed by: INTERNAL MEDICINE

## 2019-10-30 PROCEDURE — 88305 TISSUE EXAM BY PATHOLOGIST: CPT | Performed by: PATHOLOGY

## 2019-10-30 PROCEDURE — D9220A PRA ANESTHESIA: Mod: ANES,,, | Performed by: ANESTHESIOLOGY

## 2019-10-30 PROCEDURE — 88342 IMHCHEM/IMCYTCHM 1ST ANTB: CPT | Mod: 26,,, | Performed by: PATHOLOGY

## 2019-10-30 PROCEDURE — 88342 IMHCHEM/IMCYTCHM 1ST ANTB: CPT | Performed by: PATHOLOGY

## 2019-10-30 PROCEDURE — D9220A PRA ANESTHESIA: ICD-10-PCS | Mod: ANES,,, | Performed by: ANESTHESIOLOGY

## 2019-10-30 PROCEDURE — 88305 TISSUE SPECIMEN TO PATHOLOGY - SURGERY: ICD-10-PCS | Mod: 26,,, | Performed by: PATHOLOGY

## 2019-10-30 PROCEDURE — D9220A PRA ANESTHESIA: ICD-10-PCS | Mod: CRNA,,, | Performed by: NURSE ANESTHETIST, CERTIFIED REGISTERED

## 2019-10-30 PROCEDURE — 91040 ESOPH BALLOON DISTENSION TST: CPT | Mod: 26,,, | Performed by: INTERNAL MEDICINE

## 2019-10-30 PROCEDURE — 37000009 HC ANESTHESIA EA ADD 15 MINS: Performed by: INTERNAL MEDICINE

## 2019-10-30 RX ORDER — PROPOFOL 10 MG/ML
VIAL (ML) INTRAVENOUS CONTINUOUS PRN
Status: DISCONTINUED | OUTPATIENT
Start: 2019-10-30 | End: 2019-10-30

## 2019-10-30 RX ORDER — PROPOFOL 10 MG/ML
VIAL (ML) INTRAVENOUS
Status: DISCONTINUED | OUTPATIENT
Start: 2019-10-30 | End: 2019-10-30

## 2019-10-30 RX ORDER — SODIUM CHLORIDE 9 MG/ML
INJECTION, SOLUTION INTRAVENOUS CONTINUOUS PRN
Status: DISCONTINUED | OUTPATIENT
Start: 2019-10-30 | End: 2019-10-30

## 2019-10-30 RX ORDER — SODIUM CHLORIDE 0.9 % (FLUSH) 0.9 %
10 SYRINGE (ML) INJECTION
Status: DISCONTINUED | OUTPATIENT
Start: 2019-10-30 | End: 2019-10-30 | Stop reason: HOSPADM

## 2019-10-30 RX ORDER — LIDOCAINE HCL/PF 100 MG/5ML
SYRINGE (ML) INTRAVENOUS
Status: DISCONTINUED | OUTPATIENT
Start: 2019-10-30 | End: 2019-10-30

## 2019-10-30 RX ORDER — SODIUM CHLORIDE 9 MG/ML
INJECTION, SOLUTION INTRAVENOUS CONTINUOUS
Status: DISCONTINUED | OUTPATIENT
Start: 2019-10-30 | End: 2019-10-30 | Stop reason: HOSPADM

## 2019-10-30 RX ORDER — LIDOCAINE HYDROCHLORIDE 20 MG/ML
JELLY TOPICAL ONCE
Status: COMPLETED | OUTPATIENT
Start: 2019-10-30 | End: 2019-10-30

## 2019-10-30 RX ORDER — ONDANSETRON 2 MG/ML
INJECTION INTRAMUSCULAR; INTRAVENOUS
Status: DISCONTINUED | OUTPATIENT
Start: 2019-10-30 | End: 2019-10-30

## 2019-10-30 RX ADMIN — PROPOFOL 50 MG: 10 INJECTION, EMULSION INTRAVENOUS at 08:10

## 2019-10-30 RX ADMIN — ONDANSETRON 8 MG: 2 INJECTION INTRAMUSCULAR; INTRAVENOUS at 08:10

## 2019-10-30 RX ADMIN — PROPOFOL 20 MG: 10 INJECTION, EMULSION INTRAVENOUS at 08:10

## 2019-10-30 RX ADMIN — SODIUM CHLORIDE: 0.9 INJECTION, SOLUTION INTRAVENOUS at 08:10

## 2019-10-30 RX ADMIN — PROPOFOL 150 MCG/KG/MIN: 10 INJECTION, EMULSION INTRAVENOUS at 08:10

## 2019-10-30 RX ADMIN — LIDOCAINE HYDROCHLORIDE 10 ML: 20 JELLY TOPICAL at 11:10

## 2019-10-30 RX ADMIN — LIDOCAINE HYDROCHLORIDE 100 MG: 20 INJECTION, SOLUTION INTRAVENOUS at 08:10

## 2019-10-30 NOTE — TRANSFER OF CARE
"Anesthesia Transfer of Care Note    Patient: Nena Mendez    Procedure(s) Performed: Procedure(s) (LRB):  EGD (ESOPHAGOGASTRODUODENOSCOPY) (N/A)  MANOMETRY, ESOPHAGUS, WITH IMPEDANCE MEASUREMENT (N/A)    Patient location: [facility]    Anesthesia Type: general    Transport from OR: Transported from OR on room air with adequate spontaneous ventilation    Post pain: adequate analgesia    Post assessment: no apparent anesthetic complications and tolerated procedure well    Post vital signs: stable    Level of consciousness: alert, awake and oriented    Nausea/Vomiting: no nausea/vomiting    Complications: none    Transfer of care protocol was followed      Last vitals:   Visit Vitals  BP (!) 111/56 (BP Location: Left arm, Patient Position: Lying)   Pulse 67   Temp 36.5 °C (97.7 °F) (Temporal)   Resp 16   Ht 5' 5" (1.651 m)   Wt 47.2 kg (104 lb)   SpO2 100%   Breastfeeding? No   BMI 17.31 kg/m²     "

## 2019-10-30 NOTE — H&P
Short Stay Endoscopy History and Physical    PCP - Jeancarlos Angel MD     Procedure - EGD w Endoflip  ASA - per anesthesia  Mallampati - per anesthesia  History of Anesthesia problems - no  Family history Anesthesia problems -  no   Plan of anesthesia - General    HPI:  This is a 39 y.o. female here for evaluation of dysphagia, n/v, gerd, diarrhea, melena, weight loss:      ROS:  Constitutional: No fevers, chills, No weight loss  CV: No chest pain  Pulm: No cough, No shortness of breath  Ophtho: No vision changes  GI: see HPI  Derm: No rash    Medical History:  has a past medical history of Gastroparesis.    Surgical History:  has a past surgical history that includes Appendectomy; Cholecystectomy; Cosmetic surgery;  section; Hysterectomy; Tonsillectomy; Gastrostomy-jejeunostomy tube change/placement (08/10/2016); Gastric stimulator implant surgery; laparoscopic jejunostomy tube; sleeve gastrectomy; Replacement of gastric neurostimulator generator (N/A, 3/22/2019); and Esophagogastroduodenoscopy (N/A, 3/22/2019).    Family History: family history includes Asthma in her daughter; Cirrhosis in her father; Hypothyroidism in her mother; No Known Problems in her brother; Sleep apnea in her daughter.. Otherwise no colon cancer, inflammatory bowel disease, or GI malignancies.    Social History:  reports that she quit smoking about 4 years ago. She smoked 0.00 packs per day. She has never used smokeless tobacco. She reports that she does not drink alcohol or use drugs.    Review of patient's allergies indicates:   Allergen Reactions    Iodine and iodide containing products Swelling, Hives, Itching, Rash and Shortness Of Breath    Domperidone Itching    Latex, natural rubber Rash    Scopolamine      Patch- caused burn under patch       Medications:   Medications Prior to Admission   Medication Sig Dispense Refill Last Dose    b complex vitamins capsule Take 1 capsule by mouth once daily.   Past Week at Unknown  time    baclofen (LIORESAL) 10 MG tablet Take 1 tablet (10 mg total) by mouth 3 (three) times daily as needed. (Patient taking differently: Take 10 mg by mouth nightly. ) 90 tablet 11 Past Week at Unknown time    butalbital-acetaminop-caf-cod -03-30 mg Cap TK 1 C PO Q 4 TO 6 H PRN P  3 Past Week at Unknown time    ergocalciferol (VITAMIN D2) 50,000 unit Cap Take 50,000 Units by mouth every 7 days.   Past Week at Unknown time    multivitamin capsule Take 1 capsule by mouth once daily.   Past Week at Unknown time    NEXIUM PACKET 40 mg GrPS 40 mg before breakfast.    Past Week at Unknown time    ondansetron (ZOFRAN-ODT) 8 MG TbDL Take 1 tablet (8 mg total) by mouth every 8 (eight) hours as needed. 30 tablet 6 Past Month at Unknown time    promethazine (PHENERGAN) 25 MG tablet Take 1 tablet (25 mg total) by mouth every 6 (six) hours as needed for Nausea. 120 tablet 3 10/29/2019 at Unknown time    promethazine (PHENERGAN) 6.25 mg/5 mL syrup Take 12.5 mg by mouth every 6 (six) hours as needed for Nausea.   10/29/2019 at Unknown time    prucalopride 2 mg Tab Take 1 tablet by mouth once daily. 30 tablet 11 Past Week at Unknown time    HYDROcodone-acetaminophen (NORCO) 5-325 mg per tablet Take 1 tablet by mouth every 6 (six) hours as needed for Pain (Do not drive while taking this medication.). 21 tablet 0 More than a month at Unknown time    mirtazapine (REMERON) 15 MG tablet Take 15 mg by mouth every evening.   More than a month at Unknown time    sucralfate (CARAFATE) 1 gram tablet Take 1 g by mouth 4 (four) times daily before meals and nightly.   More than a month at Unknown time       Physical Exam:    Vital Signs:   Vitals:    10/30/19 0747   BP: 100/69   Pulse: 70   Resp: 16   Temp: 97.5 °F (36.4 °C)       General Appearance: Well appearing in no acute distress  Eyes:    No scleral icterus  Lungs: CTA anteriorly  Heart:  Regular rate, S1, S2 normal, no murmurs heard.  Abdomen: Soft, non tender, non  distended with normal bowel sounds. No hepatosplenomegaly, ascites, or mass.  Extremities: No edema  Skin: No rash    Labs:  Lab Results   Component Value Date    WBC 5.31 08/27/2019    HGB 12.9 08/27/2019    HCT 39.1 08/27/2019     08/27/2019    ALT 17 08/27/2019    AST 22 08/27/2019     08/27/2019    K 4.0 08/27/2019     08/27/2019    CREATININE 0.7 08/27/2019    BUN 3 (L) 08/27/2019    CO2 30 (H) 08/27/2019    TSH 1.073 08/27/2019       I have explained the risks and benefits of endoscopy procedures to the patient including but not limited to bleeding, perforation, infection, and death.      Patti Orozco MD

## 2019-10-30 NOTE — ANESTHESIA PREPROCEDURE EVALUATION
10/30/2019  Nena Mendez is a 39 y.o., female.    Anesthesia Evaluation    I have reviewed the Patient Summary Reports.      I have reviewed the Medications.     Review of Systems  Anesthesia Hx:  No problems with previous Anesthesia  History of prior surgery of interest to airway management or planning: Previous anesthesia: General Denies Family Hx of Anesthesia complications.   Denies Personal Hx of Anesthesia complications.   Social:  Non-Smoker, No Alcohol Use    Cardiovascular:  Cardiovascular Normal     Pulmonary:  Pulmonary Normal  Denies Asthma.  Denies Shortness of breath.  Denies Recent URI.  Denies Sleep Apnea.    Neurological:  Neurology Normal    Endocrine:  Endocrine Normal        Physical Exam  General:  Cachexia    Airway/Jaw/Neck:  Airway Findings: Mouth Opening: Normal Tongue: Normal  General Airway Assessment: Adult  Mallampati: II  TM Distance: Normal, at least 6 cm         Dental:  DENTAL FINDINGS: Normal   Chest/Lungs:  Chest/Lungs Findings: Clear to auscultation, Normal Respiratory Rate     Heart/Vascular:  Heart Findings: Rate: Normal  Rhythm: Regular Rhythm        Mental Status:  Mental Status Findings:  Cooperative, Alert and Oriented         Anesthesia Plan  Type of Anesthesia, risks & benefits discussed:  Anesthesia Type:  general  Patient's Preference:   Intra-op Monitoring Plan: standard ASA monitors  Intra-op Monitoring Plan Comments:   Post Op Pain Control Plan:   Post Op Pain Control Plan Comments:   Induction:   IV  Beta Blocker:  Patient is not currently on a Beta-Blocker (No further documentation required).       Informed Consent: Patient understands risks and agrees with Anesthesia plan.  Questions answered. Anesthesia consent signed with patient.  ASA Score: 2     Day of Surgery Review of History & Physical:    H&P update referred to the surgeon.         Ready For  Surgery From Anesthesia Perspective.

## 2019-10-30 NOTE — [employer] PATIENT INSTRUCTIONS
Discharge Summary/Instructions after an Endoscopic Procedure  Patient Name: Nena Mendez  Patient MRN: [id number]  Patient YOB: 1980 Wednesday, October 30, 2019  Patti Orozco MD  RESTRICTIONS:  During your procedure today, you received medications for sedation.  These   medications may affect your judgment, balance and coordination.  Therefore,   for 24 hours, you have the following restrictions:   - DO NOT drive a car, operate machinery, make legal/financial decisions,   sign important papers or drink alcohol.    ACTIVITY:  Today: no heavy lifting, straining or running due to procedural   sedation/anesthesia.  The following day: return to full activity including work.  DIET:  Eat and drink normally unless instructed otherwise.     TREATMENT FOR COMMON SIDE EFFECTS:  - Mild abdominal pain, nausea, belching, bloating or excessive gas:  rest,   eat lightly and use a heating pad.  - Sore Throat: treat with throat lozenges and/or gargle with warm salt   water.  - Because air was used during the procedure, expelling large amounts of air   from your rectum or belching is normal.  - If a bowel prep was taken, you may not have a bowel movement for 1-3 days.    This is normal.  SYMPTOMS TO WATCH FOR AND REPORT TO YOUR PHYSICIAN:  1. Abdominal pain or bloating, other than gas cramps.  2. Chest pain.  3. Back pain.  4. Signs of infection such as: chills or fever occurring within 24 hours   after the procedure.  5. Rectal bleeding, which would show as bright red, maroon, or black stools.   (A tablespoon of blood from the rectum is not serious, especially if   hemorrhoids are present.)  6. Vomiting.  7. Weakness or dizziness.  GO DIRECTLY TO THE NEAREST EMERGENCY ROOM IF YOU HAVE ANY OF THE FOLLOWING:      Difficulty breathing              Chills and/or fever over 101 F   Persistent vomiting and/or vomiting blood   Severe abdominal pain   Severe chest pain   Black, tarry stools   Bleeding- more than one  tablespoon   Any other symptom or condition that you feel may need urgent attention  Your doctor recommends these additional instructions:  If any biopsies were taken, your doctors clinic will contact you in 1 to 2   weeks with any results.  - Discharge patient to home (with escort).   - Resume previous diet.   - Continue present medications.   - Await pathology results.   - The findings and recommendations were discussed with the patient.   - Patient has a contact number available for emergencies.  The signs and   symptoms of potential delayed complications were discussed with the   patient.  Return to normal activities tomorrow.  Written discharge   instructions were provided to the patient.  For questions, problems or results please call your physician - Patti Orozco MD at Work:  [phone].  [facility], EMERGENCY ROOM PHONE NUMBER: [phone]  IF A COMPLICATION OR EMERGENCY SITUATION ARISES AND YOU ARE UNABLE TO REACH   YOUR PHYSICIAN - GO DIRECTLY TO THE EMERGENCY ROOM.  Patti Orozco MD  10/30/2019 9:15:21 AM  This report has been verified and signed electronically.  [employer]

## 2019-10-30 NOTE — ANESTHESIA POSTPROCEDURE EVALUATION
Anesthesia Post Evaluation    Patient: Nena Mendez    Procedure(s) Performed: Procedure(s) (LRB):  EGD (ESOPHAGOGASTRODUODENOSCOPY) (N/A)  MANOMETRY, ESOPHAGUS, WITH IMPEDANCE MEASUREMENT (N/A)    Final Anesthesia Type: general  Patient location during evaluation: PACU  Patient participation: Yes- Able to Participate  Level of consciousness: awake and alert  Post-procedure vital signs: reviewed and stable  Pain management: adequate  Airway patency: patent  PONV status at discharge: nausea (controlled) (nausea at baseline)  Anesthetic complications: no      Cardiovascular status: hemodynamically stable  Respiratory status: unassisted, spontaneous ventilation and room air  Hydration status: euvolemic  Follow-up not needed.          Vitals Value Taken Time   /57 10/30/2019  9:46 AM   Temp 36.5 °C (97.7 °F) 10/30/2019  9:04 AM   Pulse 50 10/30/2019  9:59 AM   Resp 20 10/30/2019  9:59 AM   SpO2 100 % 10/30/2019  9:59 AM   Vitals shown include unvalidated device data.      No case tracking events are documented in the log.      Pain/Baljit Score: Baljit Score: 10 (10/30/2019  9:30 AM)

## 2019-10-30 NOTE — DISCHARGE INSTRUCTIONS
Upper GI Endoscopy     During endoscopy, a long, flexible tube is used to view the inside of your upper GI tract.      Upper GI endoscopy allows your healthcare provider to look directly into the beginning of your gastrointestinal (GI) tract. The esophagus, stomach, and duodenum (the first part of the small intestine) make up the upper GI tract.   Before the exam  Follow these and any other instructions you are given before your endoscopy. If you dont follow the healthcare providers instructions carefully, the test may need to be canceled or done over:  · Don't eat or drink anything after midnight the night before your exam. If your exam is in the afternoon, drink only clear liquids in the morning. Don't eat or drink anything for 8 hours before the exam. In some cases, you may be able to take medicines with sips of water until 2 hours before the procedure. Speak with your healthcare provider about this.   · Bring your X-rays and any other test results you have.  · Because you will be sedated, arrange for an adult to drive you home after the exam.  · Tell your healthcare provider before the exam if you are taking any medicines or have any medical problems.  The procedure  Here is what to expect:  · You will lie on the endoscopy table. Usually patients lie on the left side.  · You will be monitored and given oxygen.  · Your throat may be numbed with a spray or gargle. You are given medicine through an intravenous (IV) line that will help you relax and remain comfortable. You may be awake or asleep during the procedure.  · The healthcare provider will put the endoscope in your mouth and down your esophagus. It is thinner than most pieces of food that you swallow. It will not affect your breathing. The medicine helps keep you from gagging.  · Air is put into your GI tract to expand it. It can make you burp.  · During the procedure, the healthcare provider can take biopsies (tissue samples), remove abnormalities,  such as polyps, or treat abnormalities through a variety of devices placed through the endoscope. You will not feel this.   · The endoscope carries images of your upper GI tract to a video screen. If you are awake, you may be able to look at the images.  · After the procedure is done, you will rest for a time. An adult must drive you home.  When to call your healthcare provider  Contact your healthcare provider if you have:  · Black or tarry stools, or blood in your stool  · Fever  · Pain in your belly that does not go away  · Nausea and vomiting, or vomiting blood   Date Last Reviewed: 7/1/2016  © 7633-8538 [employer]. [address] [place] [address], [place] [address]. All rights reserved. This information is not intended as a substitute for professional medical care. Always follow your healthcare professional's instructions.        Esophageal Manometry     A catheter measures pressure along the esophagus.     Esophageal manometry is a test to measure the strength and function of the esophagus (the food pipe). Results can help identify causes of heartburn, swallowing problems, or chest pain. The test can also help plan surgery and determine the success of previous surgery.  Preparing for the test  Be sure to talk to your healthcare provider about any medicines you take. Some medicines can affect the test results. Also ask any questions you have about the risks of the test. These include irritation to the nose and throat. Be sure not to smoke, eat, or drink for up to 12 hours before the test.  During the test  Manometry takes about an hour. Usually you lie down during the test. Your nose and throat are numbed. Then a soft, thin tube is placed through the nose and down the esophagus. At first you may notice a gagging feeling. You will be asked to swallow several times. Holes along the tube measure the pressure while you swallow. Measurements are printed out as tracings, much like a heart test tracing. After  the test, another catheter may be left in the esophagus for up to 24 hours to measure acid (pH) levels.  After esophageal manometry  Youll probably discuss the results of the test with your healthcare provider at another appointment. This is because time is needed to review the tracings. You may have a mild sore throat for a short time. As soon as the numbness in your throat is gone, you can return to eating and your normal activities.  Date Last Reviewed: 6/1/2016 © 2000-2017 [employer]. [address] [place] [address], [place] [address]. All rights reserved. This information is not intended as a substitute for professional medical care. Always follow your healthcare professional's instructions.

## 2019-10-30 NOTE — PLAN OF CARE
Pt tolerated procedure well.  Discharge paperwork printed and reviewed with pt and spouse.  Copies of discharge paperwork printed and given to patient.  No complaints of pain or nausea. Pt NPO for manometry.  VSS.  Safety maintained throughout care.

## 2019-11-01 ENCOUNTER — PATIENT MESSAGE (OUTPATIENT)
Dept: GASTROENTEROLOGY | Facility: CLINIC | Age: 39
End: 2019-11-01

## 2019-11-04 ENCOUNTER — TELEPHONE (OUTPATIENT)
Dept: GASTROENTEROLOGY | Facility: CLINIC | Age: 39
End: 2019-11-04

## 2019-11-04 NOTE — TELEPHONE ENCOUNTER
Spoke with  regarding manometry results . She is scheduled for a video visit with Ms. Patel on 11/20. She has been trying to get a neurology and rheumatology appt scheduled somewhere closer to home and is now waiting on message from providers at [facility] in [place]. If they are not able to schedule her she will call back so that we can set up @ [facility]. Tammy Mora RN

## 2019-11-04 NOTE — TELEPHONE ENCOUNTER
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

## 2019-11-13 ENCOUNTER — OFFICE VISIT (OUTPATIENT)
Dept: SURGERY | Facility: CLINIC | Age: 39
End: 2019-11-13
Payer: COMMERCIAL

## 2019-11-13 VITALS — HEIGHT: 64 IN | BODY MASS INDEX: 18.33 KG/M2 | WEIGHT: 107.38 LBS

## 2019-11-13 DIAGNOSIS — K31.84 GASTROPARESIS: Primary | ICD-10-CM

## 2019-11-13 DIAGNOSIS — Z86.39 H/O DEHYDRATION: ICD-10-CM

## 2019-11-13 PROCEDURE — 99213 PR OFFICE/OUTPT VISIT, EST, LEVL III, 20-29 MIN: ICD-10-PCS | Mod: S$GLB,,, | Performed by: SURGERY

## 2019-11-13 PROCEDURE — 3008F BODY MASS INDEX DOCD: CPT | Mod: CPTII,S$GLB,, | Performed by: SURGERY

## 2019-11-13 PROCEDURE — 3008F PR BODY MASS INDEX (BMI) DOCUMENTED: ICD-10-PCS | Mod: CPTII,S$GLB,, | Performed by: SURGERY

## 2019-11-13 PROCEDURE — 99213 OFFICE O/P EST LOW 20 MIN: CPT | Mod: S$GLB,,, | Performed by: SURGERY

## 2019-11-13 NOTE — Clinical Note
November 13, 2019      Provider Ghada           [place] [facility]  [address]  [place] [address]  Phone: [phone]  Fax: [phone]          Patient: Nena Mendez   MR Number: [id number]   YOB: 1980   Date of Visit: 11/13/2019       Dear Provider Ghada:    Thank you for referring Nena Mendez to me for evaluation. Attached you will find relevant portions of my assessment and plan of care.    If you have questions, please do not hesitate to call me. I look forward to following Nena Mendez along with you.    Sincerely,    Roebrt Boswell MD    Enclosure  CC:  No Recipients    If you would like to receive this communication electronically, please contact [email or web address] or [phone] to request more information on [employer] Link access.    For providers and/or their staff who would like to refer a patient to [facility], please contact us through our one-stop-shop provider referral line, [facility], at [phone].    If you feel you have received this communication in error or would no longer like to receive these types of communications, please e-mail [email or web address]

## 2019-11-13 NOTE — PROGRESS NOTES
History & Physical    SUBJECTIVE:     History of Present Illness:   39-year-old female referred by Constance Shanks np, for placement of a venous access port due to episodes of dehydration related to severe gastroparesis.  Patient requires intermittent IV access for IV fluids.      Chief Complaint   Patient presents with    Other     port         Review of patient's allergies indicates:  Review of patient's allergies indicates:   Allergen Reactions    Iodine and iodide containing products Swelling, Hives, Itching, Rash and Shortness Of Breath    Domperidone Itching    Latex, natural rubber Rash    Scopolamine      Patch- caused burn under patch       Current Outpatient Medications on File Prior to Visit   Medication Sig Dispense Refill    b complex vitamins capsule Take 1 capsule by mouth once daily.      baclofen (LIORESAL) 10 MG tablet Take 1 tablet (10 mg total) by mouth 3 (three) times daily as needed. (Patient taking differently: Take 10 mg by mouth nightly. ) 90 tablet 11    butalbital-acetaminop-caf-cod -81-30 mg Cap TK 1 C PO Q 4 TO 6 H PRN P  3    ergocalciferol (VITAMIN D2) 50,000 unit Cap Take 50,000 Units by mouth every 7 days.      HYDROcodone-acetaminophen (NORCO) 5-325 mg per tablet Take 1 tablet by mouth every 6 (six) hours as needed for Pain (Do not drive while taking this medication.). 21 tablet 0    mirtazapine (REMERON) 15 MG tablet Take 15 mg by mouth every evening.      multivitamin capsule Take 1 capsule by mouth once daily.      NEXIUM PACKET 40 mg GrPS 40 mg before breakfast.       ondansetron (ZOFRAN-ODT) 8 MG TbDL Take 1 tablet (8 mg total) by mouth every 8 (eight) hours as needed. 30 tablet 6    promethazine (PHENERGAN) 25 MG tablet Take 1 tablet (25 mg total) by mouth every 6 (six) hours as needed for Nausea. 120 tablet 3    promethazine (PHENERGAN) 6.25 mg/5 mL syrup Take 12.5 mg by mouth every 6 (six) hours as needed for Nausea.      prucalopride 2 mg Tab Take  1 tablet by mouth once daily. 30 tablet 11    sucralfate (CARAFATE) 1 gram tablet Take 1 g by mouth 4 (four) times daily before meals and nightly.       No current facility-administered medications on file prior to visit.        Past Medical History:   Diagnosis Date    Gastroparesis      Past Surgical History:   Procedure Laterality Date    APPENDECTOMY       SECTION      CHOLECYSTECTOMY      COSMETIC SURGERY      ESOPHAGEAL MANOMETRY WITH MEASUREMENT OF IMPEDANCE N/A 10/30/2019    Procedure: MANOMETRY, ESOPHAGUS, WITH IMPEDANCE MEASUREMENT;  Surgeon: Patti Orozco MD;  Location: [facility] ([address] [place] [address]);  Service: Endoscopy;  Laterality: N/A;  LATEX ALLERGY  r/o rumination  Motility Studies:  Hold Narcotics x 1 days   Hold TCA x 1 days  10/24 - pt confirmed appt-BB    ESOPHAGOGASTRODUODENOSCOPY N/A 3/22/2019    Procedure: EGD (ESOPHAGOGASTRODUODENOSCOPY)-dual lumen scope to remove intragastric suture.;  Surgeon: Robert Kumar MD;  Location: [address];  Service: General;  Laterality: N/A;    ESOPHAGOGASTRODUODENOSCOPY N/A 10/30/2019    Procedure: EGD (ESOPHAGOGASTRODUODENOSCOPY);  Surgeon: Patti Orozco MD;  Location: [address]);  Service: Endoscopy;  Laterality: N/A;  LATEX ALLERGY  EGD with EndoFlip   4th Floor, scheduled on 2nd floor due to availability  Full liquid diet 3 days and clear liquid diet x 1 day prior to procedure  Propofol only. No fentanyl or benzodiazepine during sedation. If additional sedation need    GASTRIC STIMULATOR IMPLANT SURGERY      GASTROSTOMY-JEJEUNOSTOMY TUBE CHANGE/PLACEMENT  08/10/2016    HYSTERECTOMY      laparoscopic jejunostomy tube      REPLACEMENT OF GASTRIC NEUROSTIMULATOR GENERATOR N/A 3/22/2019    Procedure: REPLACEMENT, PULSE GENERATOR, NEUROSTIMULATOR, GASTRIC-battery exchange only;  Surgeon: Robert Kumar MD;  Location: [facility] OR [address] [place] [address];  Service: General;  Laterality: N/A;    sleeve gastrectomy      TONSILLECTOMY        Family History   Problem Relation Age of Onset    Hypothyroidism Mother     Cirrhosis Father     No Known Problems Brother     Asthma Daughter     Sleep apnea Daughter     Celiac disease Neg Hx     Colon cancer Neg Hx     Esophageal cancer Neg Hx     Stomach cancer Neg Hx     Rectal cancer Neg Hx     Ulcerative colitis Neg Hx     Crohn's disease Neg Hx        Social History     Socioeconomic History    Marital status:      Spouse name: Not on file    Number of children: 2    Years of education: Not on file    Highest education level: Not on file   Occupational History    Not on file   Social Needs    Financial resource strain: Not on file    Food insecurity:     Worry: Not on file     Inability: Not on file    Transportation needs:     Medical: Not on file     Non-medical: Not on file   Tobacco Use    Smoking status: Former Smoker     Packs/day: 0.00     Last attempt to quit: 2015     Years since quittin.2    Smokeless tobacco: Never Used   Substance and Sexual Activity    Alcohol use: No    Drug use: No    Sexual activity: Not on file   Lifestyle    Physical activity:     Days per week: Not on file     Minutes per session: Not on file    Stress: Not on file   Relationships    Social connections:     Talks on phone: Not on file     Gets together: Not on file     Attends Je[employer]'s witness service: Not on file     Active member of club or organization: Not on file     Attends meetings of clubs or organizations: Not on file     Relationship status: Not on file   Other Topics Concern    Not on file   Social History Narrative    Not on file          Review of Systems   Constitutional: Negative for chills and fever.   Respiratory: Negative for cough and shortness of breath.    Cardiovascular: Negative for chest pain and palpitations.   Gastrointestinal: Positive for abdominal pain, nausea and vomiting.   Skin: Negative for itching and rash.   Neurological: Positive for headaches.  Negative for dizziness, seizures and weakness.   Endo/Heme/Allergies: Does not bruise/bleed easily.       OBJECTIVE:     There were no vitals filed for this visit.              Physical Exam:  Physical Exam   Constitutional: She is oriented to person, place, and time and well-developed, well-nourished, and in no distress.   HENT:   Head: Normocephalic and atraumatic.   Eyes: Pupils are equal, round, and reactive to light. EOM are normal.   Neck: Normal range of motion. Neck supple.   Cardiovascular: Normal rate, regular rhythm and normal heart sounds.   Pulmonary/Chest: Effort normal and breath sounds normal. No respiratory distress.   Abdominal: Soft. Bowel sounds are normal. She exhibits no distension. There is no tenderness.   Musculoskeletal: Normal range of motion. She exhibits no edema.   Neurological: She is alert and oriented to person, place, and time. She has normal reflexes. No cranial nerve deficit. Gait normal.   Skin: Skin is warm and dry.   Psychiatric: Affect and judgment normal.           ASSESSMENT/PLAN:   Gastroparesis with associated frequent dehydration  PLAN:  IV access needed.  Will place left subclavian venous access port on November 25, 2019.  Procedure, risk and benefits discussed with patient.

## 2019-11-18 LAB
BASOPHILS NFR SNV MANUAL: 0.5 % (ref 0–3)
BUN SERPL-MCNC: 7 MG/DL (ref 7–18)
BUN/CREAT SERPL: 12.06 RATIO (ref 7–18)
CALCIUM SERPL-MCNC: 9.5 MG/DL (ref 8.8–10.5)
CHLORIDE SERPL-SCNC: 102 MMOL/L (ref 100–108)
CO2 SERPL-SCNC: 30 MMOL/L (ref 21–32)
CREAT SERPL-MCNC: 0.58 MG/DL (ref 0.55–1.02)
EOSINOPHIL NFR SNV MANUAL: 1.1 % (ref 1–3)
ERYTHROCYTE [DISTWIDTH] IN BLOOD BY AUTOMATED COUNT: 13.1 % (ref 12.5–18)
GFR ESTIMATION: > 60
GLUCOSE SERPL-MCNC: 94 MG/DL (ref 70–110)
HCT VFR BLD AUTO: 33.8 % (ref 37–47)
HGB BLD-MCNC: 11.3 G/DL (ref 12–16)
LYMPHOCYTES NFR SNV MANUAL: 40.3 % (ref 25–40)
MANUAL NRBC PER 100 CELLS: 0 %
MCH RBC QN AUTO: 29.4 PG (ref 27–31.2)
MCHC RBC AUTO-ENTMCNC: 33.4 G/DL (ref 31.8–35.4)
MCV RBC AUTO: 88 FL (ref 80–97)
MONOCYTES/100 LEUKOCYTES: 6.7 % (ref 1–15)
NEUTROPHILS NFR BLD: 3.22 10*3/UL (ref 1.8–7.7)
NEUTROPHILS NFR SNV MANUAL: 51.2 % (ref 37–80)
PLATELETS: 359 10*3/UL (ref 142–424)
POTASSIUM SERPL-SCNC: 4 MMOL/L (ref 3.6–5.2)
RBC # BLD AUTO: 3.84 10*6/UL (ref 4.2–5.4)
SODIUM BLD-SCNC: 141 MMOL/L (ref 135–145)
WBC # BLD: 6.3 10*3/UL (ref 4.6–10.2)

## 2019-11-20 ENCOUNTER — OFFICE VISIT (OUTPATIENT)
Dept: GASTROENTEROLOGY | Facility: CLINIC | Age: 39
End: 2019-11-20
Payer: COMMERCIAL

## 2019-11-20 ENCOUNTER — TELEPHONE (OUTPATIENT)
Dept: GASTROENTEROLOGY | Facility: CLINIC | Age: 39
End: 2019-11-20

## 2019-11-20 ENCOUNTER — PATIENT MESSAGE (OUTPATIENT)
Dept: GASTROENTEROLOGY | Facility: CLINIC | Age: 39
End: 2019-11-20

## 2019-11-20 DIAGNOSIS — R13.10 DYSPHAGIA, UNSPECIFIED TYPE: Primary | ICD-10-CM

## 2019-11-20 DIAGNOSIS — R10.9 ABDOMINAL PAIN, UNSPECIFIED ABDOMINAL LOCATION: ICD-10-CM

## 2019-11-20 DIAGNOSIS — K59.09 CONSTIPATION, CHRONIC: ICD-10-CM

## 2019-11-20 DIAGNOSIS — R14.0 BLOATING: ICD-10-CM

## 2019-11-20 DIAGNOSIS — R11.2 NAUSEA AND VOMITING, INTRACTABILITY OF VOMITING NOT SPECIFIED, UNSPECIFIED VOMITING TYPE: ICD-10-CM

## 2019-11-20 DIAGNOSIS — K21.9 GASTROESOPHAGEAL REFLUX DISEASE, ESOPHAGITIS PRESENCE NOT SPECIFIED: ICD-10-CM

## 2019-11-20 DIAGNOSIS — R68.81 EARLY SATIETY: ICD-10-CM

## 2019-11-20 DIAGNOSIS — K22.2 ESOPHAGOGASTRIC JUNCTION OUTFLOW OBSTRUCTION: ICD-10-CM

## 2019-11-20 PROCEDURE — 99214 OFFICE O/P EST MOD 30 MIN: CPT | Mod: 95,,, | Performed by: NURSE PRACTITIONER

## 2019-11-20 PROCEDURE — 99214 PR OFFICE/OUTPT VISIT, EST, LEVL IV, 30-39 MIN: ICD-10-PCS | Mod: 95,,, | Performed by: NURSE PRACTITIONER

## 2019-11-20 NOTE — PATIENT INSTRUCTIONS
I have ordered a CT scan of the chest (can be done locally) for further evaluation of the blockage at the bottom of the esophagus seen on esophageal manometry.    My medical assistant will help you coordinate the rheumatology, neurology, and GI dietitian visits, as well as the  lab and upper GI testing to be done here at [facility].     Try over the counter iberogast 5 to 20 drops (1 mL), 3 times a day for abdominal pain, nausea, vomiting, bloating.  The duration of taking Iberogast depends on the severity of your symptoms and how long they last.  Try to take it for at least 3 weeks to see if it makes a difference. You can order it online at amazon.com.    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  Follow the instructions in this video:   [email or web address]://[email or web address]/[email or web address]?[email or web address] .See the handouts previously provided to you during your last motility clinic visit about rumination as well. The GI dietitian can help you with this as well as your therapist.    -For help with prucalopride/motegrity prescription coverage call  1-796.891.4411 or look for information at [email or web address]  -if no response from above then contact [employer] [facility] to see if they can assist you with prescription coverage.   You will pay $50 per month for a prescription through this program :3-096-844-HOPE. [email or web address]

## 2019-11-20 NOTE — TELEPHONE ENCOUNTER
Called pt to inform her reg her discharge , I was able to schedule everything but rhem ref as their first availavble was not until 1/23 and the last slot populating for neurology was 1/20.    In turn, I scheduled ref to neuro, gi dietician, 8 am cortisol, ugi w sbft all on 1/13. Ct chest order faxed to [facility].    Pt stated she will think about what she can arrange for rhem ref and get back with me.

## 2019-11-20 NOTE — PROGRESS NOTES
[facility] Consultation Note    Reason for Consult:    No chief complaint on file.        PCP:   Jeancarlos Angel       Referring MD:  No referring provider defined for this encounter.    Current GI: Dr. David    HPI:  Nena Mendez is a 39 y.o. female with a PMH of  migraines, enlarged thyroid gland, status post cholecystectomy referred to motility clinic for second opinion regarding the following problems:    The patient location is:  Patient work  The chief complaint leading to consultation is: multiple GI complaints  Visit type: Virtual visit with synchronous audio and video  Total time spent with patient: 30 min  Each patient to whom he or she provides medical services by telemedicine is:  (1) informed of the relationship between the physician and patient and the respective role of any other health care provider with respect to management of the patient; and (2) notified that he or she may decline to receive medical services by telemedicine and may withdraw from such care at any time.    GERD.  Patient reports bothersome heartburn. Unchanged.  Onset:4 yrs ago  Retrosternal pyrosis:yes  Regurgitation:yes  Belching:yes  Frequency: daily  Improve with:  No improvement with nexium granules 40 mg daily and carafate 1 g QID. No improvent with omeprazole daily, dexilant, prevacid. Has not taken protonix, aciphex,   Upright symptoms: yes  Nocturnal symptoms:  yes  Hoarseness:yes  Cough:no  Throat clearing:yes  Food Triggers:none notice  Caffeine intake:32 oz tea dailiy  Sleeps with head of the bed elevated.   Avoids eating prior to bedtime.      Dysphagia.  Reports difficulty swallowing. Unchanged.  Onset of symptoms:2 yrs ago  Problems with solids:yes,hannah bread  Problems with liquids:no  Choking while eating:no   Coughing while eating: no  Location: [place]  Frequency:  4 days weekly  Improves with:spitting materials back up for relief  Narcotic pain meds:no  History of food  impactions requiring ED visit:no  History of allergies:iodine, latex  History of seasonal allergies:yes  History of food allergies:no  History of eczema:no    Nausea.  Unchanged. Worse if eating lactose  Frequency:constant  Onset: 5 yrs ago, worse for the last year    Early satiety. unchanged  Frequency:daily  Onset:4 yrs ago    Vomiting. unchanged  Frequency:every meal, worse in the last year  Onset:5 yrs ago  Timing of vomiting:  Within 30 min of eating:yes   Within 3 hours after eating:yes  Rumination Syndrome  Symptoms occur within 10 minutes of finishing a meal:yes  Regurgitate lacks sour or bitter taste.  Regurgitate described as tasting similar to the food recently ingested.  Little or no improvement after GERD or nausea directed treatment.  No symptoms during sleep or fasting:vomits dinner in sleep  Weight loss (40% of patients):yes  Related to reflux:?  Related to belching:no        Cyclical episodes of n/v with symptom free intervals between episodes:no  Prodrome:n/a  History of migraines:+ PH, no FH  Marijuana use:no  Unusual bathing behaviors:no    Improves with:  No improvement with FDgard  No improvement with baclofen 10 mg HS since 4/2019  No improvement with zofran 8 mg PRN  minimal improvement with phenergan 25 mg HS; causes sedation  Has not tried compazine    No improvement with reglan and was taken off due to itching. Has had eye twitching for few years.    No improvement with domperidone; not a candidate d/t h/o electrolyte imbalance  Unable to tolerate scopolamine patch due to skin rash/burning.  No  improvement with gastric stim (2016)  No improvement with pyloroplasty (2017)  1 year improvement w gastric sleeve (2017)  No improvement with pyloric dilation (2019)  Unable to tolerate J tube d/t infection    Gastroparesis diagnosed: 4 years ago    Etiology:    Dm-no  Idopathic  Postsurgical- major sx for endometriosis prior to s/s onset.   Parkinsonism-no  Amyloidosis-no  Paraneoplastic  disease-no  Scleroderma-no  Mesenteric ischemia-no    History of:  Prior gastric or bariatric surgery: gastric sleeve for gastroparesis  Diabetes mellitus: no  Thyroid dysfunction: no  Neurological disease: no   Autoimmune disorders: no    Number of GP related hospitalization in the past year: 0  Number of GP related ED visit in the past year: 4    Interventions: (pyloroplasty, botox, gastric stimulator, gastroparesis diet):  Gastric Stimulator    Placed: 2016  Last battery change: 2019  Last interrogated:   5/2019    Curenlty on following medications that may affect gastric emptying:   Narcotics (morphine, oxycodone, tapentadol, less with tramadol):no  Anticholinergics:  no  Cyclosporine:no  Diabetic medications (GLP-1 analogs, Exenatide, Lixisenatide, Livaglutide, Albiglutide, DulaglutatidePramlintide - SymlinPen (injection)):no    Abdominal pain. Reports abdominal pain. Unchanged.  Onset:several years ago; has endometriosis  Location:LUQ, epigastric, LLQ  Character:stabbing, sharp  Frequency:  daily  Duration:constant  Worse with:BMs  Improves with:nothing in particular. No improvement with baclofen 10 mg HS since 4/2019  No improvement with IBgard, FDgard  Has not tried  Bentyl, Levsin, Levbid.  Associated with Bm:yes, LLQ    Nocturnal pain: yes  Antidepressants:no  Using narcotic pain medication: no   NSAIDs: ibuprofen PRN for migraines    Gas and bloating. Unchanged. No improvement with eliminating lactose.   Onset:several yrs ago  Bloating: yes  Excessive gas: yes  Abdominal distension: yes, mild  Symptoms get worse after meals:yes  Symptoms get worse as the day progresses:  yes  Consumes lactose:cheese  Consumes artificial sugars:no    Diarrhea.  Reports loose to watery stools.  Unchanged.  Symptoms started: 12 yrs ago w constipation  [place]: 7  Frequency:twice monthly with one watery BM  Nocturnal symptoms: no    Has not tried imodium   lomotil   cholestyramine  Not a candidate for viberzi due to s/p  "mckinley    Fecal incontinence: no      Constipation.  Reports bothersome constipation. unchanged  Onset:12 yrs ago associated w endometriosis  [place]ates type:2  Frequency:1  Urgency and sensation of incomplete evacuation:yes  Straining during defecation:yes  Sensation of anorectal blockage:sometimes  Rectal prolapse:quarter sized tissue after BM at times  Uses manual maneuvers to facilitate defecation:yes   Problems in early childhood: no  History of perineal trauma: no    Previous evaluation: no  Previous pelvic muscle retraining: no    No improvement with miralax BID several yrs ago  No improvement with Linzess ?dose? daily x two months  Unsure if she has tried Amitiza  Has not tried Trulance  Senna  Colace  No improvement with dulcolax, fiber  No improvement with lactulose  Insurance would not cover prucalopride; pt has not attempted pt assistance program      Black stools. Twice monthly. Some improvement. Since last few years. Denies pepto/charco cap use.     Weight loss.  Reports unintentional weight loss.   Lost (lb):  From 180 lbs to 104 lbs over 5 yrs; from 112 lbs to 104 lbs since 5/2019    Pt report intestines "knicked" during endometriosis surgery 5 yrs ago and repaired with sutures.     Depression. Due to GI symptoms. No meds. Has not seen psychiatry. Is now seen therapist through employee [employer] program.     Insomnia. Gets 2-3 hours sleep nightly. Has lots of joint pain or abd pain. Has not seen sleep specialist.     Anemia. H/o Low RBC, HGB. Not on iron.     Migraines. On Fioricet -27-30 mg PRN. On ibuprofen PRN. No improvement with amitriptyline Per pcp. Has seen neurology in the past.    Enlarged thyroid gland since age 17. TSH low in 2016    Multiple joint pain. JOHNIE negative in the past.     Denies BRBPR, anxiety    Total visit time was 30 minutes, more than 50% of which was spent in face-to-face counseling with patient regarding symptoms, diagnostic results, prognosis, risks and benefits of " treatment options, instructions for management, importance of compliance with chosen treatment options, risk factor reduction, stress reduction, coping strategies.      Gastric stimulator:  Entera interrogated (8/27/19): Impedence: 439 omb, Amplitude: 8V, Current:8.3 MA, Pulse width: 330 us, Rate: 50Hz, On 4s Off 1s, Battery: OK  Gastric stimulator adjustment (5/11/19): imp 439 voltage 8 Current 18.2 Pulse Width 330 Rate 50 Cycle on 4 Cycle off 1  Gastric stimulator interrogation (3/9/19): imp 446 voltage 7 Current 15.7 Pulse Width 330 Rate 50 Cycle on 3 Cycle off 2-battery is low.  Initial gastric stimulator settings (10/7/17): Impedance was 422, voltage 3.5, current 8.3, pulse width 330, rate   14, cycle on 0.1, cycle off 5.    Previous Studies:   Ct chest 12/3/19: min parenchymal infiltrate in the lateral aspect of the lingula. Left subclavian port in good position. S/p mckinley.  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 Complete bolus clearance. Normalization of IRP in 5 of 5 upright swallows. Abnormal MRS test. No sig diff w provacative maneuvers. No residual liquid in esophagus after 50 cc bolus. Rumination syndrome. Possible HH.   EGD 10/30/19:  Normal esophagus (negative). 5 cm hiatal hernia. Sleeve gastrectomy with healthy-appearing mucosa.  Gastric erythema (chronic inflammation).  Pyloroplasty with visible sutures and healthy appearing mucosa.  Normal duodenum (negative).  In the flip 10/30/2019:  Normal esophageal contractility.  EGD 03/01/2019:  Small hiatal hernia. Nonerosive reflux esophagitis (early reflux esophagitis).  Gastritis (chronic inflammation).  Normal-appearing gastric sleeve.  Pyloroplasty without evidence of pyloric stenosis or duodenal stricture.  CT abdomen and pelvis without contrast 12/26/2018:  Status post cholecystectomy.  Kidney cyst.  Status post gastric surgery.  Status post gastric  pacemaker.  CT abdomen and pelvis 12/19/2017:  Status post cholecystectomy.  Status post gastric surgery.  Status post gastric pacemaker.  EGD 11/17/2017:  Gastritis (chronic inactive gastritis).  Reflux esophagus (reflux esophagitis).  Status post gastric sleeve.  Normal duodenum.  Colonoscopy 11/17/2017: ? Prep to cecum. 4 mm cecal polyp (benign polypoid mucosa).  9 mm descending colon polyp (ulcerated and inflamed hyperplastic polyp benign).  Internal hemorrhoids.  Repeat in 3 years  EGD 03/30/2017:  Normal esophagus. Gastric erythema (?).  Normal duodenum.  CT abdomen and pelvis 01/31/2017:  Kidney cysts, status post cholecystectomy, status post gastric pacemaker, status post jejunostomy tube.  MRV head 07/22/2016:  No intracranial MR venogram abnormalities  Gastric emptying study 07/01/2016:  Mildly delayed early gastric emptying.11% gastric emptying at 34 min.  24% gastric emptying at 64 min.  51% emptying at 130 min.  95% emptying at 242.    Normal gastric emptying is noted at 4 hrs.   EGD 06/10/2016:  Gastritis (?).  Reflux esophagitis (?).  Normal duodenum.    Labs:  08/27/2019:  Rheumatoid factor negative, JOHNIE negative, pre-albumin normal, ferritin normal, iron in TI BC normal, TT G/IgA normal, TSH normal  05/11/2017:  Phosphorus normal, magnesium normal, BMP unremarkable, CBC RBC low 3.81, HGB low at 11.2  01/29/2017:  CMP calcium low 8.4, total protein of 5.2, albumin low 2.9  07/18/2016:  JOHNIE negative, CBC normal, CMP unremarkable, hepatic profile normal, iron normal, TIBC normal, ferritin normal, TSH low 0.4    Relevant surgeries:  Gastric pacemaker change battery (03/22/2019)  Gastric sleeve (05/15/2017)  Pyloroplasty (01/27/2017)  J-tube inserted (12/16/2016)  Gastric stimulator placement (10/07/2016)  J tube placement (08/10/2016)  Cholecystectomy (9 yrs ago)    ROS:  ROS   Constitutional: no fevers, no chills, no night sweats, +weight loss  ENT: No congestion, no rhinorrhea, no chronic sinus  problems  CV: No chest pain, no palpitations  Pulm: No cough, no shortness of breath  Ophtho: + blurry vision, no eye redness  GI: see HPI  Derm: No rash  Heme: + lymphadenopathy, + bruising  MSK: + joint pain, no joint swelling, no Raynauds  : No dysuria, no frequent urination, + blood in urine  Endo: + hot or cold intolerance  Neuro: + dizziness, no syncope, no seizure  Psych: No anxiety, + depression        Medical History:   Past Medical History:   Diagnosis Date    Gastroparesis         Surgical History:   Past Surgical History:   Procedure Laterality Date    APPENDECTOMY       SECTION      CHOLECYSTECTOMY      COSMETIC SURGERY      ESOPHAGEAL MANOMETRY WITH MEASUREMENT OF IMPEDANCE N/A 10/30/2019    Procedure: MANOMETRY, ESOPHAGUS, WITH IMPEDANCE MEASUREMENT;  Surgeon: Patti Orozco MD;  Location: [facility] ([address] [place] [address]);  Service: Endoscopy;  Laterality: N/A;  LATEX ALLERGY  r/o rumination  Motility Studies:  Hold Narcotics x 1 days   Hold TCA x 1 days  10/24 - pt confirmed appt-BB    ESOPHAGOGASTRODUODENOSCOPY N/A 3/22/2019    Procedure: EGD (ESOPHAGOGASTRODUODENOSCOPY)-dual lumen scope to remove intragastric suture.;  Surgeon: Robert Kumar MD;  Location: [facility] OR [address] [place] [address];  Service: General;  Laterality: N/A;    ESOPHAGOGASTRODUODENOSCOPY N/A 10/30/2019    Procedure: EGD (ESOPHAGOGASTRODUODENOSCOPY);  Surgeon: Patti Orozco MD;  Location: [address]);  Service: Endoscopy;  Laterality: N/A;  LATEX ALLERGY  EGD with EndoFlip   4th Floor, scheduled on 2nd floor due to availability  Full liquid diet 3 days and clear liquid diet x 1 day prior to procedure  Propofol only. No fentanyl or benzodiazepine during sedation. If additional sedation need    GASTRIC STIMULATOR IMPLANT SURGERY      GASTROSTOMY-JEJEUNOSTOMY TUBE CHANGE/PLACEMENT  08/10/2016    HYSTERECTOMY      laparoscopic jejunostomy tube      REPLACEMENT OF GASTRIC NEUROSTIMULATOR GENERATOR N/A 3/22/2019     Procedure: REPLACEMENT, PULSE GENERATOR, NEUROSTIMULATOR, GASTRIC-battery exchange only;  Surgeon: Robert Kumar MD;  Location: [facility] OR [address] [place] [address];  Service: General;  Laterality: N/A;    sleeve gastrectomy      TONSILLECTOMY          Family History:   Family History   Problem Relation Age of Onset    Hypothyroidism Mother     Cirrhosis Father     No Known Problems Brother     Asthma Daughter     Sleep apnea Daughter     Celiac disease Neg Hx     Colon cancer Neg Hx     Esophageal cancer Neg Hx     Stomach cancer Neg Hx     Rectal cancer Neg Hx     Ulcerative colitis Neg Hx     Crohn's disease Neg Hx         Social History:   Social History     Socioeconomic History    Marital status:      Spouse name: Not on file    Number of children: 2    Years of education: Not on file    Highest education level: Not on file   Occupational History    Not on file   Social Needs    Financial resource strain: Not on file    Food insecurity:     Worry: Not on file     Inability: Not on file    Transportation needs:     Medical: Not on file     Non-medical: Not on file   Tobacco Use    Smoking status: Former Smoker     Packs/day: 0.00     Last attempt to quit: 2015     Years since quittin.2    Smokeless tobacco: Never Used   Substance and Sexual Activity    Alcohol use: No    Drug use: No    Sexual activity: Not on file   Lifestyle    Physical activity:     Days per week: Not on file     Minutes per session: Not on file    Stress: Not on file   Relationships    Social connections:     Talks on phone: Not on file     Gets together: Not on file     Attends Denominational service: Not on file     Active member of club or organization: Not on file     Attends meetings of clubs or organizations: Not on file     Relationship status: Not on file   Other Topics Concern    Not on file   Social History Narrative    Not on file        Review of patient's allergies indicates:   Allergen Reactions     Iodine and iodide containing products Swelling, Hives, Itching, Rash and Shortness Of Breath    Domperidone Itching    Latex, natural rubber Rash    Scopolamine      Patch- caused burn under patch       Current Outpatient Medications   Medication Sig Dispense Refill    b complex vitamins capsule Take 1 capsule by mouth once daily.      baclofen (LIORESAL) 10 MG tablet Take 1 tablet (10 mg total) by mouth 3 (three) times daily as needed. (Patient taking differently: Take 10 mg by mouth nightly. ) 90 tablet 11    butalbital-acetaminop-caf-cod -04-30 mg Cap TK 1 C PO Q 4 TO 6 H PRN P  3    ergocalciferol (VITAMIN D2) 50,000 unit Cap Take 50,000 Units by mouth every 7 days.      HYDROcodone-acetaminophen (NORCO) 5-325 mg per tablet Take 1 tablet by mouth every 6 (six) hours as needed for Pain (Do not drive while taking this medication.). 21 tablet 0    mirtazapine (REMERON) 15 MG tablet Take 15 mg by mouth every evening.      multivitamin capsule Take 1 capsule by mouth once daily.      NEXIUM PACKET 40 mg GrPS 40 mg before breakfast.       ondansetron (ZOFRAN-ODT) 8 MG TbDL Take 1 tablet (8 mg total) by mouth every 8 (eight) hours as needed. 30 tablet 6    promethazine (PHENERGAN) 25 MG tablet Take 1 tablet (25 mg total) by mouth every 6 (six) hours as needed for Nausea. 120 tablet 3    promethazine (PHENERGAN) 6.25 mg/5 mL syrup Take 12.5 mg by mouth every 6 (six) hours as needed for Nausea.      prucalopride 2 mg Tab Take 1 tablet by mouth once daily. 30 tablet 11    sucralfate (CARAFATE) 1 gram tablet Take 1 g by mouth 4 (four) times daily before meals and nightly.       No current facility-administered medications for this visit.         Objective Findings:  Vital Signs:  There were no vitals taken for this visit.  There is no height or weight on file to calculate BMI.    Physical Exam:  General appearance: alert, cooperative, no distress  HENT: Normocephalic, atraumatic, neck symmetrical,  no nasal discharge  Eyes: conjunctivae/corneas clear,  EOM's intact  Integument: Skin color, texture, turgor normal; no rashes; hair distrubution normal  Neurologic: Alert and oriented X 3  Psychiatric: no pressured speech; normal affect; no evidence of impaired cognition    Labs:  Lab Results   Component Value Date    WBC 6.3 11/18/2019    HGB 11.3 (L) 11/18/2019    HCT 33.8 (L) 11/18/2019    MCV 90 08/27/2019     08/27/2019     Lab Results   Component Value Date    FERRITIN 147 08/27/2019     Lab Results   Component Value Date     11/18/2019    K 4.0 11/18/2019     11/18/2019    CO2 30 11/18/2019    GLU 94 11/18/2019    BUN 7 11/18/2019    CREATININE 0.58 11/18/2019    CALCIUM 9.5 11/18/2019    PROT 7.4 08/27/2019    ALBUMIN 4.8 08/27/2019    BILITOT 0.4 08/27/2019    ALKPHOS 96 08/27/2019    AST 22 08/27/2019    ALT 17 08/27/2019     Lab Results   Component Value Date    TSH 1.073 08/27/2019     No results found for: SEDRATE  No results found for: CRP  No results found for: LABA1C, HGBA1C        Assessment and Plan:  Nena Mendez is a 39 y.o. female with a PMH of  migraines, enlarged thyroid gland, status post cholecystectomy referred to motility clinic for second opinion regarding the following problems:    GERD.  5 cm hiatal hernia.   No improvement with nexium granules 40 mg daily and carafate 1 g QID.   No improvent with omeprazole daily, dexilant, prevacid.   No improvement with baclofen 10 mg HS  -Has not taken protonix, aciphex,     Dysphagia.  EGD unrevealing. endoFlip with normal distensibility. Esophageal manometry with EGJOO vs achalasia variant.   -Check CT chest    Nausea.  Early satiety. Vomiting. Worse in the last year. Diagnosed with gastroparesis in 2015. GES in 2016 with mild delayed gastric emptying at the beginning of the test and normal at four hours. Pt reports initial GES with delay. EGD unrevealing. Esophageal manometry with rumination syndrome. Labs  unrevealing.  Vomits food tasting materials with in 30 minutes of eating every meal.      Unable to tolerate scopolamine patch due to skin rash/burning.No improvement with reglan and was taken off due to itching. Has had eye twitching for few years.    No improvement with FDgard  No improvement with domperidone; not a candidate due to history of electrolyte imbalance   No improvement with baclofen 10 mg HS since 4/2019  No improvement with zofran 8 mg PRN  No  improvement with gastric stim (2016)  No improvement with pyloroplasty (2017)  1 year improvement w gastric sleeve (2017)  No improvement with pyloric dilation (2019)  Unable to tolerate J tube due to infection, trouble flushing/using  Minimal improvement with phenergan 25 mg HS; causes sedation  -Insurance will not cover smartPill  -cont to try to drink protein shakes TID  -Start prucalopride; PT to try prescription assistance program   -Start OTC iberogast  -Will consider remeron  -Again discussed etiology, pathophysiology, evaluation and treatment of rumination syndrome.  Handouts previously provided.  -Referral to GI dietitian for GP diet and for diaphragmatic breathing. PT to practice diaphragmatic breathing TID. You tube video link provided.  -Has not tried compazine      Abdominal pain. History of endometriosis. Associated with bowel movements. Nocturnal pain. EGD unrevealing. Labs negative.   On ibuprofen PRN for migraines  No improvement with baclofen 10 mg HS   No improvement with IBgard  No improvement w FDgard  -Start  iberogast  -Start prucalopride  -Will consider remeron if no improvement with prucalopride  -Has not tried Bentyl, Levsin, Levbid.    Gas and bloating. Mild distention.   No improvement with eliminating lactose.  Avoids artificial sugars  -Start iberogast  -Will consider SIBO testing    Diarrhea.  Twice monthly. Possible overflow.   Not a candidate for viberzi due to has had cholecystectomy  -Has not tried imodium, lomotil,  "cholestyramine    Constipation.  Labs unrevealing.  No improvement with miralax BID several yrs ago  No improvement with Linzess ?dose? daily x two months  No improvement with dulcolax, fiber  No improvement with lactulose  Unsure if she has tried Amitiza  -Start prucalopride. Use pt assistance program  -Has not tried Trulance, prucalopride, Senna, Colace      Black stools. Twice monthly. Some improvement. Since last few years. Denies pepto/charco cap use. EGD negative.     Weight loss.  Reports unintentional weight loss. From 180 lbs to 104 lbs over 5 yrs; from 112 lbs to 104 lbs since 5/2019. Prealbumin normal.   -Start protein shakes TID  -See GI dietitian  -Will consider remeron    Pt report intestines "knicked" during endometriosis surgery 5 yrs ago and repaired with sutures.   -Check upper GI w SBFT as previously ordered    Depression. Due to GI symptoms. No meds. Has not seen psych. PT seeing therapist through [facility].   -Previously discussed the benefit of TCA and antidepressants in management of functional GI disorders.    -Will consider starting remeron  -Work on diaphragmatic breathing with therapist    Insomnia. Gets 2-3 hours sleep nightly. Has lots of joint pain or abd pain. Has not seen sleep specialist.     Anemia. H/o Low RBC, HGB. Not on iron. Labs with normal iron, ferritin, HGB.    Migraines. On Fioricet -58-30 mg PRN. On ibuprofen PRN. No improvement with amitriptyline Per pcp. Has seen neurology in the past.  -Referral to neurology to discuss adjusting meds for migraines    Enlarged thyroid gland since age 17. TSH low in 2016. Recent TSH normal.     Multiple joint pain. JOHNIE negative in the past.   -Referral to rheumatology    Follow up in about 3 months (around 2/20/2020) for Motility w Dr. Orozco.    1. Dysphagia, unspecified type    2. Esophagogastric junction outflow obstruction    3. Gastroesophageal reflux disease, esophagitis presence not specified    4. Early satiety    5. Nausea and " vomiting, intractability of vomiting not specified, unspecified vomiting type    6. Abdominal pain, unspecified abdominal location    7. Bloating    8. Constipation, chronic          Order summary:  Orders Placed This Encounter    CT Chest Without Contrast         Thank you so much for allowing me to participate in the care of Nena Patel, MILA, FNP-C

## 2019-11-25 ENCOUNTER — OUTSIDE PLACE OF SERVICE (OUTPATIENT)
Dept: ADMINISTRATIVE | Facility: OTHER | Age: 39
End: 2019-11-25
Payer: COMMERCIAL

## 2019-11-25 PROCEDURE — 77001 FLUOROGUIDE FOR VEIN DEVICE: CPT | Mod: 26,,, | Performed by: SURGERY

## 2019-11-25 PROCEDURE — 36561 INSERT TUNNELED CV CATH: CPT | Mod: LT,,, | Performed by: SURGERY

## 2019-11-25 PROCEDURE — 77001 CHG FLUOROGUIDE CNTRL VEN ACCESS,PLACE,REPLACE,REMOVE: ICD-10-PCS | Mod: 26,,, | Performed by: SURGERY

## 2019-11-25 PROCEDURE — 36561 PR INSERT TUNNELED CV CATH WITH PORT: ICD-10-PCS | Mod: LT,,, | Performed by: SURGERY

## 2019-12-02 ENCOUNTER — PATIENT MESSAGE (OUTPATIENT)
Dept: GASTROENTEROLOGY | Facility: CLINIC | Age: 39
End: 2019-12-02

## 2019-12-02 ENCOUNTER — OFFICE VISIT (OUTPATIENT)
Dept: SURGERY | Facility: CLINIC | Age: 39
End: 2019-12-02
Payer: COMMERCIAL

## 2019-12-02 DIAGNOSIS — Z98.890 POST-OPERATIVE STATE: Primary | ICD-10-CM

## 2019-12-02 PROCEDURE — 99024 POSTOP FOLLOW-UP VISIT: CPT | Mod: S$GLB,,, | Performed by: SURGERY

## 2019-12-02 PROCEDURE — 99024 PR POST-OP FOLLOW-UP VISIT: ICD-10-PCS | Mod: S$GLB,,, | Performed by: SURGERY

## 2019-12-02 NOTE — PROGRESS NOTES
HPI:  Postoperative visit status post left subclavian venous access port placement    PHYSICAL EXAM:  Incisions are well healed.  Sutures are removed  ASSESSMENT:    Stable status post left subclavian MediPort placement  PLAN:      Revisit as needed

## 2019-12-04 ENCOUNTER — PATIENT MESSAGE (OUTPATIENT)
Dept: GASTROENTEROLOGY | Facility: CLINIC | Age: 39
End: 2019-12-04

## 2019-12-05 ENCOUNTER — PATIENT MESSAGE (OUTPATIENT)
Dept: GASTROENTEROLOGY | Facility: CLINIC | Age: 39
End: 2019-12-05

## 2019-12-05 DIAGNOSIS — R13.10 DYSPHAGIA, UNSPECIFIED TYPE: Primary | ICD-10-CM

## 2019-12-09 ENCOUNTER — PATIENT MESSAGE (OUTPATIENT)
Dept: GASTROENTEROLOGY | Facility: CLINIC | Age: 39
End: 2019-12-09

## 2019-12-09 ENCOUNTER — TELEPHONE (OUTPATIENT)
Dept: GASTROENTEROLOGY | Facility: CLINIC | Age: 39
End: 2019-12-09

## 2019-12-20 ENCOUNTER — TELEPHONE (OUTPATIENT)
Dept: RADIOLOGY | Facility: HOSPITAL | Age: 39
End: 2019-12-20

## 2019-12-23 ENCOUNTER — OFFICE VISIT (OUTPATIENT)
Dept: SURGERY | Facility: CLINIC | Age: 39
End: 2019-12-23
Payer: COMMERCIAL

## 2019-12-23 ENCOUNTER — TELEPHONE (OUTPATIENT)
Dept: SURGERY | Facility: CLINIC | Age: 39
End: 2019-12-23

## 2019-12-23 ENCOUNTER — HOSPITAL ENCOUNTER (OUTPATIENT)
Dept: RADIOLOGY | Facility: HOSPITAL | Age: 39
Discharge: HOME | End: 2019-12-23
Attending: NURSE PRACTITIONER
Payer: COMMERCIAL

## 2019-12-23 ENCOUNTER — PATIENT MESSAGE (OUTPATIENT)
Dept: GASTROENTEROLOGY | Facility: CLINIC | Age: 39
End: 2019-12-23

## 2019-12-23 DIAGNOSIS — K59.00 CONSTIPATION, UNSPECIFIED CONSTIPATION TYPE: ICD-10-CM

## 2019-12-23 DIAGNOSIS — R68.81 EARLY SATIETY: ICD-10-CM

## 2019-12-23 DIAGNOSIS — R11.2 NAUSEA AND VOMITING, INTRACTABILITY OF VOMITING NOT SPECIFIED, UNSPECIFIED VOMITING TYPE: ICD-10-CM

## 2019-12-23 DIAGNOSIS — K31.84 GASTROPARESIS: Primary | ICD-10-CM

## 2019-12-23 DIAGNOSIS — R14.0 BLOATING: ICD-10-CM

## 2019-12-23 DIAGNOSIS — R10.9 ABDOMINAL PAIN, UNSPECIFIED ABDOMINAL LOCATION: ICD-10-CM

## 2019-12-23 DIAGNOSIS — K21.9 GASTROESOPHAGEAL REFLUX DISEASE, ESOPHAGITIS PRESENCE NOT SPECIFIED: ICD-10-CM

## 2019-12-23 PROCEDURE — 99212 OFFICE O/P EST SF 10 MIN: CPT | Mod: S$GLB,,, | Performed by: SURGERY

## 2019-12-23 PROCEDURE — 99212 PR OFFICE/OUTPT VISIT, EST, LEVL II, 10-19 MIN: ICD-10-PCS | Mod: S$GLB,,, | Performed by: SURGERY

## 2019-12-23 PROCEDURE — 74245 FL UPPER GI WITH SMALL BOWEL: CPT | Mod: TC,FY

## 2019-12-23 PROCEDURE — 99999 PR PBB SHADOW E&M-EST. PATIENT-LVL III: ICD-10-PCS | Mod: PBBFAC,,, | Performed by: SURGERY

## 2019-12-23 PROCEDURE — 99999 PR PBB SHADOW E&M-EST. PATIENT-LVL III: CPT | Mod: PBBFAC,,, | Performed by: SURGERY

## 2019-12-23 PROCEDURE — 95981 PR ELEC ALYS NSTIM GEN GASTRIC SUBSEQUENT W/O REPROG: ICD-10-PCS | Mod: S$GLB,,, | Performed by: SURGERY

## 2019-12-23 PROCEDURE — 74245 FL UPPER GI WITH SMALL BOWEL: ICD-10-PCS | Mod: 26,,, | Performed by: RADIOLOGY

## 2019-12-23 PROCEDURE — 95981 IO ANAL GAST N-STIM SUBSQ: CPT | Mod: S$GLB,,, | Performed by: SURGERY

## 2019-12-23 PROCEDURE — 74245 FL UPPER GI WITH SMALL BOWEL: CPT | Mod: 26,,, | Performed by: RADIOLOGY

## 2019-12-23 NOTE — PROGRESS NOTES
Subjective:       Patient ID: Nena Mendez is a 39 y.o. female.    Chief Complaint: No chief complaint on file.    HPI S/p gastric stimulator 10/7/16 with replacement of batter 3/22/19, J tube 12/16/16 (she didn't tolerate it so she may have small bowel dysmotility also), pylorolasty 1/27/17 and sleeve gastrectomy 5/10/17.  Her symptoms continue to be severe and she continues to lose weight.  She may be aspirating.  Review of Systems   Constitutional: Positive for fever.   Respiratory: Positive for cough and shortness of breath.    Cardiovascular: Negative for chest pain.   Genitourinary: Negative for difficulty urinating.       Objective:      Physical Exam   Constitutional: She is oriented to person, place, and time. She appears well-developed.   Neurological: She is alert and oriented to person, place, and time.   Skin: Skin is warm and dry.   Psychiatric: She has a normal mood and affect. Her behavior is normal. Judgment and thought content normal.   Vitals reviewed.      Gastric Stimulator Interrogation: End of service (last settings imp 439 voltage 8 Current 18.2 Pulse Width 330 Rate 50 Cycle on 4 Cycle off 1)      Assessment:       1. S/P lap gastric neurostimulator placement         Plan:      Replace battery.  Awaiting ct and awaiting result of ugi.

## 2019-12-27 ENCOUNTER — ANESTHESIA EVENT (OUTPATIENT)
Dept: SURGERY | Facility: HOSPITAL | Age: 39
End: 2019-12-27
Payer: COMMERCIAL

## 2019-12-27 ENCOUNTER — TELEPHONE (OUTPATIENT)
Dept: SURGERY | Facility: CLINIC | Age: 39
End: 2019-12-27

## 2019-12-28 NOTE — ANESTHESIA PREPROCEDURE EVALUATION
12/27/2019  Nena Mendez is a 39 y.o., female with a pre-operative diagnosis of Gastroparesis [K31.84]  Nausea and vomiting, intractability of vomiting not specified, unspecified vomiting type [R11.2] who is scheduled for Procedure(s) (LRB):  REPLACEMENT, PULSE GENERATOR, NEUROSTIMULATOR, GASTRIC, OPEN (battery exchange) (N/A).     Requested anesthesia type: General  Surgeon: Robert Kumar MD    [facility]  Anesthesia Pre-Operative Evaluation         Patient Name: Nena Mendez  YOB: 1980  MRN: [id number]    SUBJECTIVE:     Pre-operative evaluation for Procedure(s) (LRB):  REPLACEMENT, PULSE GENERATOR, NEUROSTIMULATOR, GASTRIC, OPEN (battery exchange) (N/A)     12/29/2019    Nena Mendez is a 39 y.o. female w/ a significant PMHx of sleeve gastrectomy, N/V, and gastric neurostimulator placement.  Prior Bougie intubation.     Patient now presents for the above procedure(s).      LDA: None documented.       Peripheral IV - Single Lumen 01/27/17 0720 Right Hand (Active)   Number of days: 1066            Peripheral IV - Single Lumen 05/10/17 1048 Left Hand (Active)   Number of days: 963            Peripheral IV - Single Lumen 03/22/19 0646 Left Hand (Active)   Number of days: 282            Gastrostomy/Enterostomy 01/27/17 Jejunostomy tube (Active)   Number of days: 1066       Prev airway:   Direct laryngoscopy, Bougie Intubation; Inserted by: CRNA; Airway Device: Endotracheal Tube; Mask Ventilation: Easy; Intubated: Postinduction; Blade: Mullins #2; Airway Device Size: 7.5; Style: Cuffed; Cuff Inflation: Minimal occlusive pressure; Placement Verified By: Auscultation, Capnometry, ETT Condensation; Grade: Grade II; Complicating Factors: Anterior larynx; Intubation Findings: Positive EtCO2, Bilateral breath sounds, Atraumatic/Condition of teeth unchanged;   Depth of Insertion: 21; Securment: Lips; Complications: None; Breath Sounds: Equal Bilateral; Insertion Attempts: 2 (Successful with Bougie, Patient anterior);     Drips: None documented.      Patient Active Problem List   Diagnosis    S/P lap gastric neurostimulator placement     Malnutrition    Jejunostomy tube site pain    S/P laparoscopic sleeve gastrectomy    Nausea and vomiting       Review of patient's allergies indicates:   Allergen Reactions    Iodine and iodide containing products Swelling, Hives, Itching, Rash and Shortness Of Breath    Domperidone Itching    Latex, natural rubber Rash    Scopolamine      Patch- caused burn under patch       Current Inpatient Medications:      No current facility-administered medications on file prior to encounter.      Current Outpatient Medications on File Prior to Encounter   Medication Sig Dispense Refill    b complex vitamins capsule Take 1 capsule by mouth once daily.      baclofen (LIORESAL) 10 MG tablet Take 1 tablet (10 mg total) by mouth 3 (three) times daily as needed. (Patient taking differently: Take 10 mg by mouth nightly. ) 90 tablet 11    ergocalciferol (VITAMIN D2) 50,000 unit Cap Take 50,000 Units by mouth every 7 days.      multivitamin capsule Take 1 capsule by mouth once daily.      NEXIUM PACKET 40 mg GrPS 40 mg before breakfast.       promethazine (PHENERGAN) 25 MG tablet Take 1 tablet (25 mg total) by mouth every 6 (six) hours as needed for Nausea. 120 tablet 3    promethazine (PHENERGAN) 6.25 mg/5 mL syrup Take 12.5 mg by mouth every 6 (six) hours as needed for Nausea.      butalbital-acetaminop-caf-cod -85-30 mg Cap TK 1 C PO Q 4 TO 6 H PRN P  3    HYDROcodone-acetaminophen (NORCO) 5-325 mg per tablet Take 1 tablet by mouth every 6 (six) hours as needed for Pain (Do not drive while taking this medication.). 21 tablet 0    mirtazapine (REMERON) 15 MG tablet Take 15 mg by mouth every evening.      ondansetron (ZOFRAN-ODT)  8 MG TbDL Take 1 tablet (8 mg total) by mouth every 8 (eight) hours as needed. 30 tablet 6    prucalopride 2 mg Tab Take 1 tablet by mouth once daily. 30 tablet 11    sucralfate (CARAFATE) 1 gram tablet Take 1 g by mouth 4 (four) times daily before meals and nightly.         Past Surgical History:   Procedure Laterality Date    APPENDECTOMY       SECTION      CHOLECYSTECTOMY      COSMETIC SURGERY      ESOPHAGEAL MANOMETRY WITH MEASUREMENT OF IMPEDANCE N/A 10/30/2019    Procedure: MANOMETRY, ESOPHAGUS, WITH IMPEDANCE MEASUREMENT;  Surgeon: Patti Orozco MD;  Location: [address]);  Service: Endoscopy;  Laterality: N/A;  LATEX ALLERGY  r/o rumination  Motility Studies:  Hold Narcotics x 1 days   Hold TCA x 1 days  10/24 - pt confirmed appt-BB    ESOPHAGOGASTRODUODENOSCOPY N/A 3/22/2019    Procedure: EGD (ESOPHAGOGASTRODUODENOSCOPY)-dual lumen scope to remove intragastric suture.;  Surgeon: Robert Kumar MD;  Location: [address];  Service: General;  Laterality: N/A;    ESOPHAGOGASTRODUODENOSCOPY N/A 10/30/2019    Procedure: EGD (ESOPHAGOGASTRODUODENOSCOPY);  Surgeon: Patti Orozco MD;  Location: [address]);  Service: Endoscopy;  Laterality: N/A;  LATEX ALLERGY  EGD with EndoFlip   4th Floor, scheduled on 2nd floor due to availability  Full liquid diet 3 days and clear liquid diet x 1 day prior to procedure  Propofol only. No fentanyl or benzodiazepine during sedation. If additional sedation need    GASTRIC STIMULATOR IMPLANT SURGERY      GASTROSTOMY-JEJEUNOSTOMY TUBE CHANGE/PLACEMENT  08/10/2016    HYSTERECTOMY      laparoscopic jejunostomy tube      REPLACEMENT OF GASTRIC NEUROSTIMULATOR GENERATOR N/A 3/22/2019    Procedure: REPLACEMENT, PULSE GENERATOR, NEUROSTIMULATOR, GASTRIC-battery exchange only;  Surgeon: Robert Kumar MD;  Location: [facility] OR [address] [place] [address];  Service: General;  Laterality: N/A;    sleeve gastrectomy      TONSILLECTOMY         Social  History     Socioeconomic History    Marital status:      Spouse name: Not on file    Number of children: 2    Years of education: Not on file    Highest education level: Not on file   Occupational History    Not on file   Social Needs    Financial resource strain: Not on file    Food insecurity:     Worry: Not on file     Inability: Not on file    Transportation needs:     Medical: Not on file     Non-medical: Not on file   Tobacco Use    Smoking status: Former Smoker     Packs/day: 0.00     Last attempt to quit: 2015     Years since quittin.3    Smokeless tobacco: Never Used   Substance and Sexual Activity    Alcohol use: No    Drug use: No    Sexual activity: Not on file   Lifestyle    Physical activity:     Days per week: Not on file     Minutes per session: Not on file    Stress: Not on file   Relationships    Social connections:     Talks on phone: Not on file     Gets together: Not on file     Attends Orthodoxy service: Not on file     Active member of club or organization: Not on file     Attends meetings of clubs or organizations: Not on file     Relationship status: Not on file   Other Topics Concern    Not on file   Social History Narrative    Not on file       OBJECTIVE:     Vital Signs Range (Last 24H):         Significant Labs:  Lab Results   Component Value Date    WBC 6.3 2019    HGB 11.3 (L) 2019    HCT 33.8 (L) 2019     2019    ALT 17 2019    AST 22 2019     2019    K 4.0 2019     2019    CREATININE 0.58 2019    BUN 7 2019    CO2 30 2019    TSH 1.073 2019       Diagnostic Studies: No relevant studies.    EKG:   No results found for this or any previous visit.    2D ECHO:  TTE:  No results found for this or any previous visit.    MAIK:  No results found for this or any previous visit.    ASSESSMENT/PLAN:       Anesthesia Evaluation    I have reviewed the Patient Summary  Reports.    I have reviewed the Nursing Notes.   I have reviewed the Medications.     Review of Systems  Anesthesia Hx:  No problems with previous Anesthesia  History of prior surgery of interest to airway management or planning: Denies Family Hx of Anesthesia complications.   Denies Personal Hx of Anesthesia complications.   Social:  No Alcohol Use, Non-Smoker, Former Smoker    Hematology/Oncology:  Hematology Normal   Oncology Normal     EENT/Dental:EENT/Dental Normal   Cardiovascular:  Cardiovascular Normal     Pulmonary:  Pulmonary Normal    Renal/:  Renal/ Normal     Hepatic/GI:  Hepatic/GI Normal Gastric stimulator, weight loss noted.   Musculoskeletal:  Musculoskeletal Normal    Neurological:  Neurology Normal    Endocrine:  Endocrine Normal    Dermatological:  Skin Normal    Psych:  Psychiatric Normal           Physical Exam  General:  Malnutrition    Airway/Jaw/Neck:  Airway Findings: Mouth Opening: Normal Tongue: Normal  General Airway Assessment: Adult, Good  Mallampati: II  Improves to II with phonation.  TM Distance: Normal, at least 6 cm  Jaw/Neck Findings:     Neck ROM: Normal ROM      Dental:  Dental Findings: In tact   Chest/Lungs:  Chest/Lungs Findings: Clear to auscultation, Normal Respiratory Rate     Heart/Vascular:  Heart Findings: Rate: Normal  Rhythm: Regular Rhythm  Sounds: Normal     Abdomen:  Abdomen Findings:  Normal, Soft, Nontender            Anesthesia Plan  Type of Anesthesia, risks & benefits discussed:  Anesthesia Type:  general  Patient's Preference: as indicated  Intra-op Monitoring Plan: standard ASA monitors  Intra-op Monitoring Plan Comments:   Post Op Pain Control Plan: per primary service following discharge from PACU, IV/PO Opioids PRN and multimodal analgesia  Post Op Pain Control Plan Comments:   Induction:   IV  Beta Blocker:  Patient is not currently on a Beta-Blocker (No further documentation required).       Informed Consent: Patient understands risks and agrees  with Anesthesia plan.  Questions answered. Anesthesia consent signed with patient.  ASA Score: 3     Day of Surgery Review of History & Physical:  There are no significant changes.  H&P update referred to the provider.     Anesthesia Plan Notes:  Discussed PONV plan of care with patient and she agrees. Also discussed airway soreness due to airway history. Reassurance given.        Ready For Surgery From Anesthesia Perspective.

## 2019-12-30 ENCOUNTER — ANESTHESIA (OUTPATIENT)
Dept: SURGERY | Facility: HOSPITAL | Age: 39
End: 2019-12-30
Payer: COMMERCIAL

## 2019-12-30 ENCOUNTER — HOSPITAL ENCOUNTER (OUTPATIENT)
Facility: HOSPITAL | Age: 39
Discharge: HOME | End: 2019-12-30
Attending: SURGERY | Admitting: SURGERY
Payer: COMMERCIAL

## 2019-12-30 VITALS
SYSTOLIC BLOOD PRESSURE: 101 MMHG | HEART RATE: 50 BPM | BODY MASS INDEX: 17.07 KG/M2 | TEMPERATURE: 98 F | DIASTOLIC BLOOD PRESSURE: 64 MMHG | WEIGHT: 100 LBS | OXYGEN SATURATION: 99 % | RESPIRATION RATE: 18 BRPM | HEIGHT: 64 IN

## 2019-12-30 DIAGNOSIS — K31.84 GASTROPARESIS: Primary | ICD-10-CM

## 2019-12-30 PROCEDURE — D9220A PRA ANESTHESIA: ICD-10-PCS | Mod: ,,, | Performed by: ANESTHESIOLOGY

## 2019-12-30 PROCEDURE — 63600175 PHARM REV CODE 636 W HCPCS: Performed by: ANESTHESIOLOGY

## 2019-12-30 PROCEDURE — 64590 INS/RPL PRPH SAC/GSTR NPG/R: CPT | Mod: ,,, | Performed by: SURGERY

## 2019-12-30 PROCEDURE — 71000015 HC POSTOP RECOV 1ST HR: Performed by: SURGERY

## 2019-12-30 PROCEDURE — 95980 PR ELEC ALYS NSTIM PLS GEN GASTRIC INTRAOP W/ PROG: ICD-10-PCS | Mod: ,,, | Performed by: SURGERY

## 2019-12-30 PROCEDURE — 95980 IO ANAL GAST N-STIM INIT: CPT | Mod: ,,, | Performed by: SURGERY

## 2019-12-30 PROCEDURE — 25000003 PHARM REV CODE 250: Performed by: STUDENT IN AN ORGANIZED HEALTH CARE EDUCATION/TRAINING PROGRAM

## 2019-12-30 PROCEDURE — 63600175 PHARM REV CODE 636 W HCPCS: Performed by: STUDENT IN AN ORGANIZED HEALTH CARE EDUCATION/TRAINING PROGRAM

## 2019-12-30 PROCEDURE — 88300 PR  SURG PATH,GROSS,LEVEL I: ICD-10-PCS | Mod: 26,,, | Performed by: PATHOLOGY

## 2019-12-30 PROCEDURE — 63600175 PHARM REV CODE 636 W HCPCS: Performed by: SURGERY

## 2019-12-30 PROCEDURE — C1767 GENERATOR, NEURO NON-RECHARG: HCPCS | Performed by: SURGERY

## 2019-12-30 PROCEDURE — 36000707: Performed by: SURGERY

## 2019-12-30 PROCEDURE — S0028 INJECTION, FAMOTIDINE, 20 MG: HCPCS | Performed by: ANESTHESIOLOGY

## 2019-12-30 PROCEDURE — 64590 PR IMPLANT PERIPH/GASTRIC NEUROSTIM/RECEIVER: ICD-10-PCS | Mod: ,,, | Performed by: SURGERY

## 2019-12-30 PROCEDURE — 88300 SURGICAL PATH GROSS: CPT | Performed by: PATHOLOGY

## 2019-12-30 PROCEDURE — D9220A PRA ANESTHESIA: Mod: ,,, | Performed by: ANESTHESIOLOGY

## 2019-12-30 PROCEDURE — 25000003 PHARM REV CODE 250: Performed by: ANESTHESIOLOGY

## 2019-12-30 PROCEDURE — 25000003 PHARM REV CODE 250: Performed by: SURGERY

## 2019-12-30 PROCEDURE — 36000706: Performed by: SURGERY

## 2019-12-30 PROCEDURE — 88300 SURGICAL PATH GROSS: CPT | Mod: 26,,, | Performed by: PATHOLOGY

## 2019-12-30 PROCEDURE — 37000009 HC ANESTHESIA EA ADD 15 MINS: Performed by: SURGERY

## 2019-12-30 PROCEDURE — 37000008 HC ANESTHESIA 1ST 15 MINUTES: Performed by: SURGERY

## 2019-12-30 PROCEDURE — 94761 N-INVAS EAR/PLS OXIMETRY MLT: CPT

## 2019-12-30 PROCEDURE — 71000033 HC RECOVERY, INTIAL HOUR: Performed by: SURGERY

## 2019-12-30 DEVICE — GENERATOR NEUROSTIM GASTRIC
Type: IMPLANTABLE DEVICE | Site: ABDOMEN | Status: NON-FUNCTIONAL
Removed: 2023-06-23

## 2019-12-30 RX ORDER — GABAPENTIN 300 MG/1
300 CAPSULE ORAL 3 TIMES DAILY
Qty: 15 CAPSULE | Refills: 0 | Status: SHIPPED | OUTPATIENT
Start: 2019-12-30 | End: 2020-07-29

## 2019-12-30 RX ORDER — LIDOCAINE HYDROCHLORIDE 10 MG/ML
1 INJECTION, SOLUTION EPIDURAL; INFILTRATION; INTRACAUDAL; PERINEURAL ONCE
Status: DISCONTINUED | OUTPATIENT
Start: 2019-12-30 | End: 2019-12-30 | Stop reason: HOSPADM

## 2019-12-30 RX ORDER — HYDROCODONE BITARTRATE AND ACETAMINOPHEN 5; 325 MG/1; MG/1
1 TABLET ORAL ONCE
Status: COMPLETED | OUTPATIENT
Start: 2019-12-30 | End: 2019-12-30

## 2019-12-30 RX ORDER — ONDANSETRON 2 MG/ML
INJECTION INTRAMUSCULAR; INTRAVENOUS
Status: DISCONTINUED | OUTPATIENT
Start: 2019-12-30 | End: 2019-12-30

## 2019-12-30 RX ORDER — FAMOTIDINE 10 MG/ML
20 INJECTION INTRAVENOUS ONCE
Status: COMPLETED | OUTPATIENT
Start: 2019-12-30 | End: 2019-12-30

## 2019-12-30 RX ORDER — FENTANYL CITRATE 50 UG/ML
25 INJECTION, SOLUTION INTRAMUSCULAR; INTRAVENOUS EVERY 5 MIN PRN
Status: DISCONTINUED | OUTPATIENT
Start: 2019-12-30 | End: 2019-12-30 | Stop reason: HOSPADM

## 2019-12-30 RX ORDER — KETOROLAC TROMETHAMINE 30 MG/ML
INJECTION, SOLUTION INTRAMUSCULAR; INTRAVENOUS
Status: DISCONTINUED | OUTPATIENT
Start: 2019-12-30 | End: 2019-12-30

## 2019-12-30 RX ORDER — LIDOCAINE HCL/PF 100 MG/5ML
SYRINGE (ML) INTRAVENOUS
Status: DISCONTINUED | OUTPATIENT
Start: 2019-12-30 | End: 2019-12-30

## 2019-12-30 RX ORDER — SODIUM CHLORIDE 9 MG/ML
INJECTION, SOLUTION INTRAVENOUS CONTINUOUS
Status: DISCONTINUED | OUTPATIENT
Start: 2019-12-30 | End: 2019-12-30 | Stop reason: HOSPADM

## 2019-12-30 RX ORDER — HEPARIN 100 UNIT/ML
100 SYRINGE INTRAVENOUS ONCE AS NEEDED
Status: COMPLETED | OUTPATIENT
Start: 2019-12-30 | End: 2019-12-30

## 2019-12-30 RX ORDER — MIDAZOLAM HYDROCHLORIDE 1 MG/ML
INJECTION, SOLUTION INTRAMUSCULAR; INTRAVENOUS
Status: DISCONTINUED | OUTPATIENT
Start: 2019-12-30 | End: 2019-12-30

## 2019-12-30 RX ORDER — PROPOFOL 10 MG/ML
VIAL (ML) INTRAVENOUS
Status: DISCONTINUED | OUTPATIENT
Start: 2019-12-30 | End: 2019-12-30

## 2019-12-30 RX ORDER — CEFAZOLIN SODIUM 1 G/3ML
INJECTION, POWDER, FOR SOLUTION INTRAMUSCULAR; INTRAVENOUS
Status: DISCONTINUED | OUTPATIENT
Start: 2019-12-30 | End: 2019-12-30

## 2019-12-30 RX ORDER — SUCCINYLCHOLINE CHLORIDE 20 MG/ML
INJECTION INTRAMUSCULAR; INTRAVENOUS
Status: DISCONTINUED | OUTPATIENT
Start: 2019-12-30 | End: 2019-12-30

## 2019-12-30 RX ORDER — ACETAMINOPHEN 10 MG/ML
INJECTION, SOLUTION INTRAVENOUS
Status: DISCONTINUED | OUTPATIENT
Start: 2019-12-30 | End: 2019-12-30

## 2019-12-30 RX ORDER — HYDROCODONE BITARTRATE AND ACETAMINOPHEN 5; 325 MG/1; MG/1
1 TABLET ORAL EVERY 6 HOURS PRN
Qty: 8 TABLET | Refills: 0 | Status: SHIPPED | OUTPATIENT
Start: 2019-12-30 | End: 2020-07-29

## 2019-12-30 RX ORDER — KETAMINE HCL IN 0.9 % NACL 50 MG/5 ML
SYRINGE (ML) INTRAVENOUS
Status: DISCONTINUED | OUTPATIENT
Start: 2019-12-30 | End: 2019-12-30

## 2019-12-30 RX ORDER — PHENYLEPHRINE HYDROCHLORIDE 10 MG/ML
INJECTION INTRAVENOUS
Status: DISCONTINUED | OUTPATIENT
Start: 2019-12-30 | End: 2019-12-30

## 2019-12-30 RX ORDER — BUPIVACAINE HYDROCHLORIDE 2.5 MG/ML
INJECTION, SOLUTION EPIDURAL; INFILTRATION; INTRACAUDAL
Status: DISCONTINUED | OUTPATIENT
Start: 2019-12-30 | End: 2019-12-30 | Stop reason: HOSPADM

## 2019-12-30 RX ORDER — DEXAMETHASONE SODIUM PHOSPHATE 4 MG/ML
INJECTION, SOLUTION INTRA-ARTICULAR; INTRALESIONAL; INTRAMUSCULAR; INTRAVENOUS; SOFT TISSUE
Status: DISCONTINUED | OUTPATIENT
Start: 2019-12-30 | End: 2019-12-30

## 2019-12-30 RX ORDER — ROCURONIUM BROMIDE 10 MG/ML
INJECTION, SOLUTION INTRAVENOUS
Status: DISCONTINUED | OUTPATIENT
Start: 2019-12-30 | End: 2019-12-30

## 2019-12-30 RX ORDER — HYDROCODONE BITARTRATE AND ACETAMINOPHEN 5; 325 MG/1; MG/1
TABLET ORAL
Status: DISCONTINUED
Start: 2019-12-30 | End: 2019-12-30 | Stop reason: HOSPADM

## 2019-12-30 RX ORDER — SODIUM CHLORIDE 0.9 % (FLUSH) 0.9 %
10 SYRINGE (ML) INJECTION
Status: DISCONTINUED | OUTPATIENT
Start: 2019-12-30 | End: 2019-12-30 | Stop reason: HOSPADM

## 2019-12-30 RX ORDER — ONDANSETRON 2 MG/ML
4 INJECTION INTRAMUSCULAR; INTRAVENOUS DAILY PRN
Status: DISCONTINUED | OUTPATIENT
Start: 2019-12-30 | End: 2019-12-30 | Stop reason: HOSPADM

## 2019-12-30 RX ADMIN — Medication 10 MG: at 07:12

## 2019-12-30 RX ADMIN — SUGAMMADEX 200 MG: 100 INJECTION, SOLUTION INTRAVENOUS at 07:12

## 2019-12-30 RX ADMIN — PROPOFOL 120 MG: 10 INJECTION, EMULSION INTRAVENOUS at 07:12

## 2019-12-30 RX ADMIN — FAMOTIDINE 20 MG: 10 INJECTION, SOLUTION INTRAVENOUS at 06:12

## 2019-12-30 RX ADMIN — PHENYLEPHRINE HYDROCHLORIDE 100 MCG: 10 INJECTION INTRAVENOUS at 07:12

## 2019-12-30 RX ADMIN — SODIUM CHLORIDE: 0.9 INJECTION, SOLUTION INTRAVENOUS at 06:12

## 2019-12-30 RX ADMIN — ONDANSETRON 4 MG: 2 INJECTION INTRAMUSCULAR; INTRAVENOUS at 08:12

## 2019-12-30 RX ADMIN — KETOROLAC TROMETHAMINE 30 MG: 30 INJECTION, SOLUTION INTRAMUSCULAR; INTRAVENOUS at 07:12

## 2019-12-30 RX ADMIN — LIDOCAINE HYDROCHLORIDE 75 MG: 20 INJECTION, SOLUTION INTRAVENOUS at 07:12

## 2019-12-30 RX ADMIN — Medication 30 MG: at 07:12

## 2019-12-30 RX ADMIN — ONDANSETRON 4 MG: 2 INJECTION INTRAMUSCULAR; INTRAVENOUS at 07:12

## 2019-12-30 RX ADMIN — SODIUM CHLORIDE 1000 ML: 0.9 INJECTION, SOLUTION INTRAVENOUS at 06:12

## 2019-12-30 RX ADMIN — SODIUM CHLORIDE, SODIUM GLUCONATE, SODIUM ACETATE, POTASSIUM CHLORIDE, MAGNESIUM CHLORIDE, SODIUM PHOSPHATE, DIBASIC, AND POTASSIUM PHOSPHATE: .53; .5; .37; .037; .03; .012; .00082 INJECTION, SOLUTION INTRAVENOUS at 07:12

## 2019-12-30 RX ADMIN — HYDROCODONE BITARTRATE AND ACETAMINOPHEN 1 TABLET: 5; 325 TABLET ORAL at 08:12

## 2019-12-30 RX ADMIN — CEFAZOLIN 1 G: 330 INJECTION, POWDER, FOR SOLUTION INTRAMUSCULAR; INTRAVENOUS at 07:12

## 2019-12-30 RX ADMIN — CEFAZOLIN 2 G: 330 INJECTION, POWDER, FOR SOLUTION INTRAMUSCULAR; INTRAVENOUS at 07:12

## 2019-12-30 RX ADMIN — ROCURONIUM BROMIDE 5 MG: 10 INJECTION, SOLUTION INTRAVENOUS at 07:12

## 2019-12-30 RX ADMIN — MIDAZOLAM HYDROCHLORIDE 2 MG: 1 INJECTION, SOLUTION INTRAMUSCULAR; INTRAVENOUS at 06:12

## 2019-12-30 RX ADMIN — ROCURONIUM BROMIDE 25 MG: 10 INJECTION, SOLUTION INTRAVENOUS at 07:12

## 2019-12-30 RX ADMIN — DEXAMETHASONE SODIUM PHOSPHATE 4 MG: 4 INJECTION, SOLUTION INTRAMUSCULAR; INTRAVENOUS at 07:12

## 2019-12-30 RX ADMIN — Medication 100 UNITS: at 09:12

## 2019-12-30 RX ADMIN — SUCCINYLCHOLINE CHLORIDE 120 MG: 20 INJECTION, SOLUTION INTRAMUSCULAR; INTRAVENOUS at 07:12

## 2019-12-30 RX ADMIN — ROCURONIUM BROMIDE 20 MG: 10 INJECTION, SOLUTION INTRAVENOUS at 07:12

## 2019-12-30 RX ADMIN — ACETAMINOPHEN 1000 MG: 10 INJECTION, SOLUTION INTRAVENOUS at 07:12

## 2019-12-30 NOTE — BRIEF OP NOTE
[facility]  Brief Operative Note    Surgery Date: 12/30/2019     Surgeon(s) and Role:     * Robert Kumar MD - Primary     * Kateryna Boyd MD - Resident - Assisting        Pre-op Diagnosis:  Gastroparesis [K31.84]  Nausea and vomiting, intractability of vomiting not specified, unspecified vomiting type [R11.2]    Post-op Diagnosis:  Post-Op Diagnosis Codes:     * Gastroparesis [K31.84]     * Nausea and vomiting, intractability of vomiting not specified, unspecified vomiting type [R11.2]    Procedure(s) (LRB):  REPLACEMENT, PULSE GENERATOR, NEUROSTIMULATOR, GASTRIC, OPEN (battery exchange) (N/A)    Anesthesia: General    Description of the findings of the procedure(s):   Battery exchange for gastric neurostimulator.     Estimated Blood Loss: * No values recorded between 12/30/2019  7:19 AM and 12/30/2019  8:09 AM *         Specimens:   Specimen (12h ago, onward)    None            Discharge Note    OUTCOME: Patient tolerated treatment/procedure well without complication and is now ready for discharge.    DISPOSITION: Home or Self Care    FINAL DIAGNOSIS:  Gastroparesis    FOLLOWUP: In clinic

## 2019-12-30 NOTE — ANESTHESIA POSTPROCEDURE EVALUATION
Anesthesia Post Evaluation    Patient: Nena Mendez    Procedure(s) Performed: Procedure(s) (LRB):  REPLACEMENT, PULSE GENERATOR, NEUROSTIMULATOR, GASTRIC, OPEN (battery exchange) (N/A)    [facility] Anesthesia Post Op Evaluation      Vitals Value Taken Time   /64 12/30/2019  9:02 AM   Temp 36.6 °C (97.8 °F) 12/30/2019  8:48 AM   Pulse 51 12/30/2019  9:03 AM   Resp 18 12/30/2019  9:00 AM   SpO2 100 % 12/30/2019  9:03 AM   Vitals shown include unvalidated device data.      Event Time     Out of Recovery 08:25:00          Pain/Baljit Score: Pain Rating Prior to Med Admin: 2 (12/30/2019  8:34 AM)  Pain Rating Post Med Admin: 2 (12/30/2019  8:37 AM)  Baljit Score: 9 (12/30/2019  8:25 AM)

## 2019-12-30 NOTE — INTERVAL H&P NOTE
The patient has been examined and the H&P has been reviewed:    I concur with the findings and no changes have occurred since H&P was written.   Past Medical History:   Diagnosis Date    Gastroparesis        Past Surgical History:   Procedure Laterality Date    APPENDECTOMY       SECTION      CHOLECYSTECTOMY      COSMETIC SURGERY      ESOPHAGEAL MANOMETRY WITH MEASUREMENT OF IMPEDANCE N/A 10/30/2019    Procedure: MANOMETRY, ESOPHAGUS, WITH IMPEDANCE MEASUREMENT;  Surgeon: Patti Orozco MD;  Location: [address]);  Service: Endoscopy;  Laterality: N/A;  LATEX ALLERGY  r/o rumination  Motility Studies:  Hold Narcotics x 1 days   Hold TCA x 1 days  10/24 - pt confirmed appt-BB    ESOPHAGOGASTRODUODENOSCOPY N/A 3/22/2019    Procedure: EGD (ESOPHAGOGASTRODUODENOSCOPY)-dual lumen scope to remove intragastric suture.;  Surgeon: Robert Kumar MD;  Location: [address];  Service: General;  Laterality: N/A;    ESOPHAGOGASTRODUODENOSCOPY N/A 10/30/2019    Procedure: EGD (ESOPHAGOGASTRODUODENOSCOPY);  Surgeon: Patti Orozco MD;  Location: [address]);  Service: Endoscopy;  Laterality: N/A;  LATEX ALLERGY  EGD with EndoFlip   4th Floor, scheduled on 2nd floor due to availability  Full liquid diet 3 days and clear liquid diet x 1 day prior to procedure  Propofol only. No fentanyl or benzodiazepine during sedation. If additional sedation need    GASTRIC STIMULATOR IMPLANT SURGERY      GASTROSTOMY-JEJEUNOSTOMY TUBE CHANGE/PLACEMENT  08/10/2016    HYSTERECTOMY      laparoscopic jejunostomy tube      REPLACEMENT OF GASTRIC NEUROSTIMULATOR GENERATOR N/A 3/22/2019    Procedure: REPLACEMENT, PULSE GENERATOR, NEUROSTIMULATOR, GASTRIC-battery exchange only;  Surgeon: Robert Kumar MD;  Location: [address];  Service: General;  Laterality: N/A;    sleeve gastrectomy      TONSILLECTOMY         Family History   Problem Relation Age of Onset    Hypothyroidism Mother      Cirrhosis Father     No Known Problems Brother     Asthma Daughter     Sleep apnea Daughter     Celiac disease Neg Hx     Colon cancer Neg Hx     Esophageal cancer Neg Hx     Stomach cancer Neg Hx     Rectal cancer Neg Hx     Ulcerative colitis Neg Hx     Crohn's disease Neg Hx        Social History     Socioeconomic History    Marital status:      Spouse name: Not on file    Number of children: 2    Years of education: Not on file    Highest education level: Not on file   Occupational History    Not on file   Social Needs    Financial resource strain: Not on file    Food insecurity:     Worry: Not on file     Inability: Not on file    Transportation needs:     Medical: Not on file     Non-medical: Not on file   Tobacco Use    Smoking status: Former Smoker     Packs/day: 0.00     Last attempt to quit: 2015     Years since quittin.3    Smokeless tobacco: Never Used   Substance and Sexual Activity    Alcohol use: No    Drug use: No    Sexual activity: Not on file   Lifestyle    Physical activity:     Days per week: Not on file     Minutes per session: Not on file    Stress: Not on file   Relationships    Social connections:     Talks on phone: Not on file     Gets together: Not on file     Attends Pentecostal service: Not on file     Active member of club or organization: Not on file     Attends meetings of clubs or organizations: Not on file     Relationship status: Not on file   Other Topics Concern    Not on file   Social History Narrative    Not on file       Current Facility-Administered Medications   Medication Dose Route Frequency Provider Last Rate Last Dose    0.9%  NaCl infusion   Intravenous Continuous Rufino Newman MD        0.9%  NaCl infusion   Intravenous Continuous Robert Kumar MD        ceFAZolin (ANCEF) 3 gram in dextrose 5% IVPB  3 g Intravenous On Call Procedure Robert Kumar MD        famotidine (PF) injection 20 mg  20 mg  Intravenous Once Rufino Newman MD        heparin, porcine (PF) 100 unit/mL injection flush 100 Units  100 Units Intravenous Once PRN Rufino Newman MD        lidocaine (PF) 10 mg/ml (1%) injection 10 mg  1 mL Intradermal Once Rufino Newman MD        lidocaine (PF) 10 mg/ml (1%) injection 10 mg  1 mL Intradermal Once Robert Kumar MD        sodium chloride 0.9% bolus 500 mL  500 mL Intravenous Once Rufino Newman MD           Review of patient's allergies indicates:   Allergen Reactions    Iodine and iodide containing products Swelling, Hives, Itching, Rash and Shortness Of Breath    Domperidone Itching    Latex, natural rubber Rash    Scopolamine      Patch- caused burn under patch         Anesthesia/Surgery risks, benefits and alternative options discussed and understood by patient/family.          Active Hospital Problems    Diagnosis  POA    S/P lap gastric neurostimulator placement  [K31.84]  Yes      Resolved Hospital Problems   No resolved problems to display.

## 2019-12-30 NOTE — OP NOTE
DATE OF PROCEDURE: 12/30/2019    PRE OP DIAGNOSIS: Gastroparesis [K31.84]  Nausea and vomiting, intractability of vomiting not specified, unspecified vomiting type [R11.2]    POST OP DIAGNOSIS: Gastroparesis [K31.84]  Nausea and vomiting, intractability of vomiting not specified, unspecified vomiting type [R11.2]    PROCEDURE: Procedure(s) (LRB):  REPLACEMENT, PULSE GENERATOR, NEUROSTIMULATOR, GASTRIC, OPEN (battery exchange) (N/A)  Intraoperative programming gastric neurostimulator    Surgeon(s) and Role:     * Robert Kumar MD - Primary     * Kateryna Boyd MD - Resident - Assisting    ANESTHESIA: General.     ANESTHESIA: General.     Procedure: The patient was placed under general anesthesia and tthe abdomen was prepped and draped in the usual manner with Ioban drape.  The prior stimulator incision was pre-injected with marcaine and incised with a scalpel.  The bovie was used to divide the subcutaneous tissues to the stimulator and the stimulator was brought out onto the field.  The old stimulator was removed and the new one placed.  It was placed back in the pocket and secured with two 0 prolene sutures.  The area was inspected for hemostasis and the incision closed with 3-0 and 4-0 vicryl. The incision was reinforced with Dermabond.  The stimulator was interrogated and programmed: imp - volt 8 curr - pw 330 rate 40 on 2 off 3. An abdominal binder was placed.  The patient tolerated the procedure well and was brought to the recovery room in stable condition.  Sponge and need counts were correct at the end of the case.    Path: stimulator for gross Complications: none Blood loss: minimal

## 2019-12-30 NOTE — INTERVAL H&P NOTE
The patient has been examined and the H&P has been reviewed:    I concur with the findings and no changes have occurred since H&P was written.    Anesthesia/Surgery risks, benefits and alternative options discussed and understood by patient/family.          Active Hospital Problems    Diagnosis  POA    S/P lap gastric neurostimulator placement  [K31.84]  Yes      Resolved Hospital Problems   No resolved problems to display.

## 2019-12-30 NOTE — ANESTHESIA POSTPROCEDURE EVALUATION
Anesthesia Post Evaluation    Patient: Nena Mendez    Procedure(s) Performed: Procedure(s) (LRB):  REPLACEMENT, PULSE GENERATOR, NEUROSTIMULATOR, GASTRIC, OPEN (battery exchange) (N/A)    Final Anesthesia Type: general    Patient location during evaluation: [facility]  Patient participation: Yes- Able to Participate  Level of consciousness: awake and alert and oriented  Post-procedure vital signs: reviewed and stable  Pain management: adequate  Airway patency: patent    PONV status at discharge: No PONV  Anesthetic complications: no      Cardiovascular status: stable  Respiratory status: unassisted, spontaneous ventilation and room air  Hydration status: euvolemic  Follow-up not needed.          Vitals Value Taken Time   /64 12/30/2019  9:02 AM   Temp 36.6 °C (97.8 °F) 12/30/2019  8:48 AM   Pulse 51 12/30/2019  9:03 AM   Resp 18 12/30/2019  9:00 AM   SpO2 100 % 12/30/2019  9:03 AM         Event Time     Out of Recovery 08:25:00          Pain/Baljit Score: Pain Rating Prior to Med Admin: 2 (12/30/2019  8:34 AM)  Pain Rating Post Med Admin: 2 (12/30/2019  8:37 AM)  Baljit Score: 9 (12/30/2019  8:25 AM)

## 2019-12-30 NOTE — TRANSFER OF CARE
"Anesthesia Transfer of Care Note    Patient: Nena Mendez    Procedure(s) Performed: Procedure(s) (LRB):  REPLACEMENT, PULSE GENERATOR, NEUROSTIMULATOR, GASTRIC, OPEN (battery exchange) (N/A)    Patient location: PACU    Anesthesia Type: general    Transport from OR: Transported from OR on 6-10 L/min O2 by face mask with adequate spontaneous ventilation    Post pain: adequate analgesia    Post assessment: no apparent anesthetic complications    Post vital signs: stable    Level of consciousness: awake and sedated    Nausea/Vomiting: no nausea/vomiting    Complications: none    Transfer of care protocol was followed      Last vitals:   Visit Vitals  BP (!) 106/59 (BP Location: Left arm, Patient Position: Lying)   Pulse (!) 54   Temp 36.8 °C (98.3 °F) (Oral)   Resp 16   Ht 5' 4" (1.626 m)   Wt 45.4 kg (100 lb)   SpO2 100%   BMI 17.16 kg/m²     "

## 2019-12-30 NOTE — ANESTHESIA RELEASE NOTE
"Anesthesia Release from PACU Note    Patient: Nena Mendez    Procedure(s) Performed: Procedure(s) (LRB):  REPLACEMENT, PULSE GENERATOR, NEUROSTIMULATOR, GASTRIC, OPEN (battery exchange) (N/A)    Anesthesia type: GEN    Post pain: Adequate analgesia reported    Post assessment: no apparent anesthetic complications, tolerated procedure well and no evidence of recall    Post vital signs: /64   Pulse (!) 50   Temp 36.6 °C (97.8 °F) (Temporal)   Resp 18   Ht 5' 4" (1.626 m)   Wt 45.4 kg (100 lb)   SpO2 99%   BMI 17.16 kg/m²     Level of consciousness: awake, alert and oriented    Nausea/Vomiting: no nausea/no vomiting    Complications: none    Airway Patency: patent    Respiratory: unassisted, spontaneous ventilation, room air    Cardiovascular: stable and blood pressure at baseline    Hydration: euvolemic    "

## 2019-12-30 NOTE — DISCHARGE INSTRUCTIONS
Incision Care  Remember: Follow-up visits allow your healthcare provider to make sure your incision is healing well. Be sure to keep your appointments.     Stitches (sutures), surgical staples, special strips of surgical tape, or surgical skin glue may be used to close incisions. They also help stop bleeding and speed healing. To help your incision heal, follow the tips on this handout.  Home care  Tips for home care include the following:  · Always wash your hands before touching your incision.  · Keep your incision clean and dry.  · Avoid doing things that could cause dirt or sweat to get on your incision.  · Dont pick at scabs. They help protect the wound.  · Keep your incision out of water.  · Take a sponge bath to avoid getting your incision wet, unless your healthcare provider tells you otherwise.  · Ask your provider when can you take a shower or bathe.  · Ask your provider about the best way to keep your incision dry when bathing or showering.  · Pat stitches dry if they get wet. Dont rub.  · Leave the bandage (dressing) in place until you are told to remove it or change it. Change it only as directed, using clean hands.  · After the first 12 hours, change your dressing every 24 hours, or as directed by your healthcare provider.  · Change your dressing if it gets wet or soiled.  Care for specific closures  Follow these guidelines unless your healthcare provider tells you otherwise:  · Stitches or staples. Once you no longer need to keep these dry, clean the wound daily. First remove the bandage using clean hands. Then wash the area gently with soap and warm water. Use a wet cotton swab to loosen and remove any blood or crust that forms. After cleaning, put a thin layer of antibiotic ointment on. Then put on a new bandage.  · Skin glue. Dont put liquid, ointment, or cream on your wound while the glue is in place. Avoid activities that cause heavy sweating. Protect the wound from sunlight. Do not scratch,  rub, or pick at the glue. Do not put tape directly over the glue. The glue should peel off within 5 to 10 days.  · Surgical tape. Keep the area dry. If it gets wet, blot the area dry with a clean towel. Surgical tape usually falls off within 7 to 10 days. If it has not fallen off after 10 days, contact your healthcare provider before taking it off yourself. If you are told to remove the tape, put mineral oil or petroleum jelly on a cotton ball. Gently rub the tape until it is removed.  Changing your dressing  Leave the dressing (bandage) in place until you are told to remove it or change it. Follow the instructions below unless told otherwise by your healthcare provider:  · Always wash your hands before changing your dressing.  · After the first 48 hours, the incision wound usually will have closed. At this point, leave the incision uncovered and open to the air. If the incision has not closed keep it covered.  · Cover your incision only if your clothing is rubbing it or causing irritation.  · Change your dressing if it gets wet or soiled.  Follow-up care  Follow up with your healthcare provider to ask how long sutures or staples should be left in place. Be sure to return for stitch or staple removal as directed. If dissolving stitches were used in your mouth, these will not need to be removed. They should fall out or dissolve on their own.  If tape closures were used, remove them yourself when your provider recommends if they have not fallen off on their own. If skin glue was used, the glue will wear off by itself.  When to seek medical care  Call your healthcare provider if you have any of the following:  · More pain, redness, swelling, bleeding, or foul-smelling discharge around the incision area  · Fever of 100.4°F (38ºC) or higher, or as directed by your healthcare provider  · Shaking chills  · Vomiting or nausea that doesn't go away  · Numbness, coldness, or tingling around the incision area, or changes in  skin color  · Opening of the sutures or wound  · Stitches or staples come apart or fall out or surgical tape falls off before 7 days or as directed by your healthcare provider   Date Last Reviewed: 12/1/2016  © 4611-7970 [employer]. [address] [place] [address], [place] [address]. All rights reserved. This information is not intended as a substitute for professional medical care. Always follow your healthcare professional's instructions.

## 2019-12-30 NOTE — BRIEF OP NOTE
Operative Note       Surgery Date: 12/30/2019     Surgeon(s) and Role:     * Robert Kumar MD - Primary     * Kateryna Boyd MD - Resident - Assisting    Pre-op Diagnosis:  Gastroparesis [K31.84]  Nausea and vomiting, intractability of vomiting not specified, unspecified vomiting type [R11.2]    Post-op Diagnosis:  Gastroparesis [K31.84]  Nausea and vomiting, intractability of vomiting not specified, unspecified vomiting type [R11.2]    Procedure(s) (LRB):  REPLACEMENT, PULSE GENERATOR, NEUROSTIMULATOR, GASTRIC, OPEN (battery exchange) (N/A)    Anesthesia: General    Procedure in Detail/Findings:  Stimulator replacement and programming without apparent complication    Estimated Blood Loss: Minimal           Specimens (From admission, onward)     Start     Ordered    12/30/19 0800  Specimen to Pathology, Surgery General Surgery  Once     Question:  Procedure Type:  Answer:  General Surgery    12/30/19 0800              Implants:   Implant Name Type Inv. Item Serial No.  Lot No. LRB No. Used   GENERATOR NEUROSTIM GASTRIC - [id number]  GENERATOR NEUROSTIM GASTRIC  [employer] [facility] [id number] N/A 1   GENERATOR NEUROSTIM GASTRIC - [id number]  GENERATOR NEUROSTIM GASTRIC [id number] [employer] [facility]  N/A 1              Disposition: PACU - hemodynamically stable.           Condition: Good    Attestation:  I was present and scrubbed for the entire procedure.

## 2019-12-31 ENCOUNTER — DOCUMENTATION ONLY (OUTPATIENT)
Dept: SURGERY | Facility: CLINIC | Age: 39
End: 2019-12-31

## 2019-12-31 NOTE — PROGRESS NOTES
Phone call: She is feeling alright but did have some blood when she dry heaved.  I told her to seek attention if this worsens.

## 2020-01-02 ENCOUNTER — PATIENT MESSAGE (OUTPATIENT)
Dept: GASTROENTEROLOGY | Facility: CLINIC | Age: 40
End: 2020-01-02

## 2020-01-03 ENCOUNTER — PATIENT MESSAGE (OUTPATIENT)
Dept: GASTROENTEROLOGY | Facility: CLINIC | Age: 40
End: 2020-01-03

## 2020-01-09 LAB
FINAL PATHOLOGIC DIAGNOSIS: NORMAL
GROSS: NORMAL

## 2020-01-10 ENCOUNTER — PATIENT MESSAGE (OUTPATIENT)
Dept: GASTROENTEROLOGY | Facility: CLINIC | Age: 40
End: 2020-01-10

## 2020-02-14 ENCOUNTER — TELEPHONE (OUTPATIENT)
Dept: GASTROENTEROLOGY | Facility: CLINIC | Age: 40
End: 2020-02-14

## 2020-02-14 ENCOUNTER — OFFICE VISIT (OUTPATIENT)
Dept: GASTROENTEROLOGY | Facility: CLINIC | Age: 40
End: 2020-02-14
Payer: COMMERCIAL

## 2020-02-14 VITALS
HEART RATE: 73 BPM | BODY MASS INDEX: 18.03 KG/M2 | DIASTOLIC BLOOD PRESSURE: 67 MMHG | WEIGHT: 108.25 LBS | SYSTOLIC BLOOD PRESSURE: 115 MMHG | HEIGHT: 65 IN

## 2020-02-14 DIAGNOSIS — K59.00 CONSTIPATION, UNSPECIFIED CONSTIPATION TYPE: Primary | ICD-10-CM

## 2020-02-14 DIAGNOSIS — K21.9 GASTROESOPHAGEAL REFLUX DISEASE, ESOPHAGITIS PRESENCE NOT SPECIFIED: ICD-10-CM

## 2020-02-14 DIAGNOSIS — K62.5 RECTAL BLEEDING: ICD-10-CM

## 2020-02-14 DIAGNOSIS — R14.0 BLOATING: ICD-10-CM

## 2020-02-14 DIAGNOSIS — R11.2 NAUSEA AND VOMITING, INTRACTABILITY OF VOMITING NOT SPECIFIED, UNSPECIFIED VOMITING TYPE: ICD-10-CM

## 2020-02-14 DIAGNOSIS — R63.4 WEIGHT LOSS: ICD-10-CM

## 2020-02-14 DIAGNOSIS — G47.00 INSOMNIA, UNSPECIFIED TYPE: ICD-10-CM

## 2020-02-14 DIAGNOSIS — R68.81 EARLY SATIETY: ICD-10-CM

## 2020-02-14 DIAGNOSIS — R11.10 RUMINATION SYNDROME OF INGESTED FOOD IN ADULT: ICD-10-CM

## 2020-02-14 DIAGNOSIS — R19.7 DIARRHEA, UNSPECIFIED TYPE: ICD-10-CM

## 2020-02-14 DIAGNOSIS — R13.10 DYSPHAGIA, UNSPECIFIED TYPE: ICD-10-CM

## 2020-02-14 DIAGNOSIS — R10.9 ABDOMINAL PAIN, UNSPECIFIED ABDOMINAL LOCATION: ICD-10-CM

## 2020-02-14 PROCEDURE — 99215 PR OFFICE/OUTPT VISIT, EST, LEVL V, 40-54 MIN: ICD-10-PCS | Mod: S$GLB,,, | Performed by: INTERNAL MEDICINE

## 2020-02-14 PROCEDURE — 99999 PR PBB SHADOW E&M-EST. PATIENT-LVL IV: CPT | Mod: PBBFAC,,, | Performed by: INTERNAL MEDICINE

## 2020-02-14 PROCEDURE — 99999 PR PBB SHADOW E&M-EST. PATIENT-LVL IV: ICD-10-PCS | Mod: PBBFAC,,, | Performed by: INTERNAL MEDICINE

## 2020-02-14 PROCEDURE — 99215 OFFICE O/P EST HI 40 MIN: CPT | Mod: S$GLB,,, | Performed by: INTERNAL MEDICINE

## 2020-02-14 PROCEDURE — 3008F PR BODY MASS INDEX (BMI) DOCUMENTED: ICD-10-PCS | Mod: CPTII,S$GLB,, | Performed by: INTERNAL MEDICINE

## 2020-02-14 PROCEDURE — 3008F BODY MASS INDEX DOCD: CPT | Mod: CPTII,S$GLB,, | Performed by: INTERNAL MEDICINE

## 2020-02-14 RX ORDER — MIRTAZAPINE 30 MG/1
30 TABLET, FILM COATED ORAL NIGHTLY
Qty: 30 TABLET | Refills: 11 | Status: SHIPPED | OUTPATIENT
Start: 2020-02-14 | End: 2020-05-04

## 2020-02-14 RX ORDER — LUBIPROSTONE 24 UG/1
24 CAPSULE ORAL 2 TIMES DAILY
Qty: 60 CAPSULE | Refills: 6 | Status: SHIPPED | OUTPATIENT
Start: 2020-02-14 | End: 2020-11-10

## 2020-02-14 NOTE — PROGRESS NOTES
[facility] Consultation Note    Reason for Consult:    Chief Complaint   Patient presents with    Heartburn    Emesis    Nausea    Abdominal Pain    Gas    Bloated    Diarrhea    Rectal Bleeding    Weight Loss         PCP:   Jeancarlos Angel       Referring MD:    Dr. Kumar   Current GI: Dr. Morales David    HPI:  Nena Mendez is a 39 y.o. female with a PMH of  migraines, enlarged thyroid gland, status post cholecystectomy referred to motility clinic for second opinion regarding the following problems:    GERD.   Unchanged.  Retrosternal pyrosis:yes  Regurgitation:yes  Belching:yes  Frequency: daily  MEDS:   Tums  Nexium granules 40 mg daily  No longer on carafate 1 g QID.   No improvent with omeprazole daily, dexilant, prevacid. Has not taken protonix, aciphex,     Dysphagia.  Still w difficulty swallowing - everything comes up (likely rumination. Unchanged.    Nausea.  Unchanged.  Frequency:constant  Early satiety. unchanged  Frequency:daily    Vomiting. unchanged  Frequency:every meal, worse in the last year  Onset:5 yrs ago  Timing of vomiting:  Within 30 min of eating:yes   Within 3 hours after eating:yes    Rumination Syndrome  Has not been practicing diaphragmatic breathing yet  Unable to tolerate TF x 3   Currently attempting to get TPN w PCP.  Insurance rejected it   Getting IV fluid infusion and vitamins per PCP     MEDS:   Phenergan 25mg QHS, sometimes during the day - causes sedation  Dronabinol 5mg BID - causes sedation    Previous MEDS  No improvement with FDgard  No improvement with baclofen 10 mg HS since 4/2019  No improvement with zofran 8 mg PRN  Has not tried compazine    No improvement with reglan and was taken off due to itching. Has had eye twitching for few years.    No improvement with domperidone; not a candidate d/t h/o electrolyte imbalance  Unable to tolerate scopolamine patch due to skin rash/burning.  Unable to get motegrity    Treatment    No  improvement with gastric stim (2016)  No improvement with pyloroplasty (2017)  1 year improvement w gastric sleeve (2017)  No improvement with pyloric dilation (2019)  Unable to tolerate J tube d/t infection  Interventions: (pyloroplasty, botox, gastric stimulator, gastroparesis diet):  Gastric Stimulator    Placed: 2016  Last battery change: 2019  Last interrogated:   5/2019  Gastric stim changed 12/30/2019    Number of GP related hospitalization: 0  Number of GP related ED visit: 1    Abdominal pain. Still with lots of abdominal pain. Unchanged.  Onset:several years ago; has endometriosis  Location:LUQ, epigastric, LLQ  Character:stabbing, sharp  Frequency:  daily  Duration:constant  Worse with:BMs  No meds     Gas and bloating. Still having it   No improvement with eliminating lactose.   Consumes lactose: still some  Consumes artificial sugars:no    Diarrhea.  Still w intermittent watery stools.  Unchanged.      Constipation.  Mostly with bothersome constipation. unchanged  [place] type: 1  Frequency:1 per week     MEDS:   Unable to get prucalopride   Taking amitiza ? Dose - sample  PCP trying to get zelnorm     Previous meds  No improvement with miralax BID several yrs ago  No improvement with Linzess ?dose? daily x two months  Has not tried Trulance, Senna, Colace  No improvement with dulcolax, fiber  No improvement with lactulose     BRBPR w staining. Intermittent epsidoe sof rectal bleeding. Last episode 4 weeks ago w red blood in toilet, not just tissue paper.      On MVA w iron, no iron supplements     Anemia. H/o Low RBC, HGB. Not on iron.     Black stools. Resolved.      Weight loss.  Reports unintentional weight loss.     Lost (lb):  Now stable 108lb from 104lb.    From 180 lbs to 104 lbs over 5 yrs  Not drinking protein shakes    Depression due to GI symptoms.   On remeron 15mg daily   Is now seen therapist through employee [employer] program.     Insomnia. Still having difficulty sleeping. Has lots of  joint pain or abd pain. Has not seen sleep specialist.     Migraines. On Fioricet -97-30 mg PRN. On ibuprofen PRN. No improvement with amitriptyline Per pcp. Has seen neurology in the past.    Enlarged thyroid gland since age 17. TSH low in 2016    Multiple joint pain. JOHNIE negative in the past. Awaiting apt w rheumatology     Denies anxiety    Total visit time was 40 minutes, more than 50% of which was spent in face-to-face counseling with patient regarding symptoms, diagnostic results, prognosis, risks and benefits of treatment options, instructions for management, importance of compliance with chosen treatment options, risk factor reduction, stress reduction, coping strategies.      Gastric stimulator:  Gastric stim battery replacement 12/30/2019  Entera interrogated (8/27/19): Impedence: 439 omb, Amplitude: 8V, Current:8.3 MA, Pulse width: 330 us, Rate: 50Hz, On 4s Off 1s, Battery: OK  Gastric stimulator adjustment (5/11/19): imp 439 voltage 8 Current 18.2 Pulse Width 330 Rate 50 Cycle on 4 Cycle off 1  Gastric stimulator interrogation (3/9/19): imp 446 voltage 7 Current 15.7 Pulse Width 330 Rate 50 Cycle on 3 Cycle off 2-battery is low.  Initial gastric stimulator settings (10/7/17): Impedance was 422, voltage 3.5, current 8.3, pulse width 330, rate   14, cycle on 0.1, cycle off 5.    Previous Studies:   UGI w SBFT 12/23/2019: Normal upper GI and small bowel follow through evaluation.  There is mild displacement of the upper esophagus to the left at the base of the neck.  Ct chest 12/3/19: min parenchymal infiltrate in the lateral aspect of the lingula. Left subclavian port in good position. S/p mckinley.  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 Complete bolus clearance. Normalization of IRP in 5 of 5 upright swallows. Abnormal MRS test. No sig diff w provacative maneuvers. No residual liquid in esophagus  after 50 cc bolus. Rumination syndrome. Possible HH.   EGD 10/30/19:  Normal esophagus (negative). 5 cm hiatal hernia. Sleeve gastrectomy with healthy-appearing mucosa.  Gastric erythema (chronic inflammation).  Pyloroplasty with visible sutures and healthy appearing mucosa.  Normal duodenum (negative).  In the flip 10/30/2019:  Normal esophageal contractility.  EGD 03/01/2019:  Small hiatal hernia. Nonerosive reflux esophagitis (early reflux esophagitis).  Gastritis (chronic inflammation).  Normal-appearing gastric sleeve.  Pyloroplasty without evidence of pyloric stenosis or duodenal stricture.  CT abdomen and pelvis without contrast 12/26/2018:  Status post cholecystectomy.  Kidney cyst.  Status post gastric surgery.  Status post gastric pacemaker.  CT abdomen and pelvis 12/19/2017:  Status post cholecystectomy.  Status post gastric surgery.  Status post gastric pacemaker.  EGD 11/17/2017:  Gastritis (chronic inactive gastritis).  Reflux esophagus (reflux esophagitis).  Status post gastric sleeve.  Normal duodenum.  Colonoscopy 11/17/2017: ? Prep to cecum. 4 mm cecal polyp (benign polypoid mucosa).  9 mm descending colon polyp (ulcerated and inflamed hyperplastic polyp benign).  Internal hemorrhoids.  Repeat in 3 years  EGD 03/30/2017:  Normal esophagus. Gastric erythema (?).  Normal duodenum.  CT abdomen and pelvis 01/31/2017:  Kidney cysts, status post cholecystectomy, status post gastric pacemaker, status post jejunostomy tube.  MRV head 07/22/2016:  No intracranial MR venogram abnormalities  Gastric emptying study 07/01/2016:  Mildly delayed early gastric emptying.11% gastric emptying at 34 min.  24% gastric emptying at 64 min.  51% emptying at 130 min.  95% emptying at 242.    Normal gastric emptying is noted at 4 hrs.   EGD 06/10/2016:  Gastritis (?).  Reflux esophagitis (?).  Normal duodenum.    Labs:  08/27/2019:  Rheumatoid factor negative, JOHNIE negative, pre-albumin normal, ferritin normal, iron in TI BC  normal, TT G/IgA normal, TSH normal  2017:  Phosphorus normal, magnesium normal, BMP unremarkable, CBC RBC low 3.81, HGB low at 11.2  2017:  CMP calcium low 8.4, total protein of 5.2, albumin low 2.9  2016:  JOHNIE negative, CBC normal, CMP unremarkable, hepatic profile normal, iron normal, TIBC normal, ferritin normal, TSH low 0.4    Relevant surgeries:  Gastric stim battery replacement 2019  Gastric pacemaker change battery (2019)  Gastric sleeve (05/15/2017)  Pyloroplasty (2017)  J-tube inserted (2016)  Gastric stimulator placement (10/07/2016)  J tube placement (08/10/2016)  Cholecystectomy (9 yrs ago)    ROS:  ROS   Constitutional: + fevers, + chills, no night sweats, +weight loss  ENT: No congestion, no rhinorrhea, no chronic sinus problems  CV: + chest pain, no palpitations  Pulm: No cough, no shortness of breath  Ophtho: + blurry vision, no eye redness  GI: see HPI  Derm: No rash  Heme: + lymphadenopathy, + bruising  MSK: + joint pain, no joint swelling, no Raynauds  : No dysuria, no frequent urination, + blood in urine  Endo: + hot or cold intolerance  Neuro: + dizziness, no syncope, no seizure  Psych: + anxiety, + depression        Medical History:   Past Medical History:   Diagnosis Date    Gastroparesis         Surgical History:   Past Surgical History:   Procedure Laterality Date    APPENDECTOMY       SECTION      CHOLECYSTECTOMY      COSMETIC SURGERY      ESOPHAGEAL MANOMETRY WITH MEASUREMENT OF IMPEDANCE N/A 10/30/2019    Procedure: MANOMETRY, ESOPHAGUS, WITH IMPEDANCE MEASUREMENT;  Surgeon: Patti Orozco MD;  Location: [facility] ([address] [place] [address]);  Service: Endoscopy;  Laterality: N/A;  LATEX ALLERGY  r/o rumination  Motility Studies:  Hold Narcotics x 1 days   Hold TCA x 1 days  10/24 - pt confirmed appt-BB    ESOPHAGOGASTRODUODENOSCOPY N/A 3/22/2019    Procedure: EGD (ESOPHAGOGASTRODUODENOSCOPY)-dual lumen scope to remove intragastric suture.;   Surgeon: Robert Kumar MD;  Location: [facility] OR [address] [place] [address];  Service: General;  Laterality: N/A;    ESOPHAGOGASTRODUODENOSCOPY N/A 10/30/2019    Procedure: EGD (ESOPHAGOGASTRODUODENOSCOPY);  Surgeon: Patti Orozco MD;  Location: [address]);  Service: Endoscopy;  Laterality: N/A;  LATEX ALLERGY  EGD with EndoFlip   4th Floor, scheduled on 2nd floor due to availability  Full liquid diet 3 days and clear liquid diet x 1 day prior to procedure  Propofol only. No fentanyl or benzodiazepine during sedation. If additional sedation need    GASTRIC STIMULATOR IMPLANT SURGERY      GASTROSTOMY-JEJEUNOSTOMY TUBE CHANGE/PLACEMENT  08/10/2016    HYSTERECTOMY      laparoscopic jejunostomy tube      REPLACEMENT OF GASTRIC NEUROSTIMULATOR GENERATOR N/A 3/22/2019    Procedure: REPLACEMENT, PULSE GENERATOR, NEUROSTIMULATOR, GASTRIC-battery exchange only;  Surgeon: Robert Kumar MD;  Location: [address];  Service: General;  Laterality: N/A;    REPLACEMENT OF GASTRIC NEUROSTIMULATOR GENERATOR N/A 12/30/2019    Procedure: REPLACEMENT, PULSE GENERATOR, NEUROSTIMULATOR, GASTRIC, OPEN (battery exchange);  Surgeon: Robert Kumar MD;  Location: [facility] OR [address] [place] [address];  Service: General;  Laterality: N/A;    sleeve gastrectomy      TONSILLECTOMY          Family History:   Family History   Problem Relation Age of Onset    Hypothyroidism Mother     Cirrhosis Father     No Known Problems Brother     Asthma Daughter     Sleep apnea Daughter     Celiac disease Neg Hx     Colon cancer Neg Hx     Esophageal cancer Neg Hx     Stomach cancer Neg Hx     Rectal cancer Neg Hx     Ulcerative colitis Neg Hx     Crohn's disease Neg Hx         Social History:   Social History     Socioeconomic History    Marital status:      Spouse name: Not on file    Number of children: 2    Years of education: Not on file    Highest education level: Not on file   Occupational History    Not on file   Social  Needs    Financial resource strain: Not on file    Food insecurity:     Worry: Not on file     Inability: Not on file    Transportation needs:     Medical: Not on file     Non-medical: Not on file   Tobacco Use    Smoking status: Former Smoker     Packs/day: 0.00     Last attempt to quit: 2015     Years since quittin.4    Smokeless tobacco: Never Used   Substance and Sexual Activity    Alcohol use: No    Drug use: No    Sexual activity: Not on file   Lifestyle    Physical activity:     Days per week: Not on file     Minutes per session: Not on file    Stress: Not on file   Relationships    Social connections:     Talks on phone: Not on file     Gets together: Not on file     Attends [employer] service: Not on file     Active member of club or organization: Not on file     Attends meetings of clubs or organizations: Not on file     Relationship status: Not on file   Other Topics Concern    Not on file   Social History Narrative    Not on file        Review of patient's allergies indicates:   Allergen Reactions    Iodine and iodide containing products Swelling, Hives, Itching, Rash and Shortness Of Breath    Domperidone Itching    Latex, natural rubber Rash    Scopolamine      Patch- caused burn under patch       Current Outpatient Medications   Medication Sig Dispense Refill    b complex vitamins capsule Take 1 capsule by mouth once daily.      butalbital-acetaminop-caf-cod -15-30 mg Cap TK 1 C PO Q 4 TO 6 H PRN P  3    ergocalciferol (VITAMIN D2) 50,000 unit Cap Take 50,000 Units by mouth every 7 days.      gabapentin (NEURONTIN) 300 MG capsule Take 1 capsule (300 mg total) by mouth 3 (three) times daily. 15 capsule 0    HYDROcodone-acetaminophen (NORCO) 5-325 mg per tablet Take 1 tablet by mouth every 6 (six) hours as needed for Pain. 8 tablet 0    multivitamin capsule Take 1 capsule by mouth once daily.      NEXIUM PACKET 40 mg GrPS 40 mg before breakfast.       promethazine  "(PHENERGAN) 25 MG tablet Take 1 tablet (25 mg total) by mouth every 6 (six) hours as needed for Nausea. 120 tablet 3    promethazine (PHENERGAN) 6.25 mg/5 mL syrup Take 12.5 mg by mouth every 6 (six) hours as needed for Nausea.      prucalopride 2 mg Tab Take 1 tablet by mouth once daily. 30 tablet 11    baclofen (LIORESAL) 10 MG tablet Take 1 tablet (10 mg total) by mouth 3 (three) times daily as needed. (Patient taking differently: Take 10 mg by mouth nightly. ) 90 tablet 11    lubiprostone (AMITIZA) 24 MCG Cap Take 1 capsule (24 mcg total) by mouth 2 (two) times daily. 60 capsule 6    mirtazapine (REMERON) 30 MG tablet Take 1 tablet (30 mg total) by mouth every evening. 30 tablet 11     No current facility-administered medications for this visit.         Objective Findings:  Vital Signs:  /67   Pulse 73   Ht 5' 5" (1.651 m)   Wt 49.1 kg (108 lb 3.9 oz)   BMI 18.01 kg/m²   Body mass index is 18.01 kg/m².    Physical Exam:  General appearance: alert, cooperative, no distress  HENT: Normocephalic, atraumatic, neck symmetrical, no nasal discharge  Eyes: conjunctivae/corneas clear,  EOM's intact  Integument: Skin color, texture, turgor normal; no rashes; hair distrubution normal  Neurologic: Alert and oriented X 3  Psychiatric: no pressured speech; normal affect; no evidence of impaired cognition    Labs:  Lab Results   Component Value Date    WBC 6.3 11/18/2019    HGB 11.3 (L) 11/18/2019    HCT 33.8 (L) 11/18/2019    MCV 90 08/27/2019     08/27/2019     Lab Results   Component Value Date    FERRITIN 147 08/27/2019     Lab Results   Component Value Date     11/18/2019    K 4.0 11/18/2019     11/18/2019    CO2 30 11/18/2019    GLU 94 11/18/2019    BUN 7 11/18/2019    CREATININE 0.58 11/18/2019    CALCIUM 9.5 11/18/2019    PROT 7.4 08/27/2019    ALBUMIN 4.8 08/27/2019    BILITOT 0.4 08/27/2019    ALKPHOS 96 08/27/2019    AST 22 08/27/2019    ALT 17 08/27/2019     Lab Results   Component " Value Date    TSH 1.073 08/27/2019     No results found for: SEDRATE  No results found for: CRP  No results found for: LABA1C, HGBA1C        Assessment and Plan:  Nena Mendez is a 39 y.o. female with a PMH of  migraines, enlarged thyroid gland, status post cholecystectomy referred to motility clinic for second opinion regarding the following problems:    GERD.  5 cm hiatal hernia.   No improvement with carafate 1 g QID.   No improvent with omeprazole daily, dexilant, prevacid.   No improvement with baclofen 10 mg HS  -Cont nexium granules 40 mg daily     Dysphagia.  EGD unrevealing. EndoFlip with normal distensibility. Esophageal manometry with EGJOO. CT chest negative at OSH  -TBS    Nausea.  Early satiety. Vomiting. Diagnosed with gastroparesis in 2015. Esophageal manometry with rumination syndrome.   No  improvement with gastric stim (2016)  No improvement with pyloroplasty (2017)  1 year improvement w gastric sleeve (2017)  No improvement with pyloric dilation (2019)  Unable to tolerate J tube due to infection, trouble flushing/using  Gastric stim battery changed 12/2019  Unable to tolerate scopolamine patch due to skin rash/burning.  No improvement with reglan and was taken off due to itching. Has had eye twitching for few years.    No improvement with FDgard  No improvement with domperidone; not a candidate due to history of electrolyte imbalance   No improvement with baclofen 10 mg HS since 4/2019  No improvement with zofran 8 mg PRN  Insurance will not cover Smart pill  -phenergan 25 mg prn - causes sedation  -Started on dramamine by PCP  -Unable to get prucalopride - PT to try prescription assistance program   -Increase remeron to 30mg   -Fu w neurology for brain imaging   -Will consider compazine, diclegis, emend   -Insurance denied TPN w PCP.  PCP attempting to appeal this decision .  Getting IVF via port w PCP     Rumination syndrome.  Unable to demonstrate diaphragmatic breathing. Has not tried  "it yet.   -Again discussed etiology, pathophysiology, evaluation and treatment of rumination syndrome.  Handouts previously provided  -Discussed the benefit of diaphragmatic breathing in management of functional Gi disorders. Provided handout.   -Ref to GI dietitian in 2-3 weeks and 1 month   -Follow up with local counselor to practice diaphragmatic breathing   -Cont to try to drink protein shakes TID    Abdominal pain. History of endometriosis. Associated with bowel movements. Nocturnal pain.  On ibuprofen PRN for migraines  No improvement with baclofen 10 mg HS   No improvement with IBgard  No improvement w FDgard  -Remeron  30   -Start amitiza   -Will consider Bentyl,  Gabapentin, lyrica    Gas and bloating. Mild distention.   No improvement with eliminating lactose.  Avoids artificial sugars  -will consider iberogast   -Will consider SIBO testing    Constipation.   No improvement with miralax BID several yrs ago  No improvement with Linzess ?dose? daily x two months  No improvement with dulcolax, fiber  No improvement with lactulose  -Start Amitiza 24mg BID  -Unable to get prucalopride - PT to try prescription assistance program   -PCP in the process of getting zelnorm approved  -Has not tried Trulance    Diarrhea.  Twice monthly. Possible overflow.   Not a candidate for viberzi due to has had cholecystectomy      Black stools. Resolved. EGD negative.     BRBRP, usually associated w constipation.  Colon w polyps and hemorrhoids   -Fu w ref Gi doctor to discuss repeat colonoscopy     Weight loss.  Reports unintentional weight loss. From 180 lbs to 104 lbs over 5 yrs. Prealbumin normal.   Stable 108lb from 104lb.  Thinks increased weight related to fluids  -Start protein shakes TID  -See GI dietitian  -Remeron  -Insurance denied TPN w PCP.  PCP attempting to appeal this decision .  Getting IVF via port w PCP     Pt report intestines "knicked" during endometriosis surgery 5 yrs ago and repaired with sutures. UGI w " SBFT negative    Depression. Due to GI symptoms. No meds. Has not seen psych. P  -Follow up with therapist through [facility].   -Increase remeron to 30mg      Insomnia. Gets 2-3 hours sleep nightly. Has lots of joint pain or abd pain. Has not seen sleep specialist.   -ref to sleep     Anemia. Not SHILPA.  Takes MVA w iron     Migraines. On Fioricet -78-30 mg PRN. On ibuprofen PRN. No improvement with amitriptyline Per pcp. Has seen neurology in the past.  -Referral to neurology to discuss adjusting meds for migraines and imaging of brain to assess refractory nausea and vomiting     Multiple joint pain. JOHNIE negative in the past. Suspect fibromyalgia  -Referral to rheumatology  -may benefit from gabapentin or lyrica    Follow up in about 3 months (around 5/14/2020) for Dr. Orozco .    1. Constipation, unspecified constipation type    2. Weight loss    3. Insomnia, unspecified type    4. Dysphagia, unspecified type    5. Gastroesophageal reflux disease, esophagitis presence not specified    6. Nausea and vomiting, intractability of vomiting not specified, unspecified vomiting type    7. Early satiety    8. Rumination syndrome of ingested food in adult    9. Abdominal pain, unspecified abdominal location    10. Bloating    11. Diarrhea, unspecified type    12. Rectal bleeding          Order summary:  Orders Placed This Encounter    Ambulatory referral/consult to Sleep Disorders    lubiprostone (AMITIZA) 24 MCG Cap    mirtazapine (REMERON) 30 MG tablet         Thank you so much for allowing me to participate in the care of Nena Orozco MD

## 2020-02-14 NOTE — PATIENT INSTRUCTIONS
-Drink three high protein shakes per day   -Practice diaphragmatic breathing three times per day.  Follow the instructions in this video:   Https://www.youtube.com/watch?[email or web address]  -Follow up with Gi dietitian in 2-3 weeks, than 1 month later.  Bring a meal to the appointment   -Follow up with your local counselor to practice diaphragmatic breathing    -Start amitiza 24mg twice per day   -Call this number to inquire about prucalopride assistance.  [phone] or look for information at [email or web address]  -Follow up with your local gastroenterologist to discuss colonoscopy   -Increase remeron to 30 mg daily  -Follow up with neurology to improve your migraines because this is contributing to your Gi symptoms.  Discuss brain imaging with your neurologist to help determine why you have refractory nausea   -I am referring you to sleep medicine - Dr. Dow to make sure you do not have a sleep disorder   -Hold baclofen for now   -See rheumatology to evaluate join pain

## 2020-02-14 NOTE — TELEPHONE ENCOUNTER
----- Message from Patti Orozco MD sent at 2/14/2020 10:56 AM CST -----  This pt completed CT chest locally and I see that Eleni added it to the note.  No need to repeat CT chest here

## 2020-03-19 ENCOUNTER — TELEPHONE (OUTPATIENT)
Dept: SURGERY | Facility: CLINIC | Age: 40
End: 2020-03-19

## 2020-03-19 NOTE — TELEPHONE ENCOUNTER
Patient concerned about the inability to get blood from her medi-port. States it infuses and flushes just fine.   I explained that this is a common occurrence, and can be caused by the tip of the catheter adhering to the vessel wall while trying to draw blood from it. As long as it flushes and infuses without swelling or pain, it can be used as normal.       ----- Message from Meliza Roca sent at 3/19/2020  4:04 PM CDT -----  Contact: pt   Patient calling because she had a power port done and its not drawing back she need to know what she should do front this point. Please give patient a call. [phone]       Thanks   Meliza Roca

## 2020-03-27 ENCOUNTER — PATIENT MESSAGE (OUTPATIENT)
Dept: GASTROENTEROLOGY | Facility: CLINIC | Age: 40
End: 2020-03-27

## 2020-03-31 ENCOUNTER — TELEPHONE (OUTPATIENT)
Dept: GASTROENTEROLOGY | Facility: CLINIC | Age: 40
End: 2020-03-31

## 2020-04-01 NOTE — TELEPHONE ENCOUNTER
Phone Call   Referring professional: Dr Mary Orozco   New patient   Reason for Phone call  :  Assessment   · Iinability to retain oral intake except for sweetened ice tea 32 oz (300 calorie)   · Vvomiting within 5 minutes of food/liquid ingestion     · AAbsence of protein intake in diet due to inability to tolerate any source of protein   · HHx of weight loss - 138#>100#  30% weight loss in 48 months         BMI 17.9     138#>10/17 110# = 28# weight loss /14 months /20% weight loss     10/17(110#) - 100##  10#weight loss /27 months = 10% weight loss     Reference for Adult Malnutrition : [employer]   Severe starvation-related malnutrition related to depression and chronic alcohol abuse as evidenced by unintended weight loss >40% in 1 year; body mass index (BMI) = 17.4 kg/m2; reduced changes in functional indicators (measured by hand  strength); severe subcutaneous fat loss (orbital and triceps); severe muscle loss (temples and interosseous); usual energy intake of about 50% of the total estimated energy needs.   Severe chronic disease or condition-related malnutrition related to inadequate oral intake as evidenced by unintended weight loss of 2.8 kg (6%) in 1 month (from 46.1 to 43.3 kg) and 4.3 kg (9%) in the last 3 months (from 47.6 to 43.3 kg); body mass index = 16.8 kg/m2; severe subcutaneous fat loss of the orbital, triceps, fat overlying the ribs; severe muscle loss of the temples, clavicles, shoulders, interosseous muscles, scapula, thigh, and calf; mild ankle edema; energy intake approximately 40% of total estimated energy needs for the last month.    Age: 39 y.o.  Sex: female  Past Medical History:   Diagnosis Date    Gastroparesis      Past Surgical History:   Procedure Laterality Date    APPENDECTOMY       SECTION      CHOLECYSTECTOMY      COSMETIC SURGERY      ESOPHAGEAL MANOMETRY WITH MEASUREMENT OF IMPEDANCE N/A 10/30/2019    Procedure: MANOMETRY,  ESOPHAGUS, WITH IMPEDANCE MEASUREMENT;  Surgeon: Patti Orozco MD;  Location: [facility] [address]);  Service: Endoscopy;  Laterality: N/A;  LATEX ALLERGY  r/o rumination  Motility Studies:  Hold Narcotics x 1 days   Hold TCA x 1 days  10/24 - pt confirmed appt-BB    ESOPHAGOGASTRODUODENOSCOPY N/A 3/22/2019    Procedure: EGD (ESOPHAGOGASTRODUODENOSCOPY)-dual lumen scope to remove intragastric suture.;  Surgeon: Robert Kumar MD;  Location: [address];  Service: General;  Laterality: N/A;    ESOPHAGOGASTRODUODENOSCOPY N/A 10/30/2019    Procedure: EGD (ESOPHAGOGASTRODUODENOSCOPY);  Surgeon: Patti Orozco MD;  Location: [facility] [address]);  Service: Endoscopy;  Laterality: N/A;  LATEX ALLERGY  EGD with [facility]   4th Floor, scheduled on 2nd floor due to availability  Full liquid diet 3 days and clear liquid diet x 1 day prior to procedure  Propofol only. No fentanyl or benzodiazepine during sedation. If additional sedation need    GASTRIC STIMULATOR IMPLANT SURGERY      GASTROSTOMY-JEJEUNOSTOMY TUBE CHANGE/PLACEMENT  08/10/2016    HYSTERECTOMY      laparoscopic jejunostomy tube      REPLACEMENT OF GASTRIC NEUROSTIMULATOR GENERATOR N/A 3/22/2019    Procedure: REPLACEMENT, PULSE GENERATOR, NEUROSTIMULATOR, GASTRIC-battery exchange only;  Surgeon: Robert Kumar MD;  Location: [address];  Service: General;  Laterality: N/A;    REPLACEMENT OF GASTRIC NEUROSTIMULATOR GENERATOR N/A 12/30/2019    Procedure: REPLACEMENT, PULSE GENERATOR, NEUROSTIMULATOR, GASTRIC, OPEN (battery exchange);  Surgeon: Robert Kumar MD;  Location: [address];  Service: General;  Laterality: N/A;    sleeve gastrectomy      TONSILLECTOMY       Pertinent Medications:   Current Outpatient Medications on File Prior to Visit   Medication Sig Dispense Refill    b complex vitamins capsule Take 1 capsule by mouth once daily.      baclofen (LIORESAL) 10 MG tablet Take 1 tablet (10 mg total) by mouth  3 (three) times daily as needed. (Patient taking differently: Take 10 mg by mouth nightly. ) 90 tablet 11    butalbital-acetaminop-caf-cod -93-30 mg Cap TK 1 C PO Q 4 TO 6 H PRN P  3    ergocalciferol (VITAMIN D2) 50,000 unit Cap Take 50,000 Units by mouth every 7 days.      gabapentin (NEURONTIN) 300 MG capsule Take 1 capsule (300 mg total) by mouth 3 (three) times daily. 15 capsule 0    HYDROcodone-acetaminophen (NORCO) 5-325 mg per tablet Take 1 tablet by mouth every 6 (six) hours as needed for Pain. 8 tablet 0    lubiprostone (AMITIZA) 24 MCG Cap Take 1 capsule (24 mcg total) by mouth 2 (two) times daily. 60 capsule 6    mirtazapine (REMERON) 30 MG tablet Take 1 tablet (30 mg total) by mouth every evening. 30 tablet 11    multivitamin capsule Take 1 capsule by mouth once daily.      NEXIUM PACKET 40 mg GrPS 40 mg before breakfast.       promethazine (PHENERGAN) 25 MG tablet Take 1 tablet (25 mg total) by mouth every 6 (six) hours as needed for Nausea. 120 tablet 3    promethazine (PHENERGAN) 6.25 mg/5 mL syrup Take 12.5 mg by mouth every 6 (six) hours as needed for Nausea.      prucalopride 2 mg Tab Take 1 tablet by mouth once daily. 30 tablet 11     No current facility-administered medications on file prior to visit.        Vitamins/Supplements/Herbs: none   Labs  Lab Results   Component Value Date    GLU 94 11/18/2019    K 4.0 11/18/2019    PHOS 4.2 05/11/2017    MG 1.9 05/11/2017    ALBUMIN 4.8 08/27/2019    PREALBUMIN 22 08/27/2019    CALCIUM 9.5 11/18/2019     Current Outpatient Medications   Medication Sig    b complex vitamins capsule Take 1 capsule by mouth once daily.    baclofen (LIORESAL) 10 MG tablet Take 1 tablet (10 mg total) by mouth 3 (three) times daily as needed. (Patient taking differently: Take 10 mg by mouth nightly. )    butalbital-acetaminop-caf-cod -08-30 mg Cap TK 1 C PO Q 4 TO 6 H PRN P    ergocalciferol (VITAMIN D2) 50,000 unit Cap Take 50,000 Units by mouth  "every 7 days.    gabapentin (NEURONTIN) 300 MG capsule Take 1 capsule (300 mg total) by mouth 3 (three) times daily.    HYDROcodone-acetaminophen (NORCO) 5-325 mg per tablet Take 1 tablet by mouth every 6 (six) hours as needed for Pain.    lubiprostone (AMITIZA) 24 MCG Cap Take 1 capsule (24 mcg total) by mouth 2 (two) times daily.    mirtazapine (REMERON) 30 MG tablet Take 1 tablet (30 mg total) by mouth every evening.    multivitamin capsule Take 1 capsule by mouth once daily.    NEXIUM PACKET 40 mg GrPS 40 mg before breakfast.     promethazine (PHENERGAN) 25 MG tablet Take 1 tablet (25 mg total) by mouth every 6 (six) hours as needed for Nausea.    promethazine (PHENERGAN) 6.25 mg/5 mL syrup Take 12.5 mg by mouth every 6 (six) hours as needed for Nausea.    prucalopride 2 mg Tab Take 1 tablet by mouth once daily.     No current facility-administered medications for this visit.        Food intolerances or Allergies   Review of patient's allergies indicates:   Allergen Reactions    Iodine and iodide containing products Swelling, Hives, Itching, Rash and Shortness Of Breath    Domperidone Itching    Latex, natural rubber Rash    Scopolamine      Patch- caused burn under patch     Vitals - 1 value per visit 8/10/2016 8/11/2016 8/25/2016 10/7/2016   SYSTOLIC 127  115    DIASTOLIC 70  77    PULSE 56  68    TEMPERATURE 97.8  98.5    RESPIRATIONS 20      SPO2 96      Weight (lb) 138  137 130   Weight (kg) 62.596  62.143 58.968   HEIGHT 5' 5"  5' 5" 5' 6"   BODY MASS INDEX 22.96  22.8 20.98   VISIT REPORT       Pain Score   6       Vitals - 1 value per visit 10/8/2016 10/10/2016 10/20/2016 11/18/2016   SYSTOLIC 119  116 112   DIASTOLIC 84  83 77   PULSE 63  84 76   TEMPERATURE 96.6  98.5 99.2   RESPIRATIONS 16      SPO2 97      Weight (lb)   130 126   Weight (kg)   58.968 57.153   HEIGHT   5' 6" 5' 6"   BODY MASS INDEX   20.98 20.34   VISIT REPORT       Pain Score   4 8 5     Vitals - 1 value per visit " "12/16/2016 1/5/2017 1/17/2017 1/27/2017   SYSTOLIC 99 118 115    DIASTOLIC 69 79 67    PULSE 71 83 70    TEMPERATURE 98.2 98.5 98.2    RESPIRATIONS 15      SPO2 100      Weight (lb) 119 119 120 120   Weight (kg) 53.978 53.978 54.432 54.432   HEIGHT 5' 6" 5' 6" 5' 6" 5' 6"   BODY MASS INDEX 19.21 19.21 19.37 19.37   VISIT REPORT       Pain Score   8 8      Vitals - 1 value per visit 1/29/2017 2/9/2017 3/9/2017 5/10/2017 5/11/2017   SYSTOLIC 112 114 126  101   DIASTOLIC 74 79 74  64   PULSE 63 74 88  69   TEMPERATURE 98.2 98.5 98.6  98.3   RESPIRATIONS 16    16   SPO2 95    100   Weight (lb)  119 119 118    Weight (kg)  53.978 53.978 53.524    HEIGHT  5' 6" 5' 6" 5' 5"    BODY MASS INDEX  19.21 19.21 19.64    VISIT REPORT        Pain Score   0 6       Vitals - 1 value per visit 5/25/2017 6/27/2017 10/28/2017 3/15/2018   SYSTOLIC 110 123 113 106   DIASTOLIC 76 78 83 78   PULSE 80 75 71 67   TEMPERATURE 98.6 98.6 98.5 98.2   RESPIRATIONS       SPO2       Weight (lb) 115 119 110 111   Weight (kg) 52.164 53.978 49.896 50.349   HEIGHT 5' 5" 5' 5" 5' 5" 5' 5"   BODY MASS INDEX 19.14 19.8 18.3 18.47   VISIT REPORT       Pain Score    0 6     Vitals - 1 value per visit 1/3/2019 2/7/2019 3/9/2019 3/22/2019 5/11/2019   SYSTOLIC 117 130 115 123 106   DIASTOLIC 72 86 79 80 68   PULSE 72 74 74 65 69   TEMPERATURE 98.3   97.6 98.6   RESPIRATIONS    18    SPO2    100    Weight (lb) 118.17 112 110 110 108   Weight (kg) 53.6 50.803 49.896 49.896 48.988   HEIGHT 5' 5" 5' 5" 5' 5" 5' 5" 5' 5"   BODY MASS INDEX 19.66 18.64 18.3 18.3 17.97   VISIT REPORT        Pain Score  5         Vitals - 1 value per visit 8/27/2019 10/30/2019 11/13/2019 12/30/2019   SYSTOLIC 116 112  101   DIASTOLIC 79 69  64   PULSE 71 70  50   TEMPERATURE  97.7  97.8   RESPIRATIONS 16 16  18   SPO2  97  99   Weight (lb) 104.94 104 107.4 100   Weight (kg) 47.6 47.174 48.716 45.36   HEIGHT 5' 5" 5' 5" 5' 4" 5' 4"   BODY MASS INDEX 17.46 17.31 18.44 17.16   VISIT " "REPORT       Pain Score  7        Vitals - 1 value per visit 2020   SYSTOLIC 115   DIASTOLIC 67   PULSE 73   TEMPERATURE    RESPIRATIONS    SPO2    Weight (lb) 108.25   Weight (kg) 49.1   HEIGHT 5' 5"   BODY MASS INDEX 18.01   VISIT REPORT    Pain Score       Ht Readings from Last 3 Encounters:   20 5' 5" (1.651 m)   19 5' 4" (1.626 m)   19 5' 4" (1.626 m)     Wt Readings from Last 3 Encounters:   20 49.1 kg (108 lb 3.9 oz)   19 45.4 kg (100 lb)   19 48.7 kg (107 lb 6.4 oz)     Weight status: consistently decreasing    Estimated Calorie Needs : (MST Jeor x 1.3-1.4 )  1600 calories   Estimated Protein Needs : 60 grams ( 1.2 grams per kg )    Diagnoses         · Dysphagia, unspecified type   ·   · Esophagogastric junction outflow obstruction   ·   · Gastroesophageal reflux disease, esophagitis presence not specified   ·   · Early satiety   ·   · Nausea and vomiting, intractability of vomiting not specified, unspecified vomiting type   ·   · Abdominal pain, unspecified abdominal location       Smoking/alcohol:  Social History     Tobacco Use    Smoking status: Former Smoker     Packs/day: 0.00     Last attempt to quit: 2015     Years since quittin.6    Smokeless tobacco: Never Used   Substance Use Topics    Alcohol use: No     Patient Goal: stabilization of weight /prevention of further weight loss     Nutrition Diagnosis:   Weight loss related to insufficient protein /calorie intake due to vomiting  as evidenced by 30% weight loss over 48 months despite intervention of Gastric pyloroplasty , Sleeve gastrectomy ,J tube feedings, Gastric stimulator placement .    MNT Recommendations/Interventions/Goals:  Estimated Protein Needs :60 grams   Calorie Dickson St Jeor X Stress factor of 1.3 = 1600 calories   Discussion with patient included: appropriate formula to reduce chance of diarrhea   Type of product : Peptide formula -  1.5 cals/ cc initiated at half strength for " 24-48 hours 40 cc X 20  hours ; Flush of 60 cc TID   if no diarrhea to full strength in 48 hours with 100cc TID   Goal rate of 50-60 cc X 20 hours =1000- 1200cc /3314-8365 calories/60-80 grams =4- 5 cartons   Peptamen 1.5( Nestle )  , Vital 1.5 ( Vo) , [employer] ( vegan, milk free,free of allergens )     Patient demonstrated understanding: Yes     Nutrition follow-up:  By phone and my chart / copy to Dr Kumar staff     Counseling time: 1 Hour/ communication with DME representative and providing recommendations 2 hours -no charge

## 2020-04-02 ENCOUNTER — OFFICE VISIT (OUTPATIENT)
Dept: SURGERY | Facility: CLINIC | Age: 40
End: 2020-04-02
Payer: COMMERCIAL

## 2020-04-02 ENCOUNTER — PATIENT MESSAGE (OUTPATIENT)
Dept: SURGERY | Facility: CLINIC | Age: 40
End: 2020-04-02

## 2020-04-02 DIAGNOSIS — K31.84 GASTROPARESIS: Primary | ICD-10-CM

## 2020-04-02 PROCEDURE — 99213 OFFICE O/P EST LOW 20 MIN: CPT | Mod: 95,,, | Performed by: SURGERY

## 2020-04-02 PROCEDURE — 99213 PR OFFICE/OUTPT VISIT, EST, LEVL III, 20-29 MIN: ICD-10-PCS | Mod: 95,,, | Performed by: SURGERY

## 2020-04-02 NOTE — LETTER
[facility] - General Surgery  [address]  [facility] [address]  Phone: [phone] April 2, 2020      Jeancarlos Angel MD  [address]  [address]  [facility] [address]    Patient: Nena Mendez   MR Number: [id number]   YOB: 1980   Date of Visit: 4/2/2020     Dear Dr. Angel:    Thank you for referring Nena Mendez to me for evaluation. Attached you will find relevant portions of my assessment and plan of care.    Ms. Mendez is status post laparoscopic gastric neurostimulator placement, symptoms are severe and she is taking very little in PO and losing weight, small bowel motility disorder, possibly in part due to endometriosis, possible autoimmune disease, stimulator pocket pain, likely due to the wires, not much we can do about it at this time.    She is a good candidate for TPN as she is not tolerating oral feeds and did not tolerate J-tube feeds. We are awaiting follow up with gyn, Dr. Orozco and nutrition.  She is scheduled for a CT and I suggest GES.      If you have questions, please do not hesitate to call me. I look forward to following Nena Mendez along with you.    Sincerely,    Robert Kumar M.D.  Professor, [employer]  Section Head, General Surgery  [facility]    WSR[facility]anza

## 2020-04-03 ENCOUNTER — TELEPHONE (OUTPATIENT)
Dept: GASTROENTEROLOGY | Facility: CLINIC | Age: 40
End: 2020-04-03

## 2020-04-09 ENCOUNTER — PATIENT MESSAGE (OUTPATIENT)
Dept: GASTROENTEROLOGY | Facility: CLINIC | Age: 40
End: 2020-04-09

## 2020-04-09 ENCOUNTER — TELEPHONE (OUTPATIENT)
Dept: GASTROENTEROLOGY | Facility: CLINIC | Age: 40
End: 2020-04-09

## 2020-04-09 ENCOUNTER — PATIENT MESSAGE (OUTPATIENT)
Dept: RHEUMATOLOGY | Facility: CLINIC | Age: 40
End: 2020-04-09

## 2020-04-14 ENCOUNTER — OFFICE VISIT (OUTPATIENT)
Dept: RHEUMATOLOGY | Facility: CLINIC | Age: 40
End: 2020-04-14
Payer: COMMERCIAL

## 2020-04-14 ENCOUNTER — OFFICE VISIT (OUTPATIENT)
Dept: SLEEP MEDICINE | Facility: CLINIC | Age: 40
End: 2020-04-14
Payer: COMMERCIAL

## 2020-04-14 DIAGNOSIS — G47.00 INSOMNIA, UNSPECIFIED TYPE: ICD-10-CM

## 2020-04-14 DIAGNOSIS — G47.30 SLEEP APNEA, UNSPECIFIED TYPE: Primary | ICD-10-CM

## 2020-04-14 DIAGNOSIS — G89.4 CHRONIC PAIN SYNDROME: Primary | ICD-10-CM

## 2020-04-14 PROCEDURE — 99203 OFFICE O/P NEW LOW 30 MIN: CPT | Mod: 95,,, | Performed by: INTERNAL MEDICINE

## 2020-04-14 PROCEDURE — 99204 OFFICE O/P NEW MOD 45 MIN: CPT | Mod: 95,,, | Performed by: PSYCHIATRY & NEUROLOGY

## 2020-04-14 PROCEDURE — 99204 PR OFFICE/OUTPT VISIT, NEW, LEVL IV, 45-59 MIN: ICD-10-PCS | Mod: 95,,, | Performed by: PSYCHIATRY & NEUROLOGY

## 2020-04-14 PROCEDURE — 99203 PR OFFICE/OUTPT VISIT, NEW, LEVL III, 30-44 MIN: ICD-10-PCS | Mod: 95,,, | Performed by: INTERNAL MEDICINE

## 2020-04-14 RX ORDER — GABAPENTIN 300 MG/1
300 CAPSULE ORAL 3 TIMES DAILY
Qty: 90 CAPSULE | Refills: 3 | Status: SHIPPED | OUTPATIENT
Start: 2020-04-14 | End: 2020-10-09

## 2020-04-14 ASSESSMENT — ROUTINE ASSESSMENT OF PATIENT INDEX DATA (RAPID3)
PAIN SCORE: 7
FATIGUE SCORE: 7
AM STIFFNESS SCORE: 1, YES
PSYCHOLOGICAL DISTRESS SCORE: 1.1
TOTAL RAPID3 SCORE: 2.89
WHEN YOU AWAKENED IN THE MORNING OVER THE LAST WEEK, PLEASE INDICATE THE AMOUNT OF TIME IT TAKES UNTIL YOU ARE AS LIMBER AS YOU WILL BE FOR THE DAY: 2
MDHAQ FUNCTION SCORE: .2
PATIENT GLOBAL ASSESSMENT SCORE: 1

## 2020-04-14 NOTE — LETTER
April 14, 2020      Patti Orozco MD  [address]  [facility] [address]           [id number]  [address]  [facility] [address]  Phone: [phone]          Patient: Nena Mendez   MR Number: [id number]   YOB: 1980   Date of Visit: 4/14/2020       Dear Dr. Patti Orozco:    Thank you for referring Nena Mendez to me for evaluation. Attached you will find relevant portions of my assessment and plan of care.    If you have questions, please do not hesitate to call me. I look forward to following Nena Mendez along with you.    Sincerely,    Dulce Dow MD    Enclosure  CC:  No Recipients    If you would like to receive this communication electronically, please contact [email or web address] or [phone] to request more information on [facility] access.    For providers and/or their staff who would like to refer a patient to [facility], please contact us through our one-stop-shop provider referral line, [facility] [place], at [phone].    If you feel you have received this communication in error or would no longer like to receive these types of communications, please e-mail [email or web address]

## 2020-04-14 NOTE — PROGRESS NOTES
Rapid3 Question Responses and Scores 4/13/2020   MDHAQ Score 0.2   Psychologic Score 1.1   Pain Score 7   When you awakened in the morning OVER THE LAST WEEK, did you feel stiff? Yes   If Yes, please indicate the number of hours until you are as limber as you will be for the day 2   Fatigue Score 7   Global Health Score 1   RAPID3 Score 2.88       Answers for HPI/ROS submitted by the patient on 4/13/2020   fever: No  eye redness: No  headaches: Yes  shortness of breath: No  chest pain: No  trouble swallowing: No  diarrhea: No  constipation: Yes  unexpected weight change: Yes  genital sore: No  dysuria: No  During the last 3 days, have you had a skin rash?: No  Bruises or bleeds easily: Yes  cough: No

## 2020-04-14 NOTE — PATIENT INSTRUCTIONS
SLEEP LAB ([facility] or [facility]) will contact you to schedulethe sleep study. Their number is [phone] (ext 2). Please call them if you do not hear from them in 2 weeks from now.  The [facility] is located on 7th floor of the [facility]; [place] lab is located in [facility].    SLEEP CLINIC (my assistant) will call you when the sleep study results are ready - if you have not heard from us by 2 weeks from the date of the study, please call [phone] (ext 1) or you can use [facility] to contact me.    You are advised to abstain from driving should you feel sleepy or drowsy.    _________    For sleep continuity, please continue Remeron at bedtime or 30 min earlier.  Ok to add Melatonin  Will discuss further pharmacological treatment after the sleep study.

## 2020-04-14 NOTE — PROGRESS NOTES
Chief Complaint   Patient presents with    Disease Management    Pain       Patient was referred by Eleni Kirby    The patient location is: home   The chief complaint leading to consultation is: chronic joint pain  Visit type: audio visual  Total time spent with patient: 45 minutes  Each patient to whom he or she provides medical services by telemedicine is:  (1) informed of the relationship between the physician and patient and the respective role of any other health care provider with respect to management of the patient; and (2) notified that he or she may decline to receive medical services by telemedicine and may withdraw from such care at any time.    History of presenting illness    39 year old white female is s/p laparoscopic sleeve gastrectomy and then she had the neurostimulator which was then replaced in dec 2019    She has severe gastroparesis and rumination syndrome  She is suffering for 5 years     She has had joint pains for 3 years  She thought she was dehydrated a lot  She is now on TPN due to weight loss    She has severe endometriosis s/p total hysterectomy    Hips hurt  Shoulders hurt  Arms go numb into the hands  Knees hurt  She has to pop   Ankles and wrists hurt  She doesn't think she has hypermobility   She doesn't think her bones or muscles hurt    No swelling  Stiffness+  Morning stiffness+  Activity helps  Prolonged rest stiffens her    Migraines well controlled   But daily headaches    Insomnia + due to gastroparesis    No anxiety and depression    Medications  She used to take it not anymore   Hydrocodone prn  Baclofen prn  Gabapentin 300 mg tid    Labs     RF neg  JOHNIE neg  celiac disease neg  H/h 11.3/33.8  nml white and plt count       Past history : gastroparesis,migraines,endometriosis    Family history : hypothyroidism,cirrhosis  Grandmother has fibromyalgia     Social history : former smoker,quit 2015  No alcohol use        Review of Systems      No skin rashes,malar  rash,photosensitivity   No telangiectasias   No calcinosis   No psoriasis   No patchy alopecia   No oral and nasal ulcers   No dry eyes   Has dry mouth   No pleurisy or any cardiopulmonary complaints   No dysphagia,diplopia and dysphonia and muscle weakness    No raynaud's+   No digital ulcers   No cytopenias except for anemia   No renal issues   No blood clots   No fever,chills,night sweats  Low grade temp 99.9 F  No pregnancy losses/pre term deliveries /pregnancy complications   No recurrent conjunctivitis or uveitis or scleritis or episcleritis   No chronic or bloody diarrhea with no u colitis or crohn's /inflammatory bowel disease   No vaginal or urethral  d/c/STDs/no ulcers     Physical Exam    Not performed       Assessment     39 year old white female with chronic migraines/daily headaches/severe endometriosis s/p hysterectomy,gastroparesis s/p surgery and neurostimulator placement with ongoing symptoms of failure to thrive needing TPN for nutrition and weight loss comes with pain in almost all joints of her body  She has been told to have fibromyalgia  She has no pain outside the joints  Her pain is worse at rest and better with activity  She has no swelling  She has morning stiffness    Couldn't examine her and hence couldn't come to a diagnosis    JOHNIE,RF negative  But dont have a lot of work up    1. Chronic pain syndrome            New problem     Plan    Will initiate the work up  Sent CCP,CK,vit d,HLAB27,SSA,ESR,CRP,hepatitis panel  Complete it    Next time will do xrays    Examination very important    Management of the nutrition is very important  She needs hydration    Sleep hygiene     Headache management by neurology    Resume gabapentin 300 mg tid for pain control    rtc in 3 months     Nena was seen today for disease management and pain.    Diagnoses and all orders for this visit:    Chronic pain syndrome  -     Cyclic Citrullinated Peptide Antibody, IgG; Future  -     HLA B27 Antigen;  Future  -     Sjogrens syndrome-A extractable nuclear antibody; Future  -     Uric acid; Future  -     CK; Future  -     Vitamin D; Future  -     Sedimentation rate; Future  -     C-Reactive Protein; Future  -     HEPATITIS PANEL, ACUTE; Future  -     Cyclic Citrullinated Peptide Antibody, IgG  -     HLA B27 Antigen  -     Sjogrens syndrome-A extractable nuclear antibody  -     Uric acid  -     CK  -     Vitamin D  -     Sedimentation rate  -     C-Reactive Protein  -     HEPATITIS PANEL, ACUTE    Other orders  -     gabapentin (NEURONTIN) 300 MG capsule; Take 1 capsule (300 mg total) by mouth 3 (three) times daily.

## 2020-04-14 NOTE — PROGRESS NOTES
Pre-charting done. Tammy Shaw MA  Answers for HPI/ROS submitted by the patient on 4/13/2020   fever: No  eye redness: No  headaches: Yes  shortness of breath: No  chest pain: No  trouble swallowing: No  diarrhea: No  constipation: Yes  unexpected weight change: Yes  genital sore: No  dysuria: No  During the last 3 days, have you had a skin rash?: No  Bruises or bleeds easily: Yes  cough: No

## 2020-04-16 ENCOUNTER — TELEPHONE (OUTPATIENT)
Dept: SLEEP MEDICINE | Facility: OTHER | Age: 40
End: 2020-04-16

## 2020-04-19 ENCOUNTER — TELEPHONE (OUTPATIENT)
Dept: GASTROENTEROLOGY | Facility: CLINIC | Age: 40
End: 2020-04-19

## 2020-04-20 ENCOUNTER — PATIENT MESSAGE (OUTPATIENT)
Dept: NEUROLOGY | Facility: CLINIC | Age: 40
End: 2020-04-20

## 2020-04-20 ENCOUNTER — PATIENT MESSAGE (OUTPATIENT)
Dept: RHEUMATOLOGY | Facility: CLINIC | Age: 40
End: 2020-04-20

## 2020-04-20 ENCOUNTER — OFFICE VISIT (OUTPATIENT)
Dept: NEUROLOGY | Facility: CLINIC | Age: 40
End: 2020-04-20
Payer: COMMERCIAL

## 2020-04-20 ENCOUNTER — CLINICAL SUPPORT (OUTPATIENT)
Dept: GASTROENTEROLOGY | Facility: CLINIC | Age: 40
End: 2020-04-20
Payer: COMMERCIAL

## 2020-04-20 DIAGNOSIS — G43.019 INTRACTABLE MIGRAINE WITHOUT AURA AND WITHOUT STATUS MIGRAINOSUS: Primary | ICD-10-CM

## 2020-04-20 DIAGNOSIS — Z98.84 S/P LAPAROSCOPIC SLEEVE GASTRECTOMY: ICD-10-CM

## 2020-04-20 DIAGNOSIS — K94.19 JEJUNOSTOMY TUBE SITE PAIN: ICD-10-CM

## 2020-04-20 DIAGNOSIS — R11.2 NAUSEA AND VOMITING, INTRACTABILITY OF VOMITING NOT SPECIFIED, UNSPECIFIED VOMITING TYPE: ICD-10-CM

## 2020-04-20 DIAGNOSIS — K31.84 GASTROPARESIS: ICD-10-CM

## 2020-04-20 DIAGNOSIS — G43.019 INTRACTABLE MIGRAINE WITHOUT AURA AND WITHOUT STATUS MIGRAINOSUS: ICD-10-CM

## 2020-04-20 DIAGNOSIS — E46 MALNUTRITION, UNSPECIFIED TYPE: ICD-10-CM

## 2020-04-20 DIAGNOSIS — G44.40 MEDICATION OVERUSE HEADACHE: ICD-10-CM

## 2020-04-20 PROCEDURE — 97804 PR MED NUTR THER, GROUP, EA 30 MIN: ICD-10-PCS | Mod: 95,,, | Performed by: DIETITIAN, REGISTERED

## 2020-04-20 PROCEDURE — 97804 MEDICAL NUTRITION GROUP: CPT | Mod: 95,,, | Performed by: DIETITIAN, REGISTERED

## 2020-04-20 PROCEDURE — 99204 OFFICE O/P NEW MOD 45 MIN: CPT | Mod: 95,,, | Performed by: NURSE PRACTITIONER

## 2020-04-20 PROCEDURE — 99204 PR OFFICE/OUTPT VISIT, NEW, LEVL IV, 45-59 MIN: ICD-10-PCS | Mod: 95,,, | Performed by: NURSE PRACTITIONER

## 2020-04-20 NOTE — PROGRESS NOTES
NEW PATIENT CONSULT  SUBJECTIVE:  Patient ID: Nena Mendez   MRN: [id number]  Referred By: Eleni Patel  Chief Complaint: Consult    The patient location is: home  The chief complaint leading to consultation is: migraine, consult  Visit type: Virtual visit with synchronous audio and video  Total time spent with patient: 25 minutes   Each patient to whom he or she provides medical services by telemedicine is:  (1) informed of the relationship between the physician and patient and the respective role of any other health care provider with respect to management of the patient; and (2) notified that he or she may decline to receive medical services by telemedicine and may withdraw from such care at any time.    History of Present Illness:   39 y.o. female with migraines and gastroparesis, who presents for evaluation of headaches via virtual visit.  Migraines initially began around age 20 yo and have continued.  Headaches are described as a moderate to severe, stabbing pain beginning at the base of her skull extending into the temples and across her forehead R>L.  .  Headaches can last anywhere from multiple hours when successfully treated, but can last up to 2-3 in duration.  Pain can be rated anywhere from 6 to 10 out of 10, intensifying to peak over couple of hours.  More often than not she wakes up and the migraine is already there.  Associated symptoms include nausea, vomiting, photo/phonophobia.  Currently experiencing some sort of headache on 30/30 days, with migraines occurring on 15/30 days.  Start gabapentin 300 mg nightly by her rheumatologist, no change in her migraines.  Also suffers with gastroparesis.  Typically takes Fioricet on 4 out of 7 days per week.   Triggers - dehydration   Aggravating Factors - noise, light, movement   Alleviating Factors - sleep, lying still in a cold dark quiet room   Head Trauma? Infection? Fever? Cancer? Pregnancy? <-- denies   Recent Changes - denies   Sleep - wakes up  multiple times per night with abdominal pain, nausea, vomiting   Family Hx of Migraines <-- denies     Treatments Tried and Response  Gabapentin 300 mg   reglan   Magnesium   zofran   fioricet w/ codeine   tpoiramate 25 mg BID - no   Sumatriptan   Amitriptyline   sprix NS   zomig NS   maxalt -   emgality   nurtec odt     Social History - reviewed     Current Medications:    b complex vitamins capsule, Take 1 capsule by mouth once daily., Disp: , Rfl:     butalbital-acetaminop-caf-cod -34-30 mg Cap, TK 1 C PO Q 4 TO 6 H PRN P, Disp: , Rfl: 3    ergocalciferol (VITAMIN D2) 50,000 unit Cap, Take 50,000 Units by mouth every 7 days., Disp: , Rfl:     gabapentin (NEURONTIN) 300 MG capsule, Take 1 capsule (300 mg total) by mouth 3 (three) times daily., Disp: 90 capsule, Rfl: 3    lubiprostone (AMITIZA) 24 MCG Cap, Take 1 capsule (24 mcg total) by mouth 2 (two) times daily., Disp: 60 capsule, Rfl: 6    mirtazapine (REMERON) 30 MG tablet, Take 1 tablet (30 mg total) by mouth every evening., Disp: 30 tablet, Rfl: 11    multivitamin capsule, Take 1 capsule by mouth once daily., Disp: , Rfl:     NEXIUM PACKET 40 mg GrPS, 40 mg before breakfast. , Disp: , Rfl:     promethazine (PHENERGAN) 25 MG tablet, Take 1 tablet (25 mg total) by mouth every 6 (six) hours as needed for Nausea., Disp: 120 tablet, Rfl: 3    promethazine (PHENERGAN) 6.25 mg/5 mL syrup, Take 12.5 mg by mouth every 6 (six) hours as needed for Nausea., Disp: , Rfl:     prucalopride 2 mg Tab, Take 1 tablet by mouth once daily., Disp: 30 tablet, Rfl: 11    baclofen (LIORESAL) 10 MG tablet, Take 1 tablet (10 mg total) by mouth 3 (three) times daily as needed. (Patient taking differently: Take 10 mg by mouth nightly. ), Disp: 90 tablet, Rfl: 11    gabapentin (NEURONTIN) 300 MG capsule, Take 1 capsule (300 mg total) by mouth 3 (three) times daily. (Patient not taking: Reported on 4/14/2020), Disp: 15 capsule, Rfl: 0    galcanezumab-gnlm 120 mg/mL  PnIj, Inject 240 mg into the skin once. for 1 dose, Disp: 2 mL, Rfl: 0    galcanezumab-gnlm 120 mg/mL PnIj, Inject 120 mg into the skin every 28 days. Begin 28 days after loading dose., Disp: 1 Syringe, Rfl: 10    HYDROcodone-acetaminophen (NORCO) 5-325 mg per tablet, Take 1 tablet by mouth every 6 (six) hours as needed for Pain., Disp: 8 tablet, Rfl: 0    rimegepant (NURTEC) ODT 75 mg, Take 1 tablet (75 mg total) by mouth daily as needed for Migraine. Place ODT tablet on the tongue; alternatively the ODT tablet may be placed under the tongue, Disp: 8 tablet, Rfl: 2    Review of Systems - as per HPI, otherwise a balanced 10 systems review is negative.    OBJECTIVE:  Vitals:  There were no vitals taken for this visit.     Physical Exam:  Constitutional: she appears well-developed and well-nourished. she is well groomed. NAD.   HENT:    Head: Normocephalic and atraumatic, oral and nasal mucosa intact.    Eyes: Eyelids normal.  Conjunctivae and EOM are normal.   Resp: Respiratory effort is normal.   Musculoskeletal: Normal range of motion. No joint stiffness observed.   Skin: Skin is warm and dry.  Psychiatric: Normal mood and affect.  Neuro: Patient is alert and oriented to person, place and time.  Speech is clear and fluent. Recent and remote memory intact.  Moves all 4 extremities against gravity. Gait and station normal.  Hearing is grossly intact to speech.    Review of Data:   Notes from [facility] via care everywhere reviewed   Notes from GI, rheumatology, sleep medicine  Labs:  No visits with results within 3 Month(s) from this visit.   Latest known visit with results is:   Admission on 2019, Discharged on 2019   Component Date Value Ref Range Status    Final Pathologic Diagnosis 2019 GROSS DIAGNOSIS:  EXPLANTED BATTERY   Final    Gross 2019    Final                    Value::  [id number]     MRN:  [id number]      Pathology ID: [id number]     received fresh labeled as  ""explanted battery" is a single silver metallic  medical device, consistent with battery (6.0 x 5.5 x 1.1 cm).  The specimen  is grossly intact, with no defects present.  No adherent soft tissue is  present. The following serial numbers are grossly identified:  "3o oO 2o o1  [employer]  ENTERRA II SN [id number]."   The specimen is submitted for  gross identification only.    -Whitney Moise     Results for CHRISTIE GUAJARDO (MRN [id number]) as of 4/20/2020 10:36   Ref. Range 8/27/2019 12:19 11/18/2019 16:59 12/23/2019 07:53   WBC Latest Ref Range: 4.6 - 10.2 10*3/uL  6.3    WBC Latest Ref Range: 3.90 - 12.70 K/uL 5.31     RBC Latest Ref Range: 4.00 - 5.40 M/uL 4.35     Hemoglobin Latest Ref Range: 12.0 - 16.0 g/dL 12.9 11.3 (L)    Hematocrit Latest Ref Range: 37.0 - 47.0 % 39.1 33.8 (L)    MCV Latest Ref Range: 80 - 97 fL 90 88.0    MCH Latest Ref Range: 27.0 - 31.2 pg 29.7 29.4    MCHC Latest Ref Range: 31.8 - 35.4 g/dL 33.0 33.4    RDW RBC Auto-Rto Latest Ref Range: 12.5 - 18.0 %  13.1    RDW Latest Ref Range: 11.5 - 14.5 % 12.7     Platelets Latest Ref Range: 142 - 424 10*3/uL 291 359    MPV Latest Ref Range: 9.2 - 12.9 fL 9.1 (L)     Gran% Latest Ref Range: 38.0 - 73.0 % 39.9     Gran # (ANC) Latest Ref Range: 1.8 - 7.7 K/uL 2.1     Lymph% Latest Ref Range: 18.0 - 48.0 % 50.8 (H)     Lymph # Latest Ref Range: 1.0 - 4.8 K/uL 2.7     Mono% Latest Ref Range: 4.0 - 15.0 % 6.8     Mono # Latest Ref Range: 0.3 - 1.0 K/uL 0.4     Eosinophil% Latest Ref Range: 0.0 - 8.0 % 1.5     Eos # Latest Ref Range: 0.0 - 0.5 K/uL 0.1     Basophil% Latest Ref Range: 0.0 - 1.9 % 0.8     Baso # Latest Ref Range: 0.00 - 0.20 K/uL 0.04     nRBC Latest Ref Range: 0 /100 WBC 0     Manual nRBC per 100 Cells Latest Units: %  0.0    Differential Method Unknown Automated     Immature Grans (Abs) Latest Ref Range: 0.00 - 0.04 K/uL 0.01     Immature Granulocytes Latest Ref Range: 0.0 - 0.5 % 0.2     Iron Latest Ref Range: 30 - 160 ug/dL 96   "   TIBC Latest Ref Range: 250 - 450 ug/dL 363     Saturated Iron Latest Ref Range: 20 - 50 % 26     Transferrin Latest Ref Range: 200 - 375 mg/dL 245     Ferritin Latest Ref Range: 20.0 - 300.0 ng/mL 147     Sodium Latest Ref Range: 135 - 145 mmol/L 143 141    Potassium Latest Ref Range: 3.6 - 5.2 mmol/L 4.0 4.0    Chloride Latest Ref Range: 100 - 108 mmol/L 105 102    CO2 Latest Ref Range: 21 - 32 mmol/L 30 (H) 30    Anion Gap Latest Ref Range: 8 - 16 mmol/L 8     BUN, Bld Latest Ref Range: 7 - 18 mg/dL 3 (L) 7    Creatinine Latest Ref Range: 0.55 - 1.02 mg/dL 0.7 0.58    BUN/Creatinine Ratio Latest Ref Range: 7 - 18 Ratio  12.06    eGFR if non African American Latest Ref Range: >60 mL/min/1.73 m^2 >60.0     eGFR if African American Latest Ref Range: >60 mL/min/1.73 m^2 >60.0     Glucose Latest Ref Range: 70 - 110 mg/dL 87 94    Calcium Latest Ref Range: 8.8 - 10.5 mg/dL 10.2 9.5    Alkaline Phosphatase Latest Ref Range: 55 - 135 U/L 96     PROTEIN TOTAL Latest Ref Range: 6.0 - 8.4 g/dL 7.4     Albumin Latest Ref Range: 3.5 - 5.2 g/dL 4.8     Prealbumin Latest Ref Range: 20 - 43 mg/dL 22     BILIRUBIN TOTAL Latest Ref Range: 0.1 - 1.0 mg/dL 0.4     AST Latest Ref Range: 10 - 40 U/L 22     ALT Latest Ref Range: 10 - 44 U/L 17     Cortisol -8 AM Latest Ref Range: 4.30 - 22.40 ug/dL   12.10   TSH Latest Ref Range: 0.400 - 4.000 uIU/mL 1.073     JOHNIE Screen Latest Ref Range: Negative <1:160  Negative <1:160     TTG IgA Latest Ref Range: <20 UNITS 2     Rheumatoid Factor Latest Ref Range: 0.0 - 15.0 IU/mL 11.0     Basophils/100 leukocytes Latest Ref Range: 0 - 3 %  0.5    Eosinophils/100 leukocytes Latest Ref Range: 1 - 3 %  1.1    Lymphocytes/100 leukocytes Latest Ref Range: 25 - 40 %  40.3 (H)    Monocytes/100 leukocytes Latest Ref Range: 1 - 15 %  6.7    Neutrophils/100 leukocytes Latest Ref Range: 37 - 80 %  51.2      Imaging:  Results from MRV:   Result Narrative   Exam: Intracranial MRV without and with IV  contrast  History:Migraine with aura without status migrainous, evaluate for central  venous thrombosis, pain radiates to back of head.  Comparison studies:None    Technique:   Sagittal precontrast opacity encoded velocity encoded and postcontrast  sagittal 3-D time-of-flight intracranial venograms. Axial and coronal and  MIP reconstructed images were created from the sagittal source data.    Contrast: 7 cc Gadavist.    Findings:    Superficial system:    Superior sagittal sinus:  Patent, no filling defects.    Transverse sinuses:  Patent, no filling defects.    Sigmoid sinuses:  Patent, no filling defects.    Jugular veins:  Patent, no filling defects.  Right is slightly dominant.    Deep system:    Internal cerebral veins:  Patent, no filling defects.    Vein of Lucas:  Patent, no filling defects.      IMPRESSION:    No intracranial MR venogram abnormalities.      Note: I have independently reviewed any/all imaging/labs/tests and agree with the report (s) as documented.  Any discrepancies will be as noted/demarcated by free text.  HOLLIS BELTRAN 4/20/2020    ASSESSMENT:  1. Intractable migraine without aura and without status migrainosus    2. Chronic migraine    3. Medication overuse headache    4. Gastroparesis      PLAN:  - Discussed symptoms appear to be consistent with chronic migraine complicated by medication overuse secondary to fioricet, discussed treatment options and patient agreed with the following plan:  - Start Emgality 240 mg SQ loading dose followed by 120 mg SQ monthly injections.   - If no improvement after 6 weeks, will seek approval to begin Botox injections for chronic migraine   - For migraine abortive - trial nurtec 75 mg odt once daily as needed for migraine    - Start tracking headaches via Migraine Buddy claudia on phone   - Risks, benefits, and potential side effects of emgality discussed  - Alternative treatment options offered   - gastroparesis - management per GI  - RTC in 6 weeks or sooner  if needed      Orders Placed This Encounter    galcanezumab-gnlm 120 mg/mL PnIj    galcanezumab-gnlm 120 mg/mL PnIj    rimegepant (NURTEC) ODT 75 mg       I have discussed realistic goals of care with patient at length as well as medication options, and need for lifestyle adjustment. I have explained that treatment will take time. We have agreed that the goal will be to reduce frequency/intensity/quantity of HA, not to be completely HA free. I have explained my non narcotic policy regarding headache treatment.    Patient to track frequency of headaches.  Patient agreeable to work on lifestyle adjustments.    Discussed potential for teratogenicity with treatment, patient understands if her family planning status should change she will contact office immediately and we will safely adjust medications as needed.     Questions and concerns were sought and answered to the patient's stated verbal satisfaction.  The patient verbalizes understanding and agreement with the above stated treatment plan.     CC: MD Keri Maria, FNP-C  [facility]  [phone]    Dr. Whyte was available during today's encounter.

## 2020-04-20 NOTE — PROGRESS NOTES
Virtual Visit   The patient location is: car on way to infusion center to address TPN tubing change   The chief complaint leading to consultation is: nausea/vomiting / intolerance of J tubing /TPN initiation for one month to followed by GP - npo during 1 month -2 months.   Visit type: Virtual visit with synchronous audio and video  Total time spent with patient: 45 minutes   Each patient to whom he or she provides medical services by telemedicine is:  (1) informed of the relationship between the physician and patient and the respective role of any other health care provider with respect to management of the patient; and (2) notified that he or she may decline to receive medical services by telemedicine and may withdraw from such care at any time.    Notes: Patient driven by  in car ; empty waiting room     GI NUTRITIONAL ASSESSMENT  Referring Provider: Dr. Mary Barrett Nesha Mendez is a 39 y.o. female referred to  regarding the following problems:  Reason for Visit: Follow -up to telephone consult 3/31/20 ( calorie needs/protein needs )  and multiple telephone visits to coordinate with Dr Kumar's office / DME Nutrition assessment and recommendations related to: declining weight due to J tube intolerance option of TPN support for short -term (1-2 months per local GP who has been managing IV infusions )  Chief Complaint   Patient presents with    Dysphagia     reports normal diet prior to severe migraine which presented with stroke symptoms in [place] ; all foods vomited but solids trigger more forceful vomiting than liquids and depletion of liquid and electrolytes      Rumination     has not been able to keep anything retained in stomach -especially with migraines -Neurologist recommended new infusion product ; has not followed up with therapist .   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     Migraines  "    onset of migraines preceded gastroparesis ; virtual visit with neurology today     Sleep Apnea     plan to do home study but must  equipment one day and return the next - pt llives 4 hours away     Sleeve gastrectomy /gastric pacer       Symptoms: "Gut Gurgling " since NPO /TPN line started last Wednesday ( reports  Pepcid like med added to TPN)                       Abdominal discomfort but less intense                       Clear liquid stool                       Reports no vomiting                       Reports mild nausea -resolving     Previous iv infusions managed by GP contained - Vit D, Thiamine .       Attending Visit: spouse driving car    Medical/Surgical History  Pertinent Social History:  Social History     Tobacco Use    Smoking status: Former Smoker     Packs/day: 0.00     Last attempt to quit: 2015     Years since quittin.6    Smokeless tobacco: Never Used   Substance Use Topics    Alcohol use: No    Drug use: No        Previous Medical History:  Past Medical History:   Diagnosis Date    Gastroparesis     Headache        Previous Surgical History:  Past Surgical History:   Procedure Laterality Date    APPENDECTOMY       SECTION      CHOLECYSTECTOMY      COSMETIC SURGERY      ESOPHAGEAL MANOMETRY WITH MEASUREMENT OF IMPEDANCE N/A 10/30/2019    Procedure: MANOMETRY, ESOPHAGUS, WITH IMPEDANCE MEASUREMENT;  Surgeon: Patti Orozco MD;  Location: [address]);  Service: Endoscopy;  Laterality: N/A;  LATEX ALLERGY  r/o rumination  Motility Studies:  Hold Narcotics x 1 days   Hold TCA x 1 days  10/24 - pt confirmed appt-BB    ESOPHAGOGASTRODUODENOSCOPY N/A 3/22/2019    Procedure: EGD (ESOPHAGOGASTRODUODENOSCOPY)-dual lumen scope to remove intragastric suture.;  Surgeon: Robert Kumar MD;  Location: [address];  Service: General;  Laterality: N/A;    ESOPHAGOGASTRODUODENOSCOPY N/A 10/30/2019    Procedure: EGD (ESOPHAGOGASTRODUODENOSCOPY);  " "Surgeon: Patti Orozco MD;  Location: [facility] (2ND FLR);  Service: Endoscopy;  Laterality: N/A;  LATEX ALLERGY  EGD with [facility]   4th Floor, scheduled on 2nd floor due to availability  Full liquid diet 3 days and clear liquid diet x 1 day prior to procedure  Propofol only. No fentanyl or benzodiazepine during sedation. If additional sedation need    GASTRIC STIMULATOR IMPLANT SURGERY      GASTROSTOMY-JEJEUNOSTOMY TUBE CHANGE/PLACEMENT  08/10/2016    HYSTERECTOMY      laparoscopic jejunostomy tube      REPLACEMENT OF GASTRIC NEUROSTIMULATOR GENERATOR N/A 3/22/2019    Procedure: REPLACEMENT, PULSE GENERATOR, NEUROSTIMULATOR, GASTRIC-battery exchange only;  Surgeon: Robert Kumar MD;  [facility];  Service: General;  Laterality: N/A;    REPLACEMENT OF GASTRIC NEUROSTIMULATOR GENERATOR N/A 12/30/2019    Procedure: REPLACEMENT, PULSE GENERATOR, NEUROSTIMULATOR, GASTRIC, OPEN (battery exchange);  Surgeon: Robert Kumar MD;  Location: [facility] OR [address] [place] [address];  Service: General;  Laterality: N/A;    sleeve gastrectomy      TONSILLECTOMY     GI  procedure chronology   Gastric stim battery replacement 12/30/2019  Gastric pacemaker change battery (03/22/2019)  Gastric sleeve (05/15/2017)  Pyloroplasty (01/27/2017)  J-tube inserted (12/16/2016)  Gastric stimulator placement (10/07/2016)  J tube placement (08/10/2016)  Cholecystectomy (9 yrs ago)   Anthropometric Data Home weight at time of TPN start : 97 #   99#  1-2 days later ( hydration ?)    No updated weight since 3/31/20 phone call   Estimated body mass index is 18.01 kg/m² as calculated from the following:    Height as of 2/14/20: 5' 5" (1.651 m).    Weight as of 2/14/20: 49.1 kg (108 lb 3.9 oz).    Weight History:  Wt Readings from Last 12 Encounters:   02/14/20 49.1 kg (108 lb 3.9 oz)   12/30/19 45.4 kg (100 lb)   11/13/19 48.7 kg (107 lb 6.4 oz)   10/30/19 47.2 kg (104 lb)   08/27/19 47.6 kg (104 lb 15 oz)   05/11/19 49 kg (108 " lb)   03/22/19 49.9 kg (110 lb)   03/09/19 49.9 kg (110 lb)   02/07/19 50.8 kg (112 lb)   01/03/19 53.6 kg (118 lb 2.7 oz)   03/15/18 50.3 kg (111 lb)   10/28/17 49.9 kg (110 lb)   ]  Nutrition Focused Physical Findings:   Unable to assess via video    3/31/20 telephone note     Estimated Protein Needs :60 grams ( based upon  Weight of 3/20 49.1kg ) 1.2 grams per kg   Calorie [address] Jeor X Stress factor of 1.3 = 1600 calories   Fluids : 25cc per kg - 30cc per kg goal 0509-6630 cc   J -tube   Discussion with patient included: appropriate formula to reduce chance of diarrhea   Type of product : Peptide formula -  1.5 cals/ cc initiated at half strength for 24-48 hours 40 cc X 20  hours ; Flush of 60 cc TID   if no diarrhea to full strength in 48 hours with 100cc TID   Goal rate of 50-60 cc X 20 hours =1000- 1200cc /8312-9774 calories/60-80 grams =4- 5 cartons   Peptamen 1.5( [employer] )  , Vital 1.5 ( Vo) , [employer] ( vegan, milk free,free of allergens )      Meds  and Biochemical Data   Labs  Lab Results   Component Value Date    GLU 94 11/18/2019    K 4.0 11/18/2019    PHOS 4.2 05/11/2017    MG 1.9 05/11/2017    ALBUMIN 4.8 08/27/2019    PREALBUMIN 22 08/27/2019    CALCIUM 9.5 11/18/2019      Current Outpatient Medications   Medication Sig    b complex vitamins capsule Take 1 capsule by mouth once daily.    baclofen (LIORESAL) 10 MG tablet Take 1 tablet (10 mg total) by mouth 3 (three) times daily as needed. (Patient taking differently: Take 10 mg by mouth nightly. )    butalbital-acetaminop-caf-cod -51-30 mg Cap TK 1 C PO Q 4 TO 6 H PRN P    ergocalciferol (VITAMIN D2) 50,000 unit Cap Take 50,000 Units by mouth every 7 days.    gabapentin (NEURONTIN) 300 MG capsule Take 1 capsule (300 mg total) by mouth 3 (three) times daily. (Patient not taking: Reported on 4/14/2020)    gabapentin (NEURONTIN) 300 MG capsule Take 1 capsule (300 mg total) by mouth 3 (three) times daily.    galcanezumab-[facility] 120  mg/mL PnIj Inject 240 mg into the skin once. for 1 dose    galcanezumab-gnlm 120 mg/mL PnIj Inject 120 mg into the skin every 28 days. Begin 28 days after loading dose.    HYDROcodone-acetaminophen (NORCO) 5-325 mg per tablet Take 1 tablet by mouth every 6 (six) hours as needed for Pain.    lubiprostone (AMITIZA) 24 MCG Cap Take 1 capsule (24 mcg total) by mouth 2 (two) times daily.    mirtazapine (REMERON) 30 MG tablet Take 1 tablet (30 mg total) by mouth every evening.    multivitamin capsule Take 1 capsule by mouth once daily.    NEXIUM PACKET 40 mg GrPS 40 mg before breakfast.     promethazine (PHENERGAN) 25 MG tablet Take 1 tablet (25 mg total) by mouth every 6 (six) hours as needed for Nausea.    promethazine (PHENERGAN) 6.25 mg/5 mL syrup Take 12.5 mg by mouth every 6 (six) hours as needed for Nausea.    prucalopride 2 mg Tab Take 1 tablet by mouth once daily.    rimegepant (NURTEC) ODT 75 mg Take 1 tablet (75 mg total) by mouth daily as needed for Migraine. Place ODT tablet on the tongue; alternatively the ODT tablet may be placed under the tongue     No current facility-administered medications for this visit.      Lab Results   Component Value Date    WBC 6.3 11/18/2019    HGB 11.3 (L) 11/18/2019    HCT 33.8 (L) 11/18/2019    MCV 90 08/27/2019     08/27/2019     Lab Results   Component Value Date    FERRITIN 147 08/27/2019     Lab Results   Component Value Date     11/18/2019    K 4.0 11/18/2019     11/18/2019    CO2 30 11/18/2019    GLU 94 11/18/2019    BUN 7 11/18/2019    CREATININE 0.58 11/18/2019    CALCIUM 9.5 11/18/2019    PROT 7.4 08/27/2019    ALBUMIN 4.8 08/27/2019    BILITOT 0.4 08/27/2019    ALKPHOS 96 08/27/2019    AST 22 08/27/2019    ALT 17 08/27/2019     Lab Results   Component Value Date    TSH 1.073 08/27/2019     No results found for: SEDRATE  No results found for: CRP  No results found for: LABA1C, HGBA1C  Glucose   Date Value Ref Range Status   11/18/2019 94  70 - 110 mg/dL Final   08/27/2019 87 70 - 110 mg/dL Final     BUN, Bld   Date Value Ref Range Status   11/18/2019 7 7 - 18 mg/dL Final   08/27/2019 3 (L) 6 - 20 mg/dL Final     Creatinine   Date Value Ref Range Status   11/18/2019 0.58 0.55 - 1.02 mg/dL Final   08/27/2019 0.7 0.5 - 1.4 mg/dL Final     Sodium   Date Value Ref Range Status   11/18/2019 141 135 - 145 mmol/L Final   08/27/2019 143 136 - 145 mmol/L Final     Potassium   Date Value Ref Range Status   11/18/2019 4.0 3.6 - 5.2 mmol/L Final   08/27/2019 4.0 3.5 - 5.1 mmol/L Final     Phosphorus   Date Value Ref Range Status   05/11/2017 4.2 2.7 - 4.5 mg/dL Final   01/29/2017 3.2 2.7 - 4.5 mg/dL Final     Calcium   Date Value Ref Range Status   11/18/2019 9.5 8.8 - 10.5 mg/dL Final   08/27/2019 10.2 8.7 - 10.5 mg/dL Final     Prealbumin   Date Value Ref Range Status   08/27/2019 22 20 - 43 mg/dL Final     Albumin   Date Value Ref Range Status   08/27/2019 4.8 3.5 - 5.2 g/dL Final   01/29/2017 2.9 (L) 3.5 - 5.2 g/dL Final     Total Protein   Date Value Ref Range Status   08/27/2019 7.4 6.0 - 8.4 g/dL Final   01/29/2017 5.2 (L) 6.0 - 8.4 g/dL Final     No results found for: CHOL  No results found for: HGBA1C  Hemoglobin   Date Value Ref Range Status   11/18/2019 11.3 (L) 12.0 - 16.0 g/dL Final   08/27/2019 12.9 12.0 - 16.0 g/dL Final     Hematocrit   Date Value Ref Range Status   11/18/2019 33.8 (L) 37.0 - 47.0 % Final   08/27/2019 39.1 37.0 - 48.5 % Final     Iron   Date Value Ref Range Status   08/27/2019 96 30 - 160 ug/dL Final     No results found for: FOLATE  WBC   Date Value Ref Range Status   11/18/2019 6.3 4.6 - 10.2 10*3/uL Final   08/27/2019 5.31 3.90 - 12.70 K/uL Final   05/11/2017 8.92 3.90 - 12.70 K/uL Final       No components found for: COSYYROL12  No components found for: DNNZKPFO47  No components found for: VITAMIND2  No components found for: VITAMIND    Lab Results   Component Value Date    TSH 1.073 08/27/2019     Lab Results   Component  Value Date    FERRITIN 147 08/27/2019     No results found for: CRP  No results found for: SEDRATE  Assessment of Lab Values:       Medication:  Current Outpatient Medications   Medication Sig    b complex vitamins capsule Take 1 capsule by mouth once daily.    baclofen (LIORESAL) 10 MG tablet Take 1 tablet (10 mg total) by mouth 3 (three) times daily as needed. (Patient taking differently: Take 10 mg by mouth nightly. )    butalbital-acetaminop-caf-cod -14-30 mg Cap TK 1 C PO Q 4 TO 6 H PRN P    ergocalciferol (VITAMIN D2) 50,000 unit Cap Take 50,000 Units by mouth every 7 days.    gabapentin (NEURONTIN) 300 MG capsule Take 1 capsule (300 mg total) by mouth 3 (three) times daily. (Patient not taking: Reported on 4/14/2020)    gabapentin (NEURONTIN) 300 MG capsule Take 1 capsule (300 mg total) by mouth 3 (three) times daily.    galcanezumab-gnlm 120 mg/mL PnIj Inject 240 mg into the skin once. for 1 dose    galcanezumab-gnlm 120 mg/mL PnIj Inject 120 mg into the skin every 28 days. Begin 28 days after loading dose.    HYDROcodone-acetaminophen (NORCO) 5-325 mg per tablet Take 1 tablet by mouth every 6 (six) hours as needed for Pain.    lubiprostone (AMITIZA) 24 MCG Cap Take 1 capsule (24 mcg total) by mouth 2 (two) times daily.    mirtazapine (REMERON) 30 MG tablet Take 1 tablet (30 mg total) by mouth every evening.    multivitamin capsule Take 1 capsule by mouth once daily.    NEXIUM PACKET 40 mg GrPS 40 mg before breakfast.     promethazine (PHENERGAN) 25 MG tablet Take 1 tablet (25 mg total) by mouth every 6 (six) hours as needed for Nausea.    promethazine (PHENERGAN) 6.25 mg/5 mL syrup Take 12.5 mg by mouth every 6 (six) hours as needed for Nausea.    prucalopride 2 mg Tab Take 1 tablet by mouth once daily.    rimegepant (NURTEC) ODT 75 mg Take 1 tablet (75 mg total) by mouth daily as needed for Migraine. Place ODT tablet on the tongue; alternatively the ODT tablet may be placed under the  "tongue     No current facility-administered medications for this visit.      Nutritionally significant meds: reports "stomach calming like pepcid additive to my TPN has calmed down my stomach "  Vitamin/Supplements/Herbs: via TPN   Potential Food drug Interaction:   Allergies:  Review of patient's allergies indicates:   Allergen Reactions    Iodine and iodide containing products Swelling, Hives, Itching, Rash and Shortness Of Breath    Domperidone Itching    Latex, natural rubber Rash    Scopolamine      Patch- caused burn under patch      Dietary Data  Current Diet: NPO     Supplements/ MVI: reports IV infusions per GP prior to TPN initiation supplemented with Vit D and and Thiamine   Review of patient's allergies indicates:   Allergen Reactions    Iodine and iodide containing products Swelling, Hives, Itching, Rash and Shortness Of Breath    Domperidone Itching    Latex, natural rubber Rash    Scopolamine      Patch- caused burn under patch      Nutrition Diagnosis:   Weight loss related to insufficient protein /calorie intake due to vomiting  as evidenced by 30% weight loss over 48 months despite intervention of Gastric pyloroplasty , Sleeve gastrectomy ,J tube feedings, Gastric stimulator placement .     Plan:  Monitoring Nutrition status while on TPN  :  · Will communicate with Namrata Garcia with [facility]   · phone [phone] ( [place], [place] )  regarding current formula composition , weight changes, labs available etc ;   · Fax number for Enteral feedings if reinstated [phone] ( [place], [place] )    · Will share communication with Dr Orozco's staff   Education Provided by email : none at this time as patient is NPO   Monitoring: Weight , labs, as available through [facility] ( call placed to Namrata Garcia)    Follow-up: per Dr Orozco request   Counseling Time: 1 hour   Answers for HPI/ROS submitted by the patient on 4/19/2020   fever: Yes  chills: Yes  weight loss: Yes  eye " pain: No  eye redness: No  trouble swallowing: No  chest pain: No  shortness of breath: No  cough: No  rash: No  abdominal pain: Yes  nausea: Yes  vomiting: Yes  Feeling depressed?: No  nervous/ anxious: No  heartburn: Yes  Joint pain? : Yes  dysuria: No  hematuria: No  back pain: Yes    Answers for HPI/ROS submitted by the patient on 4/19/2020   fever: Yes  chills: Yes  weight loss: Yes  eye pain: No  eye redness: No  trouble swallowing: No  chest pain: No  shortness of breath: No  cough: No  rash: No  abdominal pain: Yes  nausea: Yes  vomiting: Yes  Feeling depressed?: No  nervous/ anxious: No  heartburn: Yes  Joint pain? : Yes  dysuria: No  hematuria: No  back pain: Yes

## 2020-04-20 NOTE — LETTER
April 20, 2020      Eleni Patel NP  [address]  [facility] [address]           [facility]  [address]  [facility] [address]  Phone: [phone]  Fax: [phone]          Patient: Nena Mendez   MR Number: [id number]   YOB: 1980   Date of Visit: 4/20/2020       Dear Eleni Patel:    Thank you for referring Nena Mendez to me for evaluation. Attached you will find relevant portions of my assessment and plan of care.    If you have questions, please do not hesitate to call me. I look forward to following Nena Mendez along with you.    Sincerely,    Keri Bone, [facility]    Enclosure  CC:  No Recipients    If you would like to receive this communication electronically, please contact [email or web address] or [phone] to request more information on [email or web address] Link access.    For providers and/or their staff who would like to refer a patient to [facility], please contact us through our one-stop-shop provider referral line, [facility], at [phone].    If you feel you have received this communication in error or would no longer like to receive these types of communications, please e-mail [email or web address]

## 2020-04-21 ENCOUNTER — PATIENT MESSAGE (OUTPATIENT)
Dept: NEUROLOGY | Facility: CLINIC | Age: 40
End: 2020-04-21

## 2020-04-23 ENCOUNTER — TELEPHONE (OUTPATIENT)
Dept: NEUROLOGY | Facility: CLINIC | Age: 40
End: 2020-04-23

## 2020-04-23 NOTE — TELEPHONE ENCOUNTER
Received message from [facility] and they state that her Nurtec is not covered by her insurance.Insurance recommends- Sumatriptan,Naratriptan,Frovatriptan,Zolmitriptan.Please advise.Thank you!

## 2020-04-24 ENCOUNTER — TELEPHONE (OUTPATIENT)
Dept: PHARMACY | Facility: CLINIC | Age: 40
End: 2020-04-24

## 2020-05-01 ENCOUNTER — TELEPHONE (OUTPATIENT)
Dept: PHARMACY | Facility: CLINIC | Age: 40
End: 2020-05-01

## 2020-05-04 ENCOUNTER — TELEPHONE (OUTPATIENT)
Dept: SLEEP MEDICINE | Facility: OTHER | Age: 40
End: 2020-05-04

## 2020-05-04 ENCOUNTER — OFFICE VISIT (OUTPATIENT)
Dept: GASTROENTEROLOGY | Facility: CLINIC | Age: 40
End: 2020-05-04
Payer: COMMERCIAL

## 2020-05-04 DIAGNOSIS — K21.9 GASTROESOPHAGEAL REFLUX DISEASE, ESOPHAGITIS PRESENCE NOT SPECIFIED: ICD-10-CM

## 2020-05-04 DIAGNOSIS — R10.9 ABDOMINAL PAIN, UNSPECIFIED ABDOMINAL LOCATION: ICD-10-CM

## 2020-05-04 DIAGNOSIS — R11.2 NAUSEA AND VOMITING, INTRACTABILITY OF VOMITING NOT SPECIFIED, UNSPECIFIED VOMITING TYPE: ICD-10-CM

## 2020-05-04 DIAGNOSIS — N80.9 ENDOMETRIOSIS: Primary | ICD-10-CM

## 2020-05-04 DIAGNOSIS — K59.00 CONSTIPATION, UNSPECIFIED CONSTIPATION TYPE: ICD-10-CM

## 2020-05-04 DIAGNOSIS — R13.10 DYSPHAGIA, UNSPECIFIED TYPE: ICD-10-CM

## 2020-05-04 PROCEDURE — 99215 OFFICE O/P EST HI 40 MIN: CPT | Mod: 95,,, | Performed by: INTERNAL MEDICINE

## 2020-05-04 PROCEDURE — 99215 PR OFFICE/OUTPT VISIT, EST, LEVL V, 40-54 MIN: ICD-10-PCS | Mod: 95,,, | Performed by: INTERNAL MEDICINE

## 2020-05-04 RX ORDER — MIRTAZAPINE 30 MG/1
30 TABLET, FILM COATED ORAL NIGHTLY
Qty: 30 TABLET | Refills: 11 | Status: SHIPPED | OUTPATIENT
Start: 2020-05-04 | End: 2021-12-06

## 2020-05-04 RX ORDER — DICYCLOMINE HYDROCHLORIDE 20 MG/1
20 TABLET ORAL 3 TIMES DAILY PRN
Qty: 90 TABLET | Refills: 6 | Status: SHIPPED | OUTPATIENT
Start: 2020-05-04 | End: 2020-06-03

## 2020-05-04 NOTE — PATIENT INSTRUCTIONS
-Complete esophagogram  -Use zofran 8 mg up to three times per day as needed for nausea and vomiting  -Use phenergan 25mg up to three times per day as needed for nausea and vomiting  -Try iberogast 5 to 20 drops (1 mL), 3 times a day for abdominal pain, nausea, vomiting, bloating.  The duration of taking Iberogast depends on the severity of your symptoms and how long they last.  Try to take it for at least 3 weeks to see if it makes a difference  -Try bentyl 20mg up to three times per day as needed when abdominal pain and bloating become severe.  Do not take thi medication every day. This medication may cause: dizziness, dry mouth, nausea, blurred vision.  While on this medication be careful about performing tasks which require mental alertness such as operating machinery or driving.  -Increase remeron to 30mg at night   -Follow up with local counselor to practice diaphragmatic breathing   -Practice diaphragmatic breathing three times per day.  Follow the instructions in this video:   [email or web address]  -Follow up with Gi dietitian in 4 week and 2 months.  OK to try breathing with food even if on TPN   -Please see a psychiatrist to discuss neuromodulators (such as TCA, SSRI, SNRI, buspirone, etc.) to improve visceral hypersensitivity in your Gi tract.  These medications may also be uses to improve anxiety, depression, stress in patients that are struggling with those symptoms.  Studies have shown that these types of treatments can significantly improve GI symptoms and quality of life.  -Please call your insurance company and ask for a list of local providers.  You can also discuss this with your primary care provider   -Discuss getting brain imaging with neurology to evaluate nausea that has not responded to treatment   -See Dr. Starkey
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
## 2020-05-11 ENCOUNTER — TELEPHONE (OUTPATIENT)
Dept: SLEEP MEDICINE | Facility: OTHER | Age: 40
End: 2020-05-11

## 2020-05-22 ENCOUNTER — PATIENT MESSAGE (OUTPATIENT)
Dept: RHEUMATOLOGY | Facility: CLINIC | Age: 40
End: 2020-05-22

## 2020-06-02 ENCOUNTER — TELEPHONE (OUTPATIENT)
Dept: SLEEP MEDICINE | Facility: OTHER | Age: 40
End: 2020-06-02

## 2020-06-09 ENCOUNTER — TELEPHONE (OUTPATIENT)
Dept: SLEEP MEDICINE | Facility: OTHER | Age: 40
End: 2020-06-09

## 2020-06-17 ENCOUNTER — TELEPHONE (OUTPATIENT)
Dept: SLEEP MEDICINE | Facility: OTHER | Age: 40
End: 2020-06-17

## 2020-06-24 ENCOUNTER — TELEPHONE (OUTPATIENT)
Dept: GASTROENTEROLOGY | Facility: CLINIC | Age: 40
End: 2020-06-24

## 2020-07-14 ENCOUNTER — TELEPHONE (OUTPATIENT)
Dept: SLEEP MEDICINE | Facility: OTHER | Age: 40
End: 2020-07-14

## 2020-07-21 ENCOUNTER — TELEPHONE (OUTPATIENT)
Dept: SLEEP MEDICINE | Facility: OTHER | Age: 40
End: 2020-07-21

## 2020-07-21 NOTE — TELEPHONE ENCOUNTER
Talked to patient and left a message to schedule the home sleep study,no response.  Sent out a message through [email or web address] to schedule.

## 2020-07-22 ENCOUNTER — TELEPHONE (OUTPATIENT)
Dept: GASTROENTEROLOGY | Facility: CLINIC | Age: 40
End: 2020-07-22

## 2020-07-22 NOTE — TELEPHONE ENCOUNTER
Spoke w pt. Followed up about scheduling esophagram. Pt states she will call back because she has to sort some things out first.

## 2020-07-29 ENCOUNTER — OFFICE VISIT (OUTPATIENT)
Dept: RHEUMATOLOGY | Facility: CLINIC | Age: 40
End: 2020-07-29
Payer: COMMERCIAL

## 2020-07-29 DIAGNOSIS — M79.7 FIBROMYALGIA: Primary | ICD-10-CM

## 2020-07-29 PROCEDURE — 99214 OFFICE O/P EST MOD 30 MIN: CPT | Mod: 95,,, | Performed by: INTERNAL MEDICINE

## 2020-07-29 PROCEDURE — 99214 PR OFFICE/OUTPT VISIT, EST, LEVL IV, 30-39 MIN: ICD-10-PCS | Mod: 95,,, | Performed by: INTERNAL MEDICINE

## 2020-07-29 RX ORDER — CELECOXIB 100 MG/1
CAPSULE ORAL
COMMUNITY
Start: 2020-07-26 | End: 2020-11-10

## 2020-07-29 RX ORDER — ONDANSETRON HYDROCHLORIDE 8 MG/1
TABLET, FILM COATED ORAL
COMMUNITY
Start: 2020-04-28 | End: 2021-12-06 | Stop reason: ALTCHOICE

## 2020-07-29 RX ORDER — GABAPENTIN 100 MG/1
CAPSULE ORAL
Qty: 30 CAPSULE | Refills: 5 | Status: SHIPPED | OUTPATIENT
Start: 2020-07-29 | End: 2020-11-10

## 2020-07-29 RX ORDER — GABAPENTIN 300 MG/1
300 CAPSULE ORAL DAILY
Qty: 30 CAPSULE | Refills: 5 | Status: SHIPPED | OUTPATIENT
Start: 2020-07-29 | End: 2020-08-28

## 2020-07-29 RX ORDER — CLONAZEPAM 0.5 MG/1
TABLET, ORALLY DISINTEGRATING ORAL
COMMUNITY
Start: 2020-04-29 | End: 2021-12-06

## 2020-07-29 NOTE — PROGRESS NOTES
Rapid3 Question Responses and Scores 7/29/2020   MDHAQ Score 0.3   Psychologic Score 1.1   Pain Score 5   When you awakened in the morning OVER THE LAST WEEK, did you feel stiff? Yes   If Yes, please indicate the number of hours until you are as limber as you will be for the day 1   Fatigue Score 5   Global Health Score 5   RAPID3 Score 3.66         Answers for HPI/ROS submitted by the patient on 7/29/2020   fever: No  eye redness: No  headaches: Yes  shortness of breath: No  chest pain: No  trouble swallowing: Yes  diarrhea: No  constipation: Yes  unexpected weight change: No  genital sore: No  dysuria: No  During the last 3 days, have you had a skin rash?: No  Bruises or bleeds easily: Yes  cough: No

## 2020-07-29 NOTE — PROGRESS NOTES
Chief Complaint   Patient presents with    Disease Management         The patient location is: home   The chief complaint leading to consultation is: chronic joint pain  Visit type: audio visual  Total time spent with patient: 45 minutes  Each patient to whom he or she provides medical services by telemedicine is:  (1) informed of the relationship between the physician and patient and the respective role of any other health care provider with respect to management of the patient; and (2) notified that he or she may decline to receive medical services by telemedicine and may withdraw from such care at any time.    History of presenting illness    39 year old white female is s/p laparoscopic sleeve gastrectomy and then she had the neurostimulator which was then replaced in dec 2019    She has severe gastroparesis and rumination syndrome  She is suffering for 5 years     She has had joint pains for 3 years  She thought she was dehydrated a lot  She is now on TPN due to weight loss    She has severe endometriosis s/p total hysterectomy    Hips hurt  Shoulders hurt  Arms go numb into the hands  Knees hurt  She has to pop   Ankles and wrists hurt  She doesn't think she has hypermobility   She doesn't think her bones or muscles hurt    No swelling  Stiffness+  Morning stiffness+  Activity helps  Prolonged rest stiffens her    Migraines well controlled   But daily headaches    Insomnia + due to gastroparesis    No anxiety and depression    Medications  She used to take it not anymore   Hydrocodone prn  Baclofen prn  Gabapentin 300 mg bed time to tid    Labs     RF neg  JOHNIE neg  celiac disease neg  H/h 11.3/33.8  nml white and plt count       Past history : gastroparesis,migraines,endometriosis    Family history : hypothyroidism,cirrhosis  Grandmother has fibromyalgia     Social history : former smoker,quit 2015  No alcohol use        Review of Systems   Constitutional: Negative for fever and unexpected weight change.    HENT: Positive for trouble swallowing.    Eyes: Negative for redness.   Respiratory: Negative for cough and shortness of breath.    Cardiovascular: Negative for chest pain.   Gastrointestinal: Positive for constipation. Negative for diarrhea.   Genitourinary: Negative for dysuria and genital sores.   Skin: Negative for rash.   Neurological: Positive for headaches.   Hematological: Bruises/bleeds easily.         No skin rashes,malar rash,photosensitivity   No telangiectasias   No calcinosis   No psoriasis   No patchy alopecia   No oral and nasal ulcers   No dry eyes   Has dry mouth   No pleurisy or any cardiopulmonary complaints   No dysphagia,diplopia and dysphonia and muscle weakness    No raynaud's+   No digital ulcers   No cytopenias except for anemia   No renal issues   No blood clots   No fever,chills,night sweats  Low grade temp 99.9 F  No pregnancy losses/pre term deliveries /pregnancy complications   No recurrent conjunctivitis or uveitis or scleritis or episcleritis   No chronic or bloody diarrhea with no u colitis or crohn's /inflammatory bowel disease   No vaginal or urethral  d/c/STDs/no ulcers     Physical Exam    Not performed       Assessment     39 year old white female with chronic migraines/daily headaches/severe endometriosis s/p hysterectomy,gastroparesis s/p surgery and neurostimulator placement with ongoing symptoms of failure to thrive needing TPN for nutrition and weight loss comes with pain in almost all joints of her body  She has been told to have fibromyalgia  She has no pain outside the joints  Her pain is worse at rest and better with activity  She has no swelling  She has morning stiffness    Couldn't examine her and hence couldn't come to a diagnosis    JOHNIE,RF negative  But dont have a lot of work up    Since the last visit :    She was hospitalised for  TPN  She then had sepsis   Breast implants removed  TPN port removed    Gabapentin 300 mg bed time will help her  But she has no  medications during the day  She cannot tolerate the 300 mg dose day time       1. Fibromyalgia            F/u  problem     Plan    Will initiate the work up  Sent CCP,CK,vit d,HLAB27,SSA,ESR,CRP,hepatitis panel  Complete it    Will do xrays    Examination very important    Management of the nutrition is very important  She needs hydration    Sleep hygiene     Headache management by neurology    Continue gabapentin 300 mg bedtime  Morning 100 mg might help    rtc in 4 months     Next visit when she is comfortable I prefer an in office visit so that I can examine her joints    Nena was seen today for disease management.    Diagnoses and all orders for this visit:    Fibromyalgia  -     Cyclic Citrullinated Peptide Antibody, IgG; Future  -     HLA B27 Antigen; Future  -     Sjogrens syndrome-A extractable nuclear antibody; Future  -     Uric acid; Future  -     CK; Future  -     Vitamin D; Future  -     Sedimentation rate; Future  -     C-Reactive Protein; Future  -     HEPATITIS PANEL, ACUTE; Future  -     XR Arthritis Survey; Future    Other orders  -     gabapentin (NEURONTIN) 300 MG capsule; Take 1 capsule (300 mg total) by mouth once daily.  -     gabapentin (NEURONTIN) 100 MG capsule; One capsule daily        4 month follow up

## 2020-08-13 ENCOUNTER — TELEPHONE (OUTPATIENT)
Dept: SLEEP MEDICINE | Facility: OTHER | Age: 40
End: 2020-08-13

## 2020-08-13 NOTE — TELEPHONE ENCOUNTER
Talked to patient left messages and sent out a message through [employer] to schedule the home sleep study.  No response.

## 2020-11-10 RX ORDER — CYPROHEPTADINE HYDROCHLORIDE 2 MG/5ML
SOLUTION ORAL
COMMUNITY
Start: 2020-09-29 | End: 2021-12-06 | Stop reason: ALTCHOICE

## 2020-11-11 ENCOUNTER — OFFICE VISIT (OUTPATIENT)
Dept: RHEUMATOLOGY | Facility: CLINIC | Age: 40
End: 2020-11-11
Payer: COMMERCIAL

## 2020-11-11 DIAGNOSIS — M79.7 FIBROMYALGIA: Primary | ICD-10-CM

## 2020-11-11 PROCEDURE — 99214 OFFICE O/P EST MOD 30 MIN: CPT | Mod: 95,,, | Performed by: INTERNAL MEDICINE

## 2020-11-11 PROCEDURE — 99214 PR OFFICE/OUTPT VISIT, EST, LEVL IV, 30-39 MIN: ICD-10-PCS | Mod: 95,,, | Performed by: INTERNAL MEDICINE

## 2020-11-11 RX ORDER — GABAPENTIN 300 MG/1
300 CAPSULE ORAL 3 TIMES DAILY
Qty: 90 CAPSULE | Refills: 3 | Status: SHIPPED | OUTPATIENT
Start: 2020-11-11 | End: 2021-11-15

## 2020-11-11 RX ORDER — HYDROCODONE BITARTRATE AND ACETAMINOPHEN 7.5; 325 MG/1; MG/1
TABLET ORAL
COMMUNITY
Start: 2020-10-07 | End: 2021-12-06 | Stop reason: ALTCHOICE

## 2020-11-11 NOTE — PROGRESS NOTES
Rapid3 Question Responses and Scores 11/10/2020   MDHAQ Score 0.7   Psychologic Score 3.3   Pain Score 7   When you awakened in the morning OVER THE LAST WEEK, did you feel stiff? Yes   If Yes, please indicate the number of hours until you are as limber as you will be for the day 1   Fatigue Score 9   Global Health Score 2   RAPID3 Score 3.77       Answers for HPI/ROS submitted by the patient on 11/10/2020   fever: No  eye redness: No  mouth sores: No  headaches: Yes  shortness of breath: No  chest pain: No  trouble swallowing: Yes  diarrhea: Yes  constipation: Yes  unexpected weight change: Yes  genital sore: No  dysuria: No  During the last 3 days, have you had a skin rash?: No  Bruises or bleeds easily: Yes  cough: No

## 2020-11-11 NOTE — PROGRESS NOTES
Chief Complaint   Patient presents with    Disease Management         The patient location is: home   The chief complaint leading to consultation is: chronic joint pain  Visit type: audio visual  Total time spent with patient: 30 minutes  Each patient to whom he or she provides medical services by telemedicine is:  (1) informed of the relationship between the physician and patient and the respective role of any other health care provider with respect to management of the patient; and (2) notified that he or she may decline to receive medical services by telemedicine and may withdraw from such care at any time.    History of presenting illness    40 year old white female is s/p laparoscopic sleeve gastrectomy and then she had the neurostimulator which was then replaced in dec 2019    She has severe gastroparesis and rumination syndrome  She is suffering for 5 years     She has had joint pains for 3 years  She thought she was dehydrated a lot  She is now on TPN due to weight loss    She has severe endometriosis s/p total hysterectomy    Hips hurt  Shoulders hurt  Arms go numb into the hands  Knees hurt  She has to pop   Ankles and wrists hurt  She doesn't think she has hypermobility   She doesn't think her bones or muscles hurt    No swelling  Stiffness+  Morning stiffness+  Activity helps  Prolonged rest stiffens her    Migraines well controlled   But daily headaches    Insomnia + due to gastroparesis    No anxiety and depression    Medications  She used to take it not anymore   Hydrocodone prn  Baclofen prn  Gabapentin 300 mg bed time to tid    Labs     RF neg  JOHNIE neg  celiac disease neg  H/h 11.3/33.8  nml white and plt count       Past history : gastroparesis,migraines,endometriosis    Family history : hypothyroidism,cirrhosis  Grandmother has fibromyalgia     Social history : former smoker,quit 2015  No alcohol use        Review of Systems   Constitutional: Negative for fever and unexpected weight change.    HENT: Positive for trouble swallowing. Negative for mouth sores.    Eyes: Negative for redness.   Respiratory: Negative for cough and shortness of breath.    Cardiovascular: Negative for chest pain.   Gastrointestinal: Positive for constipation. Negative for diarrhea.   Genitourinary: Negative for dysuria and genital sores.   Skin: Negative for rash.   Neurological: Positive for headaches.   Hematological: Bruises/bleeds easily.         No skin rashes,malar rash,photosensitivity   No telangiectasias   No calcinosis   No psoriasis   No patchy alopecia   No oral and nasal ulcers   No dry eyes   Has dry mouth   No pleurisy or any cardiopulmonary complaints   No dysphagia,diplopia and dysphonia and muscle weakness    No raynaud's+   No digital ulcers   No cytopenias except for anemia   No renal issues   No blood clots   No fever,chills,night sweats  Low grade temp 99.9 F  No pregnancy losses/pre term deliveries /pregnancy complications   No recurrent conjunctivitis or uveitis or scleritis or episcleritis   No chronic or bloody diarrhea with no u colitis or crohn's /inflammatory bowel disease   No vaginal or urethral  d/c/STDs/no ulcers     Physical Exam    Not performed       Assessment     40 year old white female with chronic migraines/daily headaches/severe endometriosis s/p hysterectomy,gastroparesis s/p surgery and neurostimulator placement with ongoing symptoms of failure to thrive needing TPN for nutrition and weight loss comes with pain in almost all joints of her body  She has been told to have fibromyalgia  She has no pain outside the joints  Her pain is worse at rest and better with activity  She has no swelling  She has morning stiffness    Couldn't examine her and hence couldn't come to a diagnosis    JOHNIE,RF negative  But dont have a lot of work up    Since the last visit :    She was hospitalised for  TPN  She then had sepsis   Breast implants removed  TPN port removed    Gabapentin 300 mg bed time will  help her  But she has no medications during the day  She cannot tolerate the 300 mg dose day time   So we gave only 100 mg daily    Back stimulator is being evaluated       1. Fibromyalgia            F/u  problem     Plan    Will initiate the work up  Sent CCP,CK,vit d,HLAB27,SSA,ESR,CRP,hepatitis panel  Complete it    Will do xrays    Examination very important    Management of the nutrition is very important  She needs hydration    Sleep hygiene     Headache management by neurology    Continue gabapentin 300 mg bedtime,add another 300 mg at 5 pm   Morning 100 mg might help    rtc in 4 months     Next visit when she is comfortable I prefer an in office visit so that I can examine her joints    Nena was seen today for disease management.    Diagnoses and all orders for this visit:    Fibromyalgia  -     CK; Future  -     Aldolase; Future  -     Uric Acid; Future  -     HEPATITIS PANEL, ACUTE; Future  -     Sedimentation rate; Future  -     C-Reactive Protein; Future  -     Cyclic Citrullinated Peptide Antibody, IgG; Future  -     HLA B27 Antigen; Future  -     Sjogrens syndrome-A extractable nuclear antibody; Future  -     Vitamin D; Future  -     XR Arthritis Survey; Future    Other orders  -     gabapentin (NEURONTIN) 300 MG capsule; Take 1 capsule (300 mg total) by mouth 3 (three) times daily.        4 month follow up

## 2020-11-15 ENCOUNTER — PATIENT MESSAGE (OUTPATIENT)
Dept: RHEUMATOLOGY | Facility: CLINIC | Age: 40
End: 2020-11-15

## 2020-11-19 ENCOUNTER — TELEPHONE (OUTPATIENT)
Dept: PHARMACY | Facility: CLINIC | Age: 40
End: 2020-11-19

## 2020-11-19 ENCOUNTER — TELEPHONE (OUTPATIENT)
Dept: SURGERY | Facility: CLINIC | Age: 40
End: 2020-11-19

## 2020-11-19 ENCOUNTER — PATIENT MESSAGE (OUTPATIENT)
Dept: RHEUMATOLOGY | Facility: CLINIC | Age: 40
End: 2020-11-19

## 2020-11-24 ENCOUNTER — OFFICE VISIT (OUTPATIENT)
Dept: SURGERY | Facility: CLINIC | Age: 40
End: 2020-11-24
Payer: COMMERCIAL

## 2020-11-24 DIAGNOSIS — K31.84 GASTROPARESIS: Primary | ICD-10-CM

## 2020-11-24 PROCEDURE — 99499 UNLISTED E&M SERVICE: CPT | Mod: 95,,, | Performed by: SURGERY

## 2020-11-24 PROCEDURE — 99499 NO LOS: ICD-10-PCS | Mod: 95,,, | Performed by: SURGERY
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
## 2020-12-03 ENCOUNTER — PATIENT MESSAGE (OUTPATIENT)
Dept: RHEUMATOLOGY | Facility: CLINIC | Age: 40
End: 2020-12-03

## 2020-12-05 ENCOUNTER — OFFICE VISIT (OUTPATIENT)
Dept: SURGERY | Facility: CLINIC | Age: 40
End: 2020-12-05
Payer: COMMERCIAL

## 2020-12-05 ENCOUNTER — HOSPITAL ENCOUNTER (OUTPATIENT)
Dept: RADIOLOGY | Facility: HOSPITAL | Age: 40
Discharge: HOME | End: 2020-12-05
Attending: INTERNAL MEDICINE
Payer: COMMERCIAL

## 2020-12-05 VITALS
WEIGHT: 99.19 LBS | HEIGHT: 65 IN | HEART RATE: 70 BPM | DIASTOLIC BLOOD PRESSURE: 65 MMHG | SYSTOLIC BLOOD PRESSURE: 105 MMHG | BODY MASS INDEX: 16.53 KG/M2

## 2020-12-05 DIAGNOSIS — K31.84 GASTROPARESIS: Primary | ICD-10-CM

## 2020-12-05 DIAGNOSIS — M79.7 FIBROMYALGIA: ICD-10-CM

## 2020-12-05 PROCEDURE — 99212 PR OFFICE/OUTPT VISIT, EST, LEVL II, 10-19 MIN: ICD-10-PCS | Mod: 25,S$GLB,, | Performed by: SURGERY

## 2020-12-05 PROCEDURE — 1125F PR PAIN SEVERITY QUANTIFIED, PAIN PRESENT: ICD-10-PCS | Mod: S$GLB,,, | Performed by: SURGERY

## 2020-12-05 PROCEDURE — 95981 PR ELEC ALYS NSTIM GEN GASTRIC SUBSEQUENT W/O REPROG: ICD-10-PCS | Mod: S$GLB,,, | Performed by: SURGERY

## 2020-12-05 PROCEDURE — 99999 PR PBB SHADOW E&M-EST. PATIENT-LVL III: ICD-10-PCS | Mod: PBBFAC,,, | Performed by: SURGERY

## 2020-12-05 PROCEDURE — 77077 JOINT SURVEY SINGLE VIEW: CPT | Mod: TC

## 2020-12-05 PROCEDURE — 77077 XR ARTHRITIS SURVEY: ICD-10-PCS | Mod: 26,,, | Performed by: RADIOLOGY

## 2020-12-05 PROCEDURE — 99999 PR PBB SHADOW E&M-EST. PATIENT-LVL III: CPT | Mod: PBBFAC,,, | Performed by: SURGERY

## 2020-12-05 PROCEDURE — 77077 JOINT SURVEY SINGLE VIEW: CPT | Mod: 26,,, | Performed by: RADIOLOGY

## 2020-12-05 PROCEDURE — 95981 IO ANAL GAST N-STIM SUBSQ: CPT | Mod: S$GLB,,, | Performed by: SURGERY

## 2020-12-05 PROCEDURE — 3008F BODY MASS INDEX DOCD: CPT | Mod: CPTII,S$GLB,, | Performed by: SURGERY

## 2020-12-05 PROCEDURE — 1125F AMNT PAIN NOTED PAIN PRSNT: CPT | Mod: S$GLB,,, | Performed by: SURGERY

## 2020-12-05 PROCEDURE — 3008F PR BODY MASS INDEX (BMI) DOCUMENTED: ICD-10-PCS | Mod: CPTII,S$GLB,, | Performed by: SURGERY

## 2020-12-05 PROCEDURE — 99212 OFFICE O/P EST SF 10 MIN: CPT | Mod: 25,S$GLB,, | Performed by: SURGERY
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
## 2020-12-07 ENCOUNTER — PATIENT MESSAGE (OUTPATIENT)
Dept: RHEUMATOLOGY | Facility: CLINIC | Age: 40
End: 2020-12-07

## 2020-12-07 ENCOUNTER — PATIENT MESSAGE (OUTPATIENT)
Dept: GASTROENTEROLOGY | Facility: CLINIC | Age: 40
End: 2020-12-07

## 2020-12-07 ENCOUNTER — TELEPHONE (OUTPATIENT)
Dept: GASTROENTEROLOGY | Facility: CLINIC | Age: 40
End: 2020-12-07

## 2020-12-07 DIAGNOSIS — R13.10 DYSPHAGIA, UNSPECIFIED TYPE: Primary | ICD-10-CM

## 2020-12-07 RX ORDER — ERGOCALCIFEROL 1.25 MG/1
50000 CAPSULE ORAL
Qty: 12 CAPSULE | Refills: 0 | Status: SHIPPED | OUTPATIENT
Start: 2020-12-07 | End: 2021-02-23

## 2020-12-07 NOTE — TELEPHONE ENCOUNTER
----- Message from Karley Farah MA sent at 12/7/2020  1:54 PM CST -----  A new esophagram order is needed per Radiology.    Thanks  CODY

## 2020-12-09 ENCOUNTER — TELEPHONE (OUTPATIENT)
Dept: SURGERY | Facility: CLINIC | Age: 40
End: 2020-12-09

## 2020-12-09 DIAGNOSIS — K31.84 GASTROPARESIS: Primary | ICD-10-CM

## 2020-12-14 ENCOUNTER — TELEPHONE (OUTPATIENT)
Dept: SLEEP MEDICINE | Facility: CLINIC | Age: 40
End: 2020-12-14

## 2020-12-14 ENCOUNTER — PATIENT MESSAGE (OUTPATIENT)
Dept: SLEEP MEDICINE | Facility: CLINIC | Age: 40
End: 2020-12-14

## 2020-12-14 DIAGNOSIS — G47.30 SLEEP APNEA, UNSPECIFIED TYPE: Primary | ICD-10-CM
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
## 2020-12-17 ENCOUNTER — PATIENT MESSAGE (OUTPATIENT)
Dept: ADMINISTRATIVE | Facility: OTHER | Age: 40
End: 2020-12-17

## 2020-12-17 ENCOUNTER — PATIENT MESSAGE (OUTPATIENT)
Dept: SLEEP MEDICINE | Facility: CLINIC | Age: 40
End: 2020-12-17

## 2020-12-17 ENCOUNTER — TELEPHONE (OUTPATIENT)
Dept: SLEEP MEDICINE | Facility: OTHER | Age: 40
End: 2020-12-17

## 2020-12-17 NOTE — TELEPHONE ENCOUNTER
Sent message through [employer],waiting on the authorization dept to get the approval before I can schedule the home sleep study.

## 2020-12-18 ENCOUNTER — DOCUMENTATION ONLY (OUTPATIENT)
Dept: SURGERY | Facility: CLINIC | Age: 40
End: 2020-12-18

## 2020-12-18 ENCOUNTER — PATIENT MESSAGE (OUTPATIENT)
Dept: GASTROENTEROLOGY | Facility: CLINIC | Age: 40
End: 2020-12-18

## 2020-12-18 ENCOUNTER — HOSPITAL ENCOUNTER (OUTPATIENT)
Dept: RADIOLOGY | Facility: HOSPITAL | Age: 40
Discharge: HOME | End: 2020-12-18
Attending: INTERNAL MEDICINE
Payer: COMMERCIAL

## 2020-12-18 ENCOUNTER — PATIENT MESSAGE (OUTPATIENT)
Dept: SURGERY | Facility: CLINIC | Age: 40
End: 2020-12-18

## 2020-12-18 DIAGNOSIS — R13.10 DYSPHAGIA, UNSPECIFIED TYPE: ICD-10-CM

## 2020-12-18 PROCEDURE — 74220 FL ESOPHAGRAM COMPLETE: ICD-10-PCS | Mod: 26,,, | Performed by: RADIOLOGY

## 2020-12-18 PROCEDURE — 74220 X-RAY XM ESOPHAGUS 1CNTRST: CPT | Mod: 26,,, | Performed by: RADIOLOGY

## 2020-12-18 PROCEDURE — 74220 X-RAY XM ESOPHAGUS 1CNTRST: CPT | Mod: TC

## 2020-12-18 PROCEDURE — A9698 NON-RAD CONTRAST MATERIALNOC: HCPCS | Performed by: INTERNAL MEDICINE

## 2020-12-18 PROCEDURE — 25500020 PHARM REV CODE 255: Performed by: INTERNAL MEDICINE

## 2020-12-18 RX ADMIN — BARIUM SULFATE 10 ML: 0.6 SUSPENSION ORAL at 09:12

## 2020-12-18 RX ADMIN — BARIUM SULFATE 200 ML: 0.81 POWDER, FOR SUSPENSION ORAL at 09:12

## 2020-12-18 NOTE — PROGRESS NOTES
I saw Nena today and she said her symptoms are worse.  She has extremely severe epigastric pain, nausea and vomiting.    Gastric Stimulator Interrogation: impedence 417 Voltage 8 Current 19.2 Pulse Rcacl001 Rate 40 Cycle on 2 Cycle off 3,  Battery ok.

## 2020-12-18 NOTE — TELEPHONE ENCOUNTER
Please please call her and let her know that she should call Dr. Kumar and discuss placement of a G-tube due to her weight loss.    Schedule her follow-up appointment with me ideally on December 29th if she is able to make it on that day

## 2020-12-20 ENCOUNTER — PATIENT MESSAGE (OUTPATIENT)
Dept: GASTROENTEROLOGY | Facility: CLINIC | Age: 40
End: 2020-12-20

## 2020-12-21 ENCOUNTER — TELEPHONE (OUTPATIENT)
Dept: SLEEP MEDICINE | Facility: OTHER | Age: 40
End: 2020-12-21

## 2020-12-22 ENCOUNTER — CLINICAL SUPPORT (OUTPATIENT)
Dept: GASTROENTEROLOGY | Facility: CLINIC | Age: 40
End: 2020-12-22
Payer: COMMERCIAL

## 2020-12-22 DIAGNOSIS — R63.4 WEIGHT LOSS: ICD-10-CM

## 2020-12-22 DIAGNOSIS — R11.10 RUMINATION SYNDROME OF INGESTED FOOD IN ADULT: Primary | ICD-10-CM

## 2020-12-22 DIAGNOSIS — R11.2 NAUSEA AND VOMITING, INTRACTABILITY OF VOMITING NOT SPECIFIED, UNSPECIFIED VOMITING TYPE: ICD-10-CM

## 2020-12-22 DIAGNOSIS — E46 MALNUTRITION, UNSPECIFIED TYPE: ICD-10-CM

## 2020-12-22 PROCEDURE — 99499 NO LOS: ICD-10-PCS | Mod: 95,,, | Performed by: DIETITIAN, REGISTERED

## 2020-12-22 PROCEDURE — 99499 UNLISTED E&M SERVICE: CPT | Mod: 95,,, | Performed by: DIETITIAN, REGISTERED
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
## 2020-12-29 ENCOUNTER — TELEPHONE (OUTPATIENT)
Dept: GASTROENTEROLOGY | Facility: CLINIC | Age: 40
End: 2020-12-29

## 2020-12-29 ENCOUNTER — OFFICE VISIT (OUTPATIENT)
Dept: GASTROENTEROLOGY | Facility: CLINIC | Age: 40
End: 2020-12-29
Payer: COMMERCIAL

## 2020-12-29 ENCOUNTER — TELEPHONE (OUTPATIENT)
Dept: SURGERY | Facility: CLINIC | Age: 40
End: 2020-12-29

## 2020-12-29 ENCOUNTER — PATIENT MESSAGE (OUTPATIENT)
Dept: GASTROENTEROLOGY | Facility: CLINIC | Age: 40
End: 2020-12-29

## 2020-12-29 DIAGNOSIS — R11.10 RUMINATION SYNDROME OF INGESTED FOOD IN ADULT: Primary | ICD-10-CM

## 2020-12-29 DIAGNOSIS — K59.00 CONSTIPATION, UNSPECIFIED CONSTIPATION TYPE: ICD-10-CM

## 2020-12-29 DIAGNOSIS — R11.2 NAUSEA AND VOMITING, INTRACTABILITY OF VOMITING NOT SPECIFIED, UNSPECIFIED VOMITING TYPE: ICD-10-CM

## 2020-12-29 DIAGNOSIS — E46 MALNUTRITION, UNSPECIFIED TYPE: Primary | ICD-10-CM

## 2020-12-29 DIAGNOSIS — K21.9 GASTROESOPHAGEAL REFLUX DISEASE, UNSPECIFIED WHETHER ESOPHAGITIS PRESENT: ICD-10-CM

## 2020-12-29 DIAGNOSIS — R10.9 ABDOMINAL PAIN, UNSPECIFIED ABDOMINAL LOCATION: ICD-10-CM

## 2020-12-29 DIAGNOSIS — R14.0 BLOATING: ICD-10-CM

## 2020-12-29 DIAGNOSIS — D64.9 ANEMIA, UNSPECIFIED TYPE: ICD-10-CM

## 2020-12-29 DIAGNOSIS — R13.10 DYSPHAGIA, UNSPECIFIED TYPE: ICD-10-CM

## 2020-12-29 PROCEDURE — 99215 PR OFFICE/OUTPT VISIT, EST, LEVL V, 40-54 MIN: ICD-10-PCS | Mod: 95,,, | Performed by: INTERNAL MEDICINE

## 2020-12-29 PROCEDURE — 99215 OFFICE O/P EST HI 40 MIN: CPT | Mod: 95,,, | Performed by: INTERNAL MEDICINE

## 2020-12-29 RX ORDER — LUBIPROSTONE 8 UG/1
8 CAPSULE ORAL 2 TIMES DAILY
Qty: 60 CAPSULE | Refills: 6 | Status: SHIPPED | OUTPATIENT
Start: 2020-12-29 | End: 2022-02-08

## 2020-12-29 RX ORDER — PROCHLORPERAZINE MALEATE 10 MG
10 TABLET ORAL 3 TIMES DAILY PRN
Qty: 90 TABLET | Refills: 6 | Status: SHIPPED | OUTPATIENT
Start: 2020-12-29 | End: 2021-01-28

## 2020-12-29 RX ORDER — PROMETHAZINE HYDROCHLORIDE 25 MG/1
25 TABLET ORAL EVERY 6 HOURS PRN
Qty: 90 TABLET | Refills: 3 | Status: SHIPPED | OUTPATIENT
Start: 2020-12-29 | End: 2021-03-29

## 2020-12-29 NOTE — PATIENT INSTRUCTIONS
-Start Baclofen 10mg three times per day for Rumination Syndrome.    This medicine strengthens the valve at the bottom of esophagus and should help decrease rumination episodes.  Side Effects: This medication may cause drowsiness, please be careful about performing tasks which require mental alertness (eg, operating machinery or driving).  This may cause urinary retention, please stop taking it and consult your doctor if you develop any problems with bladder or urination.  This may sometimes make nausea and vomiting worse, let me know if this happens.     -Practice diaphragmatic breathing three times per day.  Follow the instructions in this video:   [email or web address]  -See a local counselor to work on diaphragmatic breathing and stress reduction and food aversion.   -Start amitiza 8mcg twice per day for constipation.  This should help with constipation, abdominal pain and bloating. Contact me if your constipation does not get better after two weeks and we can increase the dose.  Most common side effect is diarrhea.    -It would help if we could start prucalopride 2mg daily (motegrity) for constipation.  Please call  [phone] to see if you can get assisestance with prescription. [email or web address]  -Follow up with Conor to get a colonoscopy and EGD and for long term management of your chronic Gi symptoms   -Please see a psychiatrist to start a neuromodulator (such as TCA, SSRI, SNRI, buspirone, etc.) to improve visceral hypersensitivity in your Gi tract.  These medications may also be uses to improve anxiety, depression, stress in patients that are struggling with those symptoms.  Studies have shown that these types of treatments can significantly improve GI symptoms and quality of life.  -Please call your insurance company and ask for a list of local providers.  You can also discuss this with your primary care provider   -Follow up with sleep medicine   -Follow up with   José to discuss J tube feeding.  -Discuss brain imaging with your neurologist    -See Urogynecology as previously recommended
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
## 2020-12-29 NOTE — TELEPHONE ENCOUNTER
Spoke with pt after virtual visit to see when she would like to come in for her lab work and pt stated that her labs are usually sent to her PCP. Her labs was faxed off to her PCP after phone convo.

## 2021-02-05 ENCOUNTER — TELEPHONE (OUTPATIENT)
Dept: SLEEP MEDICINE | Facility: OTHER | Age: 41
End: 2021-02-05

## 2021-02-10 ENCOUNTER — TELEPHONE (OUTPATIENT)
Dept: RHEUMATOLOGY | Facility: CLINIC | Age: 41
End: 2021-02-10

## 2021-02-22 ENCOUNTER — TELEPHONE (OUTPATIENT)
Dept: GASTROENTEROLOGY | Facility: CLINIC | Age: 41
End: 2021-02-22

## 2021-02-23 ENCOUNTER — PATIENT MESSAGE (OUTPATIENT)
Dept: RHEUMATOLOGY | Facility: CLINIC | Age: 41
End: 2021-02-23

## 2021-03-02 ENCOUNTER — TELEPHONE (OUTPATIENT)
Dept: SLEEP MEDICINE | Facility: OTHER | Age: 41
End: 2021-03-02

## 2021-03-10 ENCOUNTER — TELEPHONE (OUTPATIENT)
Dept: GASTROENTEROLOGY | Facility: CLINIC | Age: 41
End: 2021-03-10

## 2021-11-17 ENCOUNTER — TELEPHONE (OUTPATIENT)
Dept: GASTROENTEROLOGY | Facility: CLINIC | Age: 41
End: 2021-11-17
Payer: COMMERCIAL

## 2021-12-07 ENCOUNTER — OFFICE VISIT (OUTPATIENT)
Dept: GASTROENTEROLOGY | Facility: CLINIC | Age: 41
End: 2021-12-07
Payer: COMMERCIAL

## 2021-12-07 ENCOUNTER — PATIENT MESSAGE (OUTPATIENT)
Dept: GASTROENTEROLOGY | Facility: CLINIC | Age: 41
End: 2021-12-07

## 2021-12-07 ENCOUNTER — OFFICE VISIT (OUTPATIENT)
Dept: SURGERY | Facility: CLINIC | Age: 41
End: 2021-12-07
Payer: COMMERCIAL

## 2021-12-07 VITALS
HEIGHT: 69 IN | WEIGHT: 108.25 LBS | DIASTOLIC BLOOD PRESSURE: 80 MMHG | HEART RATE: 63 BPM | BODY MASS INDEX: 16.03 KG/M2 | SYSTOLIC BLOOD PRESSURE: 131 MMHG

## 2021-12-07 VITALS
BODY MASS INDEX: 17.99 KG/M2 | OXYGEN SATURATION: 100 % | HEIGHT: 65 IN | DIASTOLIC BLOOD PRESSURE: 77 MMHG | SYSTOLIC BLOOD PRESSURE: 114 MMHG | HEART RATE: 64 BPM | WEIGHT: 108 LBS

## 2021-12-07 DIAGNOSIS — K31.84 GASTROPARESIS: Primary | ICD-10-CM

## 2021-12-07 DIAGNOSIS — R14.0 BLOATING: ICD-10-CM

## 2021-12-07 DIAGNOSIS — Z01.818 PREOP TESTING: ICD-10-CM

## 2021-12-07 DIAGNOSIS — R11.2 NAUSEA AND VOMITING, INTRACTABILITY OF VOMITING NOT SPECIFIED, UNSPECIFIED VOMITING TYPE: ICD-10-CM

## 2021-12-07 DIAGNOSIS — R10.9 ABDOMINAL PAIN, UNSPECIFIED ABDOMINAL LOCATION: ICD-10-CM

## 2021-12-07 DIAGNOSIS — R68.81 EARLY SATIETY: ICD-10-CM

## 2021-12-07 DIAGNOSIS — R13.10 DYSPHAGIA, UNSPECIFIED TYPE: ICD-10-CM

## 2021-12-07 DIAGNOSIS — K31.84 GASTROPARESIS: ICD-10-CM

## 2021-12-07 DIAGNOSIS — K59.00 CONSTIPATION, UNSPECIFIED CONSTIPATION TYPE: ICD-10-CM

## 2021-12-07 DIAGNOSIS — K21.9 GASTROESOPHAGEAL REFLUX DISEASE, UNSPECIFIED WHETHER ESOPHAGITIS PRESENT: Primary | ICD-10-CM

## 2021-12-07 PROCEDURE — 99215 PR OFFICE/OUTPT VISIT, EST, LEVL V, 40-54 MIN: ICD-10-PCS | Mod: S$GLB,,, | Performed by: INTERNAL MEDICINE

## 2021-12-07 PROCEDURE — 99999 PR PBB SHADOW E&M-EST. PATIENT-LVL IV: CPT | Mod: PBBFAC,,, | Performed by: INTERNAL MEDICINE

## 2021-12-07 PROCEDURE — 99214 OFFICE O/P EST MOD 30 MIN: CPT | Mod: S$GLB,,, | Performed by: SURGERY

## 2021-12-07 PROCEDURE — 99999 PR PBB SHADOW E&M-EST. PATIENT-LVL IV: ICD-10-PCS | Mod: PBBFAC,,, | Performed by: INTERNAL MEDICINE

## 2021-12-07 PROCEDURE — 99215 OFFICE O/P EST HI 40 MIN: CPT | Mod: S$GLB,,, | Performed by: INTERNAL MEDICINE

## 2021-12-07 PROCEDURE — 99999 PR PBB SHADOW E&M-EST. PATIENT-LVL III: CPT | Mod: PBBFAC,,, | Performed by: SURGERY

## 2021-12-07 PROCEDURE — 99999 PR PBB SHADOW E&M-EST. PATIENT-LVL III: ICD-10-PCS | Mod: PBBFAC,,, | Performed by: SURGERY

## 2021-12-07 PROCEDURE — 95981 IO ANAL GAST N-STIM SUBSQ: CPT | Mod: S$GLB,,, | Performed by: SURGERY

## 2021-12-07 PROCEDURE — 95981 PR ELEC ALYS NSTIM GEN GASTRIC SUBSEQUENT W/O REPROG: ICD-10-PCS | Mod: S$GLB,,, | Performed by: SURGERY

## 2021-12-07 PROCEDURE — 99214 PR OFFICE/OUTPT VISIT, EST, LEVL IV, 30-39 MIN: ICD-10-PCS | Mod: S$GLB,,, | Performed by: SURGERY

## 2021-12-07 RX ORDER — LUBIPROSTONE 24 UG/1
24 CAPSULE ORAL 2 TIMES DAILY
Qty: 60 CAPSULE | Refills: 6 | Status: SHIPPED | OUTPATIENT
Start: 2021-12-07 | End: 2023-02-23

## 2021-12-07 RX ORDER — MIRTAZAPINE 30 MG/1
30 TABLET, FILM COATED ORAL NIGHTLY
Qty: 30 TABLET | Refills: 11 | Status: SHIPPED | OUTPATIENT
Start: 2021-12-07 | End: 2023-02-23

## 2021-12-16 ENCOUNTER — TELEPHONE (OUTPATIENT)
Dept: SURGERY | Facility: CLINIC | Age: 41
End: 2021-12-16
Payer: COMMERCIAL

## 2021-12-22 ENCOUNTER — TELEPHONE (OUTPATIENT)
Dept: SURGERY | Facility: CLINIC | Age: 41
End: 2021-12-22
Payer: COMMERCIAL

## 2022-01-05 ENCOUNTER — TELEPHONE (OUTPATIENT)
Dept: SURGERY | Facility: CLINIC | Age: 42
End: 2022-01-05
Payer: COMMERCIAL

## 2022-01-05 NOTE — TELEPHONE ENCOUNTER
----- Message from Chhaya Morillo RN sent at 1/4/2022  3:24 PM CST -----  Regarding: FW: Procedure reschedule    ----- Message -----  From: Silvina Ortega  Sent: 1/4/2022  11:13 AM CST  To: [facility] Staff  Subject: Procedure reschedule                             Name of Who is Calling: CHRISTIE GUAJARDO [[id number]]           What is the request in detail: The patient states that her scopes have been pushed back due to covid and is requesting a call back from staff in regards to rescheduling her procedure to another date. Please advise            Can the clinic reply by [facility]: no            [facility]: [phone]

## 2022-01-05 NOTE — TELEPHONE ENCOUNTER
Pt states that she tested positive for covid and had to r/s her EGD.  She will need to reschedule her surgery.  Pt would like to go on 1/28.    Surgery schedulers notified to get pt rescheduled.

## 2022-01-27 ENCOUNTER — TELEPHONE (OUTPATIENT)
Dept: SURGERY | Facility: CLINIC | Age: 42
End: 2022-01-27
Payer: COMMERCIAL

## 2022-01-27 ENCOUNTER — ANESTHESIA EVENT (OUTPATIENT)
Dept: SURGERY | Facility: HOSPITAL | Age: 42
End: 2022-01-27
Payer: COMMERCIAL

## 2022-01-27 NOTE — TELEPHONE ENCOUNTER
Patient returned a call on 1-21-22.  We had called and left a phone message asking for a return phone call.  We were asking if she had her EGD scheduled before her surgery date of 1-28-22.   Orville stated that she was having an EGD/Colonoscopy on 1-25-22 at [facility].     When looking over her records in preparation for her surgery, I see that she needed a CT scan.  In my notes, she was going to have the CT scan ordered by KIRTI Shanks NP.  I called the Patient and she stated that she did have an EGD/Colonoscopy, but she did not have the CT scan.  Reviewed my previous note with her.  Explained that I would need to ask Dr. Kumar if he has to have the CT scan before proceeding with her surgery tomorrow.  Explained that I would call her back.      Spoke to Dr. Kumar about the Patient not having a CT scan.  He stated that it is not absolutely essential that she have the CT scan before surgery.  If she can have the CT done today, it would be nice.  There is no risk proceeding with surgery tomorrow without it.    Called and spoke to Patient.  Relayed my conversation with Dr. Kumar.  Asked where she had her EGD/Colonoscopy done.  Stated that she had her tests done at [facility] which is part of [facility].  Faxed request for the reports.  Explained that we will call her later this afternoon with her arrival time.

## 2022-01-28 ENCOUNTER — ANESTHESIA (OUTPATIENT)
Dept: SURGERY | Facility: HOSPITAL | Age: 42
End: 2022-01-28
Payer: COMMERCIAL

## 2022-01-28 ENCOUNTER — HOSPITAL ENCOUNTER (OUTPATIENT)
Facility: HOSPITAL | Age: 42
Discharge: HOME | End: 2022-01-28
Attending: SURGERY | Admitting: SURGERY
Payer: COMMERCIAL

## 2022-01-28 VITALS
RESPIRATION RATE: 13 BRPM | DIASTOLIC BLOOD PRESSURE: 69 MMHG | SYSTOLIC BLOOD PRESSURE: 102 MMHG | TEMPERATURE: 98 F | OXYGEN SATURATION: 100 % | WEIGHT: 102 LBS | HEART RATE: 54 BPM | BODY MASS INDEX: 15.06 KG/M2

## 2022-01-28 DIAGNOSIS — K31.84 GASTROPARESIS: Primary | ICD-10-CM

## 2022-01-28 LAB
CTP QC/QA: YES
SARS-COV-2 AG RESP QL IA.RAPID: NEGATIVE

## 2022-01-28 PROCEDURE — 63600175 PHARM REV CODE 636 W HCPCS: Performed by: STUDENT IN AN ORGANIZED HEALTH CARE EDUCATION/TRAINING PROGRAM

## 2022-01-28 PROCEDURE — 25000003 PHARM REV CODE 250: Performed by: ANESTHESIOLOGY

## 2022-01-28 PROCEDURE — 36000706: Performed by: SURGERY

## 2022-01-28 PROCEDURE — 25000003 PHARM REV CODE 250: Performed by: STUDENT IN AN ORGANIZED HEALTH CARE EDUCATION/TRAINING PROGRAM

## 2022-01-28 PROCEDURE — D9220A PRA ANESTHESIA: ICD-10-PCS | Mod: ,,, | Performed by: ANESTHESIOLOGY

## 2022-01-28 PROCEDURE — 64590 PR IMPLANT PERIPH/GASTRIC NEUROSTIM/RECEIVER: ICD-10-PCS | Mod: ,,, | Performed by: SURGERY

## 2022-01-28 PROCEDURE — 64590 INS/RPL PRPH SAC/GSTR NPG/R: CPT | Mod: ,,, | Performed by: SURGERY

## 2022-01-28 PROCEDURE — 88300 SURGICAL PATH GROSS: CPT | Mod: 26,,, | Performed by: PATHOLOGY

## 2022-01-28 PROCEDURE — 64450 NJX AA&/STRD OTHER PN/BRANCH: CPT | Mod: 59 | Performed by: STUDENT IN AN ORGANIZED HEALTH CARE EDUCATION/TRAINING PROGRAM

## 2022-01-28 PROCEDURE — 63600175 PHARM REV CODE 636 W HCPCS: Performed by: ANESTHESIOLOGY

## 2022-01-28 PROCEDURE — 63600175 PHARM REV CODE 636 W HCPCS: Performed by: SURGERY

## 2022-01-28 PROCEDURE — 64450 QL SINGLE INJECTION: ICD-10-PCS | Mod: 59,RT,, | Performed by: ANESTHESIOLOGY

## 2022-01-28 PROCEDURE — 37000008 HC ANESTHESIA 1ST 15 MINUTES: Performed by: SURGERY

## 2022-01-28 PROCEDURE — 25000003 PHARM REV CODE 250: Performed by: SURGERY

## 2022-01-28 PROCEDURE — 71000033 HC RECOVERY, INTIAL HOUR: Performed by: SURGERY

## 2022-01-28 PROCEDURE — 88300 SURGICAL PATH GROSS: CPT | Performed by: PATHOLOGY

## 2022-01-28 PROCEDURE — 71000015 HC POSTOP RECOV 1ST HR: Performed by: SURGERY

## 2022-01-28 PROCEDURE — 64450 NJX AA&/STRD OTHER PN/BRANCH: CPT | Mod: 59,RT,, | Performed by: ANESTHESIOLOGY

## 2022-01-28 PROCEDURE — 95980 PR ELEC ALYS NSTIM PLS GEN GASTRIC INTRAOP W/ PROG: ICD-10-PCS | Mod: ,,, | Performed by: SURGERY

## 2022-01-28 PROCEDURE — 37000009 HC ANESTHESIA EA ADD 15 MINS: Performed by: SURGERY

## 2022-01-28 PROCEDURE — 36000707: Performed by: SURGERY

## 2022-01-28 PROCEDURE — 76942 ECHO GUIDE FOR BIOPSY: CPT | Mod: 26,,, | Performed by: ANESTHESIOLOGY

## 2022-01-28 PROCEDURE — 76942 QL SINGLE INJECTION: ICD-10-PCS | Mod: 26,,, | Performed by: ANESTHESIOLOGY

## 2022-01-28 PROCEDURE — 95980 IO ANAL GAST N-STIM INIT: CPT | Mod: ,,, | Performed by: SURGERY

## 2022-01-28 PROCEDURE — 94761 N-INVAS EAR/PLS OXIMETRY MLT: CPT

## 2022-01-28 PROCEDURE — C1767 GENERATOR, NEURO NON-RECHARG: HCPCS | Performed by: SURGERY

## 2022-01-28 PROCEDURE — 88300 PR  SURG PATH,GROSS,LEVEL I: ICD-10-PCS | Mod: 26,,, | Performed by: PATHOLOGY

## 2022-01-28 PROCEDURE — D9220A PRA ANESTHESIA: Mod: ,,, | Performed by: ANESTHESIOLOGY

## 2022-01-28 DEVICE — GENERATOR NEUROSTIM GASTRIC
Type: IMPLANTABLE DEVICE | Site: ABDOMEN | Status: NON-FUNCTIONAL
Removed: 2023-06-23

## 2022-01-28 RX ORDER — SODIUM CHLORIDE 0.9 % (FLUSH) 0.9 %
10 SYRINGE (ML) INJECTION
Status: DISCONTINUED | OUTPATIENT
Start: 2022-01-28 | End: 2022-01-28 | Stop reason: HOSPADM

## 2022-01-28 RX ORDER — ACETAMINOPHEN 325 MG/1
650 TABLET ORAL EVERY 4 HOURS PRN
Status: DISCONTINUED | OUTPATIENT
Start: 2022-01-28 | End: 2022-01-28 | Stop reason: HOSPADM

## 2022-01-28 RX ORDER — KETAMINE HCL IN 0.9 % NACL 50 MG/5 ML
SYRINGE (ML) INTRAVENOUS
Status: DISCONTINUED | OUTPATIENT
Start: 2022-01-28 | End: 2022-01-28

## 2022-01-28 RX ORDER — CEFAZOLIN SODIUM 1 G/3ML
INJECTION, POWDER, FOR SOLUTION INTRAMUSCULAR; INTRAVENOUS
Status: DISCONTINUED | OUTPATIENT
Start: 2022-01-28 | End: 2022-01-28

## 2022-01-28 RX ORDER — DEXAMETHASONE SODIUM PHOSPHATE 4 MG/ML
INJECTION, SOLUTION INTRA-ARTICULAR; INTRALESIONAL; INTRAMUSCULAR; INTRAVENOUS; SOFT TISSUE
Status: DISCONTINUED | OUTPATIENT
Start: 2022-01-28 | End: 2022-01-28

## 2022-01-28 RX ORDER — MIDAZOLAM HYDROCHLORIDE 1 MG/ML
INJECTION INTRAMUSCULAR; INTRAVENOUS
Status: DISCONTINUED | OUTPATIENT
Start: 2022-01-28 | End: 2022-01-28

## 2022-01-28 RX ORDER — ONDANSETRON 2 MG/ML
INJECTION INTRAMUSCULAR; INTRAVENOUS
Status: DISCONTINUED | OUTPATIENT
Start: 2022-01-28 | End: 2022-01-28

## 2022-01-28 RX ORDER — MIDAZOLAM HYDROCHLORIDE 1 MG/ML
.5-4 INJECTION INTRAMUSCULAR; INTRAVENOUS
Status: DISCONTINUED | OUTPATIENT
Start: 2022-01-28 | End: 2022-01-28 | Stop reason: HOSPADM

## 2022-01-28 RX ORDER — BUPIVACAINE HYDROCHLORIDE 5 MG/ML
INJECTION, SOLUTION EPIDURAL; INTRACAUDAL
Status: DISCONTINUED | OUTPATIENT
Start: 2022-01-28 | End: 2022-01-28 | Stop reason: HOSPADM

## 2022-01-28 RX ORDER — SODIUM CHLORIDE 9 MG/ML
INJECTION, SOLUTION INTRAVENOUS CONTINUOUS
Status: DISCONTINUED | OUTPATIENT
Start: 2022-01-28 | End: 2022-01-28 | Stop reason: HOSPADM

## 2022-01-28 RX ORDER — HALOPERIDOL 5 MG/ML
0.5 INJECTION INTRAMUSCULAR EVERY 10 MIN PRN
Status: COMPLETED | OUTPATIENT
Start: 2022-01-28 | End: 2022-01-28

## 2022-01-28 RX ORDER — SUCCINYLCHOLINE CHLORIDE 20 MG/ML
INJECTION INTRAMUSCULAR; INTRAVENOUS
Status: DISCONTINUED | OUTPATIENT
Start: 2022-01-28 | End: 2022-01-28

## 2022-01-28 RX ORDER — BUPIVACAINE HYDROCHLORIDE 7.5 MG/ML
INJECTION, SOLUTION EPIDURAL; RETROBULBAR
Status: COMPLETED | OUTPATIENT
Start: 2022-01-28 | End: 2022-01-28

## 2022-01-28 RX ORDER — PROPOFOL 10 MG/ML
VIAL (ML) INTRAVENOUS
Status: DISCONTINUED | OUTPATIENT
Start: 2022-01-28 | End: 2022-01-28

## 2022-01-28 RX ORDER — SODIUM CHLORIDE 9 MG/ML
INJECTION, SOLUTION INTRAVENOUS CONTINUOUS PRN
Status: DISCONTINUED | OUTPATIENT
Start: 2022-01-28 | End: 2022-01-28

## 2022-01-28 RX ORDER — LIDOCAINE HYDROCHLORIDE 20 MG/ML
INJECTION INTRAVENOUS
Status: DISCONTINUED | OUTPATIENT
Start: 2022-01-28 | End: 2022-01-28

## 2022-01-28 RX ADMIN — Medication 10 MG: at 09:01

## 2022-01-28 RX ADMIN — HALOPERIDOL LACTATE 0.5 MG: 5 INJECTION, SOLUTION INTRAMUSCULAR at 10:01

## 2022-01-28 RX ADMIN — HALOPERIDOL LACTATE 0.5 MG: 5 INJECTION, SOLUTION INTRAMUSCULAR at 09:01

## 2022-01-28 RX ADMIN — DEXAMETHASONE SODIUM PHOSPHATE 5 MG: 4 INJECTION, SOLUTION INTRAMUSCULAR; INTRAVENOUS at 09:01

## 2022-01-28 RX ADMIN — SODIUM CHLORIDE: 0.9 INJECTION, SOLUTION INTRAVENOUS at 08:01

## 2022-01-28 RX ADMIN — MIDAZOLAM 2 MG: 1 INJECTION INTRAMUSCULAR; INTRAVENOUS at 08:01

## 2022-01-28 RX ADMIN — SUCCINYLCHOLINE CHLORIDE 130 MG: 20 INJECTION, SOLUTION INTRAMUSCULAR; INTRAVENOUS; PARENTERAL at 08:01

## 2022-01-28 RX ADMIN — DEXTROSE 2 G: 50 INJECTION, SOLUTION INTRAVENOUS at 08:01

## 2022-01-28 RX ADMIN — PROMETHAZINE HYDROCHLORIDE 6.25 MG: 25 INJECTION INTRAMUSCULAR; INTRAVENOUS at 10:01

## 2022-01-28 RX ADMIN — ONDANSETRON 4 MG: 2 INJECTION INTRAMUSCULAR; INTRAVENOUS at 09:01

## 2022-01-28 RX ADMIN — PROPOFOL 130 MG: 10 INJECTION, EMULSION INTRAVENOUS at 08:01

## 2022-01-28 RX ADMIN — CEFAZOLIN 1 G: 330 INJECTION, POWDER, FOR SOLUTION INTRAMUSCULAR; INTRAVENOUS at 09:01

## 2022-01-28 RX ADMIN — MIDAZOLAM HYDROCHLORIDE 2 MG: 1 INJECTION, SOLUTION INTRAMUSCULAR; INTRAVENOUS at 08:01

## 2022-01-28 RX ADMIN — LIDOCAINE HYDROCHLORIDE 100 MG: 20 INJECTION, SOLUTION INTRAVENOUS at 08:01

## 2022-01-28 RX ADMIN — BUPIVACAINE HYDROCHLORIDE 15 ML: 7.5 INJECTION, SOLUTION EPIDURAL; RETROBULBAR at 08:01

## 2022-01-28 NOTE — PROGRESS NOTES
Pt AAox4. VSS. Denies pain, c/o nausea- IV PRN meds given. Nausea tolerable.. Peripheral IV removed. Discharge instructions given to pt and spouse. Pt dressed and placed in wheelchair for discharge.

## 2022-01-28 NOTE — OP NOTE
DATE OF PROCEDURE: 1/28/2022    PRE OP DIAGNOSIS: Gastroparesis [K31.84]    POST OP DIAGNOSIS: Gastroparesis [K31.84]    PROCEDURE: Procedure(s) (LRB):  REPLACEMENT, PULSE GENERATOR, NEUROSTIMULATOR, GASTRIC, Open (N/A)  Intraoperative programming gastric neurostimulator    Surgeon(s) and Role:     * Robert Kumar MD - Primary     * Latoya White MD - Resident - Assisting    ANESTHESIA: General.     ANESTHESIA: General.     Procedure: The patient was placed under general anesthesia and tthe abdomen was prepped and draped in the usual manner with Ioban drape.  The prior stimulator incision was pre-injected with marcaine and incised with a scalpel.  The bovie was used to divide the subcutaneous tissues to the stimulator and the stimulator was brought out onto the field.  The old stimulator was removed and the new one placed.  It was placed back in the pocket and secured with two 0 prolene sutures.  The area was inspected for hemostasis and the incision closed with 3-0 and 4-0 vicryl. The incision was reinforced with Dermabond and an abdominal binder was placed on the patient.  The stimulator was interrogated and programmed: imp 453 volt 8 curr 17.7 pw 330 rate 40 on 2 off 3. An abdominal binder was placed.  The patient tolerated the procedure well and was brought to the recovery room in stable condition.  Sponge and need counts were correct at the end of the case.    Path: stimulator for gross Complications: none Blood loss: minimal

## 2022-01-28 NOTE — TRANSFER OF CARE
Anesthesia Transfer of Care Note    Patient: Nena Mendez    Procedure(s) Performed: Procedure(s) (LRB):  REPLACEMENT, PULSE GENERATOR, NEUROSTIMULATOR, GASTRIC, Open (N/A)    Patient location: PACU    Anesthesia Type: general    Transport from OR: Transported from OR on 6-10 L/min O2 by face mask with adequate spontaneous ventilation    Post pain: adequate analgesia    Post assessment: no apparent anesthetic complications    Post vital signs: stable    Level of consciousness: responds to stimulation    Nausea/Vomiting: no nausea/vomiting    Complications: none    Transfer of care protocol was followed      Last vitals:   Visit Vitals  /78   Pulse 81   Temp 36.5 °C (97.7 °F) (Temporal)   Resp 16   Wt 46.3 kg (102 lb)   SpO2 100%   Breastfeeding No   BMI 15.06 kg/m²

## 2022-01-28 NOTE — ANESTHESIA PROCEDURE NOTES
Intubation    Date/Time: 1/28/2022 8:55 AM  Performed by: Jm Gomez MD  Authorized by: Kurt Abrams Jr., MD     Intubation:     Induction:  Intravenous    Intubated:  Postinduction    Mask Ventilation:  Easy mask    Attempts:  1    Attempted By:  Resident anesthesiologist    Method of Intubation:  Direct    Blade:  Tarsha 3    Laryngeal View Grade: Grade IIA - cords partially seen      Difficult Airway Encountered?: No      Complications:  None    Airway Device:  Oral endotracheal tube    Airway Device Size:  7.5    Style/Cuff Inflation:  Cuffed (inflated to minimal occlusive pressure)    Placement Verified By:  Capnometry    Complicating Factors:  None    Findings Post-Intubation:  BS equal bilateral and atraumatic/condition of teeth unchanged

## 2022-01-28 NOTE — ANESTHESIA POSTPROCEDURE EVALUATION
Anesthesia Post Evaluation    Patient: Nena Mendez    Procedure(s) Performed: Procedure(s) (LRB):  REPLACEMENT, PULSE GENERATOR, NEUROSTIMULATOR, GASTRIC, Open (N/A)    Final Anesthesia Type: general      Patient location during evaluation: PACU  Patient participation: Yes- Able to Participate  Level of consciousness: awake and alert  Post-procedure vital signs: reviewed and stable  Pain management: adequate  Airway patency: patent    PONV status at discharge: No PONV  Anesthetic complications: no      Cardiovascular status: blood pressure returned to baseline  Respiratory status: spontaneous ventilation and room air  Hydration status: euvolemic  Follow-up not needed.          Vitals Value Taken Time   /69 01/28/22 1017   Temp 36.4 °C (97.5 °F) 01/28/22 1015   Pulse 49 01/28/22 1026   Resp 12 01/28/22 1026   SpO2 100 % 01/28/22 1026   Vitals shown include unvalidated device data.      Event Time   Out of Recovery 09:55:00         Pain/Baljit Score: Pain Rating Prior to Med Admin: 0 (1/28/2022  8:40 AM)  Pain Rating Post Med Admin: 0 (1/28/2022  8:35 AM)  Baljit Score: 10 (1/28/2022  9:55 AM)
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
## 2022-01-28 NOTE — ANESTHESIA PROCEDURE NOTES
QL single injection    Patient location during procedure: pre-op   Block not for primary anesthetic.  Reason for block: at surgeon's request and post-op pain management   Post-op Pain Location: abdominal wall pain  Start time: 1/28/2022 8:38 AM  Timeout: 1/28/2022 8:37 AM   End time: 1/28/2022 8:45 AM    Staffing  Authorizing Provider: Siddhartha Ordoñez MD  Performing Provider: Dao Strange MD    Preanesthetic Checklist  Completed: patient identified, IV checked, site marked, risks and benefits discussed, surgical consent, monitors and equipment checked, pre-op evaluation and timeout performed  Peripheral Block  Patient position: supine  Prep: ChloraPrep  Patient monitoring: cardiac monitor, heart rate, continuous pulse ox, continuous capnometry and frequent blood pressure checks  Block type: Quadratus Lumborum  Laterality: right  Injection technique: single shot  Needle  Needle type: Stimuplex   Needle gauge: 22 G  Needle length: 4 in  Needle localization: ultrasound guidance   -ultrasound image captured on disc.  Assessment  Injection assessment: negative aspiration, negative parasthesia and local visualized surrounding nerve  Paresthesia pain: none  Heart rate change: no  Slow fractionated injection: yes  Pain Tolerance: comfortable throughout block and no complaints  Medications:  Medication Administration Time: 1/28/2022 8:40 AM  Medications: bupivacaine (pf) (MARCAINE) injection 0.75%, 15 mL    Medications:  Bolus administered: 20 mL of 0.25 ropivacaine  Epinephrine added: 3.75 mcg/mL (1/300,000)  Additional Notes  VSS.  [facility] RN monitoring vitals throughout procedure.  Patient tolerated procedure well. PF decadron and clonidine added to LA. [id number] epi.             Patient

## 2022-01-28 NOTE — BRIEF OP NOTE
[facility] - Surgery ([facility])  Brief Operative Note    Surgery Date: 1/28/2022     Surgeon(s) and Role:     * Robert Kumar MD - Primary     * Latoya White MD - Resident - Assisting        Pre-op Diagnosis:  Gastroparesis [K31.84]    Post-op Diagnosis:  Post-Op Diagnosis Codes:     * Gastroparesis [K31.84]    Procedure(s) (LRB):  REPLACEMENT, PULSE GENERATOR, NEUROSTIMULATOR, GASTRIC, Open (N/A)    Anesthesia: General    Operative Findings: Battery replacement, see op note for settings    Estimated Blood Loss: 5 cc         Specimens:   Specimen (24h ago, onward)             Start     Ordered    01/28/22 0942  Specimen to Pathology, Surgery General Surgery  Once        Comments: Pre-op Diagnosis: Gastroparesis [K31.84]    Procedure(s):  REPLACEMENT, PULSE GENERATOR, NEUROSTIMULATOR, GASTRIC, Open     Number of specimens: 1    Name of specimens:   1. Stimulator, gross   References:    Click here for ordering Quick Tip   Question Answer Comment   Procedure Type: General Surgery    Release to patient Immediate        01/28/22 0942                  Discharge Note    OUTCOME: Patient tolerated treatment/procedure well without complication and is now ready for discharge.    DISPOSITION: Home or Self Care    FINAL DIAGNOSIS:  S/P laparoscopic sleeve gastrectomy    FOLLOWUP: In clinic    DISCHARGE INSTRUCTIONS:    Discharge Procedure Orders   Diet general     Call MD for:  temperature >100.4     Call MD for:  persistent nausea and vomiting     Call MD for:  severe uncontrolled pain     Call MD for:  redness, tenderness, or signs of infection (pain, swelling, redness, odor or green/yellow discharge around incision site)     Leave dressing on - Keep it clean, dry, and intact until clinic visit     Activity as tolerated

## 2022-01-28 NOTE — BRIEF OP NOTE
Operative Note       Surgery Date: 1/28/2022     Surgeon(s) and Role:     * Robert Kumar MD - Primary     * Latoya White MD - Resident - Assisting    Pre-op Diagnosis:  Gastroparesis [K31.84]    Post-op Diagnosis:  Gastroparesis [K31.84]    Procedure(s) (LRB):  REPLACEMENT, PULSE GENERATOR, NEUROSTIMULATOR, GASTRIC, Open (N/A)  Intraoperative programming gastric neurostimulator    Anesthesia: General    Procedure in Detail/Findings:  Stimulator and programming without apparent complication.    Estimated Blood Loss: Minimal           Specimens (From admission, onward)    None        Implants:   Implant Name Type Inv. Item Serial No.  Lot No. LRB No. Used Action   GENERATOR NEUROSTIM GASTRIC - [id number]  GENERATOR NEUROSTIM GASTRIC [id number] [employer] [facility]  N/A 1 Implanted              Disposition: PACU - hemodynamically stable.           Condition: Good    Attestation:  I was present and scrubbed for the entire procedure.

## 2022-01-28 NOTE — TELEPHONE ENCOUNTER
Arrival time of 0700 given to the Patient via the Pre-Op Center nurse.  The nurse verified the arrival time with me.  I also asked the Pre-Op Nurse to inform the Patient that we had received her EGD and Colonoscopy reports. The Pre-Op Center nurse stated that she let the Patient know.

## 2022-01-28 NOTE — ANESTHESIA PREPROCEDURE EVALUATION
[facility]  Anesthesia Pre-Operative Evaluation         Patient Name: Nena Mendez  YOB: 1980  MRN: [id number]    SUBJECTIVE:     Pre-operative evaluation for Procedure(s) (LRB):  REPLACEMENT, PULSE GENERATOR, NEUROSTIMULATOR, GASTRIC, Open (N/A)     01/27/2022    Nena Mendez is a 41 y.o. female w/ a significant PMHx of Gastroparesis s/p gastric stim, fibromyalgia, and HAs who presents for the above procedure.      LDA: None documented.    Prev airway: None documented.     Drips: None documented.    Patient Active Problem List   Diagnosis    S/P lap gastric neurostimulator placement     Malnutrition    Jejunostomy tube site pain    S/P laparoscopic sleeve gastrectomy    Nausea and vomiting       Review of patient's allergies indicates:   Allergen Reactions    Iodine and iodide containing products Swelling, Hives, Itching, Rash and Shortness Of Breath    Domperidone Itching    Latex, natural rubber Rash    Scopolamine      Patch- caused burn under patch       Current Inpatient Medications:      No current facility-administered medications on file prior to encounter.     Current Outpatient Medications on File Prior to Encounter   Medication Sig Dispense Refill    baclofen (LIORESAL) 10 MG tablet Take 1 tablet (10 mg total) by mouth 3 (three) times daily as needed. (Patient taking differently: Take 10 mg by mouth nightly.) 90 tablet 11    gabapentin (NEURONTIN) 300 MG capsule TAKE 1 CAPSULE(300 MG) BY MOUTH THREE TIMES DAILY 90 capsule 3    lubiprostone (AMITIZA) 24 MCG Cap Take 1 capsule (24 mcg total) by mouth 2 (two) times daily. 60 capsule 6    b complex vitamins capsule Take 1 capsule by mouth once daily.      butalbital-acetaminop-caf-cod -92-30 mg Cap TK 1 C PO Q 4 TO 6 H PRN P  3    ergocalciferol (ERGOCALCIFEROL) 50,000 unit Cap Take 50,000 Units by mouth every 7 days.      lubiprostone (AMITIZA) 8 MCG Cap Take 1 capsule (8 mcg  total) by mouth 2 (two) times daily. 60 capsule 6    mirtazapine (REMERON) 30 MG tablet Take 1 tablet (30 mg total) by mouth every evening. 30 tablet 11    multivitamin Liqd Take by mouth.      promethazine (PHENERGAN) 6.25 mg/5 mL syrup Take 12.5 mg by mouth every 6 (six) hours as needed for Nausea.         Past Surgical History:   Procedure Laterality Date    APPENDECTOMY      breast implants removed Bilateral 2020     SECTION      CHOLECYSTECTOMY      COSMETIC SURGERY      ESOPHAGEAL MANOMETRY WITH MEASUREMENT OF IMPEDANCE N/A 10/30/2019    Procedure: MANOMETRY, ESOPHAGUS, WITH IMPEDANCE MEASUREMENT;  Surgeon: Patti Orozco MD;  Location: [address]);  Service: Endoscopy;  Laterality: N/A;  LATEX ALLERGY  r/o rumination  Motility Studies:  Hold Narcotics x 1 days   Hold TCA x 1 days  10/24 - pt confirmed appt-BB    ESOPHAGOGASTRODUODENOSCOPY N/A 3/22/2019    Procedure: EGD (ESOPHAGOGASTRODUODENOSCOPY)-dual lumen scope to remove intragastric suture.;  Surgeon: Robert Kumar MD;  Location: [address];  Service: General;  Laterality: N/A;    ESOPHAGOGASTRODUODENOSCOPY N/A 10/30/2019    Procedure: EGD (ESOPHAGOGASTRODUODENOSCOPY);  Surgeon: Patti Orozco MD;  Location: [address]);  Service: Endoscopy;  Laterality: N/A;  LATEX ALLERGY  EGD with EndoFlip   4th Floor, scheduled on 2nd floor due to availability  Full liquid diet 3 days and clear liquid diet x 1 day prior to procedure  Propofol only. No fentanyl or benzodiazepine during sedation. If additional sedation need    GASTRIC STIMULATOR IMPLANT SURGERY      GASTROSTOMY-JEJEUNOSTOMY TUBE CHANGE/PLACEMENT  08/10/2016    HYSTERECTOMY      laparoscopic jejunostomy tube      removed ~     REPLACEMENT OF GASTRIC NEUROSTIMULATOR GENERATOR N/A 3/22/2019    Procedure: REPLACEMENT, PULSE GENERATOR, NEUROSTIMULATOR, GASTRIC-battery exchange only;  Surgeon: Robert Kumar MD;  Location: [facility] OR [address] [place] [address];   Service: General;  Laterality: N/A;    REPLACEMENT OF GASTRIC NEUROSTIMULATOR GENERATOR N/A 2019    Procedure: REPLACEMENT, PULSE GENERATOR, NEUROSTIMULATOR, GASTRIC, OPEN (battery exchange);  Surgeon: Robert Kumar MD;  Location: [facility] OR [address] [place] [address];  Service: General;  Laterality: N/A;    sleeve gastrectomy      TONSILLECTOMY         Social History     Socioeconomic History    Marital status:     Number of children: 2   Tobacco Use    Smoking status: Former Smoker     Packs/day: 0.00     Quit date: 2015     Years since quittin.4    Smokeless tobacco: Never Used   Substance and Sexual Activity    Alcohol use: No    Drug use: No       OBJECTIVE:     Vital Signs Range (Last 24H):         CBC:   No results for input(s): WBC, RBC, HGB, HCT, PLT, MCV, MCH, MCHC in the last 72 hours.    CMP: No results for input(s): NA, K, CL, CO2, BUN, CREATININE, GLU, MG, PHOS, CALCIUM, ALBUMIN, PROT, ALKPHOS, ALT, AST, BILITOT in the last 72 hours.    INR:  No results for input(s): PT, INR, PROTIME, APTT in the last 72 hours.    Diagnostic Studies: No relevant studies.    EKG: No recent studies available.    2D ECHO:  No results found for this or any previous visit.      ASSESSMENT/PLAN:         Anesthesia Evaluation    I have reviewed the Patient Summary Reports.    I have reviewed the Nursing Notes.    I have reviewed the Medications.     Review of Systems  Anesthesia Hx:  No problems with previous Anesthesia    Hematology/Oncology:  Hematology Normal   Oncology Normal     EENT/Dental:EENT/Dental Normal   Cardiovascular:  Cardiovascular Normal     Pulmonary:  Pulmonary Normal    Renal/:  Renal/ Normal     Hepatic/GI:   Gastroparesis with nerve stim   Neurological:   Neuromuscular Disease, Headaches    Endocrine:  Endocrine Normal    Psych:  Psychiatric Normal           Physical Exam  General:  Malnutrition    Airway/Jaw/Neck:  Airway Findings: Mouth Opening: Normal Tongue: Normal  General  Airway Assessment: Adult  Mallampati: II  Jaw/Neck Findings:  Neck ROM: Normal ROM      Dental:  Dental Findings: In tact   Chest/Lungs:  Chest/Lungs Findings: Normal Respiratory Rate     Heart/Vascular:  Heart Findings: Rate: Normal        Mental Status:  Mental Status Findings:  Cooperative, Alert and Oriented         Anesthesia Plan  Type of Anesthesia, risks & benefits discussed:  Anesthesia Type:  general    Patient's Preference:   Plan Factors:          Intra-op Monitoring Plan: standard ASA monitors  Intra-op Monitoring Plan Comments:   Post Op Pain Control Plan: IV/PO Opioids PRN, multimodal analgesia and per primary service following discharge from PACU  Post Op Pain Control Plan Comments:     Induction:   IV  Beta Blocker:  Patient is not currently on a Beta-Blocker (No further documentation required).       Informed Consent: Patient understands risks and agrees with Anesthesia plan.  Questions answered. Anesthesia consent signed with patient.  ASA Score: 3     Day of Surgery Review of History & Physical: I have interviewed and examined the patient. I have reviewed the patient's H&P dated:    H&P update referred to the surgeon.         Ready For Surgery From Anesthesia Perspective.

## 2022-01-29 ENCOUNTER — DOCUMENTATION ONLY (OUTPATIENT)
Dept: SURGERY | Facility: CLINIC | Age: 42
End: 2022-01-29
Payer: COMMERCIAL

## 2022-01-29 ENCOUNTER — PATIENT MESSAGE (OUTPATIENT)
Dept: SURGERY | Facility: CLINIC | Age: 42
End: 2022-01-29
Payer: COMMERCIAL

## 2022-01-29 NOTE — PROGRESS NOTES
Phone call: She is doing ok.  She has some pain.  No fevers, has usual nausea and is not eating well as usual.

## 2022-02-03 NOTE — TELEPHONE ENCOUNTER
Returned pt phone call-states she went to the ED for jutbe check-and its in place, but states its causing abd. Pain/discomfort.    She is also complaining of nausea and states that the phenergan is no longer controlling her nausea.    Explained to pt that i would talk to Dr. Kumar to see what else we can prescribe to help her with comfort.  -needing a refill on Ultram as well.       Opioid Pregnancy And Lactation Text: These medications can lead to premature delivery and should be avoided during pregnancy. These medications are also present in breast milk in small amounts.

## 2022-02-08 ENCOUNTER — OFFICE VISIT (OUTPATIENT)
Dept: SURGERY | Facility: CLINIC | Age: 42
End: 2022-02-08
Payer: COMMERCIAL

## 2022-02-08 ENCOUNTER — LAB VISIT (OUTPATIENT)
Dept: LAB | Facility: HOSPITAL | Age: 42
End: 2022-02-08
Attending: SURGERY
Payer: COMMERCIAL

## 2022-02-08 VITALS
WEIGHT: 105.25 LBS | HEART RATE: 78 BPM | BODY MASS INDEX: 15.59 KG/M2 | SYSTOLIC BLOOD PRESSURE: 118 MMHG | HEIGHT: 69 IN | DIASTOLIC BLOOD PRESSURE: 72 MMHG

## 2022-02-08 DIAGNOSIS — E46 MALNUTRITION, UNSPECIFIED TYPE: Primary | ICD-10-CM

## 2022-02-08 DIAGNOSIS — K31.84 GASTROPARESIS: ICD-10-CM

## 2022-02-08 DIAGNOSIS — E46 MALNUTRITION, UNSPECIFIED TYPE: ICD-10-CM

## 2022-02-08 LAB
ALBUMIN SERPL BCP-MCNC: 4.1 G/DL (ref 3.5–5.2)
ALP SERPL-CCNC: 98 U/L (ref 55–135)
ALT SERPL W/O P-5'-P-CCNC: 25 U/L (ref 10–44)
ANION GAP SERPL CALC-SCNC: 6 MMOL/L (ref 8–16)
AST SERPL-CCNC: 34 U/L (ref 10–40)
BASOPHILS # BLD AUTO: 0.03 K/UL (ref 0–0.2)
BASOPHILS NFR BLD: 0.6 % (ref 0–1.9)
BILIRUB SERPL-MCNC: 0.3 MG/DL (ref 0.1–1)
BUN SERPL-MCNC: 5 MG/DL (ref 6–20)
CALCIUM SERPL-MCNC: 10.2 MG/DL (ref 8.7–10.5)
CHLORIDE SERPL-SCNC: 104 MMOL/L (ref 95–110)
CO2 SERPL-SCNC: 30 MMOL/L (ref 23–29)
CREAT SERPL-MCNC: 0.7 MG/DL (ref 0.5–1.4)
DIFFERENTIAL METHOD: ABNORMAL
EOSINOPHIL # BLD AUTO: 0.2 K/UL (ref 0–0.5)
EOSINOPHIL NFR BLD: 4.5 % (ref 0–8)
ERYTHROCYTE [DISTWIDTH] IN BLOOD BY AUTOMATED COUNT: 12.7 % (ref 11.5–14.5)
EST. GFR  (AFRICAN AMERICAN): >60 ML/MIN/1.73 M^2
EST. GFR  (NON AFRICAN AMERICAN): >60 ML/MIN/1.73 M^2
GLUCOSE SERPL-MCNC: 91 MG/DL (ref 70–110)
HCT VFR BLD AUTO: 37.9 % (ref 37–48.5)
HGB BLD-MCNC: 12.4 G/DL (ref 12–16)
IMM GRANULOCYTES # BLD AUTO: 0.01 K/UL (ref 0–0.04)
IMM GRANULOCYTES NFR BLD AUTO: 0.2 % (ref 0–0.5)
LYMPHOCYTES # BLD AUTO: 1.6 K/UL (ref 1–4.8)
LYMPHOCYTES NFR BLD: 29.9 % (ref 18–48)
MCH RBC QN AUTO: 30 PG (ref 27–31)
MCHC RBC AUTO-ENTMCNC: 32.7 G/DL (ref 32–36)
MCV RBC AUTO: 92 FL (ref 82–98)
MONOCYTES # BLD AUTO: 0.4 K/UL (ref 0.3–1)
MONOCYTES NFR BLD: 7.1 % (ref 4–15)
NEUTROPHILS # BLD AUTO: 3.1 K/UL (ref 1.8–7.7)
NEUTROPHILS NFR BLD: 57.7 % (ref 38–73)
NRBC BLD-RTO: 0 /100 WBC
PLATELET # BLD AUTO: 376 K/UL (ref 150–450)
PMV BLD AUTO: 9.1 FL (ref 9.2–12.9)
POTASSIUM SERPL-SCNC: 4.8 MMOL/L (ref 3.5–5.1)
PREALB SERPL-MCNC: 20 MG/DL (ref 20–43)
PROT SERPL-MCNC: 7.2 G/DL (ref 6–8.4)
RBC # BLD AUTO: 4.14 M/UL (ref 4–5.4)
SODIUM SERPL-SCNC: 140 MMOL/L (ref 136–145)
WBC # BLD AUTO: 5.38 K/UL (ref 3.9–12.7)

## 2022-02-08 PROCEDURE — 3008F BODY MASS INDEX DOCD: CPT | Mod: CPTII,S$GLB,, | Performed by: SURGERY

## 2022-02-08 PROCEDURE — 85025 COMPLETE CBC W/AUTO DIFF WBC: CPT | Performed by: SURGERY

## 2022-02-08 PROCEDURE — 3078F PR MOST RECENT DIASTOLIC BLOOD PRESSURE < 80 MM HG: ICD-10-PCS | Mod: CPTII,S$GLB,, | Performed by: SURGERY

## 2022-02-08 PROCEDURE — 99024 PR POST-OP FOLLOW-UP VISIT: ICD-10-PCS | Mod: S$GLB,,, | Performed by: SURGERY

## 2022-02-08 PROCEDURE — 3074F SYST BP LT 130 MM HG: CPT | Mod: CPTII,S$GLB,, | Performed by: SURGERY

## 2022-02-08 PROCEDURE — 36415 COLL VENOUS BLD VENIPUNCTURE: CPT | Performed by: SURGERY

## 2022-02-08 PROCEDURE — 3078F DIAST BP <80 MM HG: CPT | Mod: CPTII,S$GLB,, | Performed by: SURGERY

## 2022-02-08 PROCEDURE — 99999 PR PBB SHADOW E&M-EST. PATIENT-LVL III: CPT | Mod: PBBFAC,,, | Performed by: SURGERY

## 2022-02-08 PROCEDURE — 84134 ASSAY OF PREALBUMIN: CPT | Performed by: SURGERY

## 2022-02-08 PROCEDURE — 3008F PR BODY MASS INDEX (BMI) DOCUMENTED: ICD-10-PCS | Mod: CPTII,S$GLB,, | Performed by: SURGERY

## 2022-02-08 PROCEDURE — 80053 COMPREHEN METABOLIC PANEL: CPT | Performed by: SURGERY

## 2022-02-08 PROCEDURE — 99024 POSTOP FOLLOW-UP VISIT: CPT | Mod: S$GLB,,, | Performed by: SURGERY

## 2022-02-08 PROCEDURE — 3074F PR MOST RECENT SYSTOLIC BLOOD PRESSURE < 130 MM HG: ICD-10-PCS | Mod: CPTII,S$GLB,, | Performed by: SURGERY

## 2022-02-08 PROCEDURE — 99999 PR PBB SHADOW E&M-EST. PATIENT-LVL III: ICD-10-PCS | Mod: PBBFAC,,, | Performed by: SURGERY

## 2022-02-08 NOTE — PROGRESS NOTES
"Nena Mendez is a 41 y.o. female patient.   1. S/P lap gastric neurostimulator placement       Past Medical History:   Diagnosis Date    Fibromyalgia     Gastroparesis     Headache     Rumination      Past Surgical History Pertinent Negatives:   Procedure Date Noted    BRAIN SURGERY 08/04/2016    BREAST SURGERY 08/04/2016    COLON SURGERY 08/04/2016    CORONARY ARTERY BYPASS GRAFT 08/04/2016    EYE SURGERY 08/04/2016    FRACTURE SURGERY 08/04/2016    HERNIA REPAIR 08/04/2016    JOINT REPLACEMENT 08/04/2016    KIDNEY TRANSPLANT 08/04/2016    LIVER TRANSPLANT 08/04/2016    PROSTATE SURGERY 08/04/2016    SMALL INTESTINE SURGERY 08/04/2016    SPINE SURGERY 08/04/2016     Scheduled Meds:  Continuous Infusions:  PRN Meds:    Review of patient's allergies indicates:   Allergen Reactions    Iodine and iodide containing products Swelling, Hives, Itching, Rash and Shortness Of Breath    Domperidone Itching    Latex, natural rubber Rash    Scopolamine      Patch- caused burn under patch     There are no hospital problems to display for this patient.    Blood pressure 118/72, pulse 78, height 5' 9" (1.753 m), weight 47.7 kg (105 lb 4.3 oz).    Subjective 40y/o with bmi 15.99, fibromyalgia and /p gastric stimulator 10/7/16 with replacement of batter 3/22/19, 12/30/19 and 1/28/22, J tube 12/16/16 (she didn't tolerate it so she may have small bowel dysmotility also), pylorolasty 1/27/17 and sleeve gastrectomy 5/10/17 all for gastroparesis.  She is not doing well and not eating anything except sweet tea with some clear protein.  She has no severe epigastric pain, extremely severe nausea and severe to extremely severe vomiting.  She has been to the ER once and has been given a recent tramadol rx.  She has lost weight since I last saw her.  Objective Wounds clear. Gastric Stimulator Interrogation: impedence 431 Voltage 8 Current 18.6 Pulse Width 330 Rate 40 Cycle on 2 Cycle off 3, on   Assessment & Plan " Severe gastroparesis symptoms with protein calorie malnutrition despite new stimulator.  She is ready for robotic g and j tubes.  Try to set up iv fluid and labs today.       Robert Kumar MD  2/8/2022

## 2022-02-08 NOTE — PATIENT INSTRUCTIONS
Labs drawn per Dr. Kumar's Office.  Made multiple phone calls to Infusion locations to try to get her set up for an IV infusion this afternoon - Infectious Disease Inf, Chemo Infusion, [facility], Out[facility] on [facility].. to try to get the Patient an IV infusion this afternoon before she went back home.  Spoke to an Infusion Center at [facility] and set an appointment for 2-9-22 at [facility] at 0830 as a backup.       Patient was able to receive an IV infusion at the Outpatient Infusion Center on [facility].  Called and cancelled her 0830 appointment at [facility] for tomorrow.  We are trying to get an earlier surgery date than 2-28-22 for a Robotic J and G tube insertion.

## 2022-02-10 ENCOUNTER — PATIENT MESSAGE (OUTPATIENT)
Dept: ENDOSCOPY | Facility: HOSPITAL | Age: 42
End: 2022-02-10
Payer: COMMERCIAL

## 2022-02-10 LAB
FINAL PATHOLOGIC DIAGNOSIS: NORMAL
GROSS: NORMAL
Lab: NORMAL

## 2022-02-10 NOTE — ADDENDUM NOTE
Addended by: YURI SIMPSON on: 2/10/2022 12:38 PM     Modules accepted: Level of Service, SmartSet

## 2022-02-16 ENCOUNTER — PATIENT MESSAGE (OUTPATIENT)
Dept: SURGERY | Facility: CLINIC | Age: 42
End: 2022-02-16
Payer: COMMERCIAL

## 2022-02-18 ENCOUNTER — TELEPHONE (OUTPATIENT)
Dept: SURGERY | Facility: CLINIC | Age: 42
End: 2022-02-18
Payer: COMMERCIAL

## 2022-02-18 ENCOUNTER — ANESTHESIA EVENT (OUTPATIENT)
Dept: SURGERY | Facility: HOSPITAL | Age: 42
End: 2022-02-18
Payer: COMMERCIAL

## 2022-02-21 ENCOUNTER — ANESTHESIA (OUTPATIENT)
Dept: SURGERY | Facility: HOSPITAL | Age: 42
End: 2022-02-21
Payer: COMMERCIAL

## 2022-02-21 ENCOUNTER — HOSPITAL ENCOUNTER (OUTPATIENT)
Facility: HOSPITAL | Age: 42
LOS: 1 days | Discharge: HOME | End: 2022-02-24
Attending: SURGERY | Admitting: SURGERY
Payer: COMMERCIAL

## 2022-02-21 DIAGNOSIS — K31.84 GASTROPARESIS: ICD-10-CM

## 2022-02-21 LAB
CTP QC/QA: YES
SARS-COV-2 AG RESP QL IA.RAPID: NEGATIVE

## 2022-02-21 PROCEDURE — 63600175 PHARM REV CODE 636 W HCPCS

## 2022-02-21 PROCEDURE — 36000711: Performed by: SURGERY

## 2022-02-21 PROCEDURE — 36000710: Performed by: SURGERY

## 2022-02-21 PROCEDURE — 71000033 HC RECOVERY, INTIAL HOUR: Performed by: SURGERY

## 2022-02-21 PROCEDURE — 25000003 PHARM REV CODE 250

## 2022-02-21 PROCEDURE — 25000003 PHARM REV CODE 250: Performed by: NURSE ANESTHETIST, CERTIFIED REGISTERED

## 2022-02-21 PROCEDURE — 76942: ICD-10-PCS | Mod: 26,,, | Performed by: ANESTHESIOLOGY

## 2022-02-21 PROCEDURE — 76942 ECHO GUIDE FOR BIOPSY: CPT | Mod: 26,,, | Performed by: ANESTHESIOLOGY

## 2022-02-21 PROCEDURE — 37000008 HC ANESTHESIA 1ST 15 MINUTES: Performed by: SURGERY

## 2022-02-21 PROCEDURE — C1894 INTRO/SHEATH, NON-LASER: HCPCS | Performed by: SURGERY

## 2022-02-21 PROCEDURE — 71000015 HC POSTOP RECOV 1ST HR: Performed by: SURGERY

## 2022-02-21 PROCEDURE — 71000016 HC POSTOP RECOV ADDL HR: Performed by: SURGERY

## 2022-02-21 PROCEDURE — D9220A PRA ANESTHESIA: ICD-10-PCS | Mod: ,,, | Performed by: NURSE ANESTHETIST, CERTIFIED REGISTERED

## 2022-02-21 PROCEDURE — D9220A PRA ANESTHESIA: ICD-10-PCS | Mod: ,,, | Performed by: ANESTHESIOLOGY

## 2022-02-21 PROCEDURE — 63600175 PHARM REV CODE 636 W HCPCS: Performed by: STUDENT IN AN ORGANIZED HEALTH CARE EDUCATION/TRAINING PROGRAM

## 2022-02-21 PROCEDURE — 44186 PR LAP, SURG JEJUNOSTOMY: ICD-10-PCS | Mod: ,,, | Performed by: SURGERY

## 2022-02-21 PROCEDURE — 64450 NJX AA&/STRD OTHER PN/BRANCH: CPT | Mod: 59,50,, | Performed by: ANESTHESIOLOGY

## 2022-02-21 PROCEDURE — S0030 INJECTION, METRONIDAZOLE: HCPCS | Performed by: STUDENT IN AN ORGANIZED HEALTH CARE EDUCATION/TRAINING PROGRAM

## 2022-02-21 PROCEDURE — D9220A PRA ANESTHESIA: Mod: ,,, | Performed by: ANESTHESIOLOGY

## 2022-02-21 PROCEDURE — 37000009 HC ANESTHESIA EA ADD 15 MINS: Performed by: SURGERY

## 2022-02-21 PROCEDURE — 25000003 PHARM REV CODE 250: Performed by: STUDENT IN AN ORGANIZED HEALTH CARE EDUCATION/TRAINING PROGRAM

## 2022-02-21 PROCEDURE — 64450 NJX AA&/STRD OTHER PN/BRANCH: CPT | Mod: 59,50 | Performed by: ANESTHESIOLOGY

## 2022-02-21 PROCEDURE — 64450: ICD-10-PCS | Mod: 59,50,, | Performed by: ANESTHESIOLOGY

## 2022-02-21 PROCEDURE — 63600175 PHARM REV CODE 636 W HCPCS: Performed by: NURSE ANESTHETIST, CERTIFIED REGISTERED

## 2022-02-21 PROCEDURE — 94761 N-INVAS EAR/PLS OXIMETRY MLT: CPT

## 2022-02-21 PROCEDURE — 25000003 PHARM REV CODE 250: Performed by: ANESTHESIOLOGY

## 2022-02-21 PROCEDURE — 44186 LAP JEJUNOSTOMY: CPT | Mod: ,,, | Performed by: SURGERY

## 2022-02-21 PROCEDURE — D9220A PRA ANESTHESIA: Mod: ,,, | Performed by: NURSE ANESTHETIST, CERTIFIED REGISTERED

## 2022-02-21 RX ORDER — KETOROLAC TROMETHAMINE 15 MG/ML
15 INJECTION, SOLUTION INTRAMUSCULAR; INTRAVENOUS ONCE
Status: COMPLETED | OUTPATIENT
Start: 2022-02-21 | End: 2022-02-21

## 2022-02-21 RX ORDER — METRONIDAZOLE 500 MG/100ML
500 INJECTION, SOLUTION INTRAVENOUS
Status: COMPLETED | OUTPATIENT
Start: 2022-02-21 | End: 2022-02-21

## 2022-02-21 RX ORDER — GABAPENTIN 300 MG/1
300 CAPSULE ORAL ONCE
Status: COMPLETED | OUTPATIENT
Start: 2022-02-21 | End: 2022-02-21

## 2022-02-21 RX ORDER — ONDANSETRON 2 MG/ML
INJECTION INTRAMUSCULAR; INTRAVENOUS
Status: DISCONTINUED | OUTPATIENT
Start: 2022-02-21 | End: 2022-02-21

## 2022-02-21 RX ORDER — ACETAMINOPHEN 650 MG/20.3ML
650 LIQUID ORAL EVERY 8 HOURS
Status: DISCONTINUED | OUTPATIENT
Start: 2022-02-21 | End: 2022-02-21

## 2022-02-21 RX ORDER — ONDANSETRON 2 MG/ML
8 INJECTION INTRAMUSCULAR; INTRAVENOUS EVERY 8 HOURS PRN
Status: DISCONTINUED | OUTPATIENT
Start: 2022-02-21 | End: 2022-02-24

## 2022-02-21 RX ORDER — SODIUM CHLORIDE, SODIUM LACTATE, POTASSIUM CHLORIDE, CALCIUM CHLORIDE 600; 310; 30; 20 MG/100ML; MG/100ML; MG/100ML; MG/100ML
INJECTION, SOLUTION INTRAVENOUS CONTINUOUS
Status: DISCONTINUED | OUTPATIENT
Start: 2022-02-21 | End: 2022-02-23

## 2022-02-21 RX ORDER — SODIUM CHLORIDE 0.9 % (FLUSH) 0.9 %
10 SYRINGE (ML) INJECTION
Status: DISCONTINUED | OUTPATIENT
Start: 2022-02-21 | End: 2022-02-24 | Stop reason: HOSPADM

## 2022-02-21 RX ORDER — PROPOFOL 10 MG/ML
VIAL (ML) INTRAVENOUS
Status: DISCONTINUED | OUTPATIENT
Start: 2022-02-21 | End: 2022-02-21

## 2022-02-21 RX ORDER — ROCURONIUM BROMIDE 10 MG/ML
INJECTION, SOLUTION INTRAVENOUS
Status: DISCONTINUED | OUTPATIENT
Start: 2022-02-21 | End: 2022-02-21

## 2022-02-21 RX ORDER — METHOCARBAMOL 500 MG/1
500 TABLET, FILM COATED ORAL EVERY 6 HOURS PRN
Status: DISCONTINUED | OUTPATIENT
Start: 2022-02-21 | End: 2022-02-24 | Stop reason: HOSPADM

## 2022-02-21 RX ORDER — DEXAMETHASONE SODIUM PHOSPHATE 4 MG/ML
INJECTION, SOLUTION INTRA-ARTICULAR; INTRALESIONAL; INTRAMUSCULAR; INTRAVENOUS; SOFT TISSUE
Status: DISCONTINUED | OUTPATIENT
Start: 2022-02-21 | End: 2022-02-21

## 2022-02-21 RX ORDER — BUPIVACAINE HYDROCHLORIDE AND EPINEPHRINE 2.5; 5 MG/ML; UG/ML
INJECTION, SOLUTION EPIDURAL; INFILTRATION; INTRACAUDAL; PERINEURAL
Status: COMPLETED | OUTPATIENT
Start: 2022-02-21 | End: 2022-02-21

## 2022-02-21 RX ORDER — BACLOFEN 5 MG/1
5 TABLET ORAL NIGHTLY
Status: DISCONTINUED | OUTPATIENT
Start: 2022-02-21 | End: 2022-02-24 | Stop reason: HOSPADM

## 2022-02-21 RX ORDER — MIDAZOLAM HYDROCHLORIDE 1 MG/ML
INJECTION, SOLUTION INTRAMUSCULAR; INTRAVENOUS
Status: DISCONTINUED | OUTPATIENT
Start: 2022-02-21 | End: 2022-02-21

## 2022-02-21 RX ORDER — NEOSTIGMINE METHYLSULFATE 0.5 MG/ML
INJECTION, SOLUTION INTRAVENOUS
Status: DISCONTINUED | OUTPATIENT
Start: 2022-02-21 | End: 2022-02-21

## 2022-02-21 RX ORDER — SODIUM CHLORIDE 9 MG/ML
INJECTION, SOLUTION INTRAVENOUS CONTINUOUS
Status: DISCONTINUED | OUTPATIENT
Start: 2022-02-21 | End: 2022-02-21

## 2022-02-21 RX ORDER — HYDROMORPHONE HYDROCHLORIDE 1 MG/ML
INJECTION, SOLUTION INTRAMUSCULAR; INTRAVENOUS; SUBCUTANEOUS
Status: COMPLETED
Start: 2022-02-21 | End: 2022-02-21

## 2022-02-21 RX ORDER — HYDROMORPHONE HYDROCHLORIDE 1 MG/ML
0.5 INJECTION, SOLUTION INTRAMUSCULAR; INTRAVENOUS; SUBCUTANEOUS ONCE
Status: COMPLETED | OUTPATIENT
Start: 2022-02-21 | End: 2022-02-21

## 2022-02-21 RX ORDER — BUPIVACAINE HYDROCHLORIDE AND EPINEPHRINE 5; 5 MG/ML; UG/ML
INJECTION, SOLUTION EPIDURAL; INTRACAUDAL; PERINEURAL
Status: COMPLETED | OUTPATIENT
Start: 2022-02-21 | End: 2022-02-21

## 2022-02-21 RX ORDER — GABAPENTIN 300 MG/1
300 CAPSULE ORAL 3 TIMES DAILY
Status: DISCONTINUED | OUTPATIENT
Start: 2022-02-21 | End: 2022-02-22

## 2022-02-21 RX ORDER — ACETAMINOPHEN 650 MG/20.3ML
1000 LIQUID ORAL EVERY 8 HOURS
Status: DISCONTINUED | OUTPATIENT
Start: 2022-02-21 | End: 2022-02-24 | Stop reason: HOSPADM

## 2022-02-21 RX ORDER — PROCHLORPERAZINE EDISYLATE 5 MG/ML
5 INJECTION INTRAMUSCULAR; INTRAVENOUS EVERY 30 MIN PRN
Status: DISCONTINUED | OUTPATIENT
Start: 2022-02-21 | End: 2022-02-21 | Stop reason: HOSPADM

## 2022-02-21 RX ORDER — KETAMINE HCL IN 0.9 % NACL 50 MG/5 ML
SYRINGE (ML) INTRAVENOUS
Status: DISCONTINUED | OUTPATIENT
Start: 2022-02-21 | End: 2022-02-21

## 2022-02-21 RX ORDER — ACETAMINOPHEN 10 MG/ML
INJECTION, SOLUTION INTRAVENOUS
Status: DISCONTINUED | OUTPATIENT
Start: 2022-02-21 | End: 2022-02-21

## 2022-02-21 RX ADMIN — GABAPENTIN 300 MG: 300 CAPSULE ORAL at 09:02

## 2022-02-21 RX ADMIN — HYDROMORPHONE HYDROCHLORIDE 0.5 MG: 1 INJECTION, SOLUTION INTRAMUSCULAR; INTRAVENOUS; SUBCUTANEOUS at 10:02

## 2022-02-21 RX ADMIN — METHOCARBAMOL 500 MG: 500 TABLET ORAL at 03:02

## 2022-02-21 RX ADMIN — ACETAMINOPHEN 999.01 MG: 160 SOLUTION ORAL at 09:02

## 2022-02-21 RX ADMIN — BUPIVACAINE HYDROCHLORIDE AND EPINEPHRINE BITARTRATE 20 ML: 5; .005 INJECTION, SOLUTION EPIDURAL; INTRACAUDAL; PERINEURAL at 07:02

## 2022-02-21 RX ADMIN — PROMETHAZINE HYDROCHLORIDE 12.5 MG: 25 INJECTION INTRAMUSCULAR; INTRAVENOUS at 12:02

## 2022-02-21 RX ADMIN — KETOROLAC TROMETHAMINE 15 MG: 15 INJECTION, SOLUTION INTRAMUSCULAR; INTRAVENOUS at 04:02

## 2022-02-21 RX ADMIN — CEFTRIAXONE SODIUM 2 G: 2 INJECTION, SOLUTION INTRAVENOUS at 07:02

## 2022-02-21 RX ADMIN — DEXAMETHASONE SODIUM PHOSPHATE 4 MG: 4 INJECTION, SOLUTION INTRAMUSCULAR; INTRAVENOUS at 07:02

## 2022-02-21 RX ADMIN — ROCURONIUM BROMIDE 50 MG: 10 INJECTION, SOLUTION INTRAVENOUS at 07:02

## 2022-02-21 RX ADMIN — METRONIDAZOLE 500 MG: 500 INJECTION, SOLUTION INTRAVENOUS at 07:02

## 2022-02-21 RX ADMIN — SODIUM CHLORIDE, SODIUM LACTATE, POTASSIUM CHLORIDE, AND CALCIUM CHLORIDE: .6; .31; .03; .02 INJECTION, SOLUTION INTRAVENOUS at 09:02

## 2022-02-21 RX ADMIN — ONDANSETRON 8 MG: 2 INJECTION INTRAMUSCULAR; INTRAVENOUS at 09:02

## 2022-02-21 RX ADMIN — PROPOFOL 200 MG: 10 INJECTION, EMULSION INTRAVENOUS at 07:02

## 2022-02-21 RX ADMIN — KETOROLAC TROMETHAMINE 15 MG: 15 INJECTION, SOLUTION INTRAMUSCULAR; INTRAVENOUS at 10:02

## 2022-02-21 RX ADMIN — GABAPENTIN 300 MG: 300 CAPSULE ORAL at 10:02

## 2022-02-21 RX ADMIN — PROMETHAZINE HYDROCHLORIDE 12.5 MG: 25 INJECTION INTRAMUSCULAR; INTRAVENOUS at 09:02

## 2022-02-21 RX ADMIN — MIDAZOLAM HYDROCHLORIDE 2 MG: 1 INJECTION, SOLUTION INTRAMUSCULAR; INTRAVENOUS at 07:02

## 2022-02-21 RX ADMIN — METHOCARBAMOL 500 MG: 500 TABLET ORAL at 09:02

## 2022-02-21 RX ADMIN — NEOSTIGMINE METHYLSULFATE 3 MG: 0.5 INJECTION INTRAVENOUS at 08:02

## 2022-02-21 RX ADMIN — ACETAMINOPHEN 1000 MG: 10 INJECTION, SOLUTION INTRAVENOUS at 07:02

## 2022-02-21 RX ADMIN — ONDANSETRON 4 MG: 2 INJECTION INTRAMUSCULAR; INTRAVENOUS at 08:02

## 2022-02-21 RX ADMIN — Medication 10 MG: at 08:02

## 2022-02-21 RX ADMIN — GABAPENTIN 300 MG: 300 CAPSULE ORAL at 04:02

## 2022-02-21 RX ADMIN — SODIUM CHLORIDE: 0.9 INJECTION, SOLUTION INTRAVENOUS at 07:02

## 2022-02-21 RX ADMIN — GABAPENTIN 300 MG: 300 CAPSULE ORAL at 12:02

## 2022-02-21 RX ADMIN — BUPIVACAINE HYDROCHLORIDE AND EPINEPHRINE BITARTRATE 20 ML: 2.5; .0091 INJECTION, SOLUTION EPIDURAL; INFILTRATION; INTRACAUDAL; PERINEURAL at 07:02

## 2022-02-21 RX ADMIN — GLYCOPYRROLATE 0.4 MG: 0.2 INJECTION INTRAMUSCULAR; INTRAVENOUS at 08:02

## 2022-02-21 NOTE — TRANSFER OF CARE
"Anesthesia Transfer of Care Note    Patient: Nena Mendez    Procedure(s) Performed: Procedure(s) (LRB):  XI ROBOTIC JEJUNOSTOMY Tube Placement, G Tube Placement (N/A)    Patient location: PACU    Anesthesia Type: general    Transport from OR: Transported from OR on 6-10 L/min O2 by face mask with adequate spontaneous ventilation    Post pain: adequate analgesia    Post assessment: no apparent anesthetic complications and tolerated procedure well    Post vital signs: stable    Level of consciousness: awake    Nausea/Vomiting: no nausea/vomiting    Complications: none    Transfer of care protocol was followed      Last vitals:   Visit Vitals  /66 (BP Location: Left arm, Patient Position: Lying)   Pulse 70   Temp 36.7 °C (98.1 °F) (Oral)   Resp 18   Ht 5' 5" (1.651 m)   Wt 45.4 kg (100 lb)   SpO2 100%   Breastfeeding No   BMI 16.64 kg/m²     "

## 2022-02-21 NOTE — OP NOTE
DATE OF PROCEDURE: 2/21/2022    PRE OP DIAGNOSIS: Malnutrition, unspecified type [E46]    POST OP DIAGNOSIS: Malnutrition, unspecified type [E46]    PROCEDURE: Procedure(s) (LRB):  XI ROBOTIC JEJUNOSTOMY Tube Placement, G Tube Placement (N/A)    Surgeon(s) and Role:     * Robert Kumar MD - Primary     * Zacarias Steele MD - Resident - Assisting    ANESTHESIA: General.     Procedure:    The patient was placed under general anesthesia and the abdomen prepped and draped in usual manner.  Access to the peritoneum was gained through the umbilicus using Optiview trocar under direct vision and pneumoperitoneum to 15 mmHg CO2 gas was obtained.  Robotic trocars were placed 8 cm left lateral and 8 cm lateral and several cm caudad to avoid the battery and wires for a 2nd robotic trocar.  The primary trocar was then switched over for robotic trocar.  These were all placed under direct vision.  The prior jejunostomy site still had small bowel attached to it so we made an incision in the skin just over this area and used a 14 Divehi Santos introducer set to introduce and dilate a tract into the small bowel through the skin incision a wire was placed and the dilator removed leaving the peel-away sheath in its place and a 12 Divehi FLORY-Key J-tube was used as J-tube and placed through this into the jejunum.  The balloon was not inflated and the flange was sutured to the skin using 3-0 nylon.  We identified a good piece of stomach for the G-tube and at the spot on the abdominal wall that was appropriate for its placement.  We passed a 18 Divehi Gandhi catheter into the abdominal in through this spot.  We then docked the robot and used a 2-0 absorbable V lock suture to attach the stomach to the anterior abdominal wall around the G-tube a hole was made in the stomach and the G-tube was placed into the stomach and a pursestring suture with 2-0 chromic was suture around the G-tube we then completed the V lock suture anteriorly  around until it met itself on the patient's right side.  We inspected both tubes robotically, injected air to make sure that they were both intraluminal and everything looked good and there was no bleeding.  The bow but was undocked and the trocars removed under direct vision.  Prior within last trocar pneumoperitoneum was allowed to escape.  Of note there was a 4th skin incision made where we thought we might place a trocar but decided not to.  Fascia the navel was closed 0 Vicryl and the skin incisions were repaired with 4 plain catgut and reinforced with Dermabond.  The patient tolerated procedure well was brought recovery room stable condition.  Sponge and needle counts were correct at the end of the case.  Blood loss was minimal, complications none and pathology none    This dictation was done with voice recognition software

## 2022-02-21 NOTE — BRIEF OP NOTE
Operative Note       Surgery Date: 2/21/2022     Surgeon(s) and Role:     * Robert Kumar MD - Primary     * Zacarias Steele MD - Resident - Assisting    Pre-op Diagnosis:  Malnutrition, unspecified type [E46]    Post-op Diagnosis:  Malnutrition, unspecified type [E46]    Procedure(s) (LRB):  XI ROBOTIC JEJUNOSTOMY Tube Placement, G Tube Placement (N/A)    Anesthesia: General    Procedure in Detail/Findings:  j and g without apparent complication    Estimated Blood Loss: Minimal           Specimens (From admission, onward)    None        Implants: * No implants in log *           Disposition: PACU - hemodynamically stable.           Condition: Good    Attestation:  I was present and scrubbed for the entire procedure.

## 2022-02-21 NOTE — ANESTHESIA PROCEDURE NOTES
Bilateral Quadratus Lumborum Single Shot Blocks    Patient location during procedure: pre-op   Block not for primary anesthetic.  Reason for block: at surgeon's request and post-op pain management   Post-op Pain Location: Abdominal pain   Start time: 2/21/2022 7:16 AM  Timeout: 2/21/2022 7:15 AM   End time: 2/21/2022 7:20 AM    Staffing  Authorizing Provider: John Michele MD  Performing Provider: Toya Arredondo MD    Preanesthetic Checklist  Completed: patient identified, IV checked, site marked, risks and benefits discussed, surgical consent, monitors and equipment checked, pre-op evaluation and timeout performed  Peripheral Block  Patient position: sitting  Prep: ChloraPrep  Patient monitoring: heart rate, cardiac monitor, continuous pulse ox, continuous capnometry and frequent blood pressure checks  Block type: Quadratus Lumborum (Serratus Plane)  Laterality: bilateral  Injection technique: single shot  Needle  Needle type: Stimuplex   Needle gauge: 21 G  Needle length: 4 in  Needle localization: anatomical landmarks and ultrasound guidance   -ultrasound image captured on disc.  Medications:  Medication Administration Time: 2/21/2022 7:20 AM  Medications: bupivacaine 0.5%-EPINEPHrine 1:200,000 injection - Perineural   20 mL - 2/21/2022 7:20:00 AM  bupivacaine 0.25%-EPINEPHrine (PF) 1:200,000 injection - Other   20 mL - 2/21/2022 7:20:00 AM    Additional Notes  VSS.  [facility] RN monitoring vitals throughout procedure.  Patient tolerated procedure well.

## 2022-02-21 NOTE — ANESTHESIA PREPROCEDURE EVALUATION
[facility]  Anesthesia Pre-Operative Evaluation         Patient Name: Nena Mendez  YOB: 1980  MRN: [id number]    SUBJECTIVE:     Pre-operative evaluation for Procedure(s) (LRB):  REPLACEMENT, PULSE GENERATOR, NEUROSTIMULATOR, GASTRIC, Open (N/A)     02/21/2022    Nena Mendez is a 41 y.o. female w/ a significant PMHx of Gastroparesis s/p gastric stim, fibromyalgia, and HAs who presents for the above procedure.      LDA: None documented.    Prev airway: None documented.     Drips: None documented.    Patient Active Problem List   Diagnosis    S/P lap gastric neurostimulator placement     Malnutrition    Jejunostomy tube site pain    S/P laparoscopic sleeve gastrectomy    Nausea and vomiting       Review of patient's allergies indicates:   Allergen Reactions    Iodine and iodide containing products Swelling, Hives, Itching, Rash and Shortness Of Breath    Domperidone Itching    Latex, natural rubber Rash    Scopolamine      Patch- caused burn under patch       Current Inpatient Medications:      Current Facility-Administered Medications on File Prior to Visit   Medication Dose Route Frequency Provider Last Rate Last Admin    0.9%  NaCl infusion   Intravenous Continuous Concepción Bustillos MD        cefTRIAXone (ROCEPHIN) 2 g/50 mL D5W IVPB  2 g Intravenous On Call Procedure Concepción Bustillos MD        And    metronidazole IVPB 500 mg  500 mg Intravenous On Call Procedure Concepción Bustillos MD         Current Outpatient Medications on File Prior to Visit   Medication Sig Dispense Refill    b complex vitamins capsule Take 1 capsule by mouth once daily.      baclofen (LIORESAL) 10 MG tablet Take 1 tablet (10 mg total) by mouth 3 (three) times daily as needed. (Patient taking differently: Take 10 mg by mouth nightly.) 90 tablet 11    butalbital-acetaminop-caf-cod -80-30 mg Cap TK 1 C PO Q 4 TO 6 H PRN P  3    ergocalciferol (ERGOCALCIFEROL) 50,000  unit Cap Take 50,000 Units by mouth every 7 days.      gabapentin (NEURONTIN) 300 MG capsule TAKE 1 CAPSULE(300 MG) BY MOUTH THREE TIMES DAILY 90 capsule 3    lubiprostone (AMITIZA) 24 MCG Cap Take 1 capsule (24 mcg total) by mouth 2 (two) times daily. 60 capsule 6    mirtazapine (REMERON) 30 MG tablet Take 1 tablet (30 mg total) by mouth every evening. 30 tablet 11    multivitamin Liqd Take by mouth.      promethazine (PHENERGAN) 25 MG tablet TAKE 1 TABLET(25 MG) BY MOUTH EVERY 6 HOURS AS NEEDED FOR NAUSEA 60 tablet 6    promethazine (PHENERGAN) 6.25 mg/5 mL syrup Take 12.5 mg by mouth every 6 (six) hours as needed for Nausea.         Past Surgical History:   Procedure Laterality Date    APPENDECTOMY      breast implants removed Bilateral 2020     SECTION      CHOLECYSTECTOMY      COSMETIC SURGERY      ESOPHAGEAL MANOMETRY WITH MEASUREMENT OF IMPEDANCE N/A 10/30/2019    Procedure: MANOMETRY, ESOPHAGUS, WITH IMPEDANCE MEASUREMENT;  Surgeon: Patti Orozco MD;  Location: [address]);  Service: Endoscopy;  Laterality: N/A;  LATEX ALLERGY  r/o rumination  Motility Studies:  Hold Narcotics x 1 days   Hold TCA x 1 days  10/24 - pt confirmed appt-BB    ESOPHAGOGASTRODUODENOSCOPY N/A 3/22/2019    Procedure: EGD (ESOPHAGOGASTRODUODENOSCOPY)-dual lumen scope to remove intragastric suture.;  Surgeon: Robert Kumar MD;  Location: [address];  Service: General;  Laterality: N/A;    ESOPHAGOGASTRODUODENOSCOPY N/A 10/30/2019    Procedure: EGD (ESOPHAGOGASTRODUODENOSCOPY);  Surgeon: Patti Orozco MD;  Location: [address]);  Service: Endoscopy;  Laterality: N/A;  LATEX ALLERGY  EGD with [facility]   4th Floor, scheduled on 2nd floor due to availability  Full liquid diet 3 days and clear liquid diet x 1 day prior to procedure  Propofol only. No fentanyl or benzodiazepine during sedation. If additional sedation need    GASTRIC STIMULATOR IMPLANT SURGERY       GASTROSTOMY-JEJEUNOSTOMY TUBE CHANGE/PLACEMENT  08/10/2016    HYSTERECTOMY      laparoscopic jejunostomy tube      removed ~ 2019    REPLACEMENT OF GASTRIC NEUROSTIMULATOR GENERATOR N/A 3/22/2019    Procedure: REPLACEMENT, PULSE GENERATOR, NEUROSTIMULATOR, GASTRIC-battery exchange only;  Surgeon: Robert Kumar MD;  [facility];  Service: General;  Laterality: N/A;    REPLACEMENT OF GASTRIC NEUROSTIMULATOR GENERATOR N/A 2019    Procedure: REPLACEMENT, PULSE GENERATOR, NEUROSTIMULATOR, GASTRIC, OPEN (battery exchange);  Surgeon: Robert Kumar MD;  [facility];  Service: General;  Laterality: N/A;    REPLACEMENT OF GASTRIC NEUROSTIMULATOR GENERATOR N/A 2022    Procedure: REPLACEMENT, PULSE GENERATOR, NEUROSTIMULATOR, GASTRIC, Open;  Surgeon: Robert Kumar MD;  [facility];  Service: General;  Laterality: N/A;    sleeve gastrectomy      TONSILLECTOMY         Social History     Socioeconomic History    Marital status:     Number of children: 2   Tobacco Use    Smoking status: Former Smoker     Packs/day: 0.00     Quit date: 2015     Years since quittin.4    Smokeless tobacco: Never Used   Substance and Sexual Activity    Alcohol use: No    Drug use: No       OBJECTIVE:     Vital Signs Range (Last 24H):  Temp:  [36.7 °C (98.1 °F)]   Pulse:  [70]   Resp:  [18]   BP: (101)/(66)   SpO2:  [100 %]       CBC:   No results for input(s): WBC, RBC, HGB, HCT, PLT, MCV, MCH, MCHC in the last 72 hours.    CMP: No results for input(s): NA, K, CL, CO2, BUN, CREATININE, GLU, MG, PHOS, CALCIUM, ALBUMIN, PROT, ALKPHOS, ALT, AST, BILITOT in the last 72 hours.    INR:  No results for input(s): PT, INR, PROTIME, APTT in the last 72 hours.    Diagnostic Studies: No relevant studies.    EKG: No recent studies available.    2D ECHO:  No results found for this or any previous visit.      ASSESSMENT/PLAN:         Pre-op Assessment    I  have reviewed the Patient Summary Reports.    I have reviewed the Nursing Notes.    I have reviewed the Medications.     Review of Systems  Anesthesia Hx:  No problems with previous Anesthesia    Social:  Former Smoker, No Alcohol Use    Hematology/Oncology:  Hematology Normal   Oncology Normal     EENT/Dental:EENT/Dental Normal   Cardiovascular:  Cardiovascular Normal     Pulmonary:  Pulmonary Normal    Renal/:  Renal/ Normal     Hepatic/GI:   Gastroparesis with nerve stim   Neurological:   Neuromuscular Disease, Headaches    Endocrine:  Endocrine Normal    Psych:  Psychiatric Normal           Physical Exam  General:  Malnutrition      Airway/Jaw/Neck:  Airway Findings: Mouth Opening: Normal   Tongue: Normal   General Airway Assessment: Adult Mallampati: II  Jaw/Neck Findings:  Neck ROM: Normal ROM       Dental:  Dental Findings: In tact     Chest/Lungs:  Chest/Lungs Findings: Normal Respiratory Rate      Heart/Vascular:  Heart Findings: Rate: Normal        Mental Status:  Mental Status Findings:  Cooperative, Alert and Oriented         Anesthesia Plan  Type of Anesthesia, risks & benefits discussed:  Anesthesia Type:  Gen ETT    Patient's Preference:   Plan Factors:          Intra-op Monitoring Plan: standard ASA monitors  Intra-op Monitoring Plan Comments:   Post Op Pain Control Plan: IV/PO Opioids PRN, multimodal analgesia and per primary service following discharge from PACU  Post Op Pain Control Plan Comments:     Induction:   IV  Beta Blocker:  Patient is not currently on a Beta-Blocker (No further documentation required).       Informed Consent: Informed consent signed with the Patient and all parties understand the risks and agree with anesthesia plan.  All questions answered.  Anesthesia consent signed with patient.  ASA Score: 3     Day of Surgery Review of History & Physical: I have interviewed and examined the patient. I have reviewed the patient's H&P dated:    H&P update referred to the surgeon.            Ready For Surgery From Anesthesia Perspective.           Physical Exam  General: Malnutrition    Airway:  Mallampati: II   Mouth Opening: Normal  Tongue: Normal  Neck ROM: Normal ROM    Dental:  In tact    Chest/Lungs:  Normal Respiratory Rate    Heart:  Rate: Normal          Anesthesia Plan  Type of Anesthesia, risks & benefits discussed:    Anesthesia Type: Gen ETT  Intra-op Monitoring Plan: standard ASA monitors  Post Op Pain Control Plan: IV/PO Opioids PRN, multimodal analgesia and per primary service following discharge from PACU  Induction:  IV  Airway Plan: Direct  Informed Consent: Informed consent signed with the Patient and all parties understand the risks and agree with anesthesia plan.  All questions answered.   ASA Score: 3  Day of Surgery Review of History & Physical: H&P update referred to the surgeon. I have interviewed and examined the patient. I have reviewed the patient's H&P dated:     Ready For Surgery From Anesthesia Perspective.       .

## 2022-02-21 NOTE — PROGRESS NOTES
Pt verbalize pain despite the prn oral pain medicine, DR Paul Gannon informed, Prn dilaudid given as prescribe with very good effect

## 2022-02-21 NOTE — PLAN OF CARE
Patient diagnosed with: Gastroparesis  Patient had a J and a G tube placed today(2/21) in surgery.(J-tube is not to be used for medicine).    Patient's diet is NPO x meds with sips of water.   Patient's last labs was 2/8.    Patient is from home with spouse and was Independent before surgery. Poss plan to Consult Diet/Nutrition to start tube feedings (when J and G tube are cleared by surgery to be used.). Consult SW and CM for Insurance to pay for feeding and feeding pump at home (unless patient will do bolus feedings.) Patient will probably be in hospital the next 2 to 3 days to start feedings, 24 hours or more to check for toleration of feed and to get to goal amount or ml/hr on pump and insurance to pay for feed and pump.    ROSE:  TBD (poss within the next 72 hours.)

## 2022-02-21 NOTE — NURSING TRANSFER
Nursing Transfer Note      2/21/2022     Reason patient is being transferred: post op    Transfer To: room 540    Transfer via stretcher    Transfer with     Transported by pct    Medicines sent: no    Any special needs or follow-up needed: no    Chart send with patient: Yes    Notified: spouse

## 2022-02-21 NOTE — BRIEF OP NOTE
[facility] (2nd Fl)  Brief Operative Note    SUMMARY     Surgery Date: 2/21/2022     Surgeon(s) and Role:     * Robert Kumar MD - Primary     * Zacarias Steele MD - Resident - Assisting        Pre-op Diagnosis:  Malnutrition, unspecified type [E46]    Post-op Diagnosis:  Post-Op Diagnosis Codes:     * Malnutrition, unspecified type [E46]    Procedure(s) (LRB):  XI ROBOTIC JEJUNOSTOMY Tube Placement, G Tube Placement (N/A)    Anesthesia: General    Operative Findings: J-tube placed with lap assitance.G-tube placed robotically. Pt tolerated well. No complication.     Estimated Blood Loss: 10ml  Estimated Blood Loss has been documented.         Specimens:   Specimen (24h ago, onward)            None          [id number]

## 2022-02-22 LAB
ALBUMIN SERPL BCP-MCNC: 3.3 G/DL (ref 3.5–5.2)
ALP SERPL-CCNC: 74 U/L (ref 55–135)
ALT SERPL W/O P-5'-P-CCNC: 8 U/L (ref 10–44)
ANION GAP SERPL CALC-SCNC: 6 MMOL/L (ref 8–16)
AST SERPL-CCNC: 16 U/L (ref 10–40)
BASOPHILS # BLD AUTO: 0.03 K/UL (ref 0–0.2)
BASOPHILS NFR BLD: 0.4 % (ref 0–1.9)
BILIRUB SERPL-MCNC: 0.4 MG/DL (ref 0.1–1)
BUN SERPL-MCNC: 6 MG/DL (ref 6–20)
CALCIUM SERPL-MCNC: 9.5 MG/DL (ref 8.7–10.5)
CHLORIDE SERPL-SCNC: 107 MMOL/L (ref 95–110)
CO2 SERPL-SCNC: 27 MMOL/L (ref 23–29)
CREAT SERPL-MCNC: 0.8 MG/DL (ref 0.5–1.4)
DIFFERENTIAL METHOD: ABNORMAL
EOSINOPHIL # BLD AUTO: 0.1 K/UL (ref 0–0.5)
EOSINOPHIL NFR BLD: 1.9 % (ref 0–8)
ERYTHROCYTE [DISTWIDTH] IN BLOOD BY AUTOMATED COUNT: 13.2 % (ref 11.5–14.5)
EST. GFR  (AFRICAN AMERICAN): >60 ML/MIN/1.73 M^2
EST. GFR  (NON AFRICAN AMERICAN): >60 ML/MIN/1.73 M^2
GLUCOSE SERPL-MCNC: 86 MG/DL (ref 70–110)
HCT VFR BLD AUTO: 31.3 % (ref 37–48.5)
HGB BLD-MCNC: 10.3 G/DL (ref 12–16)
IMM GRANULOCYTES # BLD AUTO: 0.02 K/UL (ref 0–0.04)
IMM GRANULOCYTES NFR BLD AUTO: 0.3 % (ref 0–0.5)
LYMPHOCYTES # BLD AUTO: 2.1 K/UL (ref 1–4.8)
LYMPHOCYTES NFR BLD: 27.9 % (ref 18–48)
MAGNESIUM SERPL-MCNC: 2 MG/DL (ref 1.6–2.6)
MCH RBC QN AUTO: 29.9 PG (ref 27–31)
MCHC RBC AUTO-ENTMCNC: 32.9 G/DL (ref 32–36)
MCV RBC AUTO: 91 FL (ref 82–98)
MONOCYTES # BLD AUTO: 0.7 K/UL (ref 0.3–1)
MONOCYTES NFR BLD: 8.8 % (ref 4–15)
NEUTROPHILS # BLD AUTO: 4.5 K/UL (ref 1.8–7.7)
NEUTROPHILS NFR BLD: 60.7 % (ref 38–73)
NRBC BLD-RTO: 0 /100 WBC
PHOSPHATE SERPL-MCNC: 4.2 MG/DL (ref 2.7–4.5)
PLATELET # BLD AUTO: 230 K/UL (ref 150–450)
PMV BLD AUTO: 9.3 FL (ref 9.2–12.9)
POTASSIUM SERPL-SCNC: 4.8 MMOL/L (ref 3.5–5.1)
PROT SERPL-MCNC: 5.6 G/DL (ref 6–8.4)
RBC # BLD AUTO: 3.45 M/UL (ref 4–5.4)
SODIUM SERPL-SCNC: 140 MMOL/L (ref 136–145)
WBC # BLD AUTO: 7.39 K/UL (ref 3.9–12.7)

## 2022-02-22 PROCEDURE — 36415 COLL VENOUS BLD VENIPUNCTURE: CPT

## 2022-02-22 PROCEDURE — 63600175 PHARM REV CODE 636 W HCPCS: Performed by: STUDENT IN AN ORGANIZED HEALTH CARE EDUCATION/TRAINING PROGRAM

## 2022-02-22 PROCEDURE — 85025 COMPLETE CBC W/AUTO DIFF WBC: CPT

## 2022-02-22 PROCEDURE — 83735 ASSAY OF MAGNESIUM: CPT

## 2022-02-22 PROCEDURE — 84100 ASSAY OF PHOSPHORUS: CPT

## 2022-02-22 PROCEDURE — 80053 COMPREHEN METABOLIC PANEL: CPT

## 2022-02-22 PROCEDURE — 94761 N-INVAS EAR/PLS OXIMETRY MLT: CPT

## 2022-02-22 PROCEDURE — 25000003 PHARM REV CODE 250

## 2022-02-22 PROCEDURE — 25000003 PHARM REV CODE 250: Performed by: STUDENT IN AN ORGANIZED HEALTH CARE EDUCATION/TRAINING PROGRAM

## 2022-02-22 PROCEDURE — 63600175 PHARM REV CODE 636 W HCPCS

## 2022-02-22 RX ORDER — LIDOCAINE 50 MG/G
1 PATCH TOPICAL
Status: DISCONTINUED | OUTPATIENT
Start: 2022-02-22 | End: 2022-02-24 | Stop reason: HOSPADM

## 2022-02-22 RX ORDER — GABAPENTIN 300 MG/1
600 CAPSULE ORAL 3 TIMES DAILY
Status: DISCONTINUED | OUTPATIENT
Start: 2022-02-22 | End: 2022-02-24 | Stop reason: HOSPADM

## 2022-02-22 RX ORDER — KETOROLAC TROMETHAMINE 15 MG/ML
15 INJECTION, SOLUTION INTRAMUSCULAR; INTRAVENOUS EVERY 6 HOURS
Status: DISCONTINUED | OUTPATIENT
Start: 2022-02-22 | End: 2022-02-24

## 2022-02-22 RX ADMIN — PROMETHAZINE HYDROCHLORIDE 12.5 MG: 25 INJECTION INTRAMUSCULAR; INTRAVENOUS at 08:02

## 2022-02-22 RX ADMIN — GABAPENTIN 300 MG: 300 CAPSULE ORAL at 08:02

## 2022-02-22 RX ADMIN — KETOROLAC TROMETHAMINE 15 MG: 15 INJECTION, SOLUTION INTRAMUSCULAR; INTRAVENOUS at 05:02

## 2022-02-22 RX ADMIN — ACETAMINOPHEN 999.01 MG: 160 SOLUTION ORAL at 02:02

## 2022-02-22 RX ADMIN — KETOROLAC TROMETHAMINE 15 MG: 15 INJECTION, SOLUTION INTRAMUSCULAR; INTRAVENOUS at 11:02

## 2022-02-22 RX ADMIN — GABAPENTIN 600 MG: 300 CAPSULE ORAL at 08:02

## 2022-02-22 RX ADMIN — GABAPENTIN 600 MG: 300 CAPSULE ORAL at 02:02

## 2022-02-22 RX ADMIN — PROMETHAZINE HYDROCHLORIDE 12.5 MG: 25 INJECTION INTRAMUSCULAR; INTRAVENOUS at 07:02

## 2022-02-22 RX ADMIN — METHOCARBAMOL 500 MG: 500 TABLET ORAL at 05:02

## 2022-02-22 RX ADMIN — LIDOCAINE 5% 1 PATCH: 700 PATCH TOPICAL at 08:02

## 2022-02-22 RX ADMIN — METHOCARBAMOL 500 MG: 500 TABLET ORAL at 08:02

## 2022-02-22 RX ADMIN — BACLOFEN 5 MG: 5 TABLET ORAL at 08:02

## 2022-02-22 NOTE — ASSESSMENT & PLAN NOTE
40yo gastoparetic s/p robotic G and J tube placement on 2/21    - Ok for clears today  - Will start TFs via J tube today and discuss with SW for home tube feeds  - Vent G tube as need for decompression  - Home meds  - No crushed meds via J tube  - Encourage OOBTC, IS and ambulation  - DVT ppx

## 2022-02-22 NOTE — PLAN OF CARE
02/22/22 1447   Post-Acute Status   Post-Acute Authorization IV Infusion   IV Infusion Status Referral(s) sent     SW faxed referral to [facility] via [facility] for review. SW will follow up as needed.    2:47 PM  [facility] Accepted.        Mandy Harris LMSW  Case Management   [facility]   Ext. [phone]

## 2022-02-22 NOTE — PROGRESS NOTES
[facility] Initial Tube Feeding Education/ Discharge Planning Note:     New patient referral received by [facility] for home tube feeding formula & supplies needed at time of discharge. [facility] able to accept patient on service for tube feeding. Met patient and spouse at the bedside to introduce myself and my role as related to their care. Patient stated she has had  j-tube in the past & familiar with pump. Discussed with patient that we can review (home) pump prior to discharge once final orders are determined. Patient and spouse in agreement with this plan. Time allotted for questions; questions addressed appropriately. Informed patient that I will follow up with them on 2/23/2022 to initiate home tube feeding education. [facility] planning for tentative d/c on 2/23/2022. Full note to follow education.     Will continue to monitor while inpatient.    Please do not hesitate to reach out for any additional needs in the interim.       Luis Iqbal MS, RDN, LDN  Clinical Dietitian  [facility]   Desk: [phone]  Other Phone: [phone]  [email or web address]

## 2022-02-22 NOTE — PLAN OF CARE
Patient alert and oriented.  Able to make needs known.  Comprehends her plan of care.  Up and to the bathroom without any difficulty.  Started on clears today.  Tolerating liquids without any difficulty.  Lap sites dry and intact. Jtube and Gtube in place.  No c/o n/v.  TF started around lunch time today without any discomfort noted.  Will Continue to monitor.

## 2022-02-22 NOTE — ANESTHESIA POSTPROCEDURE EVALUATION
Anesthesia Post Evaluation    Patient: Nena Mendez    Procedure(s) Performed: Procedure(s) (LRB):  XI ROBOTIC JEJUNOSTOMY Tube Placement, G Tube Placement (N/A)    Final Anesthesia Type: general      Patient location during evaluation: PACU  Patient participation: Yes- Able to Participate  Level of consciousness: awake and alert  Post-procedure vital signs: reviewed and stable  Pain management: adequate  Airway patency: patent    PONV status at discharge: nausea (controlled)  Anesthetic complications: no      Cardiovascular status: blood pressure returned to baseline  Respiratory status: unassisted  Hydration status: euvolemic  Follow-up not needed.          Vitals Value Taken Time   /59 02/22/22 0024   Temp 36.6 °C (97.8 °F) 02/22/22 0024   Pulse 44 02/22/22 0024   Resp 20 02/22/22 0024   SpO2 98 % 02/22/22 0024         Event Time   Out of Recovery 09:30:00         Pain/Baljit Score: Pain Rating Prior to Med Admin: 8 (2/21/2022 10:14 PM)  Pain Rating Post Med Admin: 4 (2/21/2022 10:45 PM)  Baljit Score: 10 (2/21/2022 10:00 AM)

## 2022-02-22 NOTE — SUBJECTIVE & OBJECTIVE
Interval History: No acute events overnight, afebrile, vitals stable. Reports some abdominal pain and tenderness - significant improvement with toradol. Mild nausea reported, no emesis. Ambulating.     Medications:  Continuous Infusions:   lactated ringers 100 mL/hr at 02/21/22 1516     Scheduled Meds:   acetaminophen  999.0148 mg Per G Tube Q8H    baclofen  5 mg Oral QHS    gabapentin  300 mg Oral TID    ketorolac  15 mg Intravenous Q6H    LIDOcaine  1 patch Transdermal Q24H     PRN Meds:methocarbamoL, ondansetron, promethazine (PHENERGAN) IVPB, sodium chloride 0.9%     Review of patient's allergies indicates:   Allergen Reactions    Iodine and iodide containing products Swelling, Hives, Itching, Rash and Shortness Of Breath    Domperidone Itching    Latex, natural rubber Rash    Scopolamine      Patch- caused burn under patch     Objective:     Vital Signs (Most Recent):  Temp: 97.8 °F (36.6 °C) (02/22/22 0024)  Pulse: (!) 44 (02/22/22 0024)  Resp: 20 (02/22/22 0024)  BP: (!) 113/59 (02/22/22 0024)  SpO2: 98 % (02/22/22 0024)   Vital Signs (24h Range):  Temp:  [97.2 °F (36.2 °C)-98.1 °F (36.7 °C)] 97.8 °F (36.6 °C)  Pulse:  [41-90] 44  Resp:  [14-20] 20  SpO2:  [98 %-100 %] 98 %  BP: ()/(55-78) 113/59     Weight: 53 kg (116 lb 13.5 oz)  Body mass index is 19.44 kg/m².    Intake/Output - Last 3 Shifts         02/20 0700  02/21 0659 02/21 0700  02/22 0659 02/22 0700  02/23 0659    P.O.  360     I.V. (mL/kg)  500 (9.4)     Total Intake(mL/kg)  860 (16.2)     Net  +860            Urine Occurrence  2 x             Physical Exam  Constitutional:       Appearance: She is well-developed.   HENT:      Head: Normocephalic and atraumatic.      Nose: Nose normal.   Eyes:      General: No scleral icterus.     Conjunctiva/sclera: Conjunctivae normal.      Pupils: Pupils are equal, round, and reactive to light.   Neck:      Thyroid: No thyromegaly.   Cardiovascular:      Rate and Rhythm: Normal rate and regular rhythm.    Pulmonary:      Effort: Pulmonary effort is normal. No respiratory distress.   Abdominal:      General: There is no distension.      Palpations: Abdomen is soft.      Tenderness: There is abdominal tenderness (appropriate post-op ttp).      Comments: Port incisions c/d/I  G and J tube in place without surrounding erythema or drainage    Musculoskeletal:         General: Normal range of motion.      Cervical back: Normal range of motion.   Lymphadenopathy:      Cervical: No cervical adenopathy.   Skin:     General: Skin is warm and dry.      Findings: No rash.   Neurological:      Mental Status: She is alert and oriented to person, place, and time.       Significant Labs:  I have reviewed all pertinent lab results within the past 24 hours.  CBC:   Recent Labs   Lab 02/22/22  0258   WBC 7.39   RBC 3.45*   HGB 10.3*   HCT 31.3*      MCV 91   MCH 29.9   MCHC 32.9     BMP:   Recent Labs   Lab 02/22/22  0258   GLU 86      K 4.8      CO2 27   BUN 6   CREATININE 0.8   CALCIUM 9.5   MG 2.0       Significant Diagnostics:  I have reviewed all pertinent imaging results/findings within the past 24 hours.

## 2022-02-22 NOTE — PROGRESS NOTES
[facility]  General Surgery  Progress Note    Subjective:     History of Present Illness:  No notes on file    Post-Op Info:  Procedure(s) (LRB):  XI ROBOTIC JEJUNOSTOMY Tube Placement, G Tube Placement (N/A)   1 Day Post-Op     Interval History: No acute events overnight, afebrile, vitals stable. Reports some abdominal pain and tenderness - significant improvement with toradol. Mild nausea reported, no emesis. Ambulating.     Medications:  Continuous Infusions:   lactated ringers 100 mL/hr at 02/21/22 1516     Scheduled Meds:   acetaminophen  999.0148 mg Per G Tube Q8H    baclofen  5 mg Oral QHS    gabapentin  300 mg Oral TID    ketorolac  15 mg Intravenous Q6H    LIDOcaine  1 patch Transdermal Q24H     PRN Meds:methocarbamoL, ondansetron, promethazine (PHENERGAN) IVPB, sodium chloride 0.9%     Review of patient's allergies indicates:   Allergen Reactions    Iodine and iodide containing products Swelling, Hives, Itching, Rash and Shortness Of Breath    Domperidone Itching    Latex, natural rubber Rash    Scopolamine      Patch- caused burn under patch     Objective:     Vital Signs (Most Recent):  Temp: 97.8 °F (36.6 °C) (02/22/22 0024)  Pulse: (!) 44 (02/22/22 0024)  Resp: 20 (02/22/22 0024)  BP: (!) 113/59 (02/22/22 0024)  SpO2: 98 % (02/22/22 0024)   Vital Signs (24h Range):  Temp:  [97.2 °F (36.2 °C)-98.1 °F (36.7 °C)] 97.8 °F (36.6 °C)  Pulse:  [41-90] 44  Resp:  [14-20] 20  SpO2:  [98 %-100 %] 98 %  BP: ()/(55-78) 113/59     Weight: 53 kg (116 lb 13.5 oz)  Body mass index is 19.44 kg/m².    Intake/Output - Last 3 Shifts         02/20 0700  02/21 0659 02/21 0700  02/22 0659 02/22 0700 02/23 0659    P.O.  360     I.V. (mL/kg)  500 (9.4)     Total Intake(mL/kg)  860 (16.2)     Net  +860            Urine Occurrence  2 x             Physical Exam  Constitutional:       Appearance: She is well-developed.   HENT:      Head: Normocephalic and atraumatic.      Nose: Nose normal.   Eyes:       General: No scleral icterus.     Conjunctiva/sclera: Conjunctivae normal.      Pupils: Pupils are equal, round, and reactive to light.   Neck:      Thyroid: No thyromegaly.   Cardiovascular:      Rate and Rhythm: Normal rate and regular rhythm.   Pulmonary:      Effort: Pulmonary effort is normal. No respiratory distress.   Abdominal:      General: There is no distension.      Palpations: Abdomen is soft.      Tenderness: There is abdominal tenderness (appropriate post-op ttp).      Comments: Port incisions c/d/I  G and J tube in place without surrounding erythema or drainage    Musculoskeletal:         General: Normal range of motion.      Cervical back: Normal range of motion.   Lymphadenopathy:      Cervical: No cervical adenopathy.   Skin:     General: Skin is warm and dry.      Findings: No rash.   Neurological:      Mental Status: She is alert and oriented to person, place, and time.       Significant Labs:  I have reviewed all pertinent lab results within the past 24 hours.  CBC:   Recent Labs   Lab 02/22/22  0258   WBC 7.39   RBC 3.45*   HGB 10.3*   HCT 31.3*      MCV 91   MCH 29.9   MCHC 32.9     BMP:   Recent Labs   Lab 02/22/22  0258   GLU 86      K 4.8      CO2 27   BUN 6   CREATININE 0.8   CALCIUM 9.5   MG 2.0       Significant Diagnostics:  I have reviewed all pertinent imaging results/findings within the past 24 hours.    Assessment/Plan:     S/P lap gastric neurostimulator placement   42yo gastoparetic s/p robotic G and J tube placement on 2/21    - Ok for clears today  - Will start TFs via J tube today and discuss with SW for home tube feeds  - Vent G tube as need for decompression  - Home meds  - No crushed meds via J tube  - Encourage OOBTC, IS and ambulation  - DVT ppx         Concepción Bustillos MD  General Surgery  [facility]

## 2022-02-22 NOTE — NURSING
Pt resting in bed, toradol given for pain with relief-tolerates well, discomfort to left lower flank (MD aware) hot packs in place,  family @ bedside, IVF infusing, assisted to restroom, plan of care reviewed, will continue to monitor for changes.

## 2022-02-23 LAB
ANION GAP SERPL CALC-SCNC: 8 MMOL/L (ref 8–16)
BASOPHILS # BLD AUTO: 0.02 K/UL (ref 0–0.2)
BASOPHILS NFR BLD: 0.3 % (ref 0–1.9)
BUN SERPL-MCNC: 6 MG/DL (ref 6–20)
CALCIUM SERPL-MCNC: 8.9 MG/DL (ref 8.7–10.5)
CHLORIDE SERPL-SCNC: 107 MMOL/L (ref 95–110)
CO2 SERPL-SCNC: 28 MMOL/L (ref 23–29)
CREAT SERPL-MCNC: 0.7 MG/DL (ref 0.5–1.4)
DIFFERENTIAL METHOD: ABNORMAL
EOSINOPHIL # BLD AUTO: 0.2 K/UL (ref 0–0.5)
EOSINOPHIL NFR BLD: 2.7 % (ref 0–8)
ERYTHROCYTE [DISTWIDTH] IN BLOOD BY AUTOMATED COUNT: 13.3 % (ref 11.5–14.5)
EST. GFR  (AFRICAN AMERICAN): >60 ML/MIN/1.73 M^2
EST. GFR  (NON AFRICAN AMERICAN): >60 ML/MIN/1.73 M^2
GLUCOSE SERPL-MCNC: 79 MG/DL (ref 70–110)
HCT VFR BLD AUTO: 33.1 % (ref 37–48.5)
HGB BLD-MCNC: 10.6 G/DL (ref 12–16)
IMM GRANULOCYTES # BLD AUTO: 0.02 K/UL (ref 0–0.04)
IMM GRANULOCYTES NFR BLD AUTO: 0.3 % (ref 0–0.5)
LYMPHOCYTES # BLD AUTO: 1.3 K/UL (ref 1–4.8)
LYMPHOCYTES NFR BLD: 20.3 % (ref 18–48)
MAGNESIUM SERPL-MCNC: 1.9 MG/DL (ref 1.6–2.6)
MCH RBC QN AUTO: 30 PG (ref 27–31)
MCHC RBC AUTO-ENTMCNC: 32 G/DL (ref 32–36)
MCV RBC AUTO: 94 FL (ref 82–98)
MONOCYTES # BLD AUTO: 0.4 K/UL (ref 0.3–1)
MONOCYTES NFR BLD: 6.4 % (ref 4–15)
NEUTROPHILS # BLD AUTO: 4.5 K/UL (ref 1.8–7.7)
NEUTROPHILS NFR BLD: 70 % (ref 38–73)
NRBC BLD-RTO: 0 /100 WBC
PHOSPHATE SERPL-MCNC: 3.6 MG/DL (ref 2.7–4.5)
PLATELET # BLD AUTO: 223 K/UL (ref 150–450)
PMV BLD AUTO: 9.3 FL (ref 9.2–12.9)
POTASSIUM SERPL-SCNC: 4.2 MMOL/L (ref 3.5–5.1)
RBC # BLD AUTO: 3.53 M/UL (ref 4–5.4)
SODIUM SERPL-SCNC: 143 MMOL/L (ref 136–145)
WBC # BLD AUTO: 6.41 K/UL (ref 3.9–12.7)

## 2022-02-23 PROCEDURE — 63600175 PHARM REV CODE 636 W HCPCS

## 2022-02-23 PROCEDURE — 63600175 PHARM REV CODE 636 W HCPCS: Performed by: STUDENT IN AN ORGANIZED HEALTH CARE EDUCATION/TRAINING PROGRAM

## 2022-02-23 PROCEDURE — 85025 COMPLETE CBC W/AUTO DIFF WBC: CPT

## 2022-02-23 PROCEDURE — 25000003 PHARM REV CODE 250

## 2022-02-23 PROCEDURE — 80048 BASIC METABOLIC PNL TOTAL CA: CPT

## 2022-02-23 PROCEDURE — 83735 ASSAY OF MAGNESIUM: CPT

## 2022-02-23 PROCEDURE — 25000003 PHARM REV CODE 250: Performed by: STUDENT IN AN ORGANIZED HEALTH CARE EDUCATION/TRAINING PROGRAM

## 2022-02-23 PROCEDURE — 25000003 PHARM REV CODE 250: Performed by: SURGERY

## 2022-02-23 PROCEDURE — 84100 ASSAY OF PHOSPHORUS: CPT

## 2022-02-23 PROCEDURE — 94761 N-INVAS EAR/PLS OXIMETRY MLT: CPT

## 2022-02-23 PROCEDURE — 36415 COLL VENOUS BLD VENIPUNCTURE: CPT

## 2022-02-23 RX ORDER — IBUPROFEN 200 MG
200 TABLET ORAL ONCE
Status: COMPLETED | OUTPATIENT
Start: 2022-02-23 | End: 2022-02-23

## 2022-02-23 RX ORDER — ONDANSETRON 4 MG/1
8 TABLET, ORALLY DISINTEGRATING ORAL EVERY 8 HOURS PRN
Qty: 30 TABLET | Refills: 0 | Status: SHIPPED | OUTPATIENT
Start: 2022-02-23

## 2022-02-23 RX ORDER — METHOCARBAMOL 500 MG/1
500 TABLET, FILM COATED ORAL 4 TIMES DAILY
Qty: 40 TABLET | Refills: 0 | Status: SHIPPED | OUTPATIENT
Start: 2022-02-23 | End: 2022-03-05

## 2022-02-23 RX ORDER — ACETAMINOPHEN 160 MG/5ML
650 LIQUID ORAL EVERY 8 HOURS PRN
Qty: 473 ML | Refills: 0 | Status: SHIPPED | OUTPATIENT
Start: 2022-02-23

## 2022-02-23 RX ADMIN — PROMETHAZINE HYDROCHLORIDE 12.5 MG: 25 INJECTION INTRAMUSCULAR; INTRAVENOUS at 09:02

## 2022-02-23 RX ADMIN — GABAPENTIN 600 MG: 300 CAPSULE ORAL at 10:02

## 2022-02-23 RX ADMIN — KETOROLAC TROMETHAMINE 15 MG: 15 INJECTION, SOLUTION INTRAMUSCULAR; INTRAVENOUS at 12:02

## 2022-02-23 RX ADMIN — IBUPROFEN 200 MG: 200 TABLET, FILM COATED ORAL at 11:02

## 2022-02-23 RX ADMIN — BACLOFEN 5 MG: 5 TABLET ORAL at 10:02

## 2022-02-23 RX ADMIN — PROMETHAZINE HYDROCHLORIDE 12.5 MG: 25 INJECTION INTRAMUSCULAR; INTRAVENOUS at 03:02

## 2022-02-23 RX ADMIN — KETOROLAC TROMETHAMINE 15 MG: 15 INJECTION, SOLUTION INTRAMUSCULAR; INTRAVENOUS at 05:02

## 2022-02-23 RX ADMIN — GABAPENTIN 600 MG: 300 CAPSULE ORAL at 04:02

## 2022-02-23 RX ADMIN — KETOROLAC TROMETHAMINE 15 MG: 15 INJECTION, SOLUTION INTRAMUSCULAR; INTRAVENOUS at 06:02

## 2022-02-23 RX ADMIN — METHOCARBAMOL 500 MG: 500 TABLET ORAL at 10:02

## 2022-02-23 NOTE — PROGRESS NOTES
[facility]  General Surgery  Progress Note    Subjective:     History of Present Illness:  No notes on file    Post-Op Info:  Procedure(s) (LRB):  XI ROBOTIC JEJUNOSTOMY Tube Placement, G Tube Placement (N/A)   2 Days Post-Op     Interval History: NAEO. Some discomfort but improved form yesterday. Tolerating tube feeds. Some increased gas. No nausea or vomiting. AF. HDS.     Medications:  Continuous Infusions:  Scheduled Meds:   acetaminophen  999.0148 mg Per G Tube Q8H    baclofen  5 mg Oral QHS    gabapentin  600 mg Oral TID    ketorolac  15 mg Intravenous Q6H    LIDOcaine  1 patch Transdermal Q24H     PRN Meds:methocarbamoL, ondansetron, promethazine (PHENERGAN) IVPB, sodium chloride 0.9%     Review of patient's allergies indicates:   Allergen Reactions    Iodine and iodide containing products Swelling, Hives, Itching, Rash and Shortness Of Breath    Domperidone Itching    Latex, natural rubber Rash    Scopolamine      Patch- caused burn under patch     Objective:     Vital Signs (Most Recent):  Temp: 98.6 °F (37 °C) (02/23/22 0554)  Pulse: 72 (02/23/22 0554)  Resp: 16 (02/23/22 0554)  BP: 127/71 (02/23/22 0554)  SpO2: 99 % (02/23/22 0554) Vital Signs (24h Range):  Temp:  [97.6 °F (36.4 °C)-98.6 °F (37 °C)] 98.6 °F (37 °C)  Pulse:  [63-72] 72  Resp:  [14-16] 16  SpO2:  [96 %-99 %] 99 %  BP: ()/(51-71) 127/71     Weight: 53 kg (116 lb 13.5 oz)  Body mass index is 19.44 kg/m².    Intake/Output - Last 3 Shifts         02/21 0700  02/22 0659 02/22 0700 02/23 0659 02/23 0700 02/24 0659    P.O. 360      I.V. (mL/kg) 500 (9.4)      Total Intake(mL/kg) 860 (16.2)      Net +860             Urine Occurrence 2 x 1 x             Physical Exam    Significant Labs:  I have reviewed all pertinent lab results within the past 24 hours.    Significant Diagnostics:  I have reviewed all pertinent imaging results/findings within the past 24 hours.    Assessment/Plan:     S/P lap gastric neurostimulator  placement   42yo gastoparetic s/p robotic G and J tube placement on 2/21    - CLD  - TFs via J tube today   - SW set up HH for j-tube feeds  - Vent G tube as need for decompression  - Home meds  - No crushed meds via J tube  - Encourage OOBTC, IS and ambulation  - DVT ppx     Dispo: Likely today if continues tolerate TF and no issues with SW        Zacarias Steele MD  General Surgery  [facility]

## 2022-02-23 NOTE — ASSESSMENT & PLAN NOTE
42yo gastoparetic s/p robotic G and J tube placement on 2/21    - CLD  - TFs via J tube today   - SW set up HH for j-tube feeds  - Vent G tube as need for decompression  - Home meds  - No crushed meds via J tube  - Encourage OOBTC, IS and ambulation  - DVT ppx     Dispo: Likely today if continues tolerate TF and no issues with SW

## 2022-02-23 NOTE — PROGRESS NOTES
[facility] T[facility] Feeding Education/Discharge Planning Note:     Bedside home Wally Pump education completed with patient and spouse on 2/23/2022. Home tube feeding regimen with flushes reviewed and provided. Teach back by patient demonstrated; patient familiar with tube feeds, as she has had 1 in the past. Also provided review/education on formula safety, HOB elevation requirements, giving medications through feeding tube, and feeding tube site care. Additional education materials also provided. Time allotted for questions; questions addressed appropriately. Provided patient with [facility] support number [phone]. Patients home tube feeding supplies and formula have been delivered to the bedside. Patient is ready for discharge home from [facility] perspective in terms of supplies & education.    However, during education, patient voiced concerns with g-tube draining, unusual bloating, and pain. She was outwardly uncomfortable. I informed patient that I would relay these issues to CM team.     Patient's discharge planning team and bedside nurse updated with the information above.      Will continue to follow while inpatient.     Please do not hesitiate reach out for any additional needs in the interim.       Luis Iqbal MS, RDN, LDN  Clinical Dietitian  [facility]   Desk: [phone]  Other Phone: [phone]  [email or web address]

## 2022-02-23 NOTE — PLAN OF CARE
Pt resting in bed, AAOX4, PRN meds given, no complaints at this time, family @ bedside, IVF d/c, Isosource tube feeding infusing @ 40ml/hr (goal of 50ml/hr), VSS, possible d/c home with home health today, plan of care reviewed, will continue to monitor for changes.

## 2022-02-23 NOTE — HOSPITAL COURSE
Pt presented for the above procedure and tolerated without issue. She progressed appropriately post-operatively. She had some pain control issues early in the post-op course but those improved during her stay. She also has some issues with nausea and tolerating TF that improved as well. Now on POD#3 she is stable and ready for discharge.  Her pain is controlled, she is ambulating without issue, voiding spontaneously, and tolerating TF/ordered diet. She is AF and hemodynamically stable. Home health was set up for her and she was given appropriate equipment before discharge. Plan to continue advancing TF at home and follow-up in 2 weeks in clinic. Pt understood discharge plan and voiced a desire to be discharged. Uncomplicated post-op course.

## 2022-02-23 NOTE — PLAN OF CARE
Recommendations    1. Suggest TF of Isosource 1.5 advancing as tolerated to goal rate of 90 mL/hr x 12 hrs to provide 1620 kcal, 73 gm protein, and 825 mL free fluid.    - If 24 hrs TF warranted, recommend goal rate of 45 mL/hr.     2. As medically able, ADAT to low fiber/residue with texture per SLP.     3. RD following.     Goals: continue to meet % EEN/EPN by RD follow-up  Nutrition Goal Status: new

## 2022-02-23 NOTE — ASSESSMENT & PLAN NOTE
Nutrition Problem:  Severe Protein-Calorie Malnutrition  Malnutrition in the context of Chronic Illness/Injury    Related to (etiology):  Gastroparesis; Inadequate Energy Intake; N/V    Signs and Symptoms (as evidenced by):  Energy Intake: <50% of estimated energy requirement for 1 month   Body Fat Depletion: RUIZ  Muscle Mass Depletion: mild, moderate and severe depletion of temples and clavicle region (visual)  Weight Loss: 9% x 2 months per pt      Interventions (treatment strategy):  Collaboration of nutrition care with other providers  Enteral Nutrition     Nutrition Diagnosis Status:  New

## 2022-02-23 NOTE — SUBJECTIVE & OBJECTIVE
Interval History: NAEO. Some discomfort but improved form yesterday. Tolerating tube feeds. Some increased gas. No nausea or vomiting. AF. HDS.     Medications:  Continuous Infusions:  Scheduled Meds:   acetaminophen  999.0148 mg Per G Tube Q8H    baclofen  5 mg Oral QHS    gabapentin  600 mg Oral TID    ketorolac  15 mg Intravenous Q6H    LIDOcaine  1 patch Transdermal Q24H     PRN Meds:methocarbamoL, ondansetron, promethazine (PHENERGAN) IVPB, sodium chloride 0.9%     Review of patient's allergies indicates:   Allergen Reactions    Iodine and iodide containing products Swelling, Hives, Itching, Rash and Shortness Of Breath    Domperidone Itching    Latex, natural rubber Rash    Scopolamine      Patch- caused burn under patch     Objective:     Vital Signs (Most Recent):  Temp: 98.6 °F (37 °C) (02/23/22 0554)  Pulse: 72 (02/23/22 0554)  Resp: 16 (02/23/22 0554)  BP: 127/71 (02/23/22 0554)  SpO2: 99 % (02/23/22 0554) Vital Signs (24h Range):  Temp:  [97.6 °F (36.4 °C)-98.6 °F (37 °C)] 98.6 °F (37 °C)  Pulse:  [63-72] 72  Resp:  [14-16] 16  SpO2:  [96 %-99 %] 99 %  BP: ()/(51-71) 127/71     Weight: 53 kg (116 lb 13.5 oz)  Body mass index is 19.44 kg/m².    Intake/Output - Last 3 Shifts         02/21 0700 02/22 0659 02/22 0700 02/23 0659 02/23 0700 02/24 0659    P.O. 360      I.V. (mL/kg) 500 (9.4)      Total Intake(mL/kg) 860 (16.2)      Net +860             Urine Occurrence 2 x 1 x             Physical Exam    Significant Labs:  I have reviewed all pertinent lab results within the past 24 hours.    Significant Diagnostics:  I have reviewed all pertinent imaging results/findings within the past 24 hours.

## 2022-02-23 NOTE — PLAN OF CARE
Patient Accepted [facility] (pending bedside education) and [facility].     [facility] completed pt's education, and left supplies by bedside..    Mandy Harris LMSW  Case Management   [facility]   Ext. [phone]

## 2022-02-23 NOTE — CONSULTS
[facility]  Adult Nutrition  Consult Note    SUMMARY     Recommendations    1. Suggest TF of Isosource 1.5 advancing as tolerated to goal rate of 90 mL/hr x 12 hrs to provide 1620 kcal, 73 gm protein, and 825 mL free fluid.    - If 24 hrs TF warranted, recommend goal rate of 45 mL/hr.     2. As medically able, ADAT to low fiber/residue with texture per SLP.     3. RD following.     Goals: continue to meet % EEN/EPN by RD follow-up  Nutrition Goal Status: new  Communication of RD Recs:  (POC)    Assessment and Plan    Malnutrition  Nutrition Problem:  Severe Protein-Calorie Malnutrition  Malnutrition in the context of Chronic Illness/Injury    Related to (etiology):  Gastroparesis; Inadequate Energy Intake; N/V    Signs and Symptoms (as evidenced by):  Energy Intake: <50% of estimated energy requirement for 1 month   Body Fat Depletion: RUIZ  Muscle Mass Depletion: mild, moderate and severe depletion of temples and clavicle region (visual)  Weight Loss: 9% x 2 months per pt      Interventions (treatment strategy):  Collaboration of nutrition care with other providers  Enteral Nutrition     Nutrition Diagnosis Status:  New             Malnutrition Assessment  Malnutrition Type: chronic illness          Weight Loss (Malnutrition):  (9% x 2 months per pt)  Energy Intake (Malnutrition): less than or equal to 50% for greater than or equal to 1 month       [place] (Muscle Loss): mild depletion (visual)  Clavicle Bone Region (Muscle Loss): severe depletion (visual)  Clavicle and Acromion Bone Region (Muscle Loss): moderate depletion (visual)                 Reason for Assessment    Reason For Assessment: consult  Diagnosis:  (s/p lap gastric neurostimulator placement)  Relevant Medical History: Fibromyalgia, Gastroparesis  Interdisciplinary Rounds: did not attend  General Information Comments: S/p robotic G and J tube placement on 2/21. Pt resting in bed with family member present. Pt reports no solid  "food for 3 weeks PTA.  lbs couple months ago and pt reports currently weighing 100 lbs - significant wt loss. No wt loss per chart review, but pt reports that is not accurate. Spoke with RD handling TF for discharge. Visual NFPE completed, pt with moderate-severe muscle wasting. Pt meets chronic severe malnutrition criteria.   Nutrition Discharge Planning: adequate nutrition via TF +/- po intake    Nutrition Risk Screen    Nutrition Risk Screen: other (see comments) (gastroperesis)    Nutrition/Diet History    Spiritual, Cultural Beliefs, [employer] Practices, Values that Affect Care: no  Factors Affecting Nutritional Intake: decreased appetite, clear liquid diet, altered gastrointestinal function    Anthropometrics    Temp: 98.6 °F (37 °C)  Height Method: Stated  Height: 5' 5" (165.1 cm)  Height (inches): 65 in  Weight Method: Bed Scale  Weight: 53 kg (116 lb 13.5 oz)  Weight (lb): 116.84 lb  Ideal Body Weight (IBW), Female: 125 lb  % Ideal Body Weight, Female (lb): 93.47 %  BMI (Calculated): 19.4  BMI Grade: 18.5-24.9 - normal  Weight Loss: unintentional     Lab/Procedures/Meds    Pertinent Labs Reviewed: reviewed  Pertinent Labs Comments: WNL  Pertinent Medications Reviewed: reviewed  Pertinent Medications Comments: acetaminophen     Estimated/Assessed Needs    Weight Used For Calorie Calculations: 53 kg (116 lb 13.5 oz)  Energy Calorie Requirements (kcal): 5628-7917 kcal/day  Energy Need Method: Kcal/kg (30-35)  Protein Requirements: 64-74 gm/day (1.2-1.4 gm/kg)  Weight Used For Protein Calculations: 53 kg (116 lb 13.5 oz)  Fluid Requirements (mL): 1 mL/kcal or per MD     RDA Method (mL): 1590     Nutrition Prescription Ordered    Current Diet Order: Clear Liquid  Current Nutrition Support Formula Ordered: Isosource 1.5  Current Nutrition Support Rate Ordered: 40 (ml)  Current Nutrition Support Frequency Ordered: mL/hr    Evaluation of Received Nutrient/Fluid Intake    Enteral Calories (kcal): " 1440  Enteral Protein (gm): 65  Enteral (Free Water) Fluid (mL): 733  % Kcal Needs: 85  % Protein Needs: 93  I/O: +860mL since admit   Energy Calories Required: meeting needs  Protein Required: meeting needs  Fluid Required:  (per MD)  Comments: LBM 2/7  Tolerance: tolerating  % Intake of Estimated Energy Needs: 75 - 100 %  % Meal Intake: 0 - 25 %    Nutrition Risk    Level of Risk/Frequency of Follow-up:  (f/u 1 x wk)       Monitor and Evaluation    Food and Nutrient Intake: energy intake, food and beverage intake, enteral nutrition intake  Food and Nutrient Adminstration: diet order, enteral and parenteral nutrition administration  Anthropometric Measurements: weight, weight change, body mass index  Biochemical Data, Medical Tests and Procedures: electrolyte and renal panel, gastrointestinal profile, glucose/endocrine profile, inflammatory profile, lipid profile  Nutrition-Focused Physical Findings: overall appearance       Nutrition Follow-Up    RD Follow-up?: Yes

## 2022-02-23 NOTE — NURSING
Resident on call for Dr José nieves . Patient unable to tolerate formula . Patient c/o of disfort and GT is draining  In N/V bag . Emmanuel  notified

## 2022-02-24 VITALS
SYSTOLIC BLOOD PRESSURE: 99 MMHG | BODY MASS INDEX: 19.47 KG/M2 | TEMPERATURE: 98 F | HEART RATE: 76 BPM | RESPIRATION RATE: 19 BRPM | DIASTOLIC BLOOD PRESSURE: 62 MMHG | HEIGHT: 65 IN | OXYGEN SATURATION: 100 % | WEIGHT: 116.88 LBS

## 2022-02-24 LAB
ALBUMIN SERPL BCP-MCNC: 3.2 G/DL (ref 3.5–5.2)
ALP SERPL-CCNC: 73 U/L (ref 55–135)
ALT SERPL W/O P-5'-P-CCNC: 10 U/L (ref 10–44)
ANION GAP SERPL CALC-SCNC: 8 MMOL/L (ref 8–16)
AST SERPL-CCNC: 17 U/L (ref 10–40)
BASOPHILS # BLD AUTO: 0.03 K/UL (ref 0–0.2)
BASOPHILS NFR BLD: 0.5 % (ref 0–1.9)
BILIRUB SERPL-MCNC: 0.3 MG/DL (ref 0.1–1)
BUN SERPL-MCNC: 5 MG/DL (ref 6–20)
CALCIUM SERPL-MCNC: 9.4 MG/DL (ref 8.7–10.5)
CHLORIDE SERPL-SCNC: 103 MMOL/L (ref 95–110)
CO2 SERPL-SCNC: 30 MMOL/L (ref 23–29)
CREAT SERPL-MCNC: 0.6 MG/DL (ref 0.5–1.4)
DIFFERENTIAL METHOD: ABNORMAL
EOSINOPHIL # BLD AUTO: 0.2 K/UL (ref 0–0.5)
EOSINOPHIL NFR BLD: 4.2 % (ref 0–8)
ERYTHROCYTE [DISTWIDTH] IN BLOOD BY AUTOMATED COUNT: 13.3 % (ref 11.5–14.5)
EST. GFR  (AFRICAN AMERICAN): >60 ML/MIN/1.73 M^2
EST. GFR  (NON AFRICAN AMERICAN): >60 ML/MIN/1.73 M^2
GLUCOSE SERPL-MCNC: 91 MG/DL (ref 70–110)
HCT VFR BLD AUTO: 31.6 % (ref 37–48.5)
HGB BLD-MCNC: 10.3 G/DL (ref 12–16)
IMM GRANULOCYTES # BLD AUTO: 0.02 K/UL (ref 0–0.04)
IMM GRANULOCYTES NFR BLD AUTO: 0.3 % (ref 0–0.5)
LYMPHOCYTES # BLD AUTO: 1.4 K/UL (ref 1–4.8)
LYMPHOCYTES NFR BLD: 24.5 % (ref 18–48)
MAGNESIUM SERPL-MCNC: 2 MG/DL (ref 1.6–2.6)
MCH RBC QN AUTO: 30.1 PG (ref 27–31)
MCHC RBC AUTO-ENTMCNC: 32.6 G/DL (ref 32–36)
MCV RBC AUTO: 92 FL (ref 82–98)
MONOCYTES # BLD AUTO: 0.5 K/UL (ref 0.3–1)
MONOCYTES NFR BLD: 8 % (ref 4–15)
NEUTROPHILS # BLD AUTO: 3.6 K/UL (ref 1.8–7.7)
NEUTROPHILS NFR BLD: 62.5 % (ref 38–73)
NRBC BLD-RTO: 0 /100 WBC
PHOSPHATE SERPL-MCNC: 3.9 MG/DL (ref 2.7–4.5)
PLATELET # BLD AUTO: 218 K/UL (ref 150–450)
PMV BLD AUTO: 8.9 FL (ref 9.2–12.9)
POTASSIUM SERPL-SCNC: 4.3 MMOL/L (ref 3.5–5.1)
PROT SERPL-MCNC: 5.7 G/DL (ref 6–8.4)
RBC # BLD AUTO: 3.42 M/UL (ref 4–5.4)
SODIUM SERPL-SCNC: 141 MMOL/L (ref 136–145)
WBC # BLD AUTO: 5.75 K/UL (ref 3.9–12.7)

## 2022-02-24 PROCEDURE — 84100 ASSAY OF PHOSPHORUS: CPT

## 2022-02-24 PROCEDURE — 85025 COMPLETE CBC W/AUTO DIFF WBC: CPT

## 2022-02-24 PROCEDURE — 83735 ASSAY OF MAGNESIUM: CPT

## 2022-02-24 PROCEDURE — 80053 COMPREHEN METABOLIC PANEL: CPT

## 2022-02-24 PROCEDURE — 25000003 PHARM REV CODE 250

## 2022-02-24 PROCEDURE — 63600175 PHARM REV CODE 636 W HCPCS

## 2022-02-24 PROCEDURE — 63600175 PHARM REV CODE 636 W HCPCS: Performed by: STUDENT IN AN ORGANIZED HEALTH CARE EDUCATION/TRAINING PROGRAM

## 2022-02-24 PROCEDURE — 36415 COLL VENOUS BLD VENIPUNCTURE: CPT

## 2022-02-24 PROCEDURE — 25000003 PHARM REV CODE 250: Performed by: STUDENT IN AN ORGANIZED HEALTH CARE EDUCATION/TRAINING PROGRAM

## 2022-02-24 RX ORDER — KETOROLAC TROMETHAMINE 15 MG/ML
15 INJECTION, SOLUTION INTRAMUSCULAR; INTRAVENOUS ONCE
Status: COMPLETED | OUTPATIENT
Start: 2022-02-24 | End: 2022-02-24

## 2022-02-24 RX ORDER — SODIUM CHLORIDE, SODIUM LACTATE, POTASSIUM CHLORIDE, CALCIUM CHLORIDE 600; 310; 30; 20 MG/100ML; MG/100ML; MG/100ML; MG/100ML
INJECTION, SOLUTION INTRAVENOUS CONTINUOUS
Status: DISCONTINUED | OUTPATIENT
Start: 2022-02-24 | End: 2022-02-24

## 2022-02-24 RX ORDER — BISACODYL 10 MG
10 SUPPOSITORY, RECTAL RECTAL ONCE
Status: COMPLETED | OUTPATIENT
Start: 2022-02-24 | End: 2022-02-24

## 2022-02-24 RX ORDER — PROMETHAZINE HYDROCHLORIDE 12.5 MG/1
12.5 TABLET ORAL EVERY 6 HOURS PRN
Qty: 30 TABLET | Refills: 0 | Status: SHIPPED | OUTPATIENT
Start: 2022-02-24 | End: 2023-02-23

## 2022-02-24 RX ORDER — ONDANSETRON 2 MG/ML
8 INJECTION INTRAMUSCULAR; INTRAVENOUS EVERY 8 HOURS
Status: DISCONTINUED | OUTPATIENT
Start: 2022-02-25 | End: 2022-02-24 | Stop reason: HOSPADM

## 2022-02-24 RX ORDER — BISACODYL 10 MG
10 SUPPOSITORY, RECTAL RECTAL DAILY PRN
Status: DISCONTINUED | OUTPATIENT
Start: 2022-02-25 | End: 2022-02-24 | Stop reason: HOSPADM

## 2022-02-24 RX ADMIN — GABAPENTIN 600 MG: 300 CAPSULE ORAL at 03:02

## 2022-02-24 RX ADMIN — BISACODYL 10 MG: 10 SUPPOSITORY RECTAL at 09:02

## 2022-02-24 RX ADMIN — ONDANSETRON 8 MG: 2 INJECTION INTRAMUSCULAR; INTRAVENOUS at 01:02

## 2022-02-24 RX ADMIN — SODIUM CHLORIDE, SODIUM LACTATE, POTASSIUM CHLORIDE, AND CALCIUM CHLORIDE: 600; 310; 30; 20 INJECTION, SOLUTION INTRAVENOUS at 10:02

## 2022-02-24 RX ADMIN — KETOROLAC TROMETHAMINE 15 MG: 15 INJECTION, SOLUTION INTRAMUSCULAR; INTRAVENOUS at 12:02

## 2022-02-24 RX ADMIN — GABAPENTIN 600 MG: 300 CAPSULE ORAL at 09:02

## 2022-02-24 RX ADMIN — KETOROLAC TROMETHAMINE 15 MG: 15 INJECTION, SOLUTION INTRAMUSCULAR; INTRAVENOUS at 04:02

## 2022-02-24 RX ADMIN — KETOROLAC TROMETHAMINE 15 MG: 15 INJECTION, SOLUTION INTRAMUSCULAR; INTRAVENOUS at 05:02

## 2022-02-24 RX ADMIN — LIDOCAINE 5% 1 PATCH: 700 PATCH TOPICAL at 09:02

## 2022-02-24 NOTE — PROGRESS NOTES
[facility]  General Surgery  Progress Note    Subjective:     History of Present Illness:  No notes on file    Post-Op Info:  Procedure(s) (LRB):  XI ROBOTIC JEJUNOSTOMY Tube Placement, G Tube Placement (N/A)   3 Days Post-Op     Interval History: NAEO. Pt still with complaints of pain and nausea. Also had some dry heaving. Did not tolerate TF well yesterday. Will restart at low rate today. AF. HDS.     Medications:  Continuous Infusions:  Scheduled Meds:   acetaminophen  999.0148 mg Per G Tube Q8H    baclofen  5 mg Oral QHS    gabapentin  600 mg Oral TID    ketorolac  15 mg Intravenous Q6H    LIDOcaine  1 patch Transdermal Q24H     PRN Meds:methocarbamoL, ondansetron, promethazine (PHENERGAN) IVPB, sodium chloride 0.9%     Review of patient's allergies indicates:   Allergen Reactions    Iodine and iodide containing products Swelling, Hives, Itching, Rash and Shortness Of Breath    Domperidone Itching    Latex, natural rubber Rash    Scopolamine      Patch- caused burn under patch     Objective:     Vital Signs (Most Recent):  Temp: 97.5 °F (36.4 °C) (02/24/22 0516)  Pulse: 63 (02/24/22 0516)  Resp: 18 (02/24/22 0516)  BP: 126/76 (02/24/22 0516)  SpO2: 99 % (02/24/22 0516) Vital Signs (24h Range):  Temp:  [97.5 °F (36.4 °C)-99.2 °F (37.3 °C)] 97.5 °F (36.4 °C)  Pulse:  [63-90] 63  Resp:  [17-19] 18  SpO2:  [96 %-100 %] 99 %  BP: (126-136)/(65-76) 126/76     Weight: 53 kg (116 lb 13.5 oz)  Body mass index is 19.44 kg/m².    Intake/Output - Last 3 Shifts         02/22 0700  02/23 0659 02/23 0700  02/24 0659    P.O.  360    Total Intake(mL/kg)  360 (6.8)    Emesis/NG output  120    Total Output  120    Net  +240          Urine Occurrence 1 x 2 x            Physical Exam    Significant Labs:  I have reviewed all pertinent lab results within the past 24 hours.    Significant Diagnostics:  I have reviewed all pertinent imaging results/findings within the past 24 hours.    Assessment/Plan:     S/P lap  gastric neurostimulator placement   40yo gastoparetic s/p robotic G and J tube placement on 2/21    - CLD  - TFs via J tube today at 10ml/hr  - Re-start mIVF  - SW set up HH for j-tube feeds  - Vent G tube as need for decompression. Use blood bag.   - Home meds  - No crushed meds via J tube  - Encourage OOBTC, IS and ambulation  - DVT ppx     Dispo: Home when tolerating TF        Zacarias Steele MD  General Surgery  [facility]

## 2022-02-24 NOTE — NURSING
Pt discharged to home via wheelchair. Pt received bedside pharmacy prescriptions and copy of discharged papers instructions. Pt denies pain at this time. Pt educated on signs and symptoms of when to call the doctor. All belongings with the patient . Pt verbalized understanding.

## 2022-02-24 NOTE — DISCHARGE SUMMARY
[facility]  General Surgery  Discharge Summary      Patient Name: Nena Mendez  MRN: [id number]  Admission Date: 2/21/2022  Hospital Length of Stay: 1 days  Discharge Date and Time:  02/24/2022 4:25 PM  Attending Physician: Robert Kumar MD   Discharging Provider: Zacarias Steele MD  Primary Care Provider: Jeancarlos Angel MD    HPI:   Subjective 42y/o with bmi 15.99, fibromyalgia and /p gastric stimulator 10/7/16 with replacement of batter 3/22/19, 12/30/19 and 1/28/22, J tube 12/16/16 (she didn't tolerate it so she may have small bowel dysmotility also), pylorolasty 1/27/17 and sleeve gastrectomy 5/10/17 all for gastroparesis.  She is not doing well and not eating anything except sweet tea with some clear protein.  She has no severe epigastric pain, extremely severe nausea and severe to extremely severe vomiting.  She has been to the ER once and has been given a recent tramadol rx.  She has lost weight since I last saw her.  Objective Wounds clear. Gastric Stimulator Interrogation: impedence 431 Voltage 8 Current 18.6 Pulse Width 330 Rate 40 Cycle on 2 Cycle off 3, on   Assessment & Plan Severe gastroparesis symptoms with protein calorie malnutrition despite new stimulator.  She is ready for robotic g and j tubes.    Procedure(s) (LRB):  XI ROBOTIC JEJUNOSTOMY Tube Placement, G Tube Placement (N/A)      Indwelling Lines/Drains at time of discharge:   Lines/Drains/Airways     Drain  Duration                Gastrostomy/Enterostomy 01/27/17 Jejunostomy tube 1854 days         Gastrostomy/Enterostomy 02/21/22 0831 Jejunostomy tube LUQ feeding 3 days         Open Drain 02/21/22 0805 Abdomen   3 days              Hospital Course: Pt presented for the above procedure and tolerated without issue. She progressed appropriately post-operatively. She had some pain control issues early in the post-op course but those improved during her stay. She also has some issues with nausea and tolerating TF that  improved as well. Now on POD#3 she is stable and ready for discharge.  Her pain is controlled, she is ambulating without issue, voiding spontaneously, and tolerating TF/ordered diet. She is AF and hemodynamically stable. Home health was set up for her and she was given appropriate equipment before discharge. Plan to continue advancing TF at home and follow-up in 2 weeks in clinic. Pt understood discharge plan and voiced a desire to be discharged. Uncomplicated post-op course.       Goals of Care Treatment Preferences:  Code Status: Full Code      Consults:   Consults (From admission, onward)        Status Ordering Provider     Inpatient consult to Registered Dietitian/Nutritionist  Once        Provider:  (Not yet assigned)    Completed YOAV LYNCH     Inpatient consult to Registered Dietitian/Nutritionist  Once        Provider:  (Not yet assigned)    Completed NATALI VELASCO     Inpatient consult to Social Work  Once        Provider:  (Not yet assigned)    Acknowledged NATALI VELASCO          Significant Diagnostic Studies: EMR    Pending Diagnostic Studies:     None        Final Active Diagnoses:    Diagnosis Date Noted POA    Malnutrition [E46] 08/10/2016 Yes    S/P lap gastric neurostimulator placement  [K31.84] 08/04/2016 Yes      Problems Resolved During this Admission:      Discharged Condition: stable    Disposition: Home or Self Care    Follow Up:   Follow-up Information     Robert Kumar MD Follow up in 2 week(s).    Specialties: General Surgery, Bariatrics  Why: Post-operative follow-up  Contact information:  [address] [facility] [address]  [phone]             [facility].    Specialties: Home Health Services, Hematology and Oncology  Contact information:  [address] [facility] [id number] [phone]                       Patient Instructions:      Diet clear liquid     Notify your health care provider if you  experience any of the following:  increased confusion or weakness     Notify your health care provider if you experience any of the following:  persistent dizziness, light-headedness, or visual disturbances     Notify your health care provider if you experience any of the following:  worsening rash     Notify your health care provider if you experience any of the following:  severe persistent headache     Notify your health care provider if you experience any of the following:  difficulty breathing or increased cough     Notify your health care provider if you experience any of the following:  redness, tenderness, or signs of infection (pain, swelling, redness, odor or green/yellow discharge around incision site)     Notify your health care provider if you experience any of the following:  severe uncontrolled pain     Notify your health care provider if you experience any of the following:  persistent nausea and vomiting or diarrhea     Notify your health care provider if you experience any of the following:  temperature >100.4     No dressing needed   Order Comments: WOUND CARE  You have skin glue over your incision(s)  This will slowly flake away in about 10 days  It is okay to shower starting the day after surgery  Can pat your incision dry  Please do not scrub hard over your incisions  Please do not pick the glue off or it will reopen the wound     Tube Feedings/Formulas     Order Specific Question Answer Comments   Select Adult Formula: Impact Peptide 1.5    Route: Jejunostomy      Activity as tolerated   Order Comments: No lifting greater than 10 pounds for 6 weeks from day of surgery.  No pushing/pulling such as vacuuming or raking.  No straining, avoid constipation and take stool softeners as described and laxatives as needed.  No driving while on narcotics and until you can react quickly without pain.     Medications:  Reconciled Home Medications:      Medication List      START taking these medications     methocarbamoL 500 MG Tab  Commonly known as: ROBAXIN  Take 1 tablet (500 mg total) by mouth 4 (four) times daily. for 10 days     ondansetron 4 MG Tbdl  Commonly known as: ZOFRAN-ODT  Dissolve 2 tablets (8 mg total) by mouth every 8 (eight) hours as needed (nausea).     SILAPAP 160 mg/5 mL Liqd  Generic drug: acetaminophen  20.3 mLs (649.6 mg total) by Per G Tube route every 8 (eight) hours as needed (pain).        CHANGE how you take these medications    baclofen 10 MG tablet  Commonly known as: LIORESAL  Take 1 tablet (10 mg total) by mouth 3 (three) times daily as needed.  What changed: when to take this     * promethazine 6.25 mg/5 mL syrup  Commonly known as: PHENERGAN  Take 12.5 mg by mouth every 6 (six) hours as needed for Nausea.  What changed: Another medication with the same name was added. Make sure you understand how and when to take each.     * promethazine 25 MG tablet  Commonly known as: PHENERGAN  TAKE 1 TABLET(25 MG) BY MOUTH EVERY 6 HOURS AS NEEDED FOR NAUSEA  What changed: Another medication with the same name was added. Make sure you understand how and when to take each.     * promethazine 12.5 MG Tab  Commonly known as: PHENERGAN  Take 1 tablet (12.5 mg total) by mouth every 6 (six) hours as needed (nausea).  What changed: You were already taking a medication with the same name, and this prescription was added. Make sure you understand how and when to take each.         * This list has 3 medication(s) that are the same as other medications prescribed for you. Read the directions carefully, and ask your doctor or other care provider to review them with you.            CONTINUE taking these medications    b complex vitamins capsule  Take 1 capsule by mouth once daily.     butalbitaL-acetaminop-caf-cod -31-30 mg Cap  TK 1 C PO Q 4 TO 6 H PRN P     ergocalciferol 50,000 unit Cap  Commonly known as: ERGOCALCIFEROL  Take 50,000 Units by mouth every 7 days.     gabapentin 300 MG  capsule  Commonly known as: NEURONTIN  TAKE 1 CAPSULE(300 MG) BY MOUTH THREE TIMES DAILY     lubiprostone 24 MCG Cap  Commonly known as: AMITIZA  Take 1 capsule (24 mcg total) by mouth 2 (two) times daily.     mirtazapine 30 MG tablet  Commonly known as: REMERON  Take 1 tablet (30 mg total) by mouth every evening.     multivitamin Liqd  Take by mouth.          Time spent on the discharge of patient: 15  minutes    Zacarias Steele MD  General Surgery  [facility] - [facility]

## 2022-02-24 NOTE — SUBJECTIVE & OBJECTIVE
Interval History: NAEO. Pt still with complaints of pain and nausea. Also had some dry heaving. Did not tolerate TF well yesterday. Will restart at low rate today. AF. HDS.     Medications:  Continuous Infusions:  Scheduled Meds:   acetaminophen  999.0148 mg Per G Tube Q8H    baclofen  5 mg Oral QHS    gabapentin  600 mg Oral TID    ketorolac  15 mg Intravenous Q6H    LIDOcaine  1 patch Transdermal Q24H     PRN Meds:methocarbamoL, ondansetron, promethazine (PHENERGAN) IVPB, sodium chloride 0.9%     Review of patient's allergies indicates:   Allergen Reactions    Iodine and iodide containing products Swelling, Hives, Itching, Rash and Shortness Of Breath    Domperidone Itching    Latex, natural rubber Rash    Scopolamine      Patch- caused burn under patch     Objective:     Vital Signs (Most Recent):  Temp: 97.5 °F (36.4 °C) (02/24/22 0516)  Pulse: 63 (02/24/22 0516)  Resp: 18 (02/24/22 0516)  BP: 126/76 (02/24/22 0516)  SpO2: 99 % (02/24/22 0516) Vital Signs (24h Range):  Temp:  [97.5 °F (36.4 °C)-99.2 °F (37.3 °C)] 97.5 °F (36.4 °C)  Pulse:  [63-90] 63  Resp:  [17-19] 18  SpO2:  [96 %-100 %] 99 %  BP: (126-136)/(65-76) 126/76     Weight: 53 kg (116 lb 13.5 oz)  Body mass index is 19.44 kg/m².    Intake/Output - Last 3 Shifts         02/22 0700  02/23 0659 02/23 0700  02/24 0659    P.O.  360    Total Intake(mL/kg)  360 (6.8)    Emesis/NG output  120    Total Output  120    Net  +240          Urine Occurrence 1 x 2 x            Physical Exam    Significant Labs:  I have reviewed all pertinent lab results within the past 24 hours.    Significant Diagnostics:  I have reviewed all pertinent imaging results/findings within the past 24 hours.

## 2022-02-24 NOTE — PLAN OF CARE
Pt resting in bed, no complaints at this time, PRN meds given, family @ bedside, assisted to restroom, not tolerating tube feedings (on hold), plan of care reviewed, will continue to monitor for changes.

## 2022-02-24 NOTE — ASSESSMENT & PLAN NOTE
40yo gastoparetic s/p robotic G and J tube placement on 2/21    - CLD  - TFs via J tube today at 10ml/hr  - Re-start mIVF  - SW set up HH for j-tube feeds  - Vent G tube as need for decompression. Use blood bag.   - Home meds  - No crushed meds via J tube  - Encourage OOBTC, IS and ambulation  - DVT ppx     Dispo: Home when tolerating TF

## 2022-02-25 NOTE — PLAN OF CARE
[facility] - [facility]  Discharge Final Note    Primary Care Provider: Jeancarlos Angel MD    Expected Discharge Date: 2/24/2022    Final Discharge Note (most recent)     Final Note - 02/24/22 1713        Final Note    Assessment Type Final Discharge Note     Anticipated Discharge Disposition Home-Health Care [facility]   [place]  / [facility] (TF)    What phone number can be called within the next 1-3 days to see how you are doing after discharge? [phone]     Hospital Resources/Appts/Education Provided Provided patient/caregiver with written discharge plan information;Appointments scheduled by Navigator/Coordinator               Future Appointments   Date Time Provider Department Center   3/10/2022  2:45 PM [facility]            Contact Info     Robert Kumar MD   Specialty: General Surgery, Bariatrics    [employer] [facility] [address]   Phone: [phone]       Next Steps: Follow up in 2 week(s)    Instructions: Post-operative follow-up    [facility]   Specialty: Home Health Services, Hematology and Oncology    [address] [facility] [address]   Phone: [phone]       Next Steps: Follow up

## 2022-03-02 ENCOUNTER — PATIENT MESSAGE (OUTPATIENT)
Dept: SURGERY | Facility: CLINIC | Age: 42
End: 2022-03-02
Payer: COMMERCIAL

## 2022-03-02 RX ORDER — BACITRACIN ZINC AND POLYMYXIN B SULFATE 500; 10000 [USP'U]/G; [USP'U]/G
OINTMENT OPHTHALMIC 3 TIMES DAILY
Qty: 3.5 G | Refills: 0 | Status: SHIPPED | OUTPATIENT
Start: 2022-03-02 | End: 2023-02-23

## 2022-03-03 ENCOUNTER — PATIENT MESSAGE (OUTPATIENT)
Dept: SURGERY | Facility: CLINIC | Age: 42
End: 2022-03-03

## 2022-03-03 ENCOUNTER — OFFICE VISIT (OUTPATIENT)
Dept: SURGERY | Facility: CLINIC | Age: 42
End: 2022-03-03
Payer: COMMERCIAL

## 2022-03-03 DIAGNOSIS — K31.84 GASTROPARESIS: Primary | ICD-10-CM

## 2022-03-03 PROCEDURE — 1160F RVW MEDS BY RX/DR IN RCRD: CPT | Mod: CPTII,95,, | Performed by: SURGERY

## 2022-03-03 PROCEDURE — 99024 POSTOP FOLLOW-UP VISIT: CPT | Mod: 95,,, | Performed by: SURGERY

## 2022-03-03 PROCEDURE — 99024 PR POST-OP FOLLOW-UP VISIT: ICD-10-PCS | Mod: 95,,, | Performed by: SURGERY

## 2022-03-03 PROCEDURE — 1160F PR REVIEW ALL MEDS BY PRESCRIBER/CLIN PHARMACIST DOCUMENTED: ICD-10-PCS | Mod: CPTII,95,, | Performed by: SURGERY

## 2022-03-03 PROCEDURE — 1159F PR MEDICATION LIST DOCUMENTED IN MEDICAL RECORD: ICD-10-PCS | Mod: CPTII,95,, | Performed by: SURGERY

## 2022-03-03 PROCEDURE — 1159F MED LIST DOCD IN RCRD: CPT | Mod: CPTII,95,, | Performed by: SURGERY

## 2022-03-03 RX ORDER — SULFAMETHOXAZOLE AND TRIMETHOPRIM 800; 160 MG/1; MG/1
1 TABLET ORAL 2 TIMES DAILY
Qty: 20 TABLET | Refills: 0 | Status: SHIPPED | OUTPATIENT
Start: 2022-03-03 | End: 2022-03-13

## 2022-03-03 NOTE — PROGRESS NOTES
The patient location is: home  The chief complaint leading to consultation is: gastroparesis/postop    Visit type: audiovisual    12 minutes of total time spent on the encounter, which includes face to face time and non-face to face time preparing to see the patient (eg, review of tests), Obtaining and/or reviewing separately obtained history, Documenting clinical information in the electronic or other health record, Independently interpreting results (not separately reported) and communicating results to the patient/family/caregiver, or Care coordination (not separately reported).     Each patient to whom he or she provides medical services by telemedicine is:  (1) informed of the relationship between the physician and patient and the respective role of any other health care provider with respect to management of the patient; and (2) notified that he or she may decline to receive medical services by telemedicine and may withdraw from such care at any time.    Notes:     40y/o with bmi 15.99, fibromyalgia and /p gastric stimulator 10/7/16 with replacement of batter 3/22/19, 12/30/19 and 1/28/22, J tube 12/16/16 (she didn't tolerate it so she may have small bowel dysmotility also), pylorolasty 1/27/17, sleeve gastrectomy 5/10/17, replacement j tube and robotic g tube 2/21/22 all for gastroparesis.  She had a lot of vomiting and this followed by a hard lump occurring at the g tube site.  On the pic there is redness and I sent an rx for antibiotics to put around the tube and she hasn't started them yet.  She says the suture looks unchanged in the spot.  She is doing well with the j tube and is on 15cc/hr and is slowly moving up (goal is about 40).  She has not weighed herself.  She is working from home.    Will start oral antibiotics and obtain ct.  Rtc 2 weeks.

## 2022-03-09 ENCOUNTER — TELEPHONE (OUTPATIENT)
Dept: ENDOSCOPY | Facility: HOSPITAL | Age: 42
End: 2022-03-09
Payer: COMMERCIAL

## 2022-03-14 ENCOUNTER — TELEPHONE (OUTPATIENT)
Dept: GASTROENTEROLOGY | Facility: CLINIC | Age: 42
End: 2022-03-14
Payer: COMMERCIAL

## 2022-03-14 ENCOUNTER — PATIENT MESSAGE (OUTPATIENT)
Dept: SURGERY | Facility: CLINIC | Age: 42
End: 2022-03-14
Payer: COMMERCIAL

## 2022-03-15 ENCOUNTER — LAB VISIT (OUTPATIENT)
Dept: LAB | Facility: HOSPITAL | Age: 42
End: 2022-03-15
Attending: SURGERY
Payer: COMMERCIAL

## 2022-03-15 ENCOUNTER — OFFICE VISIT (OUTPATIENT)
Dept: SURGERY | Facility: CLINIC | Age: 42
End: 2022-03-15
Payer: COMMERCIAL

## 2022-03-15 ENCOUNTER — TELEPHONE (OUTPATIENT)
Dept: SURGERY | Facility: CLINIC | Age: 42
End: 2022-03-15
Payer: COMMERCIAL

## 2022-03-15 VITALS
HEART RATE: 76 BPM | DIASTOLIC BLOOD PRESSURE: 70 MMHG | WEIGHT: 99.88 LBS | SYSTOLIC BLOOD PRESSURE: 100 MMHG | HEIGHT: 69 IN | BODY MASS INDEX: 14.79 KG/M2

## 2022-03-15 DIAGNOSIS — Z09 POSTOP CHECK: ICD-10-CM

## 2022-03-15 DIAGNOSIS — Z09 POSTOP CHECK: Primary | ICD-10-CM

## 2022-03-15 LAB
ANION GAP SERPL CALC-SCNC: 10 MMOL/L (ref 8–16)
BASOPHILS # BLD AUTO: 0.03 K/UL (ref 0–0.2)
BASOPHILS NFR BLD: 0.6 % (ref 0–1.9)
BUN SERPL-MCNC: 5 MG/DL (ref 6–20)
CALCIUM SERPL-MCNC: 9.9 MG/DL (ref 8.7–10.5)
CHLORIDE SERPL-SCNC: 103 MMOL/L (ref 95–110)
CO2 SERPL-SCNC: 29 MMOL/L (ref 23–29)
CREAT SERPL-MCNC: 0.6 MG/DL (ref 0.5–1.4)
DIFFERENTIAL METHOD: ABNORMAL
EOSINOPHIL # BLD AUTO: 0.2 K/UL (ref 0–0.5)
EOSINOPHIL NFR BLD: 3.3 % (ref 0–8)
ERYTHROCYTE [DISTWIDTH] IN BLOOD BY AUTOMATED COUNT: 13.3 % (ref 11.5–14.5)
EST. GFR  (AFRICAN AMERICAN): >60 ML/MIN/1.73 M^2
EST. GFR  (NON AFRICAN AMERICAN): >60 ML/MIN/1.73 M^2
GLUCOSE SERPL-MCNC: 89 MG/DL (ref 70–110)
HCT VFR BLD AUTO: 35.9 % (ref 37–48.5)
HGB BLD-MCNC: 12 G/DL (ref 12–16)
IMM GRANULOCYTES # BLD AUTO: 0.02 K/UL (ref 0–0.04)
IMM GRANULOCYTES NFR BLD AUTO: 0.4 % (ref 0–0.5)
LYMPHOCYTES # BLD AUTO: 1.8 K/UL (ref 1–4.8)
LYMPHOCYTES NFR BLD: 35.8 % (ref 18–48)
MCH RBC QN AUTO: 30.2 PG (ref 27–31)
MCHC RBC AUTO-ENTMCNC: 33.4 G/DL (ref 32–36)
MCV RBC AUTO: 90 FL (ref 82–98)
MONOCYTES # BLD AUTO: 0.4 K/UL (ref 0.3–1)
MONOCYTES NFR BLD: 7.2 % (ref 4–15)
NEUTROPHILS # BLD AUTO: 2.7 K/UL (ref 1.8–7.7)
NEUTROPHILS NFR BLD: 52.7 % (ref 38–73)
NRBC BLD-RTO: 0 /100 WBC
PLATELET # BLD AUTO: 340 K/UL (ref 150–450)
PMV BLD AUTO: 9 FL (ref 9.2–12.9)
POTASSIUM SERPL-SCNC: 4 MMOL/L (ref 3.5–5.1)
RBC # BLD AUTO: 3.97 M/UL (ref 4–5.4)
SODIUM SERPL-SCNC: 142 MMOL/L (ref 136–145)
WBC # BLD AUTO: 5.11 K/UL (ref 3.9–12.7)

## 2022-03-15 PROCEDURE — 3078F DIAST BP <80 MM HG: CPT | Mod: CPTII,S$GLB,, | Performed by: SURGERY

## 2022-03-15 PROCEDURE — 3008F PR BODY MASS INDEX (BMI) DOCUMENTED: ICD-10-PCS | Mod: CPTII,S$GLB,, | Performed by: SURGERY

## 2022-03-15 PROCEDURE — 1160F RVW MEDS BY RX/DR IN RCRD: CPT | Mod: CPTII,S$GLB,, | Performed by: SURGERY

## 2022-03-15 PROCEDURE — 99999 PR PBB SHADOW E&M-EST. PATIENT-LVL IV: CPT | Mod: PBBFAC,,, | Performed by: SURGERY

## 2022-03-15 PROCEDURE — 80048 BASIC METABOLIC PNL TOTAL CA: CPT | Performed by: SURGERY

## 2022-03-15 PROCEDURE — 1160F PR REVIEW ALL MEDS BY PRESCRIBER/CLIN PHARMACIST DOCUMENTED: ICD-10-PCS | Mod: CPTII,S$GLB,, | Performed by: SURGERY

## 2022-03-15 PROCEDURE — 85025 COMPLETE CBC W/AUTO DIFF WBC: CPT | Performed by: SURGERY

## 2022-03-15 PROCEDURE — 3074F PR MOST RECENT SYSTOLIC BLOOD PRESSURE < 130 MM HG: ICD-10-PCS | Mod: CPTII,S$GLB,, | Performed by: SURGERY

## 2022-03-15 PROCEDURE — 3078F PR MOST RECENT DIASTOLIC BLOOD PRESSURE < 80 MM HG: ICD-10-PCS | Mod: CPTII,S$GLB,, | Performed by: SURGERY

## 2022-03-15 PROCEDURE — 99024 PR POST-OP FOLLOW-UP VISIT: ICD-10-PCS | Mod: S$GLB,,, | Performed by: SURGERY

## 2022-03-15 PROCEDURE — 99999 PR PBB SHADOW E&M-EST. PATIENT-LVL IV: ICD-10-PCS | Mod: PBBFAC,,, | Performed by: SURGERY

## 2022-03-15 PROCEDURE — 1159F MED LIST DOCD IN RCRD: CPT | Mod: CPTII,S$GLB,, | Performed by: SURGERY

## 2022-03-15 PROCEDURE — 36415 COLL VENOUS BLD VENIPUNCTURE: CPT | Performed by: SURGERY

## 2022-03-15 PROCEDURE — 3074F SYST BP LT 130 MM HG: CPT | Mod: CPTII,S$GLB,, | Performed by: SURGERY

## 2022-03-15 PROCEDURE — 99024 POSTOP FOLLOW-UP VISIT: CPT | Mod: S$GLB,,, | Performed by: SURGERY

## 2022-03-15 PROCEDURE — 3008F BODY MASS INDEX DOCD: CPT | Mod: CPTII,S$GLB,, | Performed by: SURGERY

## 2022-03-15 PROCEDURE — 1159F PR MEDICATION LIST DOCUMENTED IN MEDICAL RECORD: ICD-10-PCS | Mod: CPTII,S$GLB,, | Performed by: SURGERY

## 2022-03-15 NOTE — PROGRESS NOTES
Nena Mendez is our 41 y.o. female w/PMHx of gastroparesis, rumination, and fibromyalgia s/p J-tube insertion px to  clinic for f/u.      Subjective: Activity returning to normal.  Pain well controlled not needing narcotics.  Pt only on liquid diets (primarily apple juice).  Having normal regular bowel movements.    Pt states at J-tube region there is a suture insertion located at 12 o'clock of J-tube region is irritating and erythematous. No other complications arising from area.    Pt c/o of constant purulent drainage from the G-tube. She also c/o the pressure of the G-tube causes the cap pops off, occurs 2x/week, uses ziploc bag to drain, able to close the tube.      Pt also c/o of daily vomiting, yellow in texture, acidic taste. Has tried Zofran, promethazine (tablet and liquid), no help.    Review of Systems   Constitutional: Positive for chills, fever (Random (100.2, occurs 2x/week)), malaise/fatigue and weight loss (10 lb loss within 1 month).   HENT: Negative for congestion, sinus pain and sore throat.    Respiratory: Positive for cough (Wet cough) and sputum production. Negative for hemoptysis, shortness of breath and wheezing.    Cardiovascular: Negative for chest pain and palpitations.   Gastrointestinal: Positive for abdominal pain (LLQ and epigastric), constipation, heartburn (Occasional), nausea and vomiting (Everyday, yellowish color, tastes like acid). Negative for blood in stool and diarrhea.   Genitourinary: Negative for dysuria, frequency and urgency.   Neurological: Positive for dizziness, weakness (to baseline) and headaches (Abdominal migraines).       Gastroparesis Symptom Monitor Worksheet    1. Vomiting:   Severity- 3   Frequency- 4    2. Nausea   Severity- 4   Freq.- 4    3. Early Satiety   Severity- 0 (nothing by mouth)   Freq.- 0    4. Bloating   Severity- 2   Freq.- 3    5. Postprandial Fullness   Severity- 0 (nothing by mouth)   Freq.- 0    6. Epigastric pain   Severity-  3   Freq.- 4    7. Epigastric burning   Severity- 2   Freq.- 3      Objective:   Vitals:    03/15/22 1208   BP: 100/70   Pulse: 76       Incision sites around J-tube contain very mild erythema at 12 o'clock area of suture site, no drainage. G-tube insertion site erythematous, tender to touch.  Abdomen soft and nontender.       Assessment: Nena Mendez is our 41 y.o. female w/PMHx of gastroparesis, rumination, and fibromyalgia s/p J-tube insertion px to  clinic for f/u.      Plan:  - Can follow up as needed  - Continue to not lift more than 10-15lbs for 4 more weeks  - Can call the clinic with any questions or concerns

## 2022-03-15 NOTE — PROGRESS NOTES
I have seen the patient, reviewed the Student's history and physical, assessment and plan. I have personally interviewed and examined the patient at bedside and: agree with the findings.     40y/o with bmi 14.75, fibromyalgia and /p gastric stimulator 10/7/16 with replacement of batter 3/22/19, 12/30/19 and 1/28/22, J tube 12/16/16 (she didn't tolerate it so she may have small bowel dysmotility also), pylorolasty 1/27/17, sleeve gastrectomy 5/10/17, replacement j tube and robotic g tube 2/21/22 all for gastroparesis.  She is close to goal with tube feeds.  See resident note for other symptoms.    CT ok she states.    Obtain ct result.  Obtain cbc, bmp.  Back to work Monday.  Rtc 2 months.

## 2022-03-15 NOTE — LETTER
[facility]  [address]  [facility] [address]  Phone: [phone] March 15, 2022        Jeancarlos Angel MD  [address]  [address] [place] [address]    Patient: Nena Mendez   MR Number: [id number]   YOB: 1980   Date of Visit: 3/15/2022     Dear Dr. Angel:    Thank you for referring Nena Mendez to me for evaluation. Attached you will find relevant portions of my assessment and plan of care.    If you have questions, please do not hesitate to call me. I look forward to following Nena Mendez along with you.    Sincerely,          Robert Kumar MD      [facility]

## 2022-03-17 NOTE — LETTER
April 14, 2020      Eleni Patel, RYAN  [address]  [facility] [address]           [facility]  [address]  [facility] [address]  Phone: [phone]  Fax: [phone]          Patient: Nena Mendez   MR Number: [id number]   YOB: 1980   Date of Visit: 4/14/2020       Dear Eleni Patel:    Thank you for referring Nena Mendez to me for evaluation. Attached you will find relevant portions of my assessment and plan of care.    If you have questions, please do not hesitate to call me. I look forward to following Nena Mendez along with you.    Sincerely,    Danny Neff MD    Enclosure  CC:  No Recipients    If you would like to receive this communication electronically, please contact [email or web address] or [phone] to request more information on [facility] Link access.    For providers and/or their staff who would like to refer a patient to [facility], please contact us through our one-stop-shop provider referral line, [facility], at [phone].    If you feel you have received this communication in error or would no longer like to receive these types of communications, please e-mail [email or web address]         
yes

## 2022-03-28 ENCOUNTER — TELEPHONE (OUTPATIENT)
Dept: RHEUMATOLOGY | Facility: CLINIC | Age: 42
End: 2022-03-28
Payer: COMMERCIAL

## 2022-03-28 ENCOUNTER — PATIENT MESSAGE (OUTPATIENT)
Dept: SURGERY | Facility: CLINIC | Age: 42
End: 2022-03-28
Payer: COMMERCIAL

## 2022-04-11 ENCOUNTER — PATIENT MESSAGE (OUTPATIENT)
Dept: SURGERY | Facility: CLINIC | Age: 42
End: 2022-04-11
Payer: COMMERCIAL

## 2022-04-12 ENCOUNTER — TELEPHONE (OUTPATIENT)
Dept: BARIATRICS | Facility: CLINIC | Age: 42
End: 2022-04-12
Payer: COMMERCIAL

## 2022-04-12 ENCOUNTER — OFFICE VISIT (OUTPATIENT)
Dept: SURGERY | Facility: CLINIC | Age: 42
End: 2022-04-12
Payer: COMMERCIAL

## 2022-04-12 DIAGNOSIS — R11.2 NAUSEA AND VOMITING, INTRACTABILITY OF VOMITING NOT SPECIFIED, UNSPECIFIED VOMITING TYPE: Primary | ICD-10-CM

## 2022-04-12 PROCEDURE — 1160F RVW MEDS BY RX/DR IN RCRD: CPT | Mod: CPTII,95,, | Performed by: SURGERY

## 2022-04-12 PROCEDURE — 1159F MED LIST DOCD IN RCRD: CPT | Mod: CPTII,95,, | Performed by: SURGERY

## 2022-04-12 PROCEDURE — 1160F PR REVIEW ALL MEDS BY PRESCRIBER/CLIN PHARMACIST DOCUMENTED: ICD-10-PCS | Mod: CPTII,95,, | Performed by: SURGERY

## 2022-04-12 PROCEDURE — 1159F PR MEDICATION LIST DOCUMENTED IN MEDICAL RECORD: ICD-10-PCS | Mod: CPTII,95,, | Performed by: SURGERY

## 2022-04-12 PROCEDURE — 99024 PR POST-OP FOLLOW-UP VISIT: ICD-10-PCS | Mod: 95,,, | Performed by: SURGERY

## 2022-04-12 PROCEDURE — 99024 POSTOP FOLLOW-UP VISIT: CPT | Mod: 95,,, | Performed by: SURGERY

## 2022-04-12 NOTE — TELEPHONE ENCOUNTER
Called and spoke to pt to inform her that Dr. Kumar is running behind in clinic. Told pt she could log on to her virtual appointment when she can and he will log on as soon as possible. Pt agreed and appreciative.

## 2022-04-12 NOTE — PROGRESS NOTES
The patient location is: home  The chief complaint leading to consultation is: gastroparesis    Visit type: audiovisual      14 minutes of total time spent on the encounter, which includes face to face time and non-face to face time preparing to see the patient (eg, review of tests), Obtaining and/or reviewing separately obtained history, Documenting clinical information in the electronic or other health record, Independently interpreting results (not separately reported) and communicating results to the patient/family/caregiver, or Care coordination (not separately reported).         Each patient to whom he or she provides medical services by telemedicine is:  (1) informed of the relationship between the physician and patient and the respective role of any other health care provider with respect to management of the patient; and (2) notified that he or she may decline to receive medical services by telemedicine and may withdraw from such care at any time.    Notes:       42y/o with bmi 14.75, fibromyalgia and /p gastric stimulator 10/7/16 with replacement of batter 3/22/19, 19 and 22, J tube 16 (she didn't tolerate it so she may have small bowel dysmotility also), pylorolasty 17, sleeve gastrectomy 5/10/17, replacement j tube and robotic g tube 22 all for gastroparesis.  She is at goal with tube feeds and her weith is 102-3.  She still has burning at the j tube site and she has one stitch that may be causing some of the pain.  She has a hard area adjacent to the g tube site.    Past Medical History:   Diagnosis Date    Fibromyalgia     Gastroparesis     Headache     Rumination        Past Surgical History:   Procedure Laterality Date    APPENDECTOMY      breast implants removed Bilateral      SECTION      CHOLECYSTECTOMY      COSMETIC SURGERY      ESOPHAGEAL MANOMETRY WITH MEASUREMENT OF IMPEDANCE N/A 10/30/2019    Procedure: MANOMETRY, ESOPHAGUS, WITH IMPEDANCE  MEASUREMENT;  Surgeon: Patti Orozco MD;  Location: [facility] ENDO [address]);  Service: Endoscopy;  Laterality: N/A;  LATEX ALLERGY  r/o rumination  Motility Studies:  Hold Narcotics x 1 days   Hold TCA x 1 days  10/24 - pt confirmed appt-BB    ESOPHAGOGASTRODUODENOSCOPY N/A 3/22/2019    Procedure: EGD (ESOPHAGOGASTRODUODENOSCOPY)-dual lumen scope to remove intragastric suture.;  Surgeon: Robert Kumar MD;  Location: [address];  Service: General;  Laterality: N/A;    ESOPHAGOGASTRODUODENOSCOPY N/A 10/30/2019    Procedure: EGD (ESOPHAGOGASTRODUODENOSCOPY);  Surgeon: Patti Orozco MD;  Location: [facility] ENDO [address]);  Service: Endoscopy;  Laterality: N/A;  LATEX ALLERGY  EGD with [facility]   4th Floor, scheduled on 2nd floor due to availability  Full liquid diet 3 days and clear liquid diet x 1 day prior to procedure  Propofol only. No fentanyl or benzodiazepine during sedation. If additional sedation need    GASTRIC STIMULATOR IMPLANT SURGERY      GASTROSTOMY-JEJEUNOSTOMY TUBE CHANGE/PLACEMENT  08/10/2016    HYSTERECTOMY      laparoscopic jejunostomy tube      removed ~ 2019    REPLACEMENT OF GASTRIC NEUROSTIMULATOR GENERATOR N/A 3/22/2019    Procedure: REPLACEMENT, PULSE GENERATOR, NEUROSTIMULATOR, GASTRIC-battery exchange only;  Surgeon: Robert Kumar MD;  Location: [address];  Service: General;  Laterality: N/A;    REPLACEMENT OF GASTRIC NEUROSTIMULATOR GENERATOR N/A 12/30/2019    Procedure: REPLACEMENT, PULSE GENERATOR, NEUROSTIMULATOR, GASTRIC, OPEN (battery exchange);  Surgeon: Robert Kumar MD;  Location: [address];  Service: General;  Laterality: N/A;    REPLACEMENT OF GASTRIC NEUROSTIMULATOR GENERATOR N/A 1/28/2022    Procedure: REPLACEMENT, PULSE GENERATOR, NEUROSTIMULATOR, GASTRIC, Open;  Surgeon: Robert Kumar MD;  Location: [address];  Service: General;  Laterality: N/A;    sleeve gastrectomy      TONSILLECTOMY         Family History    Problem Relation Age of Onset    Hypothyroidism Mother     Cirrhosis Father     No Known Problems Brother     Asthma Daughter     Sleep apnea Daughter     Irritable bowel syndrome Maternal Aunt     Diverticulitis Maternal Aunt     Celiac disease Neg Hx     Colon cancer Neg Hx     Esophageal cancer Neg Hx     Stomach cancer Neg Hx     Rectal cancer Neg Hx     Ulcerative colitis Neg Hx     Crohn's disease Neg Hx        Social History     Socioeconomic History    Marital status:     Number of children: 2   Tobacco Use    Smoking status: Former Smoker     Packs/day: 0.00     Quit date: 2015     Years since quittin.6    Smokeless tobacco: Never Used   Substance and Sexual Activity    Alcohol use: No    Drug use: No       Current Outpatient Medications   Medication Sig Dispense Refill    acetaminophen (TYLENOL) 160 mg/5 mL Liqd 20.3 mLs (649.6 mg total) by Per G Tube route every 8 (eight) hours as needed (pain). 473 mL 0    b complex vitamins capsule Take 1 capsule by mouth once daily.      bacitracin-polymyxin b (POLYSPORIN) ophthalmic ointment Place into both eyes 3 (three) times daily. Apply around tube and twist tube to get it in around the tube. 3.5 g 0    baclofen (LIORESAL) 10 MG tablet Take 1 tablet (10 mg total) by mouth 3 (three) times daily as needed. (Patient taking differently: Take 10 mg by mouth nightly.) 90 tablet 11    butalbital-acetaminop-caf-cod -21-30 mg Cap TK 1 C PO Q 4 TO 6 H PRN P  3    ergocalciferol (ERGOCALCIFEROL) 50,000 unit Cap Take 50,000 Units by mouth every 7 days.      gabapentin (NEURONTIN) 300 MG capsule TAKE 1 CAPSULE(300 MG) BY MOUTH THREE TIMES DAILY 90 capsule 3    lubiprostone (AMITIZA) 24 MCG Cap Take 1 capsule (24 mcg total) by mouth 2 (two) times daily. 60 capsule 6    mirtazapine (REMERON) 30 MG tablet Take 1 tablet (30 mg total) by mouth every evening. 30 tablet 11    multivitamin Liqd Take by mouth.      ondansetron  "(ZOFRAN-ODT) 4 MG TbDL Dissolve 2 tablets (8 mg total) by mouth every 8 (eight) hours as needed (nausea). 30 tablet 0    promethazine (PHENERGAN) 12.5 MG Tab Take 1 tablet (12.5 mg total) by mouth every 6 (six) hours as needed (nausea). 30 tablet 0    promethazine (PHENERGAN) 25 MG tablet TAKE 1 TABLET(25 MG) BY MOUTH EVERY 6 HOURS AS NEEDED FOR NAUSEA 60 tablet 6    promethazine (PHENERGAN) 6.25 mg/5 mL syrup Take 12.5 mg by mouth every 6 (six) hours as needed for Nausea.       No current facility-administered medications for this visit.       Review of patient's allergies indicates:   Allergen Reactions    Iodine and iodide containing products Swelling, Hives, Itching, Rash and Shortness Of Breath    Domperidone Itching    Latex, natural rubber Rash    Scopolamine      Patch- caused burn under patch          CT 2022 ok, tubes in place.  No mention of abscess at the g tube site.     Use bacitracin around the g tube.  May remove stitch on j tube.  Back to work Monday.  To see ostomy nurse and she could do that next week as she is off work.  Rtc on May.     For j tube: Try HYDROFERA BLUE READY(HRBR) foam dressing, cut small square  and slit it ,  Place HFBR under the bumper, Snug the bumper and tape securely with HYPTAPE ( 1") This  will secure and waterproof  Change every 3-5 days and may even get 7 days  Bolster tube with tube stern or ace wrap or abdominal binder    "

## 2022-04-21 NOTE — PROGRESS NOTES
The patient location is: home   The chief complaint leading to consultation is: referral from Dr. Orozco for interrupted sleep and excessive daytime sleepiness    Visit type: Virtual visit with synchronous audio and video  Total time spent with patient: 15  Each patient to whom he or she provides medical services by telemedicine is:  (1) informed of the relationship between the physician and patient and the respective role of any other health care provider with respect to management of the patient; and (2) notified that he or she may decline to receive medical services by telemedicine and may withdraw from such care at any time.         Nena Mendez  was seen at the request of  Patti Orozco MD for sleep evaluation.    04/14/2020: Tele  INITIAL HISTORY OF PRESENT ILLNESS:  Nena Mendez is a 39 y.o. female is here to be evaluated for a sleep disorder.       CHIEF COMPLAINT:      The patient's complaints include excessive daytime sleepiness, excessive daytime fatigue, snoring and interrupted sleep since  About 2 years ago; prior to that was able to sleep through the night..  Nena Mendez denied   witnessed breathing pauses and  gasping for air in sleep.  Reports nocturia x2.  She states that a lot of her awakenings are due to gastroparesis.  No naps during the day.    The patient had tonsillectomy in the past    Reports difficulty falling - staying asleep.     Denies symptoms concerning for parasomnia except for occasional somniloquy.  she Denies cataplexy, sleep paralysis, hallucinations surrounding sleep time.     Nena Mendez denied symptoms concerning for RLS.      EPWORTH SLEEPINESS SCALE 4/13/2020   Sitting and reading 2   Watching TV 2   Sitting, inactive in a public place (e.g. a theatre or a meeting) 0   As a passenger in a car for an hour without a break 3   Lying down to rest in the afternoon when circumstances permit 2   Sitting and talking to someone 0   Sitting quietly after a  You are being prescribed narcotic pain medicine.  This may make you constipated.  Take sparingly  Only take this for breakthrough pain.  Take 1 g of Tylenol every 6 hours and 500 mg of naproxen every every 12 hours.   lunch without alcohol 2   In a car, while stopped for a few minutes in traffic 0   Total score 11       PHQ9 4/13/2020   Little interest or pleasure in doing things: Several days   Feeling down, depressed or hopeless: Not at all   Trouble falling asleep, staying asleep, or sleeping too much: More than half the days   Feeling tired or having little energy: More than half the days   Poor appetite or overeating: Several days   Feeling bad about yourself- or that you are a failure or have let yourself or family down Not at all   Trouble concentrating on things, such as reading the newspaper or watching television: Not at all   Moving or speaking so slowly that other people could have noticed. Or the opposite- being so fidgety or restless that you have been moving around a lot more than usual: Not at all   Thoughts that you would be better off dead or hurting yourself in some way: Not at all   If you indicated you have experienced any of the aforementioned problems, how difficult have these problems made it for you to do your work, take care of things at home or get along with other people? Not difficult at all   Total Score 6     GAD7 4/13/2020   1. Feeling nervous, anxious, or on edge? 1   2. Not being able to stop or control worrying? 2   3. Worrying too much about different things? 3   4. Trouble relaxing? 1   5. Being so restless that it is hard to sit still? 1   6. Becoming easily annoyed or irritable? 1   7. Feeling afraid as if something awful might happen? 1   8. If you checked off any problems, how difficult have these problems made it for you to do your work, take care of things at home, or get along with other people? 0   LEVI-7 Score 10         SLEEP ROUTINE AND LIFESTYLE 04/14/2020 :    Sleep Clinic New Patient 4/13/2020   What time do you go to bed on a week day? (Give a range) Between 8 -9 PM   What time do you go to bed on a day off? (Give a range) 8-9 PM   How long does it take you to fall asleep? (Give  a range) 15 mins   On average, how many times per night do you wake up? 5-6   How long does it take you to fall back into sleep? (Give a range) 20 mins - 1 hour   What time do you wake up to start your day on a week day? (Give a range) 630 am   What time do you wake up to start your day on a day off? (Give a range) 5 - 630 am   What time do you get out of bed? (Give a range) 6 - 630am   On average, how many hours do you sleep? 5-8 hours depending on the night I have   On average, how many naps do you take per day? 0   Rate your sleep quality from 0 to 5 (0-poor, 5-great). 2   Have you experienced:  Weight loss?   How much weight have you lost or gained (in lbs.) in the last year? 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  2   Have you ever had a sleep study/CPAP machine/surgery for sleep apnea? No   Have you ever had a CPAP machine for sleep apnea? No   Have you ever had surgery for sleep apnea? No       Sleep Clinic ROS  4/13/2020   Difficulty breathing through the nose?  No   Sore throat or dry mouth in the morning? No   Irregular or very fast heart beat?  No   Shortness of breath?  No   Acid reflux? Yes   Body aches and pains?  Yes   Morning headaches? Yes   Dizziness? Yes   Mood changes?  Yes   Do you exercise?  No   Do you feel like moving your legs a lot?  Yes            PREVIOUS SLEEP STUDIES:     No        Social History:  Occupation:- daytime job, requires a lot of walking outdoors.  Bed partner: +  Exercise routine: eslking  Caffeine:  Sweet  tea  Alcohol: no    DME:       PAST MEDICAL HISTORY:    Active Ambulatory Problems     Diagnosis Date Noted    S/P lap gastric neurostimulator placement  08/04/2016    Malnutrition 08/10/2016    Jejunostomy tube site pain 01/17/2017    S/P laparoscopic sleeve gastrectomy 05/11/2017    Nausea and vomiting 10/30/2019     Resolved Ambulatory Problems     Diagnosis  Date Noted    No Resolved Ambulatory Problems     No Additional Past Medical History                PAST SURGICAL HISTORY:    Past Surgical History:   Procedure Laterality Date    APPENDECTOMY       SECTION      CHOLECYSTECTOMY      COSMETIC SURGERY      ESOPHAGEAL MANOMETRY WITH MEASUREMENT OF IMPEDANCE N/A 10/30/2019    Procedure: MANOMETRY, ESOPHAGUS, WITH IMPEDANCE MEASUREMENT;  Surgeon: Patti Orozco MD;  Location: [address]);  Service: Endoscopy;  Laterality: N/A;  LATEX ALLERGY  r/o rumination  Motility Studies:  Hold Narcotics x 1 days   Hold TCA x 1 days  10/24 - pt confirmed appt-BB    ESOPHAGOGASTRODUODENOSCOPY N/A 3/22/2019    Procedure: EGD (ESOPHAGOGASTRODUODENOSCOPY)-dual lumen scope to remove intragastric suture.;  Surgeon: Robert Kumar MD;  Location: [address];  Service: General;  Laterality: N/A;    ESOPHAGOGASTRODUODENOSCOPY N/A 10/30/2019    Procedure: EGD (ESOPHAGOGASTRODUODENOSCOPY);  Surgeon: Patti Orozco MD;  Location: [address]);  Service: Endoscopy;  Laterality: N/A;  LATEX ALLERGY  EGD with EndoFlip   4th Floor, scheduled on 2nd floor due to availability  Full liquid diet 3 days and clear liquid diet x 1 day prior to procedure  Propofol only. No fentanyl or benzodiazepine during sedation. If additional sedation need    GASTRIC STIMULATOR IMPLANT SURGERY      GASTROSTOMY-JEJEUNOSTOMY TUBE CHANGE/PLACEMENT  08/10/2016    HYSTERECTOMY      laparoscopic jejunostomy tube      REPLACEMENT OF GASTRIC NEUROSTIMULATOR GENERATOR N/A 3/22/2019    Procedure: REPLACEMENT, PULSE GENERATOR, NEUROSTIMULATOR, GASTRIC-battery exchange only;  Surgeon: Robert Kumar MD;  Location: [address];  Service: General;  Laterality: N/A;    REPLACEMENT OF GASTRIC NEUROSTIMULATOR GENERATOR N/A 2019    Procedure: REPLACEMENT, PULSE GENERATOR, NEUROSTIMULATOR, GASTRIC, OPEN (battery exchange);  Surgeon: Robert Kumar MD;   Location: [facility] OR [address] [place] [address];  Service: General;  Laterality: N/A;    sleeve gastrectomy      TONSILLECTOMY           FAMILY HISTORY:                Family History   Problem Relation Age of Onset    Hypothyroidism Mother     Cirrhosis Father     No Known Problems Brother     Asthma Daughter     Sleep apnea Daughter     Celiac disease Neg Hx     Colon cancer Neg Hx     Esophageal cancer Neg Hx     Stomach cancer Neg Hx     Rectal cancer Neg Hx     Ulcerative colitis Neg Hx     Crohn's disease Neg Hx        SOCIAL HISTORY:          Tobacco:   Social History     Tobacco Use   Smoking Status Former Smoker    Packs/day: 0.00    Last attempt to quit: 2015    Years since quittin.6   Smokeless Tobacco Never Used       alcohol use:    Social History     Substance and Sexual Activity   Alcohol Use No                   ALLERGIES:    Review of patient's allergies indicates:   Allergen Reactions    Iodine and iodide containing products Swelling, Hives, Itching, Rash and Shortness Of Breath    Domperidone Itching    Latex, natural rubber Rash    Scopolamine      Patch- caused burn under patch       CURRENT MEDICATIONS:    Current Outpatient Medications   Medication Sig Dispense Refill    b complex vitamins capsule Take 1 capsule by mouth once daily.      baclofen (LIORESAL) 10 MG tablet Take 1 tablet (10 mg total) by mouth 3 (three) times daily as needed. (Patient taking differently: Take 10 mg by mouth nightly. ) 90 tablet 11    butalbital-acetaminop-caf-cod -60-30 mg Cap TK 1 C PO Q 4 TO 6 H PRN P  3    ergocalciferol (VITAMIN D2) 50,000 unit Cap Take 50,000 Units by mouth every 7 days.      gabapentin (NEURONTIN) 300 MG capsule Take 1 capsule (300 mg total) by mouth 3 (three) times daily. 15 capsule 0    HYDROcodone-acetaminophen (NORCO) 5-325 mg per tablet Take 1 tablet by mouth every 6 (six) hours as needed for Pain. 8 tablet 0    lubiprostone (AMITIZA) 24 MCG Cap Take 1 capsule (24  mcg total) by mouth 2 (two) times daily. 60 capsule 6    mirtazapine (REMERON) 30 MG tablet Take 1 tablet (30 mg total) by mouth every evening. 30 tablet 11    multivitamin capsule Take 1 capsule by mouth once daily.      NEXIUM PACKET 40 mg GrPS 40 mg before breakfast.       promethazine (PHENERGAN) 25 MG tablet Take 1 tablet (25 mg total) by mouth every 6 (six) hours as needed for Nausea. 120 tablet 3    promethazine (PHENERGAN) 6.25 mg/5 mL syrup Take 12.5 mg by mouth every 6 (six) hours as needed for Nausea.      prucalopride 2 mg Tab Take 1 tablet by mouth once daily. 30 tablet 11     No current facility-administered medications for this visit.                       REVIEW OF SYSTEMS:   Sleep related symptoms as per HPI    denies weight gain  Denies dyspnea  Denies palpitations  + occasional  acid reflux       Denies  anemia  Reports joing pain  Denies  Gait imbalance    Otherwise, a balance of 10 systems reviewed is negative.    PHYSICAL EXAM:  There were no vitals taken for this visit.  GENERAL: Normal development, well groomed.  HEENT:   HEENT:  MP II, uvula is elongated, tonsils not visualized - examined via telemedicine camera - good visibility.        Using [facility]: yes      ASSESSMENT:        1. Insomnia NEC - sleep maintenance. Multi-factorial -  Joint pain, gastroparesis, caffeine intake at night and possible  sleep disordered breathing likely play a role.  2. Sleep Apnea NEC. The patient symptomatically has  excessive daytime sleepiness, snoring, excessive daytime fatigue, interrupted sleep and nocturia  with exam findings of restricted nasal airflow, elevated body mass index and extremity edema. The patient has medical co-morbidities of chronic pain,  which can be worsened by ABEBA. This warrants further investigation for possible obstructive sleep apnea.          PLAN:    Diagnostic: HST once the lab reopens.        Following recommendations were given in the AVS:     For sleep continuity,  please continue Remeron at bedtime or 30 min earlier.  Ok to add Melatonin  Will discuss further pharmacological treatment after the sleep study.      More than 15 minutes of this 30 minutes visit was spent in counseling: and coordination of care.     during our discussion today, we talked about the etiology of  ABEBA as well as the potential ramifications of untreated sleep apnea, which could include daytime sleepiness, hypertension, heart disease and/or stroke.  We discussed potential treatment options, which could include weight loss, body positioning, continuous positive airway pressure (CPAP), or referral for surgical consideration. Meanwhile, she  is urged to avoid supine sleep, weight gain and alcoholic beverages since all of these can worsen ABEBA.     Precautions: The patient was advised to abstain from driving should he feel sleepy or drowsy.    Follow up: MD/NP  after the sleep study has been completed.     Thank you for allowing me the opportunity to participate in the care of your patient.    This visit summary will be sent to referring provider via inbasket

## 2022-05-17 ENCOUNTER — OFFICE VISIT (OUTPATIENT)
Dept: SURGERY | Facility: CLINIC | Age: 42
End: 2022-05-17
Payer: COMMERCIAL

## 2022-05-17 VITALS
HEART RATE: 80 BPM | DIASTOLIC BLOOD PRESSURE: 73 MMHG | HEIGHT: 69 IN | SYSTOLIC BLOOD PRESSURE: 113 MMHG | BODY MASS INDEX: 15.24 KG/M2 | WEIGHT: 102.88 LBS

## 2022-05-17 DIAGNOSIS — R11.2 NAUSEA AND VOMITING, INTRACTABILITY OF VOMITING NOT SPECIFIED, UNSPECIFIED VOMITING TYPE: Primary | ICD-10-CM

## 2022-05-17 PROCEDURE — 1160F RVW MEDS BY RX/DR IN RCRD: CPT | Mod: CPTII,S$GLB,, | Performed by: SURGERY

## 2022-05-17 PROCEDURE — 1159F MED LIST DOCD IN RCRD: CPT | Mod: CPTII,S$GLB,, | Performed by: SURGERY

## 2022-05-17 PROCEDURE — 3008F BODY MASS INDEX DOCD: CPT | Mod: CPTII,S$GLB,, | Performed by: SURGERY

## 2022-05-17 PROCEDURE — 1159F PR MEDICATION LIST DOCUMENTED IN MEDICAL RECORD: ICD-10-PCS | Mod: CPTII,S$GLB,, | Performed by: SURGERY

## 2022-05-17 PROCEDURE — 99999 PR PBB SHADOW E&M-EST. PATIENT-LVL IV: CPT | Mod: PBBFAC,,, | Performed by: SURGERY

## 2022-05-17 PROCEDURE — 99999 PR PBB SHADOW E&M-EST. PATIENT-LVL IV: ICD-10-PCS | Mod: PBBFAC,,, | Performed by: SURGERY

## 2022-05-17 PROCEDURE — 3074F SYST BP LT 130 MM HG: CPT | Mod: CPTII,S$GLB,, | Performed by: SURGERY

## 2022-05-17 PROCEDURE — 3078F DIAST BP <80 MM HG: CPT | Mod: CPTII,S$GLB,, | Performed by: SURGERY

## 2022-05-17 PROCEDURE — 99024 PR POST-OP FOLLOW-UP VISIT: ICD-10-PCS | Mod: S$GLB,,, | Performed by: SURGERY

## 2022-05-17 PROCEDURE — 99024 POSTOP FOLLOW-UP VISIT: CPT | Mod: S$GLB,,, | Performed by: SURGERY

## 2022-05-17 PROCEDURE — 3078F PR MOST RECENT DIASTOLIC BLOOD PRESSURE < 80 MM HG: ICD-10-PCS | Mod: CPTII,S$GLB,, | Performed by: SURGERY

## 2022-05-17 PROCEDURE — 3008F PR BODY MASS INDEX (BMI) DOCUMENTED: ICD-10-PCS | Mod: CPTII,S$GLB,, | Performed by: SURGERY

## 2022-05-17 PROCEDURE — 3074F PR MOST RECENT SYSTOLIC BLOOD PRESSURE < 130 MM HG: ICD-10-PCS | Mod: CPTII,S$GLB,, | Performed by: SURGERY

## 2022-05-17 PROCEDURE — 1160F PR REVIEW ALL MEDS BY PRESCRIBER/CLIN PHARMACIST DOCUMENTED: ICD-10-PCS | Mod: CPTII,S$GLB,, | Performed by: SURGERY

## 2022-05-17 NOTE — PROGRESS NOTES
Call attempt to parent, message left for callback. Subjective:       Patient ID: Nena Mendez is a 41 y.o. female.    Chief Complaint: No chief complaint on file.    HPI  40y/o with bmi 15.20 (gained weight), fibromyalgia and s/p gastric stimulator 10/7/16 with replacement of battery 3/22/19, 19 and 22, J tube 16 (she didn't tolerate it so she may have small bowel dysmotility also), pylorolasty 17, sleeve gastrectomy 5/10/17, replacement j tube and robotic g tube 22 all for gastroparesis.  She is tolerating the tube feeds but continues to have severe pain all the way to her back, constipation and pain at the tube sites.  She also has allergies to all the adhesives she has tried.  She has largely extremely severe epigastric pain, nausea and vomiting.  She is taking largely phenergan for symptoms at night and just tolerates the symptoms during the day, and ppi once a day (powder nexium).  She is taking gabapentin (rx'd by neurology) but only once a day as it makes her sleepy and she doesn't want to take anything that makes her sleepy during the day.  She denies dysphagia but has severe reflux.    She is getting worked up for endometriosis but her specialist doesn't think this is an issue.    Past Medical History:   Diagnosis Date    Fibromyalgia     Gastroparesis     Headache     Rumination        Past Surgical History:   Procedure Laterality Date    APPENDECTOMY      breast implants removed Bilateral      SECTION      CHOLECYSTECTOMY      COSMETIC SURGERY      ESOPHAGEAL MANOMETRY WITH MEASUREMENT OF IMPEDANCE N/A 10/30/2019    Procedure: MANOMETRY, ESOPHAGUS, WITH IMPEDANCE MEASUREMENT;  Surgeon: Patti Orozco MD;  Location: [facility] ([address] [place] [address]);  Service: Endoscopy;  Laterality: N/A;  LATEX ALLERGY  r/o rumination  Motility Studies:  Hold Narcotics x 1 days   Hold TCA x 1 days  10/24 - pt confirmed appt-BB    ESOPHAGOGASTRODUODENOSCOPY N/A 3/22/2019    Procedure: EGD (ESOPHAGOGASTRODUODENOSCOPY)-dual lumen  scope to remove intragastric suture.;  Surgeon: Robert Kumar MD;  Location: [facility] OR [address] [place] [address];  Service: General;  Laterality: N/A;    ESOPHAGOGASTRODUODENOSCOPY N/A 10/30/2019    Procedure: EGD (ESOPHAGOGASTRODUODENOSCOPY);  Surgeon: Patti Orozco MD;  Location: [facility]);  Service: Endoscopy;  Laterality: N/A;  LATEX ALLERGY  EGD with EndoFlip   4th Floor, scheduled on 2nd floor due to availability  Full liquid diet 3 days and clear liquid diet x 1 day prior to procedure  Propofol only. No fentanyl or benzodiazepine during sedation. If additional sedation need    GASTRIC STIMULATOR IMPLANT SURGERY      GASTROSTOMY-JEJEUNOSTOMY TUBE CHANGE/PLACEMENT  08/10/2016    HYSTERECTOMY      laparoscopic jejunostomy tube      removed ~ 2019    REPLACEMENT OF GASTRIC NEUROSTIMULATOR GENERATOR N/A 3/22/2019    Procedure: REPLACEMENT, PULSE GENERATOR, NEUROSTIMULATOR, GASTRIC-battery exchange only;  Surgeon: Robert Kumar MD;  Location: [address];  Service: General;  Laterality: N/A;    REPLACEMENT OF GASTRIC NEUROSTIMULATOR GENERATOR N/A 12/30/2019    Procedure: REPLACEMENT, PULSE GENERATOR, NEUROSTIMULATOR, GASTRIC, OPEN (battery exchange);  Surgeon: Robert Kumar MD;  Location: [address];  Service: General;  Laterality: N/A;    REPLACEMENT OF GASTRIC NEUROSTIMULATOR GENERATOR N/A 1/28/2022    Procedure: REPLACEMENT, PULSE GENERATOR, NEUROSTIMULATOR, GASTRIC, Open;  Surgeon: Robert Kumar MD;  Location: [address];  Service: General;  Laterality: N/A;    sleeve gastrectomy      TONSILLECTOMY         Family History   Problem Relation Age of Onset    Hypothyroidism Mother     Cirrhosis Father     No Known Problems Brother     Asthma Daughter     Sleep apnea Daughter     Irritable bowel syndrome Maternal Aunt     Diverticulitis Maternal Aunt     Celiac disease Neg Hx     Colon cancer Neg Hx     Esophageal cancer Neg Hx     Stomach cancer Neg Hx      Rectal cancer Neg Hx     Ulcerative colitis Neg Hx     Crohn's disease Neg Hx        Social History     Socioeconomic History    Marital status:     Number of children: 2   Tobacco Use    Smoking status: Former Smoker     Packs/day: 0.00     Quit date: 2015     Years since quittin.7    Smokeless tobacco: Never Used   Substance and Sexual Activity    Alcohol use: No    Drug use: No       Current Outpatient Medications   Medication Sig Dispense Refill    acetaminophen (TYLENOL) 160 mg/5 mL Liqd 20.3 mLs (649.6 mg total) by Per G Tube route every 8 (eight) hours as needed (pain). 473 mL 0    b complex vitamins capsule Take 1 capsule by mouth once daily.      bacitracin-polymyxin b (POLYSPORIN) ophthalmic ointment Place into both eyes 3 (three) times daily. Apply around tube and twist tube to get it in around the tube. 3.5 g 0    butalbital-acetaminop-caf-cod -82-30 mg Cap TK 1 C PO Q 4 TO 6 H PRN P  3    ergocalciferol (ERGOCALCIFEROL) 50,000 unit Cap Take 50,000 Units by mouth every 7 days.      gabapentin (NEURONTIN) 300 MG capsule TAKE 1 CAPSULE(300 MG) BY MOUTH THREE TIMES DAILY 90 capsule 3    lubiprostone (AMITIZA) 24 MCG Cap Take 1 capsule (24 mcg total) by mouth 2 (two) times daily. 60 capsule 6    mirtazapine (REMERON) 30 MG tablet Take 1 tablet (30 mg total) by mouth every evening. 30 tablet 11    multivitamin Liqd Take by mouth.      ondansetron (ZOFRAN-ODT) 4 MG TbDL Dissolve 2 tablets (8 mg total) by mouth every 8 (eight) hours as needed (nausea). 30 tablet 0    promethazine (PHENERGAN) 12.5 MG Tab Take 1 tablet (12.5 mg total) by mouth every 6 (six) hours as needed (nausea). 30 tablet 0    promethazine (PHENERGAN) 25 MG tablet TAKE 1 TABLET(25 MG) BY MOUTH EVERY 6 HOURS AS NEEDED FOR NAUSEA 60 tablet 6    promethazine (PHENERGAN) 6.25 mg/5 mL syrup Take 12.5 mg by mouth every 6 (six) hours as needed for Nausea.      baclofen (LIORESAL) 10 MG tablet Take 1  tablet (10 mg total) by mouth 3 (three) times daily as needed. (Patient taking differently: Take 10 mg by mouth nightly.) 90 tablet 11     No current facility-administered medications for this visit.       Review of patient's allergies indicates:   Allergen Reactions    Iodine and iodide containing products Swelling, Hives, Itching, Rash and Shortness Of Breath    Domperidone Itching    Latex, natural rubber Rash    Iodine     Scopolamine      Patch- caused burn under patch       Review of Systems      Objective:      Physical Exam      Diagnostic Results:  CT 2022 ok, tubes in place.  No mention of abscess at the g tube site.  Ugi 2020 reviewed, reflux, no hh, no achalasia,  Egd 2019 reviewed, pyloroplasty with suture, 5 cm hh, sleeve, endoflip normal  Egd 2022 reviewed, gastritis  Motility 2019 reviewed, egj outflow obstruction, rumination syndrome, high lesp with incomplete relaxation    Assessment:       1. Nausea and vomiting, intractability of vomiting not specified, unspecified vomiting type      She would like her tubes removed but is still requiring tube feeds and malnourished.  I am concerned that the tube sites won't heal and she will leak.    Plan:      Follow up with GI and stoma nurse.  Obtain ges.  I suggest weight gain of 10 pounds prior to removing tubes.

## 2022-06-03 ENCOUNTER — PATIENT MESSAGE (OUTPATIENT)
Dept: SURGERY | Facility: CLINIC | Age: 42
End: 2022-06-03
Payer: COMMERCIAL

## 2022-06-21 ENCOUNTER — PATIENT MESSAGE (OUTPATIENT)
Dept: WOUND CARE | Facility: CLINIC | Age: 42
End: 2022-06-21

## 2022-06-21 ENCOUNTER — OFFICE VISIT (OUTPATIENT)
Dept: WOUND CARE | Facility: CLINIC | Age: 42
End: 2022-06-21
Payer: COMMERCIAL

## 2022-06-21 DIAGNOSIS — K94.19 JEJUNOSTOMY TUBE SITE PAIN: Primary | ICD-10-CM

## 2022-06-21 PROCEDURE — 1159F MED LIST DOCD IN RCRD: CPT | Mod: CPTII,95,, | Performed by: CLINICAL NURSE SPECIALIST

## 2022-06-21 PROCEDURE — 99499 NO LOS: ICD-10-PCS | Mod: 95,,, | Performed by: CLINICAL NURSE SPECIALIST

## 2022-06-21 PROCEDURE — 1159F PR MEDICATION LIST DOCUMENTED IN MEDICAL RECORD: ICD-10-PCS | Mod: CPTII,95,, | Performed by: CLINICAL NURSE SPECIALIST

## 2022-06-21 PROCEDURE — 99499 UNLISTED E&M SERVICE: CPT | Mod: 95,,, | Performed by: CLINICAL NURSE SPECIALIST

## 2022-06-21 PROCEDURE — 1160F PR REVIEW ALL MEDS BY PRESCRIBER/CLIN PHARMACIST DOCUMENTED: ICD-10-PCS | Mod: CPTII,95,, | Performed by: CLINICAL NURSE SPECIALIST

## 2022-06-21 PROCEDURE — 1160F RVW MEDS BY RX/DR IN RCRD: CPT | Mod: CPTII,95,, | Performed by: CLINICAL NURSE SPECIALIST

## 2022-06-30 ENCOUNTER — PATIENT MESSAGE (OUTPATIENT)
Dept: SURGERY | Facility: CLINIC | Age: 42
End: 2022-06-30
Payer: COMMERCIAL

## 2022-07-07 ENCOUNTER — OFFICE VISIT (OUTPATIENT)
Dept: SURGERY | Facility: CLINIC | Age: 42
End: 2022-07-07
Payer: COMMERCIAL

## 2022-07-07 VITALS
WEIGHT: 106.56 LBS | HEIGHT: 69 IN | SYSTOLIC BLOOD PRESSURE: 126 MMHG | BODY MASS INDEX: 15.78 KG/M2 | HEART RATE: 66 BPM | DIASTOLIC BLOOD PRESSURE: 77 MMHG

## 2022-07-07 DIAGNOSIS — K31.84 GASTROPARESIS: Primary | ICD-10-CM

## 2022-07-07 PROCEDURE — 3008F PR BODY MASS INDEX (BMI) DOCUMENTED: ICD-10-PCS | Mod: CPTII,S$GLB,, | Performed by: SURGERY

## 2022-07-07 PROCEDURE — 1160F PR REVIEW ALL MEDS BY PRESCRIBER/CLIN PHARMACIST DOCUMENTED: ICD-10-PCS | Mod: CPTII,S$GLB,, | Performed by: SURGERY

## 2022-07-07 PROCEDURE — 3078F PR MOST RECENT DIASTOLIC BLOOD PRESSURE < 80 MM HG: ICD-10-PCS | Mod: CPTII,S$GLB,, | Performed by: SURGERY

## 2022-07-07 PROCEDURE — 99212 OFFICE O/P EST SF 10 MIN: CPT | Mod: S$GLB,,, | Performed by: SURGERY

## 2022-07-07 PROCEDURE — 99999 PR PBB SHADOW E&M-EST. PATIENT-LVL IV: CPT | Mod: PBBFAC,,, | Performed by: SURGERY

## 2022-07-07 PROCEDURE — 99999 PR PBB SHADOW E&M-EST. PATIENT-LVL IV: ICD-10-PCS | Mod: PBBFAC,,, | Performed by: SURGERY

## 2022-07-07 PROCEDURE — 3078F DIAST BP <80 MM HG: CPT | Mod: CPTII,S$GLB,, | Performed by: SURGERY

## 2022-07-07 PROCEDURE — 3008F BODY MASS INDEX DOCD: CPT | Mod: CPTII,S$GLB,, | Performed by: SURGERY

## 2022-07-07 PROCEDURE — 1159F MED LIST DOCD IN RCRD: CPT | Mod: CPTII,S$GLB,, | Performed by: SURGERY

## 2022-07-07 PROCEDURE — 1160F RVW MEDS BY RX/DR IN RCRD: CPT | Mod: CPTII,S$GLB,, | Performed by: SURGERY

## 2022-07-07 PROCEDURE — 3074F SYST BP LT 130 MM HG: CPT | Mod: CPTII,S$GLB,, | Performed by: SURGERY

## 2022-07-07 PROCEDURE — 99212 PR OFFICE/OUTPT VISIT, EST, LEVL II, 10-19 MIN: ICD-10-PCS | Mod: S$GLB,,, | Performed by: SURGERY

## 2022-07-07 PROCEDURE — 1159F PR MEDICATION LIST DOCUMENTED IN MEDICAL RECORD: ICD-10-PCS | Mod: CPTII,S$GLB,, | Performed by: SURGERY

## 2022-07-07 PROCEDURE — 3074F PR MOST RECENT SYSTOLIC BLOOD PRESSURE < 130 MM HG: ICD-10-PCS | Mod: CPTII,S$GLB,, | Performed by: SURGERY

## 2022-07-07 NOTE — PROGRESS NOTES
I have seen the patient, reviewed the Student's history and physical, assessment and plan. I have personally interviewed and examined the patient at bedside and: agree with the findings.     Still with severe symptoms but has gained 4 pounds with eating oraly.    Gastric Stimulator Interrogation: impedence 427 Voltage 8 Current 18.7 Pulse Width 330 Rate 40 Cycle on 2 Cycle off 3    Will remove tubes at her request.  Obtain recent labs, cxr and ct.  Rtc prn.

## 2022-07-07 NOTE — PROGRESS NOTES
[facility]  General Surgery  History & Physical      Subjective:     Chief Complaint: Gastroparesis     History of Present Illness:  Patient is a 42 y.o. female with Hx of gastroparesis, headaches, fibromyalgia s/p gastric stimulator 10/7/16 with replacement of batter 3/22/19, 19 and 22, J tube 16 (she didn't tolerate it so she may have small bowel dysmotility also), pylorolasty 17, sleeve gastrectomy 5/10/17, replacement j tube and robotic g tube 22 all for gastroparesis. States that she is not using her tubes for tube feeds, the last time they were used was about a week ago. She has been eating a normal diet but has had vomiting following every meal. Reports abdominal pain at the tube sites, reports the pain is constant and radiated directly to her back behind the tube site. Changing the dressing every 2 days. Tries to drain the J tube daily but states that since last night nothing has come out. Was seen in the ED on 7/3 for a fever. States that prior to being in the ED, she had a vomiting episode and felt like she aspirated at the time. Has had an intermittent cough since. Had a CXR in the ED she states was normal. Tmax 102.8. Taking liquid tylenol and motrin and alternating since being seen. States her WBC was low and did a CT abdomen states that her tubes were in place. Taking phenergan nightly.      Works for her school district     Post Gastric Pacemaker Symptom Monitor  Severity/Frequency  Vomiting- 3/4  Nausea- 3/4  Early Satiety- 3/4  Bloating- 2/3   Postprandial fullness- 3/4  Epigastric pain- 3/4  Epigastric burning- 3/3     Past Medical History:   Diagnosis Date    Fibromyalgia     Gastroparesis     Headache     Rumination       Past Surgical History:   Procedure Laterality Date    APPENDECTOMY      breast implants removed Bilateral 2020     SECTION      CHOLECYSTECTOMY      COSMETIC SURGERY      ESOPHAGEAL MANOMETRY WITH MEASUREMENT OF  IMPEDANCE N/A 10/30/2019    Procedure: MANOMETRY, ESOPHAGUS, WITH IMPEDANCE MEASUREMENT;  Surgeon: Patti Orozco MD;  Location: [facility] ENDO ([address] [place] [address]);  Service: Endoscopy;  Laterality: N/A;  LATEX ALLERGY  r/o rumination  Motility Studies:  Hold Narcotics x 1 days   Hold TCA x 1 days  10/24 - pt confirmed appt-BB    ESOPHAGOGASTRODUODENOSCOPY N/A 3/22/2019    Procedure: EGD (ESOPHAGOGASTRODUODENOSCOPY)-dual lumen scope to remove intragastric suture.;  Surgeon: Robert Kumar MD;  Location: [facility] OR [address] [place] [address];  Service: General;  Laterality: N/A;    ESOPHAGOGASTRODUODENOSCOPY N/A 10/30/2019    Procedure: EGD (ESOPHAGOGASTRODUODENOSCOPY);  Surgeon: Patti Orozco MD;  Location: [facility] ENDO ([address] [place] [address]);  Service: Endoscopy;  Laterality: N/A;  LATEX ALLERGY  EGD with EndoFlip   4th Floor, scheduled on 2nd floor due to availability  Full liquid diet 3 days and clear liquid diet x 1 day prior to procedure  Propofol only. No fentanyl or benzodiazepine during sedation. If additional sedation need    GASTRIC STIMULATOR IMPLANT SURGERY      GASTROSTOMY-JEJEUNOSTOMY TUBE CHANGE/PLACEMENT  08/10/2016    HYSTERECTOMY      laparoscopic jejunostomy tube      removed ~ 2019    REPLACEMENT OF GASTRIC NEUROSTIMULATOR GENERATOR N/A 3/22/2019    Procedure: REPLACEMENT, PULSE GENERATOR, NEUROSTIMULATOR, GASTRIC-battery exchange only;  Surgeon: Robert Kumar MD;  Location: [address];  Service: General;  Laterality: N/A;    REPLACEMENT OF GASTRIC NEUROSTIMULATOR GENERATOR N/A 12/30/2019    Procedure: REPLACEMENT, PULSE GENERATOR, NEUROSTIMULATOR, GASTRIC, OPEN (battery exchange);  Surgeon: Robert Kumar MD;  Location: [address];  Service: General;  Laterality: N/A;    REPLACEMENT OF GASTRIC NEUROSTIMULATOR GENERATOR N/A 1/28/2022    Procedure: REPLACEMENT, PULSE GENERATOR, NEUROSTIMULATOR, GASTRIC, Open;  Surgeon: Robert Kumar MD;  Location: [facility] OR [address] [place] [address];  Service: General;   Laterality: N/A;    sleeve gastrectomy      TONSILLECTOMY        Family History   Problem Relation Age of Onset    Hypothyroidism Mother     Cirrhosis Father     No Known Problems Brother     Asthma Daughter     Sleep apnea Daughter     Irritable bowel syndrome Maternal Aunt     Diverticulitis Maternal Aunt     Celiac disease Neg Hx     Colon cancer Neg Hx     Esophageal cancer Neg Hx     Stomach cancer Neg Hx     Rectal cancer Neg Hx     Ulcerative colitis Neg Hx     Crohn's disease Neg Hx       Social History     Socioeconomic History    Marital status:     Number of children: 2   Tobacco Use    Smoking status: Former Smoker     Packs/day: 0.00     Quit date: 2015     Years since quittin.8    Smokeless tobacco: Never Used   Substance and Sexual Activity    Alcohol use: No    Drug use: No        Review of systems: see HPI    Current Outpatient Medications on File Prior to Visit   Medication Sig    acetaminophen (TYLENOL) 160 mg/5 mL Liqd 20.3 mLs (649.6 mg total) by Per G Tube route every 8 (eight) hours as needed (pain).    b complex vitamins capsule Take 1 capsule by mouth once daily.    bacitracin-polymyxin b (POLYSPORIN) ophthalmic ointment Place into both eyes 3 (three) times daily. Apply around tube and twist tube to get it in around the tube.    butalbital-acetaminop-caf-cod -10-30 mg Cap TK 1 C PO Q 4 TO 6 H PRN P    ergocalciferol (ERGOCALCIFEROL) 50,000 unit Cap Take 50,000 Units by mouth every 7 days.    gabapentin (NEURONTIN) 300 MG capsule TAKE 1 CAPSULE(300 MG) BY MOUTH THREE TIMES DAILY    lubiprostone (AMITIZA) 24 MCG Cap Take 1 capsule (24 mcg total) by mouth 2 (two) times daily.    mirtazapine (REMERON) 30 MG tablet Take 1 tablet (30 mg total) by mouth every evening.    multivitamin Liqd Take by mouth.    ondansetron (ZOFRAN-ODT) 4 MG TbDL Dissolve 2 tablets (8 mg total) by mouth every 8 (eight) hours as needed (nausea).    promethazine  (PHENERGAN) 12.5 MG Tab Take 1 tablet (12.5 mg total) by mouth every 6 (six) hours as needed (nausea).    promethazine (PHENERGAN) 25 MG tablet TAKE 1 TABLET(25 MG) BY MOUTH EVERY 6 HOURS AS NEEDED FOR NAUSEA    promethazine (PHENERGAN) 6.25 mg/5 mL syrup Take 12.5 mg by mouth every 6 (six) hours as needed for Nausea.    baclofen (LIORESAL) 10 MG tablet Take 1 tablet (10 mg total) by mouth 3 (three) times daily as needed. (Patient taking differently: Take 10 mg by mouth nightly.)     No current facility-administered medications on file prior to visit.       Review of patient's allergies indicates:   Allergen Reactions    Iodine and iodide containing products Swelling, Hives, Itching, Rash and Shortness Of Breath    Domperidone Itching    Latex, natural rubber Rash    Iodine     Scopolamine      Patch- caused burn under patch       Review of Systems   Constitutional: Positive for fever. Negative for chills.   Respiratory: Negative for cough and shortness of breath.    Cardiovascular: Negative for chest pain and palpitations.   Gastrointestinal: Positive for constipation, nausea and vomiting. Negative for diarrhea.   Hematological: Does not bruise/bleed easily.          Objective:     Vital Signs (Most Recent):  Pulse: 66 (07/07/22 1122)  BP: 126/77 (07/07/22 1122)     Weight: 48.4 kg (106 lb 9.5 oz)  Body mass index is 15.74 kg/m².    Physical Exam  Vitals and nursing note reviewed.   Constitutional:       Appearance: Normal appearance.   Cardiovascular:      Rate and Rhythm: Normal rate.   Pulmonary:      Effort: Pulmonary effort is normal.   Abdominal:      General: Abdomen is flat. There is no distension.      Palpations: Abdomen is soft. There is no mass.      Tenderness: There is abdominal tenderness. There is no guarding.      Hernia: No hernia is present.      Comments: J tube and G tube in place without surrounding erythema or drainage. Mild tenderness    Skin:     General: Skin is warm.    Neurological:      General: No focal deficit present.      Mental Status: She is alert and oriented to person, place, and time.   Psychiatric:         Mood and Affect: Mood normal.         Behavior: Behavior normal.          Labs:  Reviewed     Imaging:  Reviewed     Pathology:  N/a     Assessment/Plan:   42 y.o. female with Hx of gastroparesis, headaches, fibromyalgia s/p gastric stimulator 10/7/16 with replacement of batter 3/22/19, 12/30/19 and 1/28/22, J tube 12/16/16 (she didn't tolerate it so she may have small bowel dysmotility also), pylorolasty 1/27/17, sleeve gastrectomy 5/10/17, replacement j tube and robotic g tube 2/21/22 all for gastroparesis.     - J and G tube removed in clinic and dressings placed.   - Gastric stimulator checked with settings at impedence 427 Voltage 8 Current 18.7 Pulse Width 330 Rate 40 Cycle on 2 Cycle off 3.  - Will obtain recent labs, CXR and CT from previous ER visit  - Patient will follow up as needed       Cynthia VALENTINE  [facility]

## 2022-07-14 ENCOUNTER — TELEPHONE (OUTPATIENT)
Dept: SURGERY | Facility: CLINIC | Age: 42
End: 2022-07-14
Payer: COMMERCIAL

## 2022-08-08 ENCOUNTER — OFFICE VISIT (OUTPATIENT)
Dept: INFECTIOUS DISEASES | Facility: CLINIC | Age: 42
End: 2022-08-08
Payer: COMMERCIAL

## 2022-08-08 DIAGNOSIS — Z92.89: ICD-10-CM

## 2022-08-08 DIAGNOSIS — R50.9 FEVER, UNSPECIFIED FEVER CAUSE: ICD-10-CM

## 2022-08-08 DIAGNOSIS — R78.81 POSITIVE BLOOD CULTURE: Primary | ICD-10-CM

## 2022-08-08 PROCEDURE — 99205 OFFICE O/P NEW HI 60 MIN: CPT | Mod: 95,,, | Performed by: REGISTERED NURSE

## 2022-08-08 PROCEDURE — 99205 PR OFFICE/OUTPT VISIT, NEW, LEVL V, 60-74 MIN: ICD-10-PCS | Mod: 95,,, | Performed by: REGISTERED NURSE

## 2022-08-08 NOTE — PROGRESS NOTES
The patient location is: [place], [place]  The chief complaint leading to consultation is: + blood culture at outside ER.     Visit type: audiovisual        Subjective:       Patient ID: Nena Mendez is a 42 y.o. female.    Chief Complaint: + blood culture at outside ER.     Ms Mendez is a 41 yo female who presents to clinic virtually as a referral from Dr. Kumar with general surgery for + blood cultures at [facility]. Ms Mendez has a PMH of gastroparesis, headaches, fibromyalgia, s/p replacement of J tube and robotic g tube 2/21/22, gastric stimulator 10/7/16 with replacement of battery 3/22/19, 12/30/19, and 1/28/22, and pylroplasty 1/27/17, and sleeve gastrectomy 5/10/17. Pt recently had her G/J tube removed per Dr. Kumar on 7/7/22 per pt's request. Per chart review, it appears she had not been using the G/J tubes, and eating her meals primary by mouth. Pt states that she currently only eats one meal a day, and states she usually vomits afterwards.     She was recently seen in [facility] for abdominal pain. Blood cultures drawn on 7/4 were + for CONS in 1/4 bottles as noted in notes provided from outside health center (see media tab). CBC/CMP was unremarkable, no leukocytosis noted. Pt was given a 2 week course of macrobid in which pt states she took, but vomited after taking. Pt reports occasional fevers, tmax 100.5 in the last month. 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 States that following these days she becomes dehydrated and then develops low grade fevers. States she treats with liquid tylenol/ibuprofen. Denies body aches, chills. Pt reports chest congestion with chronic cough. CXR was obtained at [facility] on 7/4 with no acute abnormalities. No pleural infusions, infiltrates, or pneumothorax. Pt reports chronic abdominal pain, she is followed by Dr. Jamil with GI. During her ER visit at [facility] on 7/4, which  was unremarkable, no fluid collection noted. See media tab. Reports chronic alternating diarrhea, constipation, as well as nausea and vomiting, related to her gastroparesis. Denies recent ECHO. Denies dental problems, but states she has frequent dental work d/t her chronic vitamin deficiency. Reports a history of a chest port infection, in which she required IV abx and port removal. Unable to state when this occurred, what organism, therapy, and duration of therapy.       HPI  Review of Systems   Constitutional: Positive for appetite change (vomits daily) and fever (intermittent fever). Negative for chills, diaphoresis and fatigue.   HENT: Positive for dental problem (getting dental work 2-3 months d/t vitamin def). Negative for nasal congestion, ear pain, mouth sores, postnasal drip, rhinorrhea, sore throat and trouble swallowing (swallow study in [facility] - rumination syndrome)    Respiratory: Negative for chest tightness.    Cardiovascular: Negative for chest pain, palpitations and leg swelling.   Gastrointestinal: Positive for abdominal pain, constipation, diarrhea, nausea and vomiting. Negative for abdominal distention and blood in stool.   Genitourinary: Negative for difficulty urinating and dysuria.   Musculoskeletal: Positive for arthralgias (fibromylgia ) and back pain (left flank - chronic 2 years). Negative for myalgias.     Physical exam - [facility] d/t audiovisual appt.       Assessment:            Visit Diagnoses     Positive blood culture    -  Primary    Relevant Orders    CBC Auto Differential    Comprehensive Metabolic Panel    CULTURE, BLOOD    CULTURE, BLOOD    Echo    History of culture, blood        Relevant Orders    CBC Auto Differential    Comprehensive Metabolic Panel    CULTURE, BLOOD    CULTURE, BLOOD    Echo               Plan:       Recommendations:  - Repeat blood culture x2 - orders sent to [facility]  - CBC/CMP -orders sent to [facility]  - Recommend quant gold TB screen, HIV, RPR, Hep B, Hep C.   -  2d ECHO - faxed orders to [facility] with [employer]- They will call to schedule appt with pt.   - FU with GI regarding ongoing abdominal pain, N/V/D/C  - RTC PRN, notify if fevers occur more frequently.   - Discussed plan with staff ID MD, Dr. Pittman.           Face to Face time with patient: 30  60 minutes of total time spent on the encounter, which includes face to face time and non-face to face time preparing to see the patient (eg, review of tests), Obtaining and/or reviewing separately obtained history, Documenting clinical information in the electronic or other health record, Independently interpreting results (not separately reported) and communicating results to the patient/family/caregiver, or Care coordination (not separately reported).     Each patient to whom he or she provides medical services by telemedicine is:  (1) informed of the relationship between the physician and patient and the respective role of any other health care provider with respect to management of the patient; and (2) notified that he or she may decline to receive medical services by telemedicine and may withdraw from such care at any time.

## 2022-08-09 ENCOUNTER — PATIENT MESSAGE (OUTPATIENT)
Dept: INFECTIOUS DISEASES | Facility: CLINIC | Age: 42
End: 2022-08-09
Payer: COMMERCIAL

## 2022-08-12 ENCOUNTER — TELEPHONE (OUTPATIENT)
Dept: INFECTIOUS DISEASES | Facility: CLINIC | Age: 42
End: 2022-08-12
Payer: COMMERCIAL

## 2022-08-12 ENCOUNTER — PATIENT MESSAGE (OUTPATIENT)
Dept: INFECTIOUS DISEASES | Facility: CLINIC | Age: 42
End: 2022-08-12
Payer: COMMERCIAL

## 2022-08-12 DIAGNOSIS — R50.9 FEVER, UNSPECIFIED FEVER CAUSE: ICD-10-CM

## 2022-08-12 DIAGNOSIS — Z92.89: ICD-10-CM

## 2022-08-12 DIAGNOSIS — R78.81 POSITIVE BLOOD CULTURE: Primary | ICD-10-CM

## 2022-08-12 NOTE — TELEPHONE ENCOUNTER
Spoke with patient and informed her that her lab orders have been sent to [facility] in [place], [place]. Also informed patient to bring ID and Insurance card, and gave patient the address. Patient understood.

## 2022-08-15 ENCOUNTER — PATIENT MESSAGE (OUTPATIENT)
Dept: INFECTIOUS DISEASES | Facility: CLINIC | Age: 42
End: 2022-08-15
Payer: COMMERCIAL

## 2022-08-15 ENCOUNTER — TELEPHONE (OUTPATIENT)
Dept: INFECTIOUS DISEASES | Facility: HOSPITAL | Age: 42
End: 2022-08-15
Payer: COMMERCIAL

## 2022-08-15 NOTE — TELEPHONE ENCOUNTER
Partial labs returned and reviewed. CBC without urgent abnormalities, slightly neutropenic and anemic, notified pt to follow up with primary care regarding. CMP WNL. Hep B surface Ag and Hep b surface Ab negative, pending hep b core antibody. Hep c antibody negative. Syphilis non reactive. HIV nonreactive.     Pending: hep b core antibody, blood cultures, quant gold TB, and ECHO. Will follow.     Questions encouraged and answered. Verbalized understanding.

## 2022-08-22 ENCOUNTER — PATIENT MESSAGE (OUTPATIENT)
Dept: INFECTIOUS DISEASES | Facility: CLINIC | Age: 42
End: 2022-08-22
Payer: COMMERCIAL

## 2022-08-23 ENCOUNTER — TELEPHONE (OUTPATIENT)
Dept: INFECTIOUS DISEASES | Facility: HOSPITAL | Age: 42
End: 2022-08-23
Payer: COMMERCIAL

## 2022-08-23 NOTE — TELEPHONE ENCOUNTER
ECHO reviewed, pt uploaded report to media tab. No vegetations noted. Reviewed with pt via patient portal. Questions allowed. Still awaiting hep b core antibody, blood cultures, quant gold TB. Will follow.

## 2022-08-23 NOTE — TELEPHONE ENCOUNTER
Updated labs received. Quant gold negative. Blood cultures negative x2 sites x 5 days. Hep B surface antigen non reactive. Hep B surface antibody negative. Hep C antibody nonreactive. Syphilis nonreactive. HIV 1/2 Ag and Abs 4th gen nonreactive. Echo reviewed and no infectious concerns. Pending hep b core antibody. Pt called to update. Encouraged to follow up with GI and primary care. Questions encouraged and answered. Verbalized understanding.

## 2022-09-28 ENCOUNTER — PATIENT MESSAGE (OUTPATIENT)
Dept: SURGERY | Facility: CLINIC | Age: 42
End: 2022-09-28
Payer: COMMERCIAL

## 2022-10-25 ENCOUNTER — OFFICE VISIT (OUTPATIENT)
Dept: SURGERY | Facility: CLINIC | Age: 42
End: 2022-10-25
Payer: COMMERCIAL

## 2022-10-25 VITALS
DIASTOLIC BLOOD PRESSURE: 87 MMHG | HEART RATE: 78 BPM | BODY MASS INDEX: 15.24 KG/M2 | HEIGHT: 69 IN | SYSTOLIC BLOOD PRESSURE: 128 MMHG | WEIGHT: 102.88 LBS

## 2022-10-25 DIAGNOSIS — R11.2 NAUSEA AND VOMITING, UNSPECIFIED VOMITING TYPE: Primary | ICD-10-CM

## 2022-10-25 PROCEDURE — 1159F MED LIST DOCD IN RCRD: CPT | Mod: CPTII,S$GLB,, | Performed by: SURGERY

## 2022-10-25 PROCEDURE — 95981 IO ANAL GAST N-STIM SUBSQ: CPT | Mod: S$GLB,,, | Performed by: SURGERY

## 2022-10-25 PROCEDURE — 3074F SYST BP LT 130 MM HG: CPT | Mod: CPTII,S$GLB,, | Performed by: SURGERY

## 2022-10-25 PROCEDURE — 1160F PR REVIEW ALL MEDS BY PRESCRIBER/CLIN PHARMACIST DOCUMENTED: ICD-10-PCS | Mod: CPTII,S$GLB,, | Performed by: SURGERY

## 2022-10-25 PROCEDURE — 99214 OFFICE O/P EST MOD 30 MIN: CPT | Mod: S$GLB,,, | Performed by: SURGERY

## 2022-10-25 PROCEDURE — 99999 PR PBB SHADOW E&M-EST. PATIENT-LVL IV: ICD-10-PCS | Mod: PBBFAC,,, | Performed by: SURGERY

## 2022-10-25 PROCEDURE — 99999 PR PBB SHADOW E&M-EST. PATIENT-LVL IV: CPT | Mod: PBBFAC,,, | Performed by: SURGERY

## 2022-10-25 PROCEDURE — 99214 PR OFFICE/OUTPT VISIT, EST, LEVL IV, 30-39 MIN: ICD-10-PCS | Mod: S$GLB,,, | Performed by: SURGERY

## 2022-10-25 PROCEDURE — 3074F PR MOST RECENT SYSTOLIC BLOOD PRESSURE < 130 MM HG: ICD-10-PCS | Mod: CPTII,S$GLB,, | Performed by: SURGERY

## 2022-10-25 PROCEDURE — 3079F DIAST BP 80-89 MM HG: CPT | Mod: CPTII,S$GLB,, | Performed by: SURGERY

## 2022-10-25 PROCEDURE — 3079F PR MOST RECENT DIASTOLIC BLOOD PRESSURE 80-89 MM HG: ICD-10-PCS | Mod: CPTII,S$GLB,, | Performed by: SURGERY

## 2022-10-25 PROCEDURE — 95981 PR ELEC ALYS NSTIM GEN GASTRIC SUBSEQUENT W/O REPROG: ICD-10-PCS | Mod: S$GLB,,, | Performed by: SURGERY

## 2022-10-25 PROCEDURE — 1160F RVW MEDS BY RX/DR IN RCRD: CPT | Mod: CPTII,S$GLB,, | Performed by: SURGERY

## 2022-10-25 PROCEDURE — 1159F PR MEDICATION LIST DOCUMENTED IN MEDICAL RECORD: ICD-10-PCS | Mod: CPTII,S$GLB,, | Performed by: SURGERY

## 2022-10-25 RX ORDER — ALPRAZOLAM 0.5 MG/1
0.5 TABLET ORAL DAILY PRN
COMMUNITY
Start: 2022-05-17

## 2022-10-25 RX ORDER — TRAMADOL HYDROCHLORIDE 50 MG/1
50 TABLET ORAL EVERY 6 HOURS PRN
COMMUNITY
Start: 2022-05-27 | End: 2023-02-23

## 2022-10-25 RX ORDER — NITROFURANTOIN 25; 75 MG/1; MG/1
100 CAPSULE ORAL EVERY 12 HOURS
COMMUNITY
Start: 2022-07-12

## 2022-10-25 RX ORDER — SUCRALFATE 1 G/10ML
1 SUSPENSION ORAL 4 TIMES DAILY
COMMUNITY
Start: 2022-07-15

## 2022-10-25 RX ORDER — CIPROFLOXACIN 500 MG/1
500 TABLET ORAL 2 TIMES DAILY
COMMUNITY
Start: 2022-05-27 | End: 2023-02-23

## 2022-10-25 RX ORDER — BUTALBITAL, ACETAMINOPHEN, CAFFEINE AND CODEINE PHOSPHATE 300; 50; 40; 30 MG/1; MG/1; MG/1; MG/1
1 CAPSULE ORAL
COMMUNITY
Start: 2022-09-14

## 2022-10-25 NOTE — PROGRESS NOTES
43y/o with bmi 15.19 (weight stable from last visit), fibromyalgia and s/p gastric stimulator 10/7/16 with replacement of battery 3/22/19, 12/30/19 and 1/28/22, J tube 12/16/16 (she didn't tolerate it so she may have small bowel dysmotility also), pylorolasty 1/27/17, sleeve gastrectomy 5/10/17, replacement j tube and robotic g tube 2/21/22 (removed later) all for gastroparesis.  Her child is in the hospital here today with possible seizures, low blood pressure and or danna.  She has extremely severe nausea, vomiting and epigastric pain.  She is also having a migraine today.  She says she has to force herself to eat and is losing weight.  She is taking phenergan and baclofen.    Still with severe symptoms but has gained 4 pounds with eating oraly.     Gastric Stimulator Interrogation: impedence 427 Voltage 8 Current 18.7 Pulse Width 330 Rate 40 Cycle on 2 Cycle off 3     shows multiple butalbitol and tramadol rx this year.     Diagnostic Results:  CT 2022 ok, tubes in place.  No mention of abscess at the g tube site.  Ugi 2020 reviewed, reflux, no hh, no achalasia,  Egd 2019 reviewed, pyloroplasty with suture, 5 cm hh, sleeve, endoflip normal  Egd 2022 reviewed, gastritis  Motility 2019 reviewed, egj outflow obstruction, rumination syndrome, high lesp with incomplete relaxation    Obtain ges.  Follow up prn.

## 2022-11-18 ENCOUNTER — PATIENT MESSAGE (OUTPATIENT)
Dept: INFECTIOUS DISEASES | Facility: CLINIC | Age: 42
End: 2022-11-18
Payer: COMMERCIAL

## 2022-11-18 ENCOUNTER — PATIENT MESSAGE (OUTPATIENT)
Dept: SURGERY | Facility: CLINIC | Age: 42
End: 2022-11-18
Payer: COMMERCIAL

## 2022-11-28 ENCOUNTER — PATIENT MESSAGE (OUTPATIENT)
Dept: SURGERY | Facility: CLINIC | Age: 42
End: 2022-11-28
Payer: COMMERCIAL

## 2023-01-16 ENCOUNTER — PATIENT MESSAGE (OUTPATIENT)
Dept: SURGERY | Facility: CLINIC | Age: 43
End: 2023-01-16
Payer: COMMERCIAL

## 2023-01-21 ENCOUNTER — OFFICE VISIT (OUTPATIENT)
Dept: SURGERY | Facility: CLINIC | Age: 43
End: 2023-01-21
Payer: COMMERCIAL

## 2023-01-21 VITALS
DIASTOLIC BLOOD PRESSURE: 82 MMHG | SYSTOLIC BLOOD PRESSURE: 128 MMHG | HEIGHT: 69 IN | WEIGHT: 102 LBS | BODY MASS INDEX: 15.11 KG/M2 | HEART RATE: 74 BPM

## 2023-01-21 DIAGNOSIS — Z09 POSTOP CHECK: ICD-10-CM

## 2023-01-21 DIAGNOSIS — Z45.2 ENCOUNTER FOR INSERTION OF VENOUS ACCESS PORT: ICD-10-CM

## 2023-01-21 DIAGNOSIS — R11.2 NAUSEA AND VOMITING, UNSPECIFIED VOMITING TYPE: Primary | ICD-10-CM

## 2023-01-21 PROCEDURE — 3074F PR MOST RECENT SYSTOLIC BLOOD PRESSURE < 130 MM HG: ICD-10-PCS | Mod: CPTII,S$GLB,, | Performed by: SURGERY

## 2023-01-21 PROCEDURE — 1159F PR MEDICATION LIST DOCUMENTED IN MEDICAL RECORD: ICD-10-PCS | Mod: CPTII,S$GLB,, | Performed by: SURGERY

## 2023-01-21 PROCEDURE — 3074F SYST BP LT 130 MM HG: CPT | Mod: CPTII,S$GLB,, | Performed by: SURGERY

## 2023-01-21 PROCEDURE — 95981 IO ANAL GAST N-STIM SUBSQ: CPT | Mod: S$GLB,,, | Performed by: SURGERY

## 2023-01-21 PROCEDURE — 99214 OFFICE O/P EST MOD 30 MIN: CPT | Mod: S$GLB,,, | Performed by: SURGERY

## 2023-01-21 PROCEDURE — 99214 PR OFFICE/OUTPT VISIT, EST, LEVL IV, 30-39 MIN: ICD-10-PCS | Mod: S$GLB,,, | Performed by: SURGERY

## 2023-01-21 PROCEDURE — 99999 PR PBB SHADOW E&M-EST. PATIENT-LVL IV: CPT | Mod: PBBFAC,,, | Performed by: SURGERY

## 2023-01-21 PROCEDURE — 3079F DIAST BP 80-89 MM HG: CPT | Mod: CPTII,S$GLB,, | Performed by: SURGERY

## 2023-01-21 PROCEDURE — 3008F PR BODY MASS INDEX (BMI) DOCUMENTED: ICD-10-PCS | Mod: CPTII,S$GLB,, | Performed by: SURGERY

## 2023-01-21 PROCEDURE — 3008F BODY MASS INDEX DOCD: CPT | Mod: CPTII,S$GLB,, | Performed by: SURGERY

## 2023-01-21 PROCEDURE — 1160F PR REVIEW ALL MEDS BY PRESCRIBER/CLIN PHARMACIST DOCUMENTED: ICD-10-PCS | Mod: CPTII,S$GLB,, | Performed by: SURGERY

## 2023-01-21 PROCEDURE — 1160F RVW MEDS BY RX/DR IN RCRD: CPT | Mod: CPTII,S$GLB,, | Performed by: SURGERY

## 2023-01-21 PROCEDURE — 1159F MED LIST DOCD IN RCRD: CPT | Mod: CPTII,S$GLB,, | Performed by: SURGERY

## 2023-01-21 PROCEDURE — 95981 PR ELEC ALYS NSTIM GEN GASTRIC SUBSEQUENT W/O REPROG: ICD-10-PCS | Mod: S$GLB,,, | Performed by: SURGERY

## 2023-01-21 PROCEDURE — 99999 PR PBB SHADOW E&M-EST. PATIENT-LVL IV: ICD-10-PCS | Mod: PBBFAC,,, | Performed by: SURGERY

## 2023-01-21 PROCEDURE — 3079F PR MOST RECENT DIASTOLIC BLOOD PRESSURE 80-89 MM HG: ICD-10-PCS | Mod: CPTII,S$GLB,, | Performed by: SURGERY

## 2023-01-21 RX ORDER — KETOROLAC TROMETHAMINE 10 MG/1
10 TABLET, FILM COATED ORAL EVERY 6 HOURS PRN
Status: ON HOLD | COMMUNITY
Start: 2022-11-21 | End: 2023-02-03 | Stop reason: SDUPTHER

## 2023-01-21 NOTE — PROGRESS NOTES
41y/o with bmi 15.19 (weight stable from last visit), fibromyalgia and s/p gastric stimulator 10/7/16 with replacement of battery 3/22/19, 12/30/19 and 1/28/22, J tube 12/16/16 (she didn't tolerate it so she may have small bowel dysmotility also), pylorolasty 1/27/17, sleeve gastrectomy 5/10/17, replacement j tube and robotic g tube 2/21/22 (removed later) all for gastroparesis.  She continues to have extremely severe symptoms but now has severe diarrhea.  She has needed IV fluids 3 times recently.  She also has gotten IV phenergan and vitamins.  She is not able to eat hardly anything and has weakness.  No one else in her family has diarrhea and she hasn't had antibiotics in the last 2 months.    Her child has heart trouble and is being seen here for it.  They are getting a service dog to detect low blood pressure.  Her other child is likely doing peds.     Gastric Stimulator Interrogation: impedence 411 Voltage 8 Current 19.5 Pulse Width 330 Rate 40 Cycle on 2 Cycle off 3, on, battery ok      shows multiple butalbitol rx.  She takes this for headaches and it does give her stomach complaints.     Diagnostic Results:  CT 2022 ok, tubes in place.  No mention of abscess at the g tube site.  Ugi 2020 reviewed, reflux, no hh, no achalasia,  Egd 2019 reviewed, pyloroplasty with suture, 5 cm hh, sleeve, endoflip normal  Egd 2022 reviewed, gastritis  Motility 2019 reviewed, egj outflow obstruction, rumination syndrome, high lesp with incomplete relaxation     Gastroparesis with protein calorie malnutrition.  Obtain ges.  Follow up with GI.  Obtain labs.  If she needs a port for tpn she will need neck u/s to check veins (she has only had one port before on the left side).  I suggest she stop butalbitol.

## 2023-01-24 ENCOUNTER — TELEPHONE (OUTPATIENT)
Dept: SURGERY | Facility: CLINIC | Age: 43
End: 2023-01-24
Payer: COMMERCIAL

## 2023-01-24 DIAGNOSIS — K31.84 GASTROPARESIS: Primary | ICD-10-CM

## 2023-01-25 ENCOUNTER — TELEPHONE (OUTPATIENT)
Dept: SURGERY | Facility: CLINIC | Age: 43
End: 2023-01-25
Payer: COMMERCIAL

## 2023-01-25 DIAGNOSIS — E46 PROTEIN-CALORIE MALNUTRITION, UNSPECIFIED SEVERITY: Primary | ICD-10-CM

## 2023-01-25 DIAGNOSIS — K31.84 GASTROPARESIS: ICD-10-CM

## 2023-01-30 ENCOUNTER — PATIENT MESSAGE (OUTPATIENT)
Dept: SURGERY | Facility: CLINIC | Age: 43
End: 2023-01-30
Payer: COMMERCIAL

## 2023-02-01 ENCOUNTER — LAB REQUISITION (OUTPATIENT)
Dept: LAB | Facility: HOSPITAL | Age: 43
End: 2023-02-01
Payer: COMMERCIAL

## 2023-02-01 DIAGNOSIS — R63.0 ANOREXIA: ICD-10-CM

## 2023-02-01 DIAGNOSIS — K31.84 GASTROPARESIS: ICD-10-CM

## 2023-02-01 DIAGNOSIS — R19.7 DIARRHEA, UNSPECIFIED: ICD-10-CM

## 2023-02-01 DIAGNOSIS — R11.0 NAUSEA: ICD-10-CM

## 2023-02-01 LAB
ADV 40+41 DNA STL QL NAA+NON-PROBE: NOT DETECTED
ASTRO TYP 1-8 RNA STL QL NAA+NON-PROBE: NOT DETECTED
C CAYETANENSIS DNA STL QL NAA+NON-PROBE: NOT DETECTED
C COLI+JEJ+UPSA DNA STL QL NAA+NON-PROBE: NOT DETECTED
CONSISTENCY STL: NORMAL
CRYPTOSP DNA STL QL NAA+NON-PROBE: NOT DETECTED
E HISTOLYT DNA STL QL NAA+NON-PROBE: NOT DETECTED
EAEC PAA PLAS AGGR+AATA ST NAA+NON-PRB: NOT DETECTED
EC STX1+STX2 GENES STL QL NAA+NON-PROBE: NOT DETECTED
EPEC EAE GENE STL QL NAA+NON-PROBE: NOT DETECTED
ETEC LTA+ST1A+ST1B TOX ST NAA+NON-PROBE: NOT DETECTED
G LAMBLIA DNA STL QL NAA+NON-PROBE: NOT DETECTED
NOROVIRUS GI+II RNA STL QL NAA+NON-PROBE: NOT DETECTED
P SHIGELLOIDES DNA STL QL NAA+NON-PROBE: NOT DETECTED
RVA RNA STL QL NAA+NON-PROBE: NOT DETECTED
S ENT+BONG DNA STL QL NAA+NON-PROBE: NOT DETECTED
SAPO I+II+IV+V RNA STL QL NAA+NON-PROBE: NOT DETECTED
SHIGELLA SP+EIEC IPAH ST NAA+NON-PROBE: NOT DETECTED
V CHOL+PARA+VUL DNA STL QL NAA+NON-PROBE: NOT DETECTED
V CHOLERAE DNA STL QL NAA+NON-PROBE: NOT DETECTED
Y ENTEROCOL DNA STL QL NAA+NON-PROBE: NOT DETECTED

## 2023-02-01 PROCEDURE — 87507 IADNA-DNA/RNA PROBE TQ 12-25: CPT | Performed by: NURSE PRACTITIONER

## 2023-02-02 ENCOUNTER — TELEPHONE (OUTPATIENT)
Dept: SURGERY | Facility: CLINIC | Age: 43
End: 2023-02-02
Payer: COMMERCIAL

## 2023-02-03 ENCOUNTER — HOSPITAL ENCOUNTER (OUTPATIENT)
Facility: HOSPITAL | Age: 43
Discharge: HOME | End: 2023-02-03
Attending: SURGERY | Admitting: SURGERY
Payer: COMMERCIAL

## 2023-02-03 ENCOUNTER — ANESTHESIA (OUTPATIENT)
Dept: SURGERY | Facility: HOSPITAL | Age: 43
End: 2023-02-03
Payer: COMMERCIAL

## 2023-02-03 ENCOUNTER — ANESTHESIA EVENT (OUTPATIENT)
Dept: SURGERY | Facility: HOSPITAL | Age: 43
End: 2023-02-03
Payer: COMMERCIAL

## 2023-02-03 ENCOUNTER — DOCUMENTATION ONLY (OUTPATIENT)
Dept: SURGERY | Facility: CLINIC | Age: 43
End: 2023-02-03
Payer: COMMERCIAL

## 2023-02-03 VITALS
TEMPERATURE: 98 F | RESPIRATION RATE: 17 BRPM | OXYGEN SATURATION: 100 % | SYSTOLIC BLOOD PRESSURE: 112 MMHG | BODY MASS INDEX: 16.16 KG/M2 | HEIGHT: 65 IN | WEIGHT: 97 LBS | DIASTOLIC BLOOD PRESSURE: 59 MMHG | HEART RATE: 58 BPM

## 2023-02-03 DIAGNOSIS — Z45.2 ENCOUNTER FOR INSERTION OF TUNNELED CENTRAL VENOUS CATHETER (CVC) WITH PORT: Primary | ICD-10-CM

## 2023-02-03 PROCEDURE — 25000003 PHARM REV CODE 250

## 2023-02-03 PROCEDURE — D9220A PRA ANESTHESIA: Mod: ANES,,, | Performed by: ANESTHESIOLOGY

## 2023-02-03 PROCEDURE — 77001 FLUOROGUIDE FOR VEIN DEVICE: CPT | Mod: 26,,, | Performed by: SURGERY

## 2023-02-03 PROCEDURE — 25000003 PHARM REV CODE 250: Performed by: SURGERY

## 2023-02-03 PROCEDURE — 37000009 HC ANESTHESIA EA ADD 15 MINS: Performed by: SURGERY

## 2023-02-03 PROCEDURE — D9220A PRA ANESTHESIA: Mod: CRNA,,, | Performed by: NURSE ANESTHETIST, CERTIFIED REGISTERED

## 2023-02-03 PROCEDURE — 25000003 PHARM REV CODE 250: Performed by: NURSE ANESTHETIST, CERTIFIED REGISTERED

## 2023-02-03 PROCEDURE — 36000706: Performed by: SURGERY

## 2023-02-03 PROCEDURE — 36561 INSERT TUNNELED CV CATH: CPT | Mod: LT,,, | Performed by: SURGERY

## 2023-02-03 PROCEDURE — 63600175 PHARM REV CODE 636 W HCPCS: Performed by: NURSE ANESTHETIST, CERTIFIED REGISTERED

## 2023-02-03 PROCEDURE — 37000008 HC ANESTHESIA 1ST 15 MINUTES: Performed by: SURGERY

## 2023-02-03 PROCEDURE — 36000707: Performed by: SURGERY

## 2023-02-03 PROCEDURE — D9220A PRA ANESTHESIA: ICD-10-PCS | Mod: ANES,,, | Performed by: ANESTHESIOLOGY

## 2023-02-03 PROCEDURE — C1788 PORT, INDWELLING, IMP: HCPCS | Performed by: SURGERY

## 2023-02-03 PROCEDURE — D9220A PRA ANESTHESIA: ICD-10-PCS | Mod: CRNA,,, | Performed by: NURSE ANESTHETIST, CERTIFIED REGISTERED

## 2023-02-03 PROCEDURE — 63600175 PHARM REV CODE 636 W HCPCS

## 2023-02-03 PROCEDURE — 36561 PR INSERT TUNNELED CV CATH WITH PORT: ICD-10-PCS | Mod: LT,,, | Performed by: SURGERY

## 2023-02-03 PROCEDURE — 71000044 HC DOSC ROUTINE RECOVERY FIRST HOUR: Performed by: SURGERY

## 2023-02-03 PROCEDURE — 77001 CHG FLUOROGUIDE CNTRL VEN ACCESS,PLACE,REPLACE,REMOVE: ICD-10-PCS | Mod: 26,,, | Performed by: SURGERY

## 2023-02-03 PROCEDURE — 63600175 PHARM REV CODE 636 W HCPCS: Performed by: SURGERY

## 2023-02-03 PROCEDURE — 71000015 HC POSTOP RECOV 1ST HR: Performed by: SURGERY

## 2023-02-03 DEVICE — KIT POWERPORT SINGLE 8FR: Type: IMPLANTABLE DEVICE | Site: CHEST | Status: FUNCTIONAL

## 2023-02-03 RX ORDER — KETOROLAC TROMETHAMINE 10 MG/1
10 TABLET, FILM COATED ORAL EVERY 6 HOURS PRN
Qty: 5 TABLET | Refills: 0 | Status: SHIPPED | OUTPATIENT
Start: 2023-02-03 | End: 2023-07-11

## 2023-02-03 RX ORDER — BUPIVACAINE HYDROCHLORIDE 2.5 MG/ML
INJECTION, SOLUTION EPIDURAL; INFILTRATION; INTRACAUDAL
Status: DISCONTINUED | OUTPATIENT
Start: 2023-02-03 | End: 2023-02-03 | Stop reason: HOSPADM

## 2023-02-03 RX ORDER — HEPARIN SODIUM (PORCINE) LOCK FLUSH IV SOLN 100 UNIT/ML 100 UNIT/ML
SOLUTION INTRAVENOUS
Status: DISCONTINUED | OUTPATIENT
Start: 2023-02-03 | End: 2023-02-03 | Stop reason: HOSPADM

## 2023-02-03 RX ORDER — SUCCINYLCHOLINE CHLORIDE 20 MG/ML
INJECTION INTRAMUSCULAR; INTRAVENOUS
Status: DISCONTINUED | OUTPATIENT
Start: 2023-02-03 | End: 2023-02-03

## 2023-02-03 RX ORDER — PROPOFOL 10 MG/ML
VIAL (ML) INTRAVENOUS
Status: DISCONTINUED | OUTPATIENT
Start: 2023-02-03 | End: 2023-02-03

## 2023-02-03 RX ORDER — HALOPERIDOL 5 MG/ML
1 INJECTION INTRAMUSCULAR EVERY 10 MIN PRN
Status: DISCONTINUED | OUTPATIENT
Start: 2023-02-03 | End: 2023-02-03 | Stop reason: HOSPADM

## 2023-02-03 RX ORDER — ROCURONIUM BROMIDE 10 MG/ML
INJECTION, SOLUTION INTRAVENOUS
Status: DISCONTINUED | OUTPATIENT
Start: 2023-02-03 | End: 2023-02-03

## 2023-02-03 RX ORDER — SODIUM CHLORIDE 0.9 % (FLUSH) 0.9 %
10 SYRINGE (ML) INJECTION
Status: DISCONTINUED | OUTPATIENT
Start: 2023-02-03 | End: 2023-02-03 | Stop reason: HOSPADM

## 2023-02-03 RX ORDER — HALOPERIDOL 5 MG/ML
INJECTION INTRAMUSCULAR
Status: COMPLETED
Start: 2023-02-03 | End: 2023-02-03

## 2023-02-03 RX ORDER — ONDANSETRON 2 MG/ML
INJECTION INTRAMUSCULAR; INTRAVENOUS
Status: DISCONTINUED | OUTPATIENT
Start: 2023-02-03 | End: 2023-02-03

## 2023-02-03 RX ORDER — MIDAZOLAM HYDROCHLORIDE 1 MG/ML
INJECTION, SOLUTION INTRAMUSCULAR; INTRAVENOUS
Status: DISCONTINUED | OUTPATIENT
Start: 2023-02-03 | End: 2023-02-03

## 2023-02-03 RX ORDER — PROPOFOL 10 MG/ML
VIAL (ML) INTRAVENOUS CONTINUOUS PRN
Status: DISCONTINUED | OUTPATIENT
Start: 2023-02-03 | End: 2023-02-03

## 2023-02-03 RX ORDER — PHENYLEPHRINE HYDROCHLORIDE 10 MG/ML
INJECTION INTRAVENOUS
Status: DISCONTINUED | OUTPATIENT
Start: 2023-02-03 | End: 2023-02-03

## 2023-02-03 RX ORDER — LIDOCAINE HYDROCHLORIDE 20 MG/ML
INJECTION, SOLUTION EPIDURAL; INFILTRATION; INTRACAUDAL; PERINEURAL
Status: DISCONTINUED | OUTPATIENT
Start: 2023-02-03 | End: 2023-02-03

## 2023-02-03 RX ORDER — DEXAMETHASONE SODIUM PHOSPHATE 4 MG/ML
INJECTION, SOLUTION INTRA-ARTICULAR; INTRALESIONAL; INTRAMUSCULAR; INTRAVENOUS; SOFT TISSUE
Status: DISCONTINUED | OUTPATIENT
Start: 2023-02-03 | End: 2023-02-03

## 2023-02-03 RX ORDER — HYDROMORPHONE HYDROCHLORIDE 1 MG/ML
0.2 INJECTION, SOLUTION INTRAMUSCULAR; INTRAVENOUS; SUBCUTANEOUS EVERY 5 MIN PRN
Status: DISCONTINUED | OUTPATIENT
Start: 2023-02-03 | End: 2023-02-03 | Stop reason: HOSPADM

## 2023-02-03 RX ORDER — KETAMINE HCL IN 0.9 % NACL 50 MG/5 ML
SYRINGE (ML) INTRAVENOUS
Status: DISCONTINUED | OUTPATIENT
Start: 2023-02-03 | End: 2023-02-03

## 2023-02-03 RX ORDER — SODIUM CHLORIDE 9 MG/ML
INJECTION, SOLUTION INTRAVENOUS CONTINUOUS
Status: DISCONTINUED | OUTPATIENT
Start: 2023-02-03 | End: 2023-02-03 | Stop reason: HOSPADM

## 2023-02-03 RX ADMIN — SUCCINYLCHOLINE CHLORIDE 140 MG: 20 INJECTION, SOLUTION INTRAMUSCULAR; INTRAVENOUS; PARENTERAL at 01:02

## 2023-02-03 RX ADMIN — HALOPERIDOL LACTATE 1 MG: 5 INJECTION, SOLUTION INTRAMUSCULAR at 03:02

## 2023-02-03 RX ADMIN — PROPOFOL 30 MG: 10 INJECTION, EMULSION INTRAVENOUS at 01:02

## 2023-02-03 RX ADMIN — CEFAZOLIN 2 G: 2 INJECTION, POWDER, FOR SOLUTION INTRAMUSCULAR; INTRAVENOUS at 01:02

## 2023-02-03 RX ADMIN — Medication 125 MCG/KG/MIN: at 01:02

## 2023-02-03 RX ADMIN — DEXAMETHASONE SODIUM PHOSPHATE 4 MG: 4 INJECTION INTRA-ARTICULAR; INTRALESIONAL; INTRAMUSCULAR; INTRAVENOUS; SOFT TISSUE at 01:02

## 2023-02-03 RX ADMIN — SODIUM CHLORIDE: 0.9 INJECTION, SOLUTION INTRAVENOUS at 12:02

## 2023-02-03 RX ADMIN — ONDANSETRON 4 MG: 2 INJECTION INTRAMUSCULAR; INTRAVENOUS at 01:02

## 2023-02-03 RX ADMIN — LIDOCAINE HYDROCHLORIDE 100 MG: 20 INJECTION, SOLUTION EPIDURAL; INFILTRATION; INTRACAUDAL; PERINEURAL at 01:02

## 2023-02-03 RX ADMIN — ROCURONIUM BROMIDE 10 MG: 10 INJECTION INTRAVENOUS at 01:02

## 2023-02-03 RX ADMIN — PROPOFOL 180 MG: 10 INJECTION, EMULSION INTRAVENOUS at 01:02

## 2023-02-03 RX ADMIN — Medication 20 MG: at 01:02

## 2023-02-03 RX ADMIN — MIDAZOLAM 2 MG: 1 INJECTION INTRAMUSCULAR; INTRAVENOUS at 12:02

## 2023-02-03 RX ADMIN — HALOPERIDOL 1 MG: 5 INJECTION INTRAMUSCULAR at 03:02

## 2023-02-03 RX ADMIN — PROPOFOL 50 MG: 10 INJECTION, EMULSION INTRAVENOUS at 01:02

## 2023-02-03 RX ADMIN — PHENYLEPHRINE HYDROCHLORIDE 100 MCG: 10 INJECTION INTRAVENOUS at 01:02

## 2023-02-03 NOTE — INTERVAL H&P NOTE
The patient has been examined and the H&P has been reviewed:    I concur with the findings and no changes have occurred since H&P was written.    Anesthesia risks, benefits and alternative options discussed and understood by patient/family.    Neck u/s ok    Past Medical History:   Diagnosis Date    Fibromyalgia     Gastroparesis     Headache     Rumination        Past Surgical History:   Procedure Laterality Date    APPENDECTOMY      breast implants removed Bilateral      SECTION      CHOLECYSTECTOMY      COSMETIC SURGERY      ESOPHAGEAL MANOMETRY WITH MEASUREMENT OF IMPEDANCE N/A 10/30/2019    Procedure: MANOMETRY, ESOPHAGUS, WITH IMPEDANCE MEASUREMENT;  Surgeon: Patti Orozco MD;  Location: [facility] ([address] [place] [address]);  Service: Endoscopy;  Laterality: N/A;  LATEX ALLERGY  r/o rumination  Motility Studies:  Hold Narcotics x 1 days   Hold TCA x 1 days  10/24 - pt confirmed appt-BB    ESOPHAGOGASTRODUODENOSCOPY N/A 3/22/2019    Procedure: EGD (ESOPHAGOGASTRODUODENOSCOPY)-dual lumen scope to remove intragastric suture.;  Surgeon: Robert Kumar MD;  Location: [facility] OR [address] [place] [address];  Service: General;  Laterality: N/A;    ESOPHAGOGASTRODUODENOSCOPY N/A 10/30/2019    Procedure: EGD (ESOPHAGOGASTRODUODENOSCOPY);  Surgeon: Patti Orozco MD;  Location: [address]);  Service: Endoscopy;  Laterality: N/A;  LATEX ALLERGY  EGD with EndoFlip   4th Floor, scheduled on 2nd floor due to availability  Full liquid diet 3 days and clear liquid diet x 1 day prior to procedure  Propofol only. No fentanyl or benzodiazepine during sedation. If additional sedation need    GASTRIC STIMULATOR IMPLANT SURGERY      GASTROSTOMY-JEJEUNOSTOMY TUBE CHANGE/PLACEMENT  08/10/2016    HYSTERECTOMY      laparoscopic jejunostomy tube      removed ~     REPLACEMENT OF GASTRIC NEUROSTIMULATOR GENERATOR N/A 3/22/2019    Procedure: REPLACEMENT, PULSE GENERATOR, NEUROSTIMULATOR, GASTRIC-battery exchange only;  Surgeon: Robert TSE  MD José;  Location: [facility] OR 2ND FLR;  Service: General;  Laterality: N/A;    REPLACEMENT OF GASTRIC NEUROSTIMULATOR GENERATOR N/A 2019    Procedure: REPLACEMENT, PULSE GENERATOR, NEUROSTIMULATOR, GASTRIC, OPEN (battery exchange);  Surgeon: Robert Kumar MD;  Location: [facility] OR 2ND FLR;  Service: General;  Laterality: N/A;    REPLACEMENT OF GASTRIC NEUROSTIMULATOR GENERATOR N/A 2022    Procedure: REPLACEMENT, PULSE GENERATOR, NEUROSTIMULATOR, GASTRIC, Open;  Surgeon: Robert Kumar MD;  Location: [facility] OR 2ND FLR;  Service: General;  Laterality: N/A;    sleeve gastrectomy      TONSILLECTOMY         Family History   Problem Relation Age of Onset    Hypothyroidism Mother     Cirrhosis Father     No Known Problems Brother     Asthma Daughter     Sleep apnea Daughter     Irritable bowel syndrome Maternal Aunt     Diverticulitis Maternal Aunt     Celiac disease Neg Hx     Colon cancer Neg Hx     Esophageal cancer Neg Hx     Stomach cancer Neg Hx     Rectal cancer Neg Hx     Ulcerative colitis Neg Hx     Crohn's disease Neg Hx        Social History     Socioeconomic History    Marital status:     Number of children: 2   Tobacco Use    Smoking status: Former     Packs/day: 0.00     Types: Cigarettes     Quit date: 2015     Years since quittin.4    Smokeless tobacco: Never   Substance and Sexual Activity    Alcohol use: No    Drug use: No       Current Facility-Administered Medications   Medication Dose Route Frequency Provider Last Rate Last Admin    0.9%  NaCl infusion   Intravenous Continuous Rosemary Ventura MD        ceFAZolin 2 g in dextrose 5 % in water (D5W) 5 % 50 mL IVPB (MB+)  2 g Intravenous On Call Procedure Rosemary Ventura MD           Review of patient's allergies indicates:   Allergen Reactions    Iodine and iodide containing products Swelling, Hives, Itching, Rash and Shortness Of Breath    Domperidone Itching    Latex, natural rubber Rash     Iodine     Scopolamine      Patch- caused burn under patch               There are no hospital problems to display for this patient.

## 2023-02-03 NOTE — BRIEF OP NOTE
Operative Note       Surgery Date: 2/3/2023     Surgeon(s) and Role:     * Robert Kumar MD - Primary     * Rosemary Ventura MD - Resident - Assisting    Pre-op Diagnosis:  Protein-calorie malnutrition, unspecified severity [E46]    Post-op Diagnosis:  Protein-calorie malnutrition, unspecified severity [E46]    Procedure(s) (LRB):  [id number] (Left)    Anesthesia: General    Procedure in Detail/Findings:  IVC line without apparent complication.  Postop xray ok.    Estimated Blood Loss: Minimal           Specimens (From admission, onward)      None          Implants:   Implant Name Type Inv. Item Serial No.  Lot No. LRB No. Used Action   KIT POWERPORT SINGLE 8FR - [id number]  KIT POWERPORT SINGLE 8FR  [employer] [id number] 1 Implanted              Disposition: PACU - hemodynamically stable.           Condition: Good    Attestation:  I was present and scrubbed for the entire procedure.

## 2023-02-03 NOTE — TRANSFER OF CARE
"Anesthesia Transfer of Care Note    Patient: Nena Mendez    Procedure(s) Performed: Procedure(s) (LRB):  [id number] (Left)    Patient location: [facility]    Anesthesia Type: general    Transport from OR: Transported from OR on 6-10 L/min O2 by face mask with adequate spontaneous ventilation    Post pain: adequate analgesia    Post assessment: no apparent anesthetic complications    Post vital signs: stable    Level of consciousness: awake, alert and oriented    Nausea/Vomiting: no nausea/vomiting    Complications: none    Transfer of care protocol was followed      Last vitals:   Visit Vitals  /76 (BP Location: Right arm, Patient Position: Lying)   Pulse 74   Temp 36.8 °C (98.2 °F) (Temporal)   Resp 17   Ht 5' 5" (1.651 m)   Wt 44 kg (97 lb)   SpO2 100%   Breastfeeding No   BMI 16.14 kg/m²     "

## 2023-02-03 NOTE — BRIEF OP NOTE
[facility] - Surgery ([facility])  Brief Operative Note    Surgery Date: 2/3/2023     Surgeon(s) and Role:     * Robert Kumar MD - Primary     * Rosemary Ventura MD - Resident - Assisting        Pre-op Diagnosis:  Protein-calorie malnutrition, unspecified severity [E46]    Post-op Diagnosis:  Post-Op Diagnosis Codes:     * Protein-calorie malnutrition, unspecified severity [E46]    Procedure(s) (LRB):  [id number]-CATH (Left)    Anesthesia: General    Operative Findings: Port-a-cath placed in Right IJ. No complications    Estimated Blood Loss: under 10 mL         Specimens:   Specimen (24h ago, onward)      None              Discharge Note    OUTCOME: Patient tolerated treatment/procedure well without complication and is now ready for discharge.    DISPOSITION: Home or Self Care    FINAL DIAGNOSIS:  Encounter for insertion of tunneled central venous catheter (CVC) with port    FOLLOWUP: In clinic    DISCHARGE INSTRUCTIONS:    Discharge Procedure Orders   Lifting restrictions   Order Comments: No heavy lifting greater than 10 pounds (about the weight of a gallon of milk) for 6 week postoperatively. This is to prevent new/recurrent hernia at your incision sites while they heal.     Notify your health care provider if you experience any of the following:  temperature >100.4     Notify your health care provider if you experience any of the following:  persistent nausea and vomiting or diarrhea     Notify your health care provider if you experience any of the following:  severe uncontrolled pain     Notify your health care provider if you experience any of the following:  redness, tenderness, or signs of infection (pain, swelling, redness, odor or green/yellow discharge around incision site)     Shower on day dressing removed (No bath)   Order Comments: You may SHOWER 48 hours after surgery, on Sunday 2/5/23. If you have gauze/tape dressings in place, you should remove them prior to showering. If you have  "Dermabond (skin glue) or white "Steri strips" in place, these will eventually peel off on their own after 1-2 weeks. NO SUBMERSION of your incisions (bath, pool, hot tub, etc) until your postop appointment. This allows your incisions to heal and to avoid a wound infection.       "

## 2023-02-03 NOTE — ANESTHESIA PROCEDURE NOTES
Intubation    Date/Time: 2/3/2023 1:05 PM  Performed by: Danna Shetty CRNA  Authorized by: Tiara Kruse MD     Intubation:     Induction:  Rapid sequence induction    Intubated:  Postinduction    Mask Ventilation:  N/a    Attempts:  1    Attempted By:  CRNA    Method of Intubation:  Video laryngoscopy    Blade:  White 3    Laryngeal View Grade: Grade I - full view of cords      Difficult Airway Encountered?: No      Complications:  None    Airway Device:  Oral endotracheal tube    Airway Device Size:  7.0    Style/Cuff Inflation:  Cuffed (inflated to minimal occlusive pressure)    Inflation Amount (mL):  7    Tube secured:  21    Secured at:  The lips    Placement Verified By:  Capnometry    Complicating Factors:  None    Findings Post-Intubation:  BS equal bilateral and atraumatic/condition of teeth unchanged

## 2023-02-03 NOTE — OP NOTE
[facility] - Surgery (2nd Fl)  Operative Note      Date of Procedure: 2/3/2023     Procedure: Procedure(s) (LRB):  [id number] (Left)     Surgeon(s) and Role:     * Robert Kumar MD - Primary     * Rosemary Ventura MD - Resident - Assisting        Pre-Operative Diagnosis: Protein-calorie malnutrition, unspecified severity [E46]    Post-Operative Diagnosis: Post-Op Diagnosis Codes:     * Protein-calorie malnutrition, unspecified severity [E46]    Anesthesia: General    Operative Findings (including complications, if any): Port a cath placed in right IJ.     Description of Technical Procedures: : The patient was identified in Preoperative Holding, brought back to the OR, and placed supine on the operating table  and padded appropriately. Monitors were applied. The right chest was prepped and draped in the standard sterile surgical fashion. A timeout was performed and all team members present agreed this was the correct procedure on the correct patient. We also confirmed administration of appropriate preoperative antibiotics.     After administration of appropriate local anesthesia,  A 4 cm transverse skin incision was made with the 11 blade. Subcutaneous tissue was divided with Bovie electrocautery. The port pocket was created with a mixture of Bovie electrocautery and blunt dissection. Rogue bleeders were stopped via electrocautery. Afterwards, we turned our attention to the right neck. The right IJ vein was cannulated  with a hollow-bore needle using ultrasound. A guidewire was placed and confirmed to be in correct position by fluoroscopy. An appropriate area for the pocket was chosen two fingers' breadth caudal to the collarbone, and the incision was anesthetized with lidocaine.. The port was tunneled from the incision to the cannulation site with the tunneling device.  The catheter was measured for length appropriately and cut. The port was secured to the investing pectoral fascia with two  3-0 Vicryl sutures. Under fluoroscopic guidance, the split sheath and dilator were placed over the guidewire, and the guidewire and dilator were then removed. The catheter was placed down the split sheath and the sheath was split and peeled away. Fluoroscopy confirmed appropriate position of the catheter, which aspirated and flushed easily. Three cc's of heparinized saline was instilled in the catheter. The skin incision was closed in layers with deep buried running 3-0 Vicryl and running subcuticular 4-0 Monocryl. Dermabond was applied. The patient was transported to the Recovery Room in stable condition. All sponge, instrument and needle counts were correct at the end of the procedure.           Complications:  None; patient tolerated the procedure well.             Estimated Blood Loss (EBL): * No values recorded between 2/3/2023  1:29 PM and 2/3/2023  2:20 PM *           Implants:   Implant Name Type Inv. Item Serial No.  Lot No. LRB No. Used Action   KIT POWERPORT SINGLE 8FR - [id number]  KIT POWERPORT SINGLE 8FR  [employer] [id number] 1 Implanted       Specimens:   Specimen (24h ago, onward)      None                    Condition: Good      Discharge Note    OUTCOME: Patient tolerated treatment/procedure well without complication and is now ready for discharge.    DISPOSITION: Home or Self Care    FINAL DIAGNOSIS:  Encounter for insertion of tunneled central venous catheter (CVC) with port    FOLLOWUP: In clinic    DISCHARGE INSTRUCTIONS:    Discharge Procedure Orders   Lifting restrictions   Order Comments: No heavy lifting greater than 10 pounds (about the weight of a gallon of milk) for 6 week postoperatively. This is to prevent new/recurrent hernia at your incision sites while they heal.     Notify your health care provider if you experience any of the following:  temperature >100.4     Notify your health care provider if you experience any of the following:  persistent nausea and vomiting  "or diarrhea     Notify your health care provider if you experience any of the following:  severe uncontrolled pain     Notify your health care provider if you experience any of the following:  redness, tenderness, or signs of infection (pain, swelling, redness, odor or green/yellow discharge around incision site)     Shower on day dressing removed (No bath)   Order Comments: You may SHOWER 48 hours after surgery, on Sunday 2/5/23. If you have gauze/tape dressings in place, you should remove them prior to showering. If you have Dermabond (skin glue) or white "Steri strips" in place, these will eventually peel off on their own after 1-2 weeks. NO SUBMERSION of your incisions (bath, pool, hot tub, etc) until your postop appointment. This allows your incisions to heal and to avoid a wound infection.       "

## 2023-02-03 NOTE — BRIEF OP NOTE
[facility] Mitchell - Surgery ([facility])  Brief Operative Note    Surgery Date: 2/3/2023     Surgeon(s) and Role:     * Robert Kumar MD - Primary     * Rosemary Ventura MD - Resident - Assisting        Pre-op Diagnosis:  Protein-calorie malnutrition, unspecified severity [E46]    Post-op Diagnosis:  Post-Op Diagnosis Codes:     * Protein-calorie malnutrition, unspecified severity [E46]    Procedure(s) (LRB):  [id number] (Left)    Anesthesia: General    Operative Findings: right internal jugular port placed.    Estimated Blood Loss: * No values recorded between 2/3/2023  1:29 PM and 2/3/2023  2:18 PM *         Specimens:   Specimen (24h ago, onward)      None              Discharge Note    OUTCOME: Patient tolerated treatment/procedure well without complication and is now ready for discharge.    DISPOSITION: Home or Self Care    FINAL DIAGNOSIS:  Encounter for insertion of tunneled central venous catheter (CVC) with port    FOLLOWUP: In clinic    DISCHARGE INSTRUCTIONS:  No discharge procedures on file.

## 2023-02-06 NOTE — ANESTHESIA POSTPROCEDURE EVALUATION
Anesthesia Post Evaluation    Patient: Nena Mendez    Procedure(s) Performed: Procedure(s) (LRB):  [id number]-CATH (Left)    Final Anesthesia Type: general      Patient location during evaluation: PACU  Patient participation: Yes- Able to Participate  Level of consciousness: awake and alert and oriented  Pain management: adequate  Airway patency: patent    PONV status at discharge: No PONV  Anesthetic complications: no      Cardiovascular status: blood pressure returned to baseline and hemodynamically stable  Respiratory status: unassisted  Hydration status: euvolemic  Follow-up not needed.          Vitals Value Taken Time   /59 02/03/23 1516   Temp 36.7 °C (98 °F) 02/03/23 1515   Pulse 54 02/03/23 1520   Resp 41 02/03/23 1519   SpO2 100 % 02/03/23 1520   Vitals shown include unvalidated device data.      No case tracking events are documented in the log.      Pain/Baljit Score: No data recorded

## 2023-02-06 NOTE — ANESTHESIA PREPROCEDURE EVALUATION
02/06/2023  Nena Mendez is a 42 y.o., female.  Patient Active Problem List   Diagnosis    S/P lap gastric neurostimulator placement     Malnutrition    Jejunostomy tube site pain    S/P laparoscopic sleeve gastrectomy    Nausea and vomiting    Encounter for insertion of tunneled central venous catheter (CVC) with port         Pre-op Assessment    I have reviewed the Patient Summary Reports.     I have reviewed the Nursing Notes. I have reviewed the NPO Status.      Review of Systems      Physical Exam  General: Well nourished    Airway:  Mallampati: II   Mouth Opening: Normal  TM Distance: Normal  Tongue: Normal  Neck ROM: Normal ROM        Anesthesia Plan  Type of Anesthesia, risks & benefits discussed:    Anesthesia Type: Gen ETT, Gen Natural Airway  Intra-op Monitoring Plan: Standard ASA Monitors  Post Op Pain Control Plan: multimodal analgesia  Induction:  IV  Airway Plan: Direct  Informed Consent: Informed consent signed with the Patient and all parties understand the risks and agree with anesthesia plan.  All questions answered.   ASA Score: 2  Day of Surgery Review of History & Physical: H&P Update referred to the surgeon/provider.    Ready For Surgery From Anesthesia Perspective.     .

## 2023-02-14 ENCOUNTER — PATIENT MESSAGE (OUTPATIENT)
Dept: SURGERY | Facility: CLINIC | Age: 43
End: 2023-02-14
Payer: COMMERCIAL

## 2023-02-23 ENCOUNTER — OFFICE VISIT (OUTPATIENT)
Dept: SURGERY | Facility: CLINIC | Age: 43
End: 2023-02-23
Payer: COMMERCIAL

## 2023-02-23 VITALS
HEART RATE: 76 BPM | WEIGHT: 99.63 LBS | HEIGHT: 69 IN | BODY MASS INDEX: 14.76 KG/M2 | SYSTOLIC BLOOD PRESSURE: 108 MMHG | DIASTOLIC BLOOD PRESSURE: 70 MMHG

## 2023-02-23 DIAGNOSIS — K31.84 GASTROPARESIS: Primary | ICD-10-CM

## 2023-02-23 PROCEDURE — 99999 PR PBB SHADOW E&M-EST. PATIENT-LVL IV: CPT | Mod: PBBFAC,,, | Performed by: SURGERY

## 2023-02-23 PROCEDURE — 3078F PR MOST RECENT DIASTOLIC BLOOD PRESSURE < 80 MM HG: ICD-10-PCS | Mod: CPTII,S$GLB,, | Performed by: SURGERY

## 2023-02-23 PROCEDURE — 1159F MED LIST DOCD IN RCRD: CPT | Mod: CPTII,S$GLB,, | Performed by: SURGERY

## 2023-02-23 PROCEDURE — 99999 PR PBB SHADOW E&M-EST. PATIENT-LVL IV: ICD-10-PCS | Mod: PBBFAC,,, | Performed by: SURGERY

## 2023-02-23 PROCEDURE — 1160F PR REVIEW ALL MEDS BY PRESCRIBER/CLIN PHARMACIST DOCUMENTED: ICD-10-PCS | Mod: CPTII,S$GLB,, | Performed by: SURGERY

## 2023-02-23 PROCEDURE — 1159F PR MEDICATION LIST DOCUMENTED IN MEDICAL RECORD: ICD-10-PCS | Mod: CPTII,S$GLB,, | Performed by: SURGERY

## 2023-02-23 PROCEDURE — 3078F DIAST BP <80 MM HG: CPT | Mod: CPTII,S$GLB,, | Performed by: SURGERY

## 2023-02-23 PROCEDURE — 99024 PR POST-OP FOLLOW-UP VISIT: ICD-10-PCS | Mod: S$GLB,,, | Performed by: SURGERY

## 2023-02-23 PROCEDURE — 3074F SYST BP LT 130 MM HG: CPT | Mod: CPTII,S$GLB,, | Performed by: SURGERY

## 2023-02-23 PROCEDURE — 1160F RVW MEDS BY RX/DR IN RCRD: CPT | Mod: CPTII,S$GLB,, | Performed by: SURGERY

## 2023-02-23 PROCEDURE — 3008F PR BODY MASS INDEX (BMI) DOCUMENTED: ICD-10-PCS | Mod: CPTII,S$GLB,, | Performed by: SURGERY

## 2023-02-23 PROCEDURE — 99024 POSTOP FOLLOW-UP VISIT: CPT | Mod: S$GLB,,, | Performed by: SURGERY

## 2023-02-23 PROCEDURE — 3008F BODY MASS INDEX DOCD: CPT | Mod: CPTII,S$GLB,, | Performed by: SURGERY

## 2023-02-23 PROCEDURE — 3074F PR MOST RECENT SYSTOLIC BLOOD PRESSURE < 130 MM HG: ICD-10-PCS | Mod: CPTII,S$GLB,, | Performed by: SURGERY

## 2023-02-23 NOTE — PROGRESS NOTES
I have seen the patient, reviewed the Resident's history and physical, assessment and plan. I have personally interviewed and examined the patient at bedside and: agree with the findings.     43y/o with bmi 15.19 (weight stable from last visit), fibromyalgia and s/p gastric stimulator 10/7/16 with replacement of battery 3/22/19, 12/30/19 and 1/28/22, J tube 12/16/16 (she didn't tolerate it so she may have small bowel dysmotility also), pylorolasty 1/27/17, sleeve gastrectomy 5/10/17, replacement j tube and robotic g tube 2/21/22 (removed later), s/p portacath 2/3/23 all for gastroparesis.   She has no complaints from her port placement and her other symptoms are largely unchanged.  She remains to have severe malnutrition (todays bmi 14.72).  She says she stopped butalbitol.     Her child has heart trouble.  They have a Sean Luther, a service dog to detect low blood pressure.  She says her dog got Chaga's from eating an insect.     Gastric Stimulator Interrogation: impedence 411 Voltage 8 Current 19.5 Pulse Width 330 Rate 40 Cycle on 2 Cycle off 3, on, battery ok      shows multiple butalbitol rx.  She takes this for headaches and it does give her stomach complaints.    PE wound clear    Gastric Stimulator Interrogation: impedence 423 Voltage 8 Current 18.9 Pulse Width 330 Rate 40 Cycle on 2 Cycle off 3, on, battery ok     Diagnostic Results:  CT 2022 ok, tubes in place.  No mention of abscess at the g tube site.  Ugi 2020 reviewed, reflux, no hh, no achalasia,  Egd 2019 reviewed, pyloroplasty with suture, 5 cm hh, sleeve, endoflip normal  Egd 2022 reviewed, gastritis  Motility 2019 reviewed, egj outflow obstruction, rumination syndrome, high lesp with incomplete relaxation     Gastroparesis with protein calorie malnutrition.  Obtain ges.  Follow up with GI (has recent upper and lower studies).   I suggest she stop butalbitol.  Doing well from portacath placement and awaiting starting tpn.

## 2023-02-23 NOTE — PROGRESS NOTES
[facility]  Post Op Visit    SUBJECTIVE:  Nena Mendez is a 42 y.o. female who presents to clinic today for post op follow up after port placement on 2/03/2023. She  denies pain, fevers, chills, nausea, constipation. Denies redness around or drainage from incisions.    Unchanged N/V  Unchanged diarrhea (hx of IBS)  Goes to urgent care for fluid resusc; states might be dehydrated    OBJECTIVE:  Vitals:    02/23/23 1032   BP: 108/70   Pulse: 76     Body mass index is 14.72 kg/m².    General: female in NAD. Thin, gaunt-looking.  HENT: EOM intact.   Pulmonary: No respiratory distress. Effort normal.  Cardiovascular: Tachy  Abdomen: soft, non-tender, non-distended  Skin: Incisions are healing well without signs of infection. No erythema, drainage, or increased warmth noted.       Path:   Specimens (From admission, onward)      None              ASSESSMENT/PLAN:    Nena Mendez is a 42 y.o. y/o female who presents to clinic today for f/u s/p port placement on 2/03/2023. Meeting all post op milestones     -She will follow up with GI at home (3 hours away) 3/7  -She will follow up with NP for first feed 2/27  - All questions answered; patient is comfortable with the above follow-up plan and verbalized understanding.    Norma CONNELLY-[employer], MS3

## 2023-02-25 ENCOUNTER — PATIENT MESSAGE (OUTPATIENT)
Dept: SURGERY | Facility: CLINIC | Age: 43
End: 2023-02-25
Payer: COMMERCIAL

## 2023-03-08 ENCOUNTER — PATIENT MESSAGE (OUTPATIENT)
Dept: SURGERY | Facility: CLINIC | Age: 43
End: 2023-03-08
Payer: COMMERCIAL

## 2023-04-19 ENCOUNTER — OUTSIDE PLACE OF SERVICE (OUTPATIENT)
Dept: SURGERY | Facility: CLINIC | Age: 43
End: 2023-04-19
Payer: COMMERCIAL

## 2023-04-20 ENCOUNTER — OUTSIDE PLACE OF SERVICE (OUTPATIENT)
Dept: SURGERY | Facility: CLINIC | Age: 43
End: 2023-04-20

## 2023-04-20 ENCOUNTER — OUTSIDE PLACE OF SERVICE (OUTPATIENT)
Dept: SURGERY | Facility: CLINIC | Age: 43
End: 2023-04-20
Payer: COMMERCIAL

## 2023-04-20 PROCEDURE — 99214 PR OFFICE/OUTPT VISIT, EST, LEVL IV, 30-39 MIN: ICD-10-PCS | Mod: 25,,, | Performed by: SURGERY

## 2023-04-20 PROCEDURE — 36590 PR REMOVAL TUNNELED CV CATH W SUBQ PORT OR PUMP: ICD-10-PCS | Mod: RT,,, | Performed by: SURGERY

## 2023-04-20 PROCEDURE — 99214 OFFICE O/P EST MOD 30 MIN: CPT | Mod: 25,,, | Performed by: SURGERY

## 2023-04-20 PROCEDURE — 36590 REMOVAL TUNNELED CV CATH: CPT | Mod: RT,,, | Performed by: SURGERY

## 2023-05-10 NOTE — PROGRESS NOTES
"Subjective:      Patient ID: Nena Mnedez is a 42 y.o. female.    Chief Complaint:Follow-up      History of Present Illness      Ms. Megan Mendez is here today for follow up after an admission at OSH with positive blood cultures thought to be 2/2 central line.  She is here today with her father.      In review, she is a 42 year old woman with fibromyalgia, gastroparesis with complicated GI surgical history, s/p replacement of J tube and robotic g tube 2/21/22, gastric stimulator 10/7/16 with replacement of battery 3/22/19, 12/30/19, and 1/28/22, pyloroplasty 1/27/17, and sleeve gastrectomy 5/10/17. G/J tube removed per Dr. Kumar on 7/7/22, and malnutrition  (BMI 14) on TPN.  Last seen by ID on 8/2/22 after an admission for abdominal pain and positive blood cx (CONS in one bottle).   She had repeat blood cultures that visit which were negative.  2D echo ordered which was negative for vegetation.  Also had additional serology eval -   Quant gold negative. Hep B surface antigen non reactive. Hep B surface antibody negative. Hep C antibody nonreactive. RPR nonreactive. HIV  negative.  She was advised to follow up as needed.      She had a port-a-cath placed 2/22/23 for TPN for her severe malnutrition.  She was recently was admitted to [facility] 4/19-4/25 with bacteremia thought to be 2/2 to her port.  The port was removed on 4/20.  She was told she had "staph in the blood stream".  She had a peripheral PICC placed.  She reports she was discharged on Rocephin and completed 14 days of this.   I am unable to see any culture records in Care Everywhere.     She has been on TPN via PICC.  She advises plan it to continue TPN through PICC and not to have port-a-cath replaced. Reportedly had some low grade fevers and was advised to go to ED and hold TPN by outside provider.  She declined to go to ED.   She just had labs drawn and dressing change yesterday. Labs didn't show elevated blood count or other " issues. She just saw Dr. Kumar, General Surgery today.  Gastric stimulator needs to be replaced and Dr. Kumar wants to know when would be safe for her to undergo surgery due to her recent infection.     She is currently afebrile.  Reports that she gets feverish feeling in the late afternoons and she usually takes tylenol which helps.  These have been going on since her discharge from hospital.    No shaking chills/sweats.  She believes this is due to her body rejecting lines/ports/tubes.  She denies problems with PICC except for itching under dressing.  Denies dysuria, cough.     She has chronic abdominal pain, nausea, and vomiting.  She is able to tolerate fluids (juice and tea), not solid food.  She is able to drink Premier protein supplements.     During visit today called [facility] and got blood culture reports  4/19 - blood culture + for CONS.  Oxacillin S  4/21 - Blood cultures X 2 sets.  Negative after 4 days.     Labs 5/2 from [facility]  WBC 5.8  K 3.9  Cr .5       Review of Systems   Constitutional: Positive for fever (.2), malaise/fatigue and weight loss. Negative for diaphoresis and night sweats.   HENT: Negative.     Eyes: Negative.    Cardiovascular:  Positive for palpitations. Negative for chest pain and leg swelling.   Respiratory:  Positive for shortness of breath. Negative for cough.    Skin:  Positive for itching (at PICC dressing site).   Musculoskeletal:  Positive for joint pain and myalgias.   Gastrointestinal:  Positive for abdominal pain, diarrhea, heartburn, nausea and vomiting.   Neurological:  Positive for dizziness and headaches.   Psychiatric/Behavioral:  The patient has insomnia.    Objective:   Physical Exam  Vitals reviewed.   Constitutional:       Comments: Underweight, chronically ill appearing   HENT:      Head: Normocephalic and atraumatic.      Mouth/Throat:      Mouth: Mucous membranes are moist.   Eyes:      General: No scleral icterus.     Conjunctiva/sclera:  Conjunctivae normal.   Cardiovascular:      Rate and Rhythm: Normal rate and regular rhythm.      Heart sounds: No murmur heard.  Pulmonary:      Effort: Pulmonary effort is normal.      Breath sounds: Normal breath sounds.   Abdominal:      General: There is no distension.      Palpations: Abdomen is soft.      Tenderness: There is no abdominal tenderness. There is no guarding.   Musculoskeletal:      Cervical back: Normal range of motion.      Right lower leg: No edema.      Left lower leg: No edema.   Skin:     General: Skin is warm and dry.      Comments: PICC RUE - site is clear.  No erythema, drainage.    Neurological:      Mental Status: She is alert and oriented to person, place, and time.   Psychiatric:         Mood and Affect: Mood normal.         Behavior: Behavior normal.     Assessment:     1. Bacteremia associated with intravascular line, initial encounter    2. Malnutrition, unspecified type       Per labs received from OSH, blood cultures cleared 4/21 and completed 14 days of IV ceftriaxone (CONS oxacillin S).  Currently afebrile. VSS   Plan:     Blood cultures X 2 today with cbc    Patient advised if blood cultures are positive, will have to go to ED for PICC removal and antibiotics.    Advised to go ED with fever >100.4 with associated shaking chills, sweats    She is to follow up with Provider managing TPN.    If blood cultures negative, I see no contraindication at this time for replacement of gastric stimulator, though I will review with ID staff.     Follow up pending blood culture results, resolution of reported low grade fever       60 minutes of total time was spent on this encounter, which includes face to face time and non-face to face time preparing to see the patient (eg, review of tests), Obtaining and/or reviewing separately obtained history, documenting clinical information in the electronic or other health record, independently interpreting results (not separately reported) and  communicating results to the patient/family/caregiver, or care coordination (not separately reported).

## 2023-05-11 ENCOUNTER — OFFICE VISIT (OUTPATIENT)
Dept: INFECTIOUS DISEASES | Facility: CLINIC | Age: 43
End: 2023-05-11
Payer: COMMERCIAL

## 2023-05-11 ENCOUNTER — LAB VISIT (OUTPATIENT)
Dept: LAB | Facility: HOSPITAL | Age: 43
End: 2023-05-11
Payer: COMMERCIAL

## 2023-05-11 ENCOUNTER — OFFICE VISIT (OUTPATIENT)
Dept: SURGERY | Facility: CLINIC | Age: 43
End: 2023-05-11
Payer: COMMERCIAL

## 2023-05-11 VITALS
HEIGHT: 69 IN | BODY MASS INDEX: 14.66 KG/M2 | WEIGHT: 99 LBS | SYSTOLIC BLOOD PRESSURE: 110 MMHG | HEART RATE: 63 BPM | TEMPERATURE: 98 F | DIASTOLIC BLOOD PRESSURE: 77 MMHG

## 2023-05-11 VITALS
HEIGHT: 69 IN | WEIGHT: 99.31 LBS | BODY MASS INDEX: 14.71 KG/M2 | HEART RATE: 64 BPM | SYSTOLIC BLOOD PRESSURE: 110 MMHG | DIASTOLIC BLOOD PRESSURE: 71 MMHG

## 2023-05-11 DIAGNOSIS — R78.81 BACTEREMIA ASSOCIATED WITH INTRAVASCULAR LINE, INITIAL ENCOUNTER: ICD-10-CM

## 2023-05-11 DIAGNOSIS — T82.7XXA BACTEREMIA ASSOCIATED WITH INTRAVASCULAR LINE, INITIAL ENCOUNTER: Primary | ICD-10-CM

## 2023-05-11 DIAGNOSIS — K31.84 GASTROPARESIS: Primary | ICD-10-CM

## 2023-05-11 DIAGNOSIS — R78.81 BACTEREMIA ASSOCIATED WITH INTRAVASCULAR LINE, INITIAL ENCOUNTER: Primary | ICD-10-CM

## 2023-05-11 DIAGNOSIS — E46 MALNUTRITION, UNSPECIFIED TYPE: ICD-10-CM

## 2023-05-11 DIAGNOSIS — T82.7XXA BACTEREMIA ASSOCIATED WITH INTRAVASCULAR LINE, INITIAL ENCOUNTER: ICD-10-CM

## 2023-05-11 LAB
BASOPHILS # BLD AUTO: 0.05 K/UL (ref 0–0.2)
BASOPHILS NFR BLD: 1 % (ref 0–1.9)
DIFFERENTIAL METHOD: ABNORMAL
EOSINOPHIL # BLD AUTO: 0.1 K/UL (ref 0–0.5)
EOSINOPHIL NFR BLD: 2.4 % (ref 0–8)
ERYTHROCYTE [DISTWIDTH] IN BLOOD BY AUTOMATED COUNT: 12.6 % (ref 11.5–14.5)
HCT VFR BLD AUTO: 36.5 % (ref 37–48.5)
HGB BLD-MCNC: 11.8 G/DL (ref 12–16)
IMM GRANULOCYTES # BLD AUTO: 0 K/UL (ref 0–0.04)
IMM GRANULOCYTES NFR BLD AUTO: 0 % (ref 0–0.5)
LYMPHOCYTES # BLD AUTO: 1.8 K/UL (ref 1–4.8)
LYMPHOCYTES NFR BLD: 35.9 % (ref 18–48)
MCH RBC QN AUTO: 28.7 PG (ref 27–31)
MCHC RBC AUTO-ENTMCNC: 32.3 G/DL (ref 32–36)
MCV RBC AUTO: 89 FL (ref 82–98)
MONOCYTES # BLD AUTO: 0.4 K/UL (ref 0.3–1)
MONOCYTES NFR BLD: 7.4 % (ref 4–15)
NEUTROPHILS # BLD AUTO: 2.7 K/UL (ref 1.8–7.7)
NEUTROPHILS NFR BLD: 53.3 % (ref 38–73)
NRBC BLD-RTO: 0 /100 WBC
PLATELET # BLD AUTO: 409 K/UL (ref 150–450)
PMV BLD AUTO: 8.9 FL (ref 9.2–12.9)
RBC # BLD AUTO: 4.11 M/UL (ref 4–5.4)
WBC # BLD AUTO: 4.99 K/UL (ref 3.9–12.7)

## 2023-05-11 PROCEDURE — 87040 BLOOD CULTURE FOR BACTERIA: CPT | Mod: 59 | Performed by: NURSE PRACTITIONER

## 2023-05-11 PROCEDURE — 1159F PR MEDICATION LIST DOCUMENTED IN MEDICAL RECORD: ICD-10-PCS | Mod: CPTII,S$GLB,, | Performed by: NURSE PRACTITIONER

## 2023-05-11 PROCEDURE — 99215 PR OFFICE/OUTPT VISIT, EST, LEVL V, 40-54 MIN: ICD-10-PCS | Mod: S$GLB,,, | Performed by: NURSE PRACTITIONER

## 2023-05-11 PROCEDURE — 95981 PR ELEC ALYS NSTIM GEN GASTRIC SUBSEQUENT W/O REPROG: ICD-10-PCS | Mod: S$GLB,,, | Performed by: SURGERY

## 2023-05-11 PROCEDURE — 3074F SYST BP LT 130 MM HG: CPT | Mod: CPTII,S$GLB,, | Performed by: SURGERY

## 2023-05-11 PROCEDURE — 1159F MED LIST DOCD IN RCRD: CPT | Mod: CPTII,S$GLB,, | Performed by: NURSE PRACTITIONER

## 2023-05-11 PROCEDURE — 3008F BODY MASS INDEX DOCD: CPT | Mod: CPTII,S$GLB,, | Performed by: SURGERY

## 2023-05-11 PROCEDURE — 99214 OFFICE O/P EST MOD 30 MIN: CPT | Mod: S$GLB,,, | Performed by: SURGERY

## 2023-05-11 PROCEDURE — 3078F DIAST BP <80 MM HG: CPT | Mod: CPTII,S$GLB,, | Performed by: NURSE PRACTITIONER

## 2023-05-11 PROCEDURE — 85025 COMPLETE CBC W/AUTO DIFF WBC: CPT | Performed by: NURSE PRACTITIONER

## 2023-05-11 PROCEDURE — 99999 PR PBB SHADOW E&M-EST. PATIENT-LVL IV: CPT | Mod: PBBFAC,,, | Performed by: SURGERY

## 2023-05-11 PROCEDURE — 3078F PR MOST RECENT DIASTOLIC BLOOD PRESSURE < 80 MM HG: ICD-10-PCS | Mod: CPTII,S$GLB,, | Performed by: SURGERY

## 2023-05-11 PROCEDURE — 3008F PR BODY MASS INDEX (BMI) DOCUMENTED: ICD-10-PCS | Mod: CPTII,S$GLB,, | Performed by: NURSE PRACTITIONER

## 2023-05-11 PROCEDURE — 36415 COLL VENOUS BLD VENIPUNCTURE: CPT | Performed by: NURSE PRACTITIONER

## 2023-05-11 PROCEDURE — 99999 PR PBB SHADOW E&M-EST. PATIENT-LVL IV: ICD-10-PCS | Mod: PBBFAC,,, | Performed by: NURSE PRACTITIONER

## 2023-05-11 PROCEDURE — 99999 PR PBB SHADOW E&M-EST. PATIENT-LVL IV: ICD-10-PCS | Mod: PBBFAC,,, | Performed by: SURGERY

## 2023-05-11 PROCEDURE — 3008F BODY MASS INDEX DOCD: CPT | Mod: CPTII,S$GLB,, | Performed by: NURSE PRACTITIONER

## 2023-05-11 PROCEDURE — 95981 IO ANAL GAST N-STIM SUBSQ: CPT | Mod: S$GLB,,, | Performed by: SURGERY

## 2023-05-11 PROCEDURE — 3078F DIAST BP <80 MM HG: CPT | Mod: CPTII,S$GLB,, | Performed by: SURGERY

## 2023-05-11 PROCEDURE — 99215 OFFICE O/P EST HI 40 MIN: CPT | Mod: S$GLB,,, | Performed by: NURSE PRACTITIONER

## 2023-05-11 PROCEDURE — 99999 PR PBB SHADOW E&M-EST. PATIENT-LVL IV: CPT | Mod: PBBFAC,,, | Performed by: NURSE PRACTITIONER

## 2023-05-11 PROCEDURE — 3078F PR MOST RECENT DIASTOLIC BLOOD PRESSURE < 80 MM HG: ICD-10-PCS | Mod: CPTII,S$GLB,, | Performed by: NURSE PRACTITIONER

## 2023-05-11 PROCEDURE — 3074F SYST BP LT 130 MM HG: CPT | Mod: CPTII,S$GLB,, | Performed by: NURSE PRACTITIONER

## 2023-05-11 PROCEDURE — 3074F PR MOST RECENT SYSTOLIC BLOOD PRESSURE < 130 MM HG: ICD-10-PCS | Mod: CPTII,S$GLB,, | Performed by: SURGERY

## 2023-05-11 PROCEDURE — 99214 PR OFFICE/OUTPT VISIT, EST, LEVL IV, 30-39 MIN: ICD-10-PCS | Mod: S$GLB,,, | Performed by: SURGERY

## 2023-05-11 PROCEDURE — 3074F PR MOST RECENT SYSTOLIC BLOOD PRESSURE < 130 MM HG: ICD-10-PCS | Mod: CPTII,S$GLB,, | Performed by: NURSE PRACTITIONER

## 2023-05-11 PROCEDURE — 3008F PR BODY MASS INDEX (BMI) DOCUMENTED: ICD-10-PCS | Mod: CPTII,S$GLB,, | Performed by: SURGERY

## 2023-05-11 NOTE — PROGRESS NOTES
41y/o with bmi 15.19 (weight stable from last visit), fibromyalgia and s/p gastric stimulator 10/7/16 with replacement of battery 3/22/19, 12/30/19 and 1/28/22, J tube 12/16/16 (she didn't tolerate it so she may have small bowel dysmotility also), pylorolasty 1/27/17, sleeve gastrectomy 5/10/17, replacement j tube and robotic g tube 2/21/22 (removed later), s/p portacath 2/3/23 all for gastroparesis.   She has had her port removed for infection and has occasional fevers.  Tpn has been stopped.  Her other symptoms are largely unchanged.  She remains to have severe malnutrition (todays bmi 14).  She says she occasionally takes. butalbitol.     Her child has heart trouble.  They have a Sean Luther, a service dog to detect low blood pressure.  She says her dog got Chaga's from eating an insect.     Gastric Stimulator Interrogation: impedence 411 Voltage 8 Current 19.5 Pulse Width 330 Rate 40 Cycle on 2 Cycle off 3, on, battery low.      shows multiple butalbitol rx.  She takes this for headaches and it does give her stomach complaints.     PE wound clear       Diagnostic Results:  CT 2022 ok, tubes in place.  No mention of abscess at the g tube site.  Ugi 2020 reviewed, reflux, no hh, no achalasia,  Egd 2019 reviewed, pyloroplasty with suture, 5 cm hh, sleeve, endoflip normal  Egd 2022 reviewed, gastritis  Motility 2019 reviewed, egj outflow obstruction, rumination syndrome, high lesp with incomplete relaxation     Gastroparesis with protein calorie malnutrition.  Gstim battery low.  Obtain ges.  Follow up with GI.   I suggest she stop butalbitol.  Awaiting follow up with ID and will need to restart tpn when possible.  Obtain egd results.  For gstim change.

## 2023-05-11 NOTE — PATIENT INSTRUCTIONS
Blood cultures X 2 today with cbc    If blood cultures are positive, you will have to go to ED for PICC removal and antibiotics.   To ED with fever >100.4 with associated shaking chills, sweats   I am going to go through your history with one of my Staff and will discuss when we can clear you for a battery exchange.   Follow up to be determined pending blood culture results.

## 2023-05-13 ENCOUNTER — PATIENT MESSAGE (OUTPATIENT)
Dept: INFECTIOUS DISEASES | Facility: CLINIC | Age: 43
End: 2023-05-13
Payer: COMMERCIAL

## 2023-05-16 LAB
BACTERIA BLD CULT: NORMAL
BACTERIA BLD CULT: NORMAL

## 2023-05-22 NOTE — TELEPHONE ENCOUNTER
Good morning!  Did you have the PICC removed and have blood cultures done?  Please give me an update.    Dawit Razo

## 2023-05-29 ENCOUNTER — PATIENT MESSAGE (OUTPATIENT)
Dept: INFECTIOUS DISEASES | Facility: CLINIC | Age: 43
End: 2023-05-29
Payer: COMMERCIAL

## 2023-05-30 ENCOUNTER — PATIENT MESSAGE (OUTPATIENT)
Dept: SURGERY | Facility: CLINIC | Age: 43
End: 2023-05-30
Payer: COMMERCIAL

## 2023-05-30 DIAGNOSIS — R11.2 NAUSEA AND VOMITING, UNSPECIFIED VOMITING TYPE: Primary | ICD-10-CM

## 2023-05-30 DIAGNOSIS — Z45.2 ENCOUNTER FOR INSERTION OF VENOUS ACCESS PORT: ICD-10-CM

## 2023-06-02 DIAGNOSIS — K31.84 GASTROPARESIS: Primary | ICD-10-CM

## 2023-06-02 DIAGNOSIS — E46 MALNUTRITION, UNSPECIFIED TYPE: ICD-10-CM

## 2023-06-15 ENCOUNTER — TELEPHONE (OUTPATIENT)
Dept: SURGERY | Facility: CLINIC | Age: 43
End: 2023-06-15
Payer: COMMERCIAL

## 2023-06-15 NOTE — TELEPHONE ENCOUNTER
Returned call and spoke with Patrizia and [employer] and explained that Dr. Kumar wanted the neck veins evaluated for port placement.  Order re-faxed to [phone].

## 2023-06-15 NOTE — TELEPHONE ENCOUNTER
----- Message from Eleni Cheung sent at 6/15/2023 10:44 AM CDT -----  Regarding: codes  Contact: @ [phone]  [facility] is calling about the codes used for test not matching up needs someone to call her ASAP please call and adv @ [phone] speak to Starla or Patrizia

## 2023-06-16 ENCOUNTER — PATIENT MESSAGE (OUTPATIENT)
Dept: SURGERY | Facility: CLINIC | Age: 43
End: 2023-06-16
Payer: COMMERCIAL

## 2023-06-19 ENCOUNTER — TELEPHONE (OUTPATIENT)
Dept: SURGERY | Facility: CLINIC | Age: 43
End: 2023-06-19
Payer: COMMERCIAL

## 2023-06-19 NOTE — TELEPHONE ENCOUNTER
----- Message from Ian Malcolm sent at 6/19/2023  2:13 PM CDT -----  Regarding: return a miss call  Contact: [phone]  CHRISTIE GUAJARDO calling regarding Patient Advice (message) for Pt states she do not know when she had her last EGD done pt states she would have thought Dr Kumar would have had it due to him putting her last port in back in January asking for a call back to discuss.

## 2023-06-19 NOTE — TELEPHONE ENCOUNTER
Spoke to pt  Doesn't recall when or where her last EGD was unless it was while she was in the hospital.  I thanked her for the quick response and we hung up.

## 2023-06-20 ENCOUNTER — DOCUMENTATION ONLY (OUTPATIENT)
Dept: SURGERY | Facility: CLINIC | Age: 43
End: 2023-06-20
Payer: COMMERCIAL

## 2023-06-20 NOTE — PROGRESS NOTES
I had been in contact with [facility] regarding the timing of her surgery with Dr. Robert Kumar due to her recent PICC Line Infection.  Here was her most recent note:    MILA Walton, ANP sent to Torri Finch RN; Robert Kumar MD  Caller: Unspecified (1 month ago)  Hi there!   She has had two sets of negative blood cultures.  She apparently had to have her PICC removed because it was was tender/inflamed. She had blood cultures done after removal and she advised they are negative.  At this point in time, I see no reason why she cannot have her stimulator battery replaced.  Not sure when she will have PICC replaced for TPN?   I am deferring that to her PCP whom I believe is managing her TPN.  Has there been any discussion regarding another port for TPN instead of peripheral PICC?     Best, Dawit Lopez NP   Infectious Disease

## 2023-06-22 ENCOUNTER — PATIENT MESSAGE (OUTPATIENT)
Dept: SURGERY | Facility: CLINIC | Age: 43
End: 2023-06-22
Payer: COMMERCIAL

## 2023-06-22 ENCOUNTER — TELEPHONE (OUTPATIENT)
Dept: SURGERY | Facility: CLINIC | Age: 43
End: 2023-06-22
Payer: COMMERCIAL

## 2023-06-22 ENCOUNTER — ANESTHESIA EVENT (OUTPATIENT)
Dept: SURGERY | Facility: HOSPITAL | Age: 43
End: 2023-06-22
Payer: COMMERCIAL

## 2023-06-23 ENCOUNTER — ANESTHESIA (OUTPATIENT)
Dept: SURGERY | Facility: HOSPITAL | Age: 43
End: 2023-06-23
Payer: COMMERCIAL

## 2023-06-23 ENCOUNTER — HOSPITAL ENCOUNTER (OUTPATIENT)
Facility: HOSPITAL | Age: 43
Discharge: HOME | End: 2023-06-23
Attending: SURGERY | Admitting: SURGERY
Payer: COMMERCIAL

## 2023-06-23 VITALS
WEIGHT: 97 LBS | BODY MASS INDEX: 16.16 KG/M2 | HEART RATE: 48 BPM | TEMPERATURE: 98 F | OXYGEN SATURATION: 100 % | RESPIRATION RATE: 16 BRPM | SYSTOLIC BLOOD PRESSURE: 110 MMHG | DIASTOLIC BLOOD PRESSURE: 77 MMHG | HEIGHT: 65 IN

## 2023-06-23 DIAGNOSIS — K31.84 GASTROPARESIS: ICD-10-CM

## 2023-06-23 PROCEDURE — 77001 CHG FLUOROGUIDE CNTRL VEN ACCESS,PLACE,REPLACE,REMOVE: ICD-10-PCS | Mod: 26,,, | Performed by: SURGERY

## 2023-06-23 PROCEDURE — D9220A PRA ANESTHESIA: ICD-10-PCS | Mod: CRNA,,, | Performed by: NURSE ANESTHETIST, CERTIFIED REGISTERED

## 2023-06-23 PROCEDURE — 88300 SURGICAL PATH GROSS: CPT | Performed by: STUDENT IN AN ORGANIZED HEALTH CARE EDUCATION/TRAINING PROGRAM

## 2023-06-23 PROCEDURE — 25000003 PHARM REV CODE 250: Performed by: ANESTHESIOLOGY

## 2023-06-23 PROCEDURE — 95980 IO ANAL GAST N-STIM INIT: CPT | Mod: 51,,, | Performed by: SURGERY

## 2023-06-23 PROCEDURE — D9220A PRA ANESTHESIA: Mod: ANES,,, | Performed by: ANESTHESIOLOGY

## 2023-06-23 PROCEDURE — C1788 PORT, INDWELLING, IMP: HCPCS | Performed by: SURGERY

## 2023-06-23 PROCEDURE — 25000003 PHARM REV CODE 250: Performed by: STUDENT IN AN ORGANIZED HEALTH CARE EDUCATION/TRAINING PROGRAM

## 2023-06-23 PROCEDURE — 64999: ICD-10-PCS | Mod: ,,, | Performed by: ANESTHESIOLOGY

## 2023-06-23 PROCEDURE — 95980 PR ELEC ALYS NSTIM PLS GEN GASTRIC INTRAOP W/ PROG: ICD-10-PCS | Mod: 51,,, | Performed by: SURGERY

## 2023-06-23 PROCEDURE — C1767 GENERATOR, NEURO NON-RECHARG: HCPCS | Performed by: SURGERY

## 2023-06-23 PROCEDURE — 63600175 PHARM REV CODE 636 W HCPCS: Performed by: NURSE ANESTHETIST, CERTIFIED REGISTERED

## 2023-06-23 PROCEDURE — 64999 UNLISTED PX NERVOUS SYSTEM: CPT | Mod: ,,, | Performed by: ANESTHESIOLOGY

## 2023-06-23 PROCEDURE — 36561 INSERT TUNNELED CV CATH: CPT | Mod: RT,,, | Performed by: SURGERY

## 2023-06-23 PROCEDURE — 64590 INS/RPL PRPH SAC/GSTR NPG/R: CPT | Mod: 51,,, | Performed by: SURGERY

## 2023-06-23 PROCEDURE — 71000015 HC POSTOP RECOV 1ST HR: Performed by: SURGERY

## 2023-06-23 PROCEDURE — 76942 ECHO GUIDE FOR BIOPSY: CPT | Mod: 59

## 2023-06-23 PROCEDURE — 25000003 PHARM REV CODE 250: Performed by: NURSE ANESTHETIST, CERTIFIED REGISTERED

## 2023-06-23 PROCEDURE — 37000008 HC ANESTHESIA 1ST 15 MINUTES: Performed by: SURGERY

## 2023-06-23 PROCEDURE — 37000009 HC ANESTHESIA EA ADD 15 MINS: Performed by: SURGERY

## 2023-06-23 PROCEDURE — 77001 FLUOROGUIDE FOR VEIN DEVICE: CPT | Mod: 26,,, | Performed by: SURGERY

## 2023-06-23 PROCEDURE — 63600175 PHARM REV CODE 636 W HCPCS: Performed by: SURGERY

## 2023-06-23 PROCEDURE — D9220A PRA ANESTHESIA: Mod: CRNA,,, | Performed by: NURSE ANESTHETIST, CERTIFIED REGISTERED

## 2023-06-23 PROCEDURE — 36000707: Performed by: SURGERY

## 2023-06-23 PROCEDURE — 71000044 HC DOSC ROUTINE RECOVERY FIRST HOUR: Performed by: SURGERY

## 2023-06-23 PROCEDURE — 88300 SURGICAL PATH GROSS: CPT | Mod: 26,,, | Performed by: STUDENT IN AN ORGANIZED HEALTH CARE EDUCATION/TRAINING PROGRAM

## 2023-06-23 PROCEDURE — 36561 PR INSERT TUNNELED CV CATH WITH PORT: ICD-10-PCS | Mod: RT,,, | Performed by: SURGERY

## 2023-06-23 PROCEDURE — 25000003 PHARM REV CODE 250: Performed by: SURGERY

## 2023-06-23 PROCEDURE — 63600175 PHARM REV CODE 636 W HCPCS: Performed by: STUDENT IN AN ORGANIZED HEALTH CARE EDUCATION/TRAINING PROGRAM

## 2023-06-23 PROCEDURE — 64590 PR IMPLANT PERIPH/GASTRIC NEUROSTIM/RECEIVER: ICD-10-PCS | Mod: 51,,, | Performed by: SURGERY

## 2023-06-23 PROCEDURE — 36000706: Performed by: SURGERY

## 2023-06-23 PROCEDURE — 88300 PR  SURG PATH,GROSS,LEVEL I: ICD-10-PCS | Mod: 26,,, | Performed by: STUDENT IN AN ORGANIZED HEALTH CARE EDUCATION/TRAINING PROGRAM

## 2023-06-23 PROCEDURE — D9220A PRA ANESTHESIA: ICD-10-PCS | Mod: ANES,,, | Performed by: ANESTHESIOLOGY

## 2023-06-23 DEVICE — KIT POWERPORT SINGLE 8FR: Type: IMPLANTABLE DEVICE | Site: CHEST | Status: FUNCTIONAL

## 2023-06-23 DEVICE — GENERATOR NEUROSTIM GASTRIC: Type: IMPLANTABLE DEVICE | Site: ABDOMEN | Status: FUNCTIONAL

## 2023-06-23 RX ORDER — ONDANSETRON 2 MG/ML
INJECTION INTRAMUSCULAR; INTRAVENOUS
Status: DISCONTINUED | OUTPATIENT
Start: 2023-06-23 | End: 2023-06-23

## 2023-06-23 RX ORDER — BUPIVACAINE HYDROCHLORIDE 2.5 MG/ML
INJECTION, SOLUTION EPIDURAL; INFILTRATION; INTRACAUDAL
Status: DISCONTINUED | OUTPATIENT
Start: 2023-06-23 | End: 2023-06-23 | Stop reason: HOSPADM

## 2023-06-23 RX ORDER — DEXAMETHASONE SODIUM PHOSPHATE 4 MG/ML
INJECTION, SOLUTION INTRA-ARTICULAR; INTRALESIONAL; INTRAMUSCULAR; INTRAVENOUS; SOFT TISSUE
Status: DISCONTINUED | OUTPATIENT
Start: 2023-06-23 | End: 2023-06-23

## 2023-06-23 RX ORDER — HEPARIN SODIUM (PORCINE) LOCK FLUSH IV SOLN 100 UNIT/ML 100 UNIT/ML
SOLUTION INTRAVENOUS
Status: DISCONTINUED | OUTPATIENT
Start: 2023-06-23 | End: 2023-06-23 | Stop reason: HOSPADM

## 2023-06-23 RX ORDER — MIDAZOLAM HYDROCHLORIDE 1 MG/ML
INJECTION INTRAMUSCULAR; INTRAVENOUS
Status: DISCONTINUED | OUTPATIENT
Start: 2023-06-23 | End: 2023-06-23

## 2023-06-23 RX ORDER — FAMOTIDINE 10 MG/ML
INJECTION INTRAVENOUS
Status: DISCONTINUED | OUTPATIENT
Start: 2023-06-23 | End: 2023-06-23

## 2023-06-23 RX ORDER — HALOPERIDOL 5 MG/ML
INJECTION INTRAMUSCULAR
Status: DISCONTINUED | OUTPATIENT
Start: 2023-06-23 | End: 2023-06-23

## 2023-06-23 RX ORDER — LIDOCAINE HYDROCHLORIDE 10 MG/ML
1 INJECTION, SOLUTION EPIDURAL; INFILTRATION; INTRACAUDAL; PERINEURAL ONCE AS NEEDED
Status: COMPLETED | OUTPATIENT
Start: 2023-06-23 | End: 2023-06-23

## 2023-06-23 RX ORDER — PROPOFOL 10 MG/ML
VIAL (ML) INTRAVENOUS
Status: DISCONTINUED | OUTPATIENT
Start: 2023-06-23 | End: 2023-06-23

## 2023-06-23 RX ORDER — PHENYLEPHRINE HYDROCHLORIDE 10 MG/ML
INJECTION INTRAVENOUS
Status: DISCONTINUED | OUTPATIENT
Start: 2023-06-23 | End: 2023-06-23

## 2023-06-23 RX ORDER — MIDAZOLAM HYDROCHLORIDE 1 MG/ML
.5-4 INJECTION INTRAMUSCULAR; INTRAVENOUS
Status: DISCONTINUED | OUTPATIENT
Start: 2023-06-23 | End: 2023-06-23 | Stop reason: HOSPADM

## 2023-06-23 RX ORDER — CHLORPROMAZINE HYDROCHLORIDE 25 MG/1
25 TABLET, FILM COATED ORAL EVERY 4 HOURS PRN
COMMUNITY

## 2023-06-23 RX ORDER — FENTANYL CITRATE 50 UG/ML
25-200 INJECTION, SOLUTION INTRAMUSCULAR; INTRAVENOUS
Status: DISCONTINUED | OUTPATIENT
Start: 2023-06-23 | End: 2023-06-23

## 2023-06-23 RX ORDER — SODIUM CHLORIDE 9 MG/ML
INJECTION, SOLUTION INTRAVENOUS CONTINUOUS
Status: ACTIVE | OUTPATIENT
Start: 2023-06-23

## 2023-06-23 RX ORDER — BUPIVACAINE HYDROCHLORIDE 7.5 MG/ML
INJECTION, SOLUTION EPIDURAL; RETROBULBAR
Status: COMPLETED | OUTPATIENT
Start: 2023-06-23 | End: 2023-06-23

## 2023-06-23 RX ORDER — DEXMEDETOMIDINE HYDROCHLORIDE 100 UG/ML
INJECTION, SOLUTION INTRAVENOUS
Status: DISCONTINUED | OUTPATIENT
Start: 2023-06-23 | End: 2023-06-23

## 2023-06-23 RX ORDER — ROCURONIUM BROMIDE 10 MG/ML
INJECTION, SOLUTION INTRAVENOUS
Status: DISCONTINUED | OUTPATIENT
Start: 2023-06-23 | End: 2023-06-23

## 2023-06-23 RX ORDER — SUCCINYLCHOLINE CHLORIDE 20 MG/ML
INJECTION INTRAMUSCULAR; INTRAVENOUS
Status: DISCONTINUED | OUTPATIENT
Start: 2023-06-23 | End: 2023-06-23

## 2023-06-23 RX ORDER — LIDOCAINE HYDROCHLORIDE 10 MG/ML
INJECTION, SOLUTION EPIDURAL; INFILTRATION; INTRACAUDAL; PERINEURAL
Status: DISCONTINUED | OUTPATIENT
Start: 2023-06-23 | End: 2023-06-23

## 2023-06-23 RX ADMIN — DEXAMETHASONE SODIUM PHOSPHATE 4 MG: 4 INJECTION, SOLUTION INTRAMUSCULAR; INTRAVENOUS at 07:06

## 2023-06-23 RX ADMIN — PHENYLEPHRINE HYDROCHLORIDE 100 MCG: 10 INJECTION INTRAVENOUS at 08:06

## 2023-06-23 RX ADMIN — BUPIVACAINE HYDROCHLORIDE 20 ML: 7.5 INJECTION, SOLUTION EPIDURAL; RETROBULBAR at 07:06

## 2023-06-23 RX ADMIN — ROCURONIUM BROMIDE 5 MG: 10 INJECTION, SOLUTION INTRAVENOUS at 07:06

## 2023-06-23 RX ADMIN — PHENYLEPHRINE HYDROCHLORIDE 100 MCG: 10 INJECTION INTRAVENOUS at 07:06

## 2023-06-23 RX ADMIN — SODIUM CHLORIDE: 0.9 INJECTION, SOLUTION INTRAVENOUS at 06:06

## 2023-06-23 RX ADMIN — ONDANSETRON 4 MG: 2 INJECTION INTRAMUSCULAR; INTRAVENOUS at 06:06

## 2023-06-23 RX ADMIN — DEXMEDETOMIDINE HYDROCHLORIDE 8 MCG: 100 INJECTION, SOLUTION INTRAVENOUS at 08:06

## 2023-06-23 RX ADMIN — CEFAZOLIN 2 G: 2 INJECTION, POWDER, FOR SOLUTION INTRAMUSCULAR; INTRAVENOUS at 07:06

## 2023-06-23 RX ADMIN — PROPOFOL 200 MG: 10 INJECTION, EMULSION INTRAVENOUS at 07:06

## 2023-06-23 RX ADMIN — MIDAZOLAM HYDROCHLORIDE 2 MG: 1 INJECTION INTRAMUSCULAR; INTRAVENOUS at 06:06

## 2023-06-23 RX ADMIN — HALOPERIDOL LACTATE 0.5 MG: 5 INJECTION, SOLUTION INTRAMUSCULAR at 07:06

## 2023-06-23 RX ADMIN — SUCCINYLCHOLINE CHLORIDE 140 MG: 20 INJECTION, SOLUTION INTRAMUSCULAR; INTRAVENOUS at 07:06

## 2023-06-23 RX ADMIN — LIDOCAINE HYDROCHLORIDE 60 MG: 10 INJECTION, SOLUTION EPIDURAL; INFILTRATION; INTRACAUDAL; PERINEURAL at 07:06

## 2023-06-23 RX ADMIN — LIDOCAINE HYDROCHLORIDE 10 MG: 10 INJECTION, SOLUTION EPIDURAL; INFILTRATION; INTRACAUDAL; PERINEURAL at 06:06

## 2023-06-23 RX ADMIN — SODIUM CHLORIDE: 9 INJECTION, SOLUTION INTRAVENOUS at 06:06

## 2023-06-23 RX ADMIN — FAMOTIDINE 20 MG: 10 INJECTION, SOLUTION INTRAVENOUS at 07:06

## 2023-06-23 RX ADMIN — SODIUM CHLORIDE, SODIUM GLUCONATE, SODIUM ACETATE, POTASSIUM CHLORIDE, MAGNESIUM CHLORIDE, SODIUM PHOSPHATE, DIBASIC, AND POTASSIUM PHOSPHATE: .53; .5; .37; .037; .03; .012; .00082 INJECTION, SOLUTION INTRAVENOUS at 07:06

## 2023-06-23 NOTE — OP NOTE
OPERATIVE REPORT    PATIENT: Nena Mendez, 43 y.o., female MRN:[id number]  :1980  DATE OF SURGERY: 2023  PREOP DIAGNOSIS:  Gastroparesis [K31.84]  Malnutrition, unspecified type [E46]  POSTOP DIAGNOSIS:  Gastroparesis [K31.84]  Malnutrition, unspecified type [E46]  PROCEDURE: [id number]-WQTV-P-CATH, REPLACEMENT, PULSE GENERATOR, NEUROSTIMULATOR, GASTRIC, PROGRAMMING-STIMULATOR  SURGEON:   Surgeon(s) and Role:     * German Randolph MD - Primary     * Rehan Mensah MD - Resident - Assisting  ANESTHESIA: General/Regional  FINDINGS:  right IJ port placed using US and fluoroscopic guidance  Replacement of gastric nerve stimulator, leads intact  EST BLOOD LOSS:  5 mL    URINE OUTPUT:  not monitored  SPECIMENS:   Specimens (From admission, onward)       Start     Ordered    23 0837  Specimen to Pathology, Surgery General Surgery  Once        Comments: Pre-op Diagnosis: Gastroparesis [K31.84]Malnutrition, unspecified type [E46]Procedure(s):FCSQNYQNN-UFRS-L-CATHREPLACEMENT, PULSE GENERATOR, NEUROSTIMULATOR, GASTRIC 1. Neurostimulator battery--gross only     References:    Click here for ordering Quick Tip   Question Answer Comment   Procedure Type: General Surgery    Specimen Class: Routine/Screening    Which provider would you like to cc? GERMAN RANDOLPH        23 0838                  IMPLANTS:   Implant Name Type Inv. Item Serial No.  Lot No. LRB No. Used Action   KIT POWERPORT SINGLE 8FR - [id number]  KIT POWERPORT SINGLE 8FR  [employer] [id number] Right 1 Implanted   GENERATOR NEUROSTIM GASTRIC - [id number]  GENERATOR NEUROSTIM GASTRIC [id number] [employer]  Right 1 Implanted   GENERATOR NEUROSTIM GASTRIC - [id number]  GENERATOR NEUROSTIM GASTRIC [id number] [employer]  N/A 1 Explanted   GENERATOR NEUROSTIM GASTRIC - [id number]  GENERATOR NEUROSTIM GASTRIC [id number] [employer]  N/A 1 Explanted   GENERATOR NEUROSTIM GASTRIC - [id number]  GENERATOR  NEUROSTIM GASTRIC [id number] [employer] [facility]  N/A 1 Explanted     DRAINS:  none  COMPLICATIONS: none apparent at the end of the case    INDICATIONS: Nena Mendez is a 43 y.o. female with gastroparesis on chronic TPN with gastric nerve stimulator in place with battery that needs replacing.    OPERATIVE PROCEDURE:   Nena Mendez was identified in the pre-op holding area and informed consent was verified. They were then diane back to the operating room and placed on the table in the supine position. General anesthesia was induced ant patient was intubated. Periop antibiotics were administered. A shoulder roll was placed and the arms were tucked. The patient was then prepped and draped in the usual sterile fashion. A time out was performed which verified this was the correct patient, procedure, and site. All participants were in agreement.     Local anesthesia was injected along the expected course. An #11 blade was used to create a small incision at the expected site of skin entry above the right internal jugular vein. An 18-gauge hollow needle was inserted into the right internal jugular vein under ultrasound guidance. The wire was inserted through the access needle, the needle was removed, and fluoroscopy was used to confirm the wire was in the correct position. A 3 cm incision was made using a #15 blade and the port pocket was created using electrocautery and blunt dissection. The catheter was flushed and then connected to the port and tunneler. The catheter was tunneled to the site of guidewire entry along the anterior right chest. The port was then then sewn to the pectoral fascia using two 2-0 vicryl sutures at the most superior locations of the port. Fluoroscopy was used to measure the approximate length of the catheter and it was cut. The dilator and sheath were advanced over the wire under fluoroscopy and then the dilator and wire were removed together. The catheter was inserted into the sheath and  the sheath was peeled away. The catheter was confirmed to be in proper position in the SVC using fluoroscopy. The port was aspirated and flushed with saline. 300 units of heparin in 3 cc was then flushed through the catheter.    The port incision was closed using running 3-0 vicryl followed by running 4-0 monocryl subcuticular.  The skin incision at the insertion site was closed with a 4-0 monocryl. Dermabond was applied.    Attention was then turned to the abdomen which was prepped and draped in the usual manner.  The prior stimulator incision was pre-injected with marcaine and incised with a scalpel.  The bovie was used to divide the subcutaneous tissues to the stimulator and the stimulator was brought out onto the field.  The old stimulator was removed and the new one placed.  It was placed back in the pocket and secured with two 0 prolene sutures.  The area was inspected for hemostasis and the incision closed with 3-0 and 4-0 vicryl. The incision was reinforced with Dermabond and an abdominal binder was placed on the patient.  The stimulator was interrogated and programmed: imp 455 volt 8 curr 17.6 pw 330 rate 40 on 2 off 3. An abdominal binder was placed.      The patient was awakened in the operating room and transported to the PACU in stable condition. All sponge, instrument and needle counts were correct at the end of the case. Patient tolerated the procedure well. Chest x-ray was performed in recovery which showed the port in the correct position with tip of the cathter in the distal SVC and no evidence of pneumothorax.

## 2023-06-23 NOTE — H&P
FOCUSED SURGICAL H&P    Nena Mendez is a 43 y.o. female. MRN is [id number].    CC: Here today for the following surgical procedure(s):  FWBPRBQIE-TLJX-G-CATH (Chest)  REPLACEMENT, PULSE GENERATOR, NEUROSTIMULATOR, GASTRIC (Chest)    HPI: For a detailed history of the patients history of present illness please refer to the last progress note. In brief, this is a 43 y.o. female with a known history of gastroparesis with gastric nerve stimulator in place on chronic TPN, here today for surgical intervention. There has been no recent changes in the patients health, including fevers, chest pain, or shortness of breath, and no new medications have been started. The patient has not had anything to eat or drink for the last 8 hours.      Past Medical History:   Past Medical History:   Diagnosis Date    Fibromyalgia     Gastroparesis     Headache     Rumination        Past Surgical History:   Past Surgical History:   Procedure Laterality Date    APPENDECTOMY      breast implants removed Bilateral      SECTION      CHOLECYSTECTOMY      COSMETIC SURGERY      ESOPHAGEAL MANOMETRY WITH MEASUREMENT OF IMPEDANCE N/A 10/30/2019    Procedure: MANOMETRY, ESOPHAGUS, WITH IMPEDANCE MEASUREMENT;  Surgeon: Patti Orozco MD;  Location: [address]);  Service: Endoscopy;  Laterality: N/A;  LATEX ALLERGY  r/o rumination  Motility Studies:  Hold Narcotics x 1 days   Hold TCA x 1 days  10/24 - pt confirmed appt-BB    ESOPHAGOGASTRODUODENOSCOPY N/A 3/22/2019    Procedure: EGD (ESOPHAGOGASTRODUODENOSCOPY)-dual lumen scope to remove intragastric suture.;  Surgeon: Robert Kumar MD;  Location: [facility] OR [address] [place] [address];  Service: General;  Laterality: N/A;    ESOPHAGOGASTRODUODENOSCOPY N/A 10/30/2019    Procedure: EGD (ESOPHAGOGASTRODUODENOSCOPY);  Surgeon: Patti Orozco MD;  Location: [facility] ([address] [place] [address]);  Service: Endoscopy;  Laterality: N/A;  LATEX ALLERGY  EGD with EndoFlip   4th Floor, scheduled on 2nd floor due  to availability  Full liquid diet 3 days and clear liquid diet x 1 day prior to procedure  Propofol only. No fentanyl or benzodiazepine during sedation. If additional sedation need    GASTRIC STIMULATOR IMPLANT SURGERY      GASTROSTOMY-JEJEUNOSTOMY TUBE CHANGE/PLACEMENT  08/10/2016    HYSTERECTOMY      INSERTION OF TUNNELED CENTRAL VENOUS CATHETER (CVC) WITH SUBCUTANEOUS PORT Left 2/3/2023    Procedure: CZDNJLQSD-ZPTM-D-CATH;  Surgeon: Robert Kumar MD;  Location: [facility] OR [address] [place] [address];  Service: General;  Laterality: Left;    laparoscopic jejunostomy tube      removed ~     REPLACEMENT OF GASTRIC NEUROSTIMULATOR GENERATOR N/A 3/22/2019    Procedure: REPLACEMENT, PULSE GENERATOR, NEUROSTIMULATOR, GASTRIC-battery exchange only;  Surgeon: Robert Kumar MD;  Location: [facility] OR [address] [place] [address];  Service: General;  Laterality: N/A;    REPLACEMENT OF GASTRIC NEUROSTIMULATOR GENERATOR N/A 2019    Procedure: REPLACEMENT, PULSE GENERATOR, NEUROSTIMULATOR, GASTRIC, OPEN (battery exchange);  Surgeon: Robert Kumar MD;  Location: [facility] OR [address] [place] [address];  Service: General;  Laterality: N/A;    REPLACEMENT OF GASTRIC NEUROSTIMULATOR GENERATOR N/A 2022    Procedure: REPLACEMENT, PULSE GENERATOR, NEUROSTIMULATOR, GASTRIC, Open;  Surgeon: Robert Kumar MD;  Location: [facility] OR [address] [place] [address];  Service: General;  Laterality: N/A;    sleeve gastrectomy      TONSILLECTOMY         Social History:   Social History     Socioeconomic History    Marital status:     Number of children: 2   Tobacco Use    Smoking status: Former     Packs/day: 0.00     Types: Cigarettes     Quit date: 2015     Years since quittin.8    Smokeless tobacco: Never   Substance and Sexual Activity    Alcohol use: No    Drug use: No       Family History:   Family History   Problem Relation Age of Onset    Hypothyroidism Mother     Cirrhosis Father     No Known Problems Brother     Asthma Daughter     Sleep apnea Daughter      Irritable bowel syndrome Maternal Aunt     Diverticulitis Maternal Aunt     Celiac disease Neg Hx     Colon cancer Neg Hx     Esophageal cancer Neg Hx     Stomach cancer Neg Hx     Rectal cancer Neg Hx     Ulcerative colitis Neg Hx     Crohn's disease Neg Hx           Allergies:  Review of patient's allergies indicates:   Allergen Reactions    Iodine and iodide containing products Swelling, Hives, Itching, Rash and Shortness Of Breath    Domperidone Itching    Latex, natural rubber Rash    Iodine     Scopolamine      Patch- caused burn under patch         Medications:    Current Facility-Administered Medications:     0.9%  NaCl infusion, , Intravenous, Continuous, Rehan Mensah MD, Last Rate: 70 mL/hr at 06/23/23 0608, New Bag at 06/23/23 0608    ceFAZolin 2 g in dextrose 5 % in water (D5W) 5 % 50 mL IVPB (MB+), 2 g, Intravenous, On Call Procedure, Rehan Mensah MD    midazolam (VERSED) 1 mg/mL injection 0.5-4 mg, 0.5-4 mg, Intravenous, PRN, Flavia Wilhelm MD                  Vital Signs:  Vitals:    06/23/23 0549   BP: 110/74   Pulse: 62   Resp: 16   Temp: 97.8 °F (36.6 °C)         Physical Exam:  Neuro: awake, alert, no acute distress.  HEENT: PERRLA, neck supple, no lymphadenopathy.  Heart: regular rate/rhythm  Lungs: equal chest expansion bilaterally, no increased work of breathing on RA  Abdomen: soft, non-distended, non-tender to palpation.  Extremities: warm, well-perfused       Labs:  Lab Results   Component Value Date/Time    WBC 4.99 05/11/2023 03:30 PM    WBC 6.3 11/18/2019 04:59 PM    HGB 11.8 (L) 05/11/2023 03:30 PM    HCT 36.5 (L) 05/11/2023 03:30 PM     05/11/2023 03:30 PM    MCV 89 05/11/2023 03:30 PM    MCV 88.0 11/18/2019 04:59 PM     Lab Results   Component Value Date/Time     03/15/2022 01:29 PM    K 4.0 03/15/2022 01:29 PM     03/15/2022 01:29 PM    CO2 29 03/15/2022 01:29 PM    BUN 5 (L) 03/15/2022 01:29 PM    GLU 89 03/15/2022 01:29 PM    MG 2.0 02/24/2022 06:03 AM     PHOS 3.9 02/24/2022 06:03 AM     No results found for: INR, PT, PTT  No components found for: TROPI  Lab Results   Component Value Date/Time    ALT 10 02/24/2022 06:03 AM    AST 17 02/24/2022 06:03 AM          Assessment/Plan:  43 y.o. female here today for the following surgical procedure:    [id number]-M-CATH (Chest)  REPLACEMENT, PULSE GENERATOR, NEUROSTIMULATOR, GASTRIC (Chest)       The indications for surgery, highlighting the risks and benefits of the procedure were discussed with the patient. These included but are not limited to swelling, bleeding, pain, infection, and adverse anesthesia-related event. I also discussed risk of injury to nearby structures. The patient seems to understand the risks, as well as the alternatives including nonoperative observation/survellience and wishes to proceed with the surgical intervention.      Rehan Mensah MD  General Surgery, PGY 1

## 2023-06-23 NOTE — BRIEF OP NOTE
[facility] - Surgery ([facility])  Brief Operative Note    Surgery Date: 6/23/2023     Surgeon(s) and Role:     * Robert Randolph MD - Primary     * Rehan Mensah MD - Resident - Assisting    Pre-op Diagnosis:  Gastroparesis [K31.84]  Malnutrition, unspecified type [E46]    Post-op Diagnosis:  Post-Op Diagnosis Codes:     * Gastroparesis [K31.84]     * Malnutrition, unspecified type [E46]    Procedure(s) (LRB):  OGHYYYYTI-ZAAI-E-CATH (N/A)  REPLACEMENT, PULSE GENERATOR, NEUROSTIMULATOR, GASTRIC (N/A)  PROGRAMMING-STIMULATOR    Anesthesia: General/Regional    Operative Findings: right IJ port placed using US and fluoroscopic guidance  Replacement of gastric nerve stimulator, leads intact    Estimated Blood Loss: 5 cc         Specimens:   Specimen (24h ago, onward)       Start     Ordered    06/23/23 0837  Specimen to Pathology, Surgery General Surgery  Once        Comments: Pre-op Diagnosis: Gastroparesis [K31.84]Malnutrition, unspecified type [E46]Procedure(s):XZJRPPUEK-CHQN-K-CATHREPLACEMENT, PULSE GENERATOR, NEUROSTIMULATOR, GASTRIC 1. Neurostimulator battery--gross only     References:    Click here for ordering Quick Tip   Question Answer Comment   Procedure Type: General Surgery    Specimen Class: Routine/Screening    Which provider would you like to cc? ROBERT RANDOLPH        06/23/23 0838                      Discharge Note    OUTCOME: Patient tolerated treatment/procedure well without complication and is now ready for discharge.    DISPOSITION: Home or Self Care    FINAL DIAGNOSIS:  Gastroparesis    FOLLOWUP: In clinic    DISCHARGE INSTRUCTIONS:    Discharge Procedure Orders   Diet Adult Regular     Notify your health care provider if you experience any of the following:  temperature >100.4     Notify your health care provider if you experience any of the following:  persistent nausea and vomiting or diarrhea     Notify your health care provider if you experience any of the following:  severe  uncontrolled pain     Notify your health care provider if you experience any of the following:  redness, tenderness, or signs of infection (pain, swelling, redness, odor or green/yellow discharge around incision site)     Notify your health care provider if you experience any of the following:  difficulty breathing or increased cough     Weight bearing restrictions (specify):   Order Comments: Do not lift greater than 10 lbs for 2 weeks

## 2023-06-23 NOTE — PLAN OF CARE
Discharge instructions reviewed with patient and family. Verbalizes understanding. Copies of instructions given to patient. Tolerating PO fluids. Denies pain or nausea. Safety maintained.

## 2023-06-23 NOTE — ANESTHESIA PREPROCEDURE EVALUATION
06/23/2023  Nena Mendez is a 43 y.o., female.  Patient Active Problem List   Diagnosis    S/P lap gastric neurostimulator placement     Malnutrition    Jejunostomy tube site pain    S/P laparoscopic sleeve gastrectomy    Nausea and vomiting    Encounter for insertion of tunneled central venous catheter (CVC) with port       [id number]-LXGC-R-CATH (Chest    Pre-op Assessment    I have reviewed the Patient Summary Reports.     I have reviewed the Nursing Notes. I have reviewed the NPO Status.      Review of Systems      Physical Exam  General: Well nourished    Airway:  Mallampati: II   Mouth Opening: Normal  TM Distance: Normal  Tongue: Normal  Neck ROM: Normal ROM        Anesthesia Plan  Type of Anesthesia, risks & benefits discussed:    Anesthesia Type: Gen ETT, Gen Natural Airway  Intra-op Monitoring Plan: Standard ASA Monitors  Post Op Pain Control Plan: multimodal analgesia  Induction:  IV  Airway Plan: Direct  Informed Consent: Informed consent signed with the Patient and all parties understand the risks and agree with anesthesia plan.  All questions answered.   ASA Score: 2  Day of Surgery Review of History & Physical: H&P Update referred to the surgeon/provider.    Ready For Surgery From Anesthesia Perspective.     .

## 2023-06-23 NOTE — DISCHARGE INSTRUCTIONS
Keep abdominal dressing in place until clinic visit. Wear abdominal binder until see in clinic.    POSTOPERATIVE INSTRUCTIONS FOLLOWING PORT-A-CATH PLACMENT    The following are post-operative instructions that will help you to recover from your surgery.  Please read over these instructions carefully and contact us if we can answer any of your questions or concerns.    Dressing  You may shower the day after surgery. Let soap and water run over the incisions and pat dry. Do not scrub the area. Do not take a tub bath and do not soak the surgical site. Keep the sterile dressing in place.    Activity   You should be able to return to your regular activities 2 days after your surgery.  However, do not engage in strenuous activities in which you use your upper body such as:  golf, tennis, aerobics, washing windows, raking the yard, mopping, vacuuming, heavy lifting (e.g children) until you are seen for your follow-up appointment in clinic. Do not lift anything heavier than a gallon of milk.  Do not lift >10 lbs for 2 weeks.    Medication for pain  Over the counter pain medication such as Tylenol (acetaminophen) or Advil (ibuprofen) should be sufficient for your pain.    Please report the following:  Temperature greater than 101 degrees  Discharge or bad odor from the wound  Excessive bleeding, such as saturated bloody dressing or extreme bruising  Redness at incision and/or drain sites  Swelling or buildup of fluid around incision  Shortness of breath    Additional information  Your surgeon or medical oncologist will see you approximately 1-2 weeks following your surgery to check the incision.  If this follow-up appointment has not been made, please call the office.    If you have any questions or problems, please call my office or my nurse.      After hours and on weekends, you may call the main [facility] line at [phone] and ask to have the general surgery resident paged.

## 2023-06-23 NOTE — ANESTHESIA PROCEDURE NOTES
Intubation    Date/Time: 6/23/2023 7:26 AM  Performed by: Raúl Corona CRNA  Authorized by: John Michele MD     Intubation:     Induction:  Rapid sequence induction    Intubated:  Postinduction    Mask Ventilation:  Not attempted    Attempts:  1    Attempted By:  Student    Method of Intubation:  Direct    Blade:  Mullins 2    Laryngeal View Grade: Grade I - full view of cords      Difficult Airway Encountered?: No      Complications:  None    Airway Device:  Oral endotracheal tube    Airway Device Size:  7.0    Style/Cuff Inflation:  Cuffed (inflated to minimal occlusive pressure)    Inflation Amount (mL):  8    Tube secured:  21    Secured at:  The lips    Placement Verified By:  Capnometry    Complicating Factors:  None    Findings Post-Intubation:  BS equal bilateral and atraumatic/condition of teeth unchanged

## 2023-06-23 NOTE — ANESTHESIA PROCEDURE NOTES
HAY SS Blocks    Patient location during procedure: pre-op   Block not for primary anesthetic.  Reason for block: at surgeon's request and post-op pain management   Post-op Pain Location: abdominal pain   Start time: 6/23/2023 6:53 AM  Timeout: 6/23/2023 6:52 AM   End time: 6/23/2023 7:05 AM    Staffing  Authorizing Provider: Andrea Gomez MD  Performing Provider: Flavia Wilhelm MD    Preanesthetic Checklist  Completed: patient identified, IV checked, site marked, risks and benefits discussed, surgical consent, monitors and equipment checked, pre-op evaluation and timeout performed  Peripheral Block  Patient position: sitting  Prep: ChloraPrep  Patient monitoring: heart rate, cardiac monitor, continuous pulse ox, continuous capnometry and frequent blood pressure checks  Block type: erector spinae plane  Laterality: bilateral  Injection technique: single shot  Interspace: T7-8    Needle  Needle type: Tuohy   Needle gauge: 17 G  Needle length: 3.5 in  Needle localization: anatomical landmarks and ultrasound guidance   -ultrasound image captured on disc.  Assessment  Injection assessment: negative aspiration, negative parasthesia and local visualized surrounding nerve  Paresthesia pain: none  Heart rate change: no  Slow fractionated injection: yes    Medications:    Medications: bupivacaine (pf) (MARCAINE) injection 0.75% - Perineural   20 mL - 6/23/2023 7:05:00 AM    Additional Notes  A time out was conducted. Site blossom confirmed with team and patient. Allergies reviewed.   Vital signs stable throughout block. RN monitoring vitals throughout.   Needle advanced under continuous ultrasound guidance.  Local injected incrementally after confirming negative aspiration. No signs or symptoms of intravascular or intraneural injection noted.   No persistent paresthesias elicited or expressed. Patient tolerated procedure well.  20cc of 0.375% Bupi with epinephrine 1:300K, PF dexamethasone 1 mg, and clonidine 50 mcg used for  the bl blocks

## 2023-06-23 NOTE — BRIEF OP NOTE
Operative Note       Surgery Date: 6/23/2023     Surgeon(s) and Role:     * German Randolph MD - Primary     * Rehan Mensah MD - Resident - Assisting    Pre-op Diagnosis:  Gastroparesis [K31.84]  Malnutrition, unspecified type [E46]    Post-op Diagnosis:  Gastroparesis [K31.84]  Malnutrition, unspecified type [E46]    Procedure(s) (LRB):  OYSKEFIOO-POHM-I-CATH (N/A)  REPLACEMENT, PULSE GENERATOR, NEUROSTIMULATOR, GASTRIC (N/A)  PROGRAMMING-STIMULATOR    Anesthesia: General/Regional    Procedure in Detail/Findings:  Port, gstim and programming without apparent complication.    Estimated Blood Loss: Minimal           Specimens (From admission, onward)       Start     Ordered    06/23/23 0837  Specimen to Pathology, Surgery General Surgery  Once        Comments: Pre-op Diagnosis: Gastroparesis [K31.84]Malnutrition, unspecified type [E46]Procedure(s):NCNAVDIFI-RMUU-Y-CATHREPLACEMENT, PULSE GENERATOR, NEUROSTIMULATOR, GASTRIC 1. Neurostimulator battery--gross only     References:    Click here for ordering Quick Tip   Question Answer Comment   Procedure Type: General Surgery    Specimen Class: Routine/Screening    Which provider would you like to cc? GERMAN RANDOLPH        06/23/23 0838                  Implants:   Implant Name Type Inv. Item Serial No.  Lot No. LRB No. Used Action   KIT POWERPORT SINGLE 8FR - [id number]  KIT POWERPORT SINGLE 8FR  [employer] [place] [id number] Right 1 Implanted   GENERATOR NEUROSTIM GASTRIC - [id number]  GENERATOR NEUROSTIM GASTRIC [id number] [employer]  Right 1 Implanted   GENERATOR NEUROSTIM GASTRIC - [id number]  GENERATOR NEUROSTIM GASTRIC [id number] [employer]  N/A 1 Explanted   GENERATOR NEUROSTIM GASTRIC - [id number]  GENERATOR NEUROSTIM GASTRIC [id number] [employer]  N/A 1 Explanted   GENERATOR NEUROSTIM GASTRIC - [id number]  GENERATOR NEUROSTIM GASTRIC [id number] [employer]  N/A 1 Explanted              Disposition: PACU - hemodynamically  stable.           Condition: Good    Attestation:  I was present and scrubbed for the entire procedure.

## 2023-06-23 NOTE — TRANSFER OF CARE
"Anesthesia Transfer of Care Note    Patient: Nena Mendez    Procedure(s) Performed: Procedure(s) (LRB):  GTXPPTQEU-AKFZ-E-CATH (N/A)  REPLACEMENT, PULSE GENERATOR, NEUROSTIMULATOR, GASTRIC (N/A)  PROGRAMMING-STIMULATOR    Patient location: [facility]    Anesthesia Type: general    Transport from OR: Transported from OR on 6-10 L/min O2 by face mask with adequate spontaneous ventilation    Post pain: adequate analgesia    Post assessment: no apparent anesthetic complications    Post vital signs: stable    Level of consciousness: awake    Nausea/Vomiting: no nausea/vomiting    Complications: none    Transfer of care protocol was followed      Last vitals:   Visit Vitals  /74 (BP Location: Right arm, Patient Position: Lying)   Pulse 62   Temp 36.6 °C (97.8 °F) (Oral)   Resp 16   Ht 5' 5" (1.651 m)   Wt 44 kg (97 lb)   SpO2 98%   Breastfeeding No   BMI 16.14 kg/m²     "

## 2023-06-24 NOTE — PROGRESS NOTES
Phone call: She is ok but has pain in her shoulder where the port is.  She is otherwise ok.  She denies fevers.  She can use some ointment for pain but be careful not to dissolve the glue and ice.

## 2023-06-26 LAB
FINAL PATHOLOGIC DIAGNOSIS: NORMAL
GROSS: NORMAL
Lab: NORMAL

## 2023-06-26 NOTE — ANESTHESIA POSTPROCEDURE EVALUATION
Anesthesia Post Evaluation    Patient: Nena Mendez    Procedure(s) Performed: Procedure(s) (LRB):  UTDYUJSQP-NLXM-H-CATH (N/A)  REPLACEMENT, PULSE GENERATOR, NEUROSTIMULATOR, GASTRIC (N/A)  PROGRAMMING-STIMULATOR    Final Anesthesia Type: general      Patient location during evaluation: PACU  Patient participation: Yes- Able to Participate  Level of consciousness: awake and alert  Post-procedure vital signs: reviewed and stable  Pain management: adequate  Airway patency: patent    PONV status at discharge: No PONV  Anesthetic complications: no      Cardiovascular status: blood pressure returned to baseline  Respiratory status: unassisted  Hydration status: euvolemic  Follow-up not needed.          Vitals Value Taken Time   /77 06/23/23 1010   Temp 36.5 °C (97.7 °F) 06/23/23 1010   Pulse 53 06/23/23 1011   Resp 15 06/23/23 1011   SpO2 100 % 06/23/23 1011   Vitals shown include unvalidated device data.      No case tracking events are documented in the log.      Pain/Baljit Score: No data recorded

## 2023-07-11 ENCOUNTER — OFFICE VISIT (OUTPATIENT)
Dept: SURGERY | Facility: CLINIC | Age: 43
End: 2023-07-11
Payer: COMMERCIAL

## 2023-07-11 VITALS
BODY MASS INDEX: 14.75 KG/M2 | SYSTOLIC BLOOD PRESSURE: 113 MMHG | HEIGHT: 69 IN | HEART RATE: 65 BPM | WEIGHT: 99.56 LBS | DIASTOLIC BLOOD PRESSURE: 66 MMHG

## 2023-07-11 DIAGNOSIS — R11.2 NAUSEA AND VOMITING, UNSPECIFIED VOMITING TYPE: Primary | ICD-10-CM

## 2023-07-11 PROCEDURE — 99024 POSTOP FOLLOW-UP VISIT: CPT | Mod: S$GLB,,, | Performed by: SURGERY

## 2023-07-11 PROCEDURE — 99999 PR PBB SHADOW E&M-EST. PATIENT-LVL III: ICD-10-PCS | Mod: PBBFAC,,, | Performed by: SURGERY

## 2023-07-11 PROCEDURE — 1160F PR REVIEW ALL MEDS BY PRESCRIBER/CLIN PHARMACIST DOCUMENTED: ICD-10-PCS | Mod: CPTII,S$GLB,, | Performed by: SURGERY

## 2023-07-11 PROCEDURE — 3074F SYST BP LT 130 MM HG: CPT | Mod: CPTII,S$GLB,, | Performed by: SURGERY

## 2023-07-11 PROCEDURE — 99999 PR PBB SHADOW E&M-EST. PATIENT-LVL III: CPT | Mod: PBBFAC,,, | Performed by: SURGERY

## 2023-07-11 PROCEDURE — 1159F MED LIST DOCD IN RCRD: CPT | Mod: CPTII,S$GLB,, | Performed by: SURGERY

## 2023-07-11 PROCEDURE — 1159F PR MEDICATION LIST DOCUMENTED IN MEDICAL RECORD: ICD-10-PCS | Mod: CPTII,S$GLB,, | Performed by: SURGERY

## 2023-07-11 PROCEDURE — 3008F PR BODY MASS INDEX (BMI) DOCUMENTED: ICD-10-PCS | Mod: CPTII,S$GLB,, | Performed by: SURGERY

## 2023-07-11 PROCEDURE — 99024 PR POST-OP FOLLOW-UP VISIT: ICD-10-PCS | Mod: S$GLB,,, | Performed by: SURGERY

## 2023-07-11 PROCEDURE — 3078F DIAST BP <80 MM HG: CPT | Mod: CPTII,S$GLB,, | Performed by: SURGERY

## 2023-07-11 PROCEDURE — 1160F RVW MEDS BY RX/DR IN RCRD: CPT | Mod: CPTII,S$GLB,, | Performed by: SURGERY

## 2023-07-11 PROCEDURE — 3008F BODY MASS INDEX DOCD: CPT | Mod: CPTII,S$GLB,, | Performed by: SURGERY

## 2023-07-11 PROCEDURE — 3074F PR MOST RECENT SYSTOLIC BLOOD PRESSURE < 130 MM HG: ICD-10-PCS | Mod: CPTII,S$GLB,, | Performed by: SURGERY

## 2023-07-11 PROCEDURE — 3078F PR MOST RECENT DIASTOLIC BLOOD PRESSURE < 80 MM HG: ICD-10-PCS | Mod: CPTII,S$GLB,, | Performed by: SURGERY

## 2023-07-11 NOTE — PROGRESS NOTES
44y/o with bmi 15.19 (weight stable from last visit), fibromyalgia and s/p gastric stimulator 10/7/16 with replacement of battery 3/22/19, 12/30/19, 1/28/22, 6/23/23, J tube 12/16/16 (she didn't tolerate it so she may have small bowel dysmotility also), pylorolasty 1/27/17, sleeve gastrectomy 5/10/17, replacement j tube and robotic g tube 2/21/22 (removed later), s/p portacath 2/3/23 and replacement 6/23/23 all for gastroparesis.   She has some soreness at her port site.  She continues to have extremely severe symptoms.     Her child has heart trouble.  They have a Sean Luther, a service dog to detect low blood pressure and is in training.  She says her dog got Chaga's from eating an insect and has a pacemaker.  Her oldest daughter is considering crna and working in the icu.     Last Gastric Stimulator Interrogation: impedence 411 Voltage 8 Current 19.5 Pulse Width 330 Rate 40 Cycle on 2 Cycle off 3, on, battery low.      shows multiple butalbitol rx and recent narcotics rx.  She takes this for headaches and it does give her stomach complaints.      Diagnostic Results:  CT 2022 ok, tubes in place.  No mention of abscess at the g tube site.  Ugi 2020 reviewed, reflux, no hh, no achalasia,  Egd 2019 reviewed, pyloroplasty with suture, 5 cm hh, sleeve, endoflip normal  Egd 2022 reviewed, gastritis  Motility 2019 reviewed, egj outflow obstruction, rumination syndrome, high lesp with incomplete relaxation     Gastroparesis with protein calorie malnutrition.  Follow up with GI and awaiting starting tpn.  Reschedule ges.  Obtain last egd (valentines day).  She asked about a total or subtotal gastrectomy and I have not had good success with this.

## 2023-07-12 ENCOUNTER — DOCUMENTATION ONLY (OUTPATIENT)
Dept: SURGERY | Facility: CLINIC | Age: 43
End: 2023-07-12
Payer: COMMERCIAL

## 2023-07-12 NOTE — ADDENDUM NOTE
Addendum  created 07/11/23 2243 by Andrea Gomez MD    Attestation recorded in Intraprocedure, Intraprocedure Attestations filed

## 2023-07-12 NOTE — PROGRESS NOTES
Faxed request for EGD Report of 2/14/23 to Dr. Morales David's office Fax # [phone] (Ph # [phone])

## 2023-07-17 NOTE — PROGRESS NOTES
Confirmed with Amalia(?) at [facility] that order was received and everything good to go for GES on 8/7/23.

## 2023-09-18 ENCOUNTER — PATIENT MESSAGE (OUTPATIENT)
Dept: INFECTIOUS DISEASES | Facility: CLINIC | Age: 43
End: 2023-09-18
Payer: COMMERCIAL

## 2023-09-22 ENCOUNTER — TELEPHONE (OUTPATIENT)
Dept: INFECTIOUS DISEASES | Facility: CLINIC | Age: 43
End: 2023-09-22
Payer: COMMERCIAL

## 2023-09-22 NOTE — TELEPHONE ENCOUNTER
Called patient  s PCP, KIRTI Shanks NP   Patient had port placed in July for TPN.  Held off TPN.    Patient reportedly having recurrent fever and WBC 2 at last lab draw.    Ms. Vasquez's query:  ? Start TPN  Advised that if patient having intermittent fever with a port in, she needs, at a minimum,  to have blood cultures drawn; more ideally, to ED for cultures, empiric antibiotics, and evaluation of need for port removal.  Would not start TPN with fevers/low WBC  Recommend 2D echo if not done recently    This has been a recurrent issue.

## 2023-10-03 ENCOUNTER — TELEPHONE (OUTPATIENT)
Dept: INFECTIOUS DISEASES | Facility: CLINIC | Age: 43
End: 2023-10-03
Payer: COMMERCIAL

## 2023-10-03 DIAGNOSIS — R50.9 FEVER, UNSPECIFIED FEVER CAUSE: Primary | ICD-10-CM

## 2023-10-03 NOTE — TELEPHONE ENCOUNTER
Called to PCP, Shima Shakns  Patient advises she has not had blood cultures or lab work done?    No answer.  Left voicemail to call me

## 2023-10-04 ENCOUNTER — PATIENT MESSAGE (OUTPATIENT)
Dept: INFECTIOUS DISEASES | Facility: CLINIC | Age: 43
End: 2023-10-04
Payer: COMMERCIAL

## 2023-10-10 ENCOUNTER — PATIENT MESSAGE (OUTPATIENT)
Dept: INFECTIOUS DISEASES | Facility: CLINIC | Age: 43
End: 2023-10-10
Payer: COMMERCIAL

## 2023-10-16 DIAGNOSIS — A41.9 SEPSIS: Primary | ICD-10-CM

## 2023-10-18 ENCOUNTER — OFFICE VISIT (OUTPATIENT)
Dept: SURGERY | Facility: CLINIC | Age: 43
End: 2023-10-18
Payer: COMMERCIAL

## 2023-10-18 DIAGNOSIS — A41.9 SEPSIS: ICD-10-CM

## 2023-10-18 DIAGNOSIS — T80.219A INFECTION OF VENOUS ACCESS PORT, INITIAL ENCOUNTER: Primary | ICD-10-CM

## 2023-10-18 PROCEDURE — 99213 OFFICE O/P EST LOW 20 MIN: CPT | Mod: S$GLB,,, | Performed by: SURGERY

## 2023-10-18 PROCEDURE — 99213 PR OFFICE/OUTPT VISIT, EST, LEVL III, 20-29 MIN: ICD-10-PCS | Mod: S$GLB,,, | Performed by: SURGERY

## 2023-10-18 PROCEDURE — 1160F PR REVIEW ALL MEDS BY PRESCRIBER/CLIN PHARMACIST DOCUMENTED: ICD-10-PCS | Mod: CPTII,S$GLB,, | Performed by: SURGERY

## 2023-10-18 PROCEDURE — 1159F PR MEDICATION LIST DOCUMENTED IN MEDICAL RECORD: ICD-10-PCS | Mod: CPTII,S$GLB,, | Performed by: SURGERY

## 2023-10-18 PROCEDURE — 1160F RVW MEDS BY RX/DR IN RCRD: CPT | Mod: CPTII,S$GLB,, | Performed by: SURGERY

## 2023-10-18 PROCEDURE — 1159F MED LIST DOCD IN RCRD: CPT | Mod: CPTII,S$GLB,, | Performed by: SURGERY

## 2023-10-18 NOTE — PROGRESS NOTES
History & Physical    SUBJECTIVE:     History of Present Illness:    43-year-old female is referred for right venous access port removal.  Patient has been running low-grade fevers with a associated leukopenia and was recommended to have her right venous access port removed.  She has a long history of gastroparesis and had numerous procedures related to this.  She had been getting TPN through the port.  Most recent medical records from [facility] in [place] are noted.    Chief Complaint   Patient presents with    Other     Port removal right side          Review of patient's allergies indicates:  Review of patient's allergies indicates:   Allergen Reactions    Iodine and iodide containing products Swelling, Hives, Itching, Rash and Shortness Of Breath    Domperidone Itching    Latex, natural rubber Rash    Iodine     Scopolamine      Patch- caused burn under patch       Current Outpatient Medications on File Prior to Visit   Medication Sig Dispense Refill    butalbitaL-acetaminop-caf-cod -15-30 mg Cap Take 1 capsule by mouth every 4 to 6 hours as needed.      promethazine (PHENERGAN) 25 MG tablet TAKE 1 TABLET(25 MG) BY MOUTH EVERY 6 HOURS AS NEEDED FOR NAUSEA 60 tablet 6    sucralfate (CARAFATE) 100 mg/mL suspension Take 1 g by mouth 4 (four) times daily.      acetaminophen (TYLENOL) 160 mg/5 mL Liqd 20.3 mLs (649.6 mg total) by Per G Tube route every 8 (eight) hours as needed (pain). 473 mL 0    ALPRAZolam (XANAX) 0.5 MG tablet Take 0.5 mg by mouth daily as needed.      b complex vitamins capsule Take 1 capsule by mouth once daily.      baclofen (LIORESAL) 10 MG tablet Take 1 tablet (10 mg total) by mouth 3 (three) times daily as needed. (Patient taking differently: Take 10 mg by mouth nightly.) 90 tablet 11    chlorproMAZINE (THORAZINE) 25 MG tablet Take 25 mg by mouth every 4 (four) hours as needed.      multivitamin Liqd Take by mouth once daily.      nitrofurantoin,  macrocrystal-monohydrate, (MACROBID) 100 MG capsule Take 100 mg by mouth every 12 (twelve) hours.      ondansetron (ZOFRAN-ODT) 4 MG TbDL Dissolve 2 tablets (8 mg total) by mouth every 8 (eight) hours as needed (nausea). 30 tablet 0    promethazine (PHENERGAN) 6.25 mg/5 mL syrup Take 12.5 mg by mouth every 6 (six) hours as needed for Nausea.       Current Facility-Administered Medications on File Prior to Visit   Medication Dose Route Frequency Provider Last Rate Last Admin    0.9%  NaCl infusion   Intravenous Continuous Rehan Mensah MD   Stopped at 23 0932       Past Medical History:   Diagnosis Date    Fibromyalgia     Gastroparesis     Headache     Rumination      Past Surgical History:   Procedure Laterality Date    APPENDECTOMY      breast implants removed Bilateral 2020     SECTION      CHOLECYSTECTOMY      COSMETIC SURGERY      ESOPHAGEAL MANOMETRY WITH MEASUREMENT OF IMPEDANCE N/A 10/30/2019    Procedure: MANOMETRY, ESOPHAGUS, WITH IMPEDANCE MEASUREMENT;  Surgeon: Patti Orozco MD;  Location: [address]);  Service: Endoscopy;  Laterality: N/A;  LATEX ALLERGY  r/o rumination  Motility Studies:  Hold Narcotics x 1 days   Hold TCA x 1 days  10/24 - pt confirmed appt-BB    ESOPHAGOGASTRODUODENOSCOPY N/A 3/22/2019    Procedure: EGD (ESOPHAGOGASTRODUODENOSCOPY)-dual lumen scope to remove intragastric suture.;  Surgeon: oRbert Kumar MD;  Location: [address];  Service: General;  Laterality: N/A;    ESOPHAGOGASTRODUODENOSCOPY N/A 10/30/2019    Procedure: EGD (ESOPHAGOGASTRODUODENOSCOPY);  Surgeon: Patti Orozco MD;  Location: [address]);  Service: Endoscopy;  Laterality: N/A;  LATEX ALLERGY  EGD with [facility]   4th Floor, scheduled on 2nd floor due to availability  Full liquid diet 3 days and clear liquid diet x 1 day prior to procedure  Propofol only. No fentanyl or benzodiazepine during sedation. If additional sedation need    GASTRIC STIMULATOR IMPLANT  SURGERY      GASTROSTOMY-JEJEUNOSTOMY TUBE CHANGE/PLACEMENT  08/10/2016    HYSTERECTOMY      INSERTION OF TUNNELED CENTRAL VENOUS CATHETER (CVC) WITH SUBCUTANEOUS PORT Left 2/3/2023    Procedure: [id number]-CATH;  Surgeon: Robert Kumar MD;  Location: [facility] OR [address] [place] [address];  Service: General;  Laterality: Left;    INSERTION OF TUNNELED CENTRAL VENOUS CATHETER (CVC) WITH SUBCUTANEOUS PORT N/A 6/23/2023    Procedure: [id number]-CATH;  Surgeon: Robert Kumar MD;  Location: [address];  Service: General;  Laterality: N/A;    laparoscopic jejunostomy tube      removed ~ 2019    PROGRAMMING-STIMULATOR  6/23/2023    Procedure: PROGRAMMING-STIMULATOR;  Surgeon: Robert Kumar MD;  Location: [address];  Service: General;;    REPLACEMENT OF GASTRIC NEUROSTIMULATOR GENERATOR N/A 3/22/2019    Procedure: REPLACEMENT, PULSE GENERATOR, NEUROSTIMULATOR, GASTRIC-battery exchange only;  Surgeon: Robert Kumar MD;  Location: [address];  Service: General;  Laterality: N/A;    REPLACEMENT OF GASTRIC NEUROSTIMULATOR GENERATOR N/A 12/30/2019    Procedure: REPLACEMENT, PULSE GENERATOR, NEUROSTIMULATOR, GASTRIC, OPEN (battery exchange);  Surgeon: Robert Kumar MD;  Location: [address];  Service: General;  Laterality: N/A;    REPLACEMENT OF GASTRIC NEUROSTIMULATOR GENERATOR N/A 1/28/2022    Procedure: REPLACEMENT, PULSE GENERATOR, NEUROSTIMULATOR, GASTRIC, Open;  Surgeon: Robert Kumar MD;  Location: [address];  Service: General;  Laterality: N/A;    REPLACEMENT OF GASTRIC NEUROSTIMULATOR GENERATOR N/A 6/23/2023    Procedure: REPLACEMENT, PULSE GENERATOR, NEUROSTIMULATOR, GASTRIC;  Surgeon: Robert Kumar MD;  Location: [address];  Service: General;  Laterality: N/A;  No narcotics    sleeve gastrectomy      TONSILLECTOMY       Family History   Problem Relation Age of Onset    Hypothyroidism Mother     Cirrhosis Father     No Known Problems  Brother     Asthma Daughter     Sleep apnea Daughter     Irritable bowel syndrome Maternal Aunt     Diverticulitis Maternal Aunt     Celiac disease Neg Hx     Colon cancer Neg Hx     Esophageal cancer Neg Hx     Stomach cancer Neg Hx     Rectal cancer Neg Hx     Ulcerative colitis Neg Hx     Crohn's disease Neg Hx        Social History     Socioeconomic History    Marital status:     Number of children: 2   Tobacco Use    Smoking status: Former     Current packs/day: 0.00     Types: Cigarettes     Quit date: 2015     Years since quittin.1    Smokeless tobacco: Never   Substance and Sexual Activity    Alcohol use: No    Drug use: No          Review of Systems   Constitutional:  Positive for weight loss.   Respiratory: Negative.     Cardiovascular: Negative.    Gastrointestinal:  Positive for abdominal pain.   Genitourinary: Negative.    Musculoskeletal: Negative.    Neurological: Negative.    Endo/Heme/Allergies: Negative.    Psychiatric/Behavioral: Negative.         OBJECTIVE:     There were no vitals filed for this visit.              Physical Exam:  Physical Exam  Constitutional:       Appearance: Normal appearance.   HENT:      Head: Normocephalic.   Eyes:      Pupils: Pupils are equal, round, and reactive to light.   Cardiovascular:      Rate and Rhythm: Normal rate.   Pulmonary:      Effort: Pulmonary effort is normal.   Abdominal:      General: Abdomen is flat. Bowel sounds are normal.      Palpations: Abdomen is soft.   Musculoskeletal:         General: Normal range of motion.      Cervical back: Normal range of motion.   Skin:     General: Skin is warm and dry.   Neurological:      General: No focal deficit present.      Mental Status: She is alert and oriented to person, place, and time.      Cranial Nerves: No cranial nerve deficit.   Psychiatric:         Mood and Affect: Mood normal.         Behavior: Behavior normal.             ASSESSMENT/PLAN:   Low-grade sepsis related to right venous  access port infection  PLAN:  Removal of right venous access port will be scheduled for tomorrow.  We discussed doing this under a local MAC versus a light general anesthetic.  The procedure, risk and benefits were discussed and all questions answered

## 2023-10-19 ENCOUNTER — OUTSIDE PLACE OF SERVICE (OUTPATIENT)
Dept: SURGERY | Facility: CLINIC | Age: 43
End: 2023-10-19

## 2023-10-19 PROCEDURE — 36590 PR REMOVAL TUNNELED CV CATH W SUBQ PORT OR PUMP: ICD-10-PCS | Mod: ,,, | Performed by: SURGERY

## 2023-10-19 PROCEDURE — 36590 REMOVAL TUNNELED CV CATH: CPT | Mod: ,,, | Performed by: SURGERY

## 2023-10-23 ENCOUNTER — PATIENT MESSAGE (OUTPATIENT)
Dept: INFECTIOUS DISEASES | Facility: CLINIC | Age: 43
End: 2023-10-23
Payer: COMMERCIAL

## 2023-10-30 ENCOUNTER — OFFICE VISIT (OUTPATIENT)
Dept: SURGERY | Facility: CLINIC | Age: 43
End: 2023-10-30
Payer: COMMERCIAL

## 2023-10-30 DIAGNOSIS — Z98.890 POST-OPERATIVE STATE: Primary | ICD-10-CM

## 2023-10-30 PROCEDURE — 99024 PR POST-OP FOLLOW-UP VISIT: ICD-10-PCS | Mod: S$GLB,,, | Performed by: SURGERY

## 2023-10-30 PROCEDURE — 99024 POSTOP FOLLOW-UP VISIT: CPT | Mod: S$GLB,,, | Performed by: SURGERY

## 2023-10-30 PROCEDURE — 1160F PR REVIEW ALL MEDS BY PRESCRIBER/CLIN PHARMACIST DOCUMENTED: ICD-10-PCS | Mod: CPTII,S$GLB,, | Performed by: SURGERY

## 2023-10-30 PROCEDURE — 1160F RVW MEDS BY RX/DR IN RCRD: CPT | Mod: CPTII,S$GLB,, | Performed by: SURGERY

## 2023-10-30 PROCEDURE — 1159F MED LIST DOCD IN RCRD: CPT | Mod: CPTII,S$GLB,, | Performed by: SURGERY

## 2023-10-30 PROCEDURE — 1159F PR MEDICATION LIST DOCUMENTED IN MEDICAL RECORD: ICD-10-PCS | Mod: CPTII,S$GLB,, | Performed by: SURGERY

## 2023-10-30 NOTE — PROGRESS NOTES
HPI:  Postop revisit status post right subclavian venous access port removal    PHYSICAL EXAM:  Incision is clean and dry in the subcuticular sutures removed  ASSESSMENT:    Stable postop  PLAN:    Revisit as needed

## 2024-08-12 ENCOUNTER — PATIENT MESSAGE (OUTPATIENT)
Dept: SURGERY | Facility: CLINIC | Age: 44
End: 2024-08-12
Payer: COMMERCIAL

## 2024-10-01 NOTE — TELEPHONE ENCOUNTER
I though [facility] was [facility], [facility]  Pls brennon PalenciaSADOLFO and TBS  
Please let pt know that she needs to complete Timed barium swallow next.  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  She should fu w dietitian to work on breathing technique     She needs to show the CT to her PCP to address the lung abnormality that was seen            
Spoke w [facility]/ Radiology who let me know that they could do ugi w sbft and esophagram same time. Pt notified.  
[id number]: || ||00\01||False;

## 2024-10-16 ENCOUNTER — OUTSIDE PLACE OF SERVICE (OUTPATIENT)
Dept: INTERVENTIONAL RADIOLOGY/VASCULAR | Facility: CLINIC | Age: 44
End: 2024-10-16
Payer: COMMERCIAL

## 2024-10-16 PROCEDURE — 74240 X-RAY XM UPR GI TRC 1CNTRST: CPT | Mod: 26,,, | Performed by: STUDENT IN AN ORGANIZED HEALTH CARE EDUCATION/TRAINING PROGRAM

## 2024-10-22 ENCOUNTER — PATIENT MESSAGE (OUTPATIENT)
Dept: SURGERY | Facility: CLINIC | Age: 44
End: 2024-10-22
Payer: COMMERCIAL

## 2024-10-24 ENCOUNTER — PATIENT MESSAGE (OUTPATIENT)
Dept: WOUND CARE | Facility: CLINIC | Age: 44
End: 2024-10-24
Payer: COMMERCIAL

## 2024-10-24 ENCOUNTER — TELEPHONE (OUTPATIENT)
Dept: SURGERY | Facility: CLINIC | Age: 44
End: 2024-10-24
Payer: COMMERCIAL

## 2024-10-24 NOTE — TELEPHONE ENCOUNTER
Placed call to pt following request to be seen in [facility] by Dr. Kumar (per pt).    PMHx gastroparesis, gastric sleeve, j-tube, endometriosis, IBS-c, constipation     Most recent c-scope on 11/21 revealed bowel twisting d/t adhesions. Has been experiencing abd pain but pt related pain to gastroparesis.   Requested verbal consent to obtain report and disc from  Dr. Morales David (local GI). Pt approved stating she can also sent the report via pt portal.     Offered pt appt for 11/13 @ 3:20 PM to which she provided verbal approval. Location details reviewed.     Pt denied having any additional questions.     No additional needs identified at this time.

## 2024-11-13 ENCOUNTER — PATIENT MESSAGE (OUTPATIENT)
Dept: SURGERY | Facility: CLINIC | Age: 44
End: 2024-11-13

## 2024-11-13 ENCOUNTER — OFFICE VISIT (OUTPATIENT)
Dept: SURGERY | Facility: CLINIC | Age: 44
End: 2024-11-13
Attending: COLON & RECTAL SURGERY
Payer: COMMERCIAL

## 2024-11-13 VITALS
SYSTOLIC BLOOD PRESSURE: 117 MMHG | WEIGHT: 100.75 LBS | BODY MASS INDEX: 14.92 KG/M2 | HEART RATE: 65 BPM | HEIGHT: 69 IN | DIASTOLIC BLOOD PRESSURE: 69 MMHG

## 2024-11-13 DIAGNOSIS — R10.9 ABDOMINAL PAIN, UNSPECIFIED ABDOMINAL LOCATION: ICD-10-CM

## 2024-11-13 DIAGNOSIS — R11.2 NAUSEA AND VOMITING, UNSPECIFIED VOMITING TYPE: Primary | ICD-10-CM

## 2024-11-13 PROCEDURE — 3074F SYST BP LT 130 MM HG: CPT | Mod: CPTII,S$GLB,, | Performed by: COLON & RECTAL SURGERY

## 2024-11-13 PROCEDURE — 99205 OFFICE O/P NEW HI 60 MIN: CPT | Mod: S$GLB,,, | Performed by: COLON & RECTAL SURGERY

## 2024-11-13 PROCEDURE — 1159F MED LIST DOCD IN RCRD: CPT | Mod: CPTII,S$GLB,, | Performed by: COLON & RECTAL SURGERY

## 2024-11-13 PROCEDURE — 3008F BODY MASS INDEX DOCD: CPT | Mod: CPTII,S$GLB,, | Performed by: COLON & RECTAL SURGERY

## 2024-11-13 PROCEDURE — 3078F DIAST BP <80 MM HG: CPT | Mod: CPTII,S$GLB,, | Performed by: COLON & RECTAL SURGERY

## 2024-11-13 PROCEDURE — 99999 PR PBB SHADOW E&M-EST. PATIENT-LVL IV: CPT | Mod: PBBFAC,,, | Performed by: COLON & RECTAL SURGERY

## 2024-11-13 NOTE — PROGRESS NOTES
CRS Office Visit History and Physical    Referring MD:   Robert Kumar Md  [address] [place] [address]    SUBJECTIVE:     Chief Complaint: Colonic adhesions, small bowel delay, abdominal pain    History of Present Illness:  The patient is new patient to this practice.   Course is as follows:  Patient is a 44 y.o. female presents as referral from her GI for 'colon adhesions.'  Has a complex prior surgical history related to gastroparesis (see below) including J-tubes, ports, gastric pacemakers, and most recently a robotic RYGB in June 2024.  Since that surgery has had poor PO tolerance, vomiting, and abdominal pain.  Many of these symptoms were also present prior to this surgery.  Has frequent, watery bowel movements.  Had several studies recently- I only have photos of the paper reports (scanned into Media):  EGD 8/23/2024--> stricture at G-J anastomosis (dilated?), gastritis, esophagitis  SBFT (10/16/2024) --> vomited contrast, delayed small bowel emptying, contrast seen in colon after 22hrs  Colonoscopy (Oct 2024)--> difficulty maneuvering suggestive of adhesions, able to get to cecum, otherwise normal, no note of stricture.   The patient reports that she was taken for x-rays after the colonoscopy to see 'if she had a leak' but was told everything was ok.  She was told be her GI that she needed to be evaluated by a surgeon for the adhesions in her colon.  She messaged Dr Kumar (who did her gastric pacemaker) who referred her here.    Prior:  [place]  2016: Pyloroplasty  2017: Sleeve gastrectomy with re-positioning of gastric stimulator  2391-5084: Multiple feeding jejunostomy feeding tubes (x4) with current feeding tube in place along with multiple port placements for TPN  2023: Port removal   June 2024: Robotic conversion to a RYGB (Dr Giordano)      Review of patient's allergies indicates:   Allergen Reactions    Iodine and iodide containing  products Swelling, Hives, Itching, Rash and Shortness Of Breath    Domperidone Itching    Latex, natural rubber Rash    Iodine     Scopolamine      Patch- caused burn under patch       Past Medical History:   Diagnosis Date    Fibromyalgia     Gastroparesis     Headache     Rumination      Past Surgical History:   Procedure Laterality Date    APPENDECTOMY      breast implants removed Bilateral      SECTION      CHOLECYSTECTOMY      COSMETIC SURGERY      ESOPHAGEAL MANOMETRY WITH MEASUREMENT OF IMPEDANCE N/A 10/30/2019    Procedure: MANOMETRY, ESOPHAGUS, WITH IMPEDANCE MEASUREMENT;  Surgeon: Patti Orozco MD;  Location: [address]);  Service: Endoscopy;  Laterality: N/A;  LATEX ALLERGY  r/o rumination  Motility Studies:  Hold Narcotics x 1 days   Hold TCA x 1 days  10/24 - pt confirmed appt-BB    ESOPHAGOGASTRODUODENOSCOPY N/A 3/22/2019    Procedure: EGD (ESOPHAGOGASTRODUODENOSCOPY)-dual lumen scope to remove intragastric suture.;  Surgeon: Robert Kumar MD;  Location: [address];  Service: General;  Laterality: N/A;    ESOPHAGOGASTRODUODENOSCOPY N/A 10/30/2019    Procedure: EGD (ESOPHAGOGASTRODUODENOSCOPY);  Surgeon: Patti Orozco MD;  Location: [address]);  Service: Endoscopy;  Laterality: N/A;  LATEX ALLERGY  EGD with End[facility]   4th Floor, scheduled on 2nd floor due to availability  Full liquid diet 3 days and clear liquid diet x 1 day prior to procedure  Propofol only. No fentanyl or benzodiazepine during sedation. If additional sedation need    GASTRIC STIMULATOR IMPLANT SURGERY      GASTROSTOMY-JEJEUNOSTOMY TUBE CHANGE/PLACEMENT  08/10/2016    HYSTERECTOMY      INSERTION OF TUNNELED CENTRAL VENOUS CATHETER (CVC) WITH SUBCUTANEOUS PORT Left 2/3/2023    Procedure: IHEVOHKHV-BBWC-A-CATH;  Surgeon: Robert Kumar MD;  Location: [facility] OR [address] [place] [address];  Service: General;  Laterality: Left;    INSERTION OF TUNNELED CENTRAL VENOUS CATHETER (CVC) WITH SUBCUTANEOUS PORT  N/A 6/23/2023    Procedure: VOSNNEFBS-RNNC-H-CATH;  Surgeon: Robert Kumar MD;  [facility];  Service: General;  Laterality: N/A;    laparoscopic jejunostomy tube      removed ~ 2019    PROGRAMMING-STIMULATOR  6/23/2023    Procedure: PROGRAMMING-STIMULATOR;  Surgeon: Robert Kumar MD;  Location: [facility] OR [address] [place] [address];  Service: General;;    REPLACEMENT OF GASTRIC NEUROSTIMULATOR GENERATOR N/A 3/22/2019    Procedure: REPLACEMENT, PULSE GENERATOR, NEUROSTIMULATOR, GASTRIC-battery exchange only;  Surgeon: Robert Kumar MD;  Location: [facility] OR [address] [place] [address];  Service: General;  Laterality: N/A;    REPLACEMENT OF GASTRIC NEUROSTIMULATOR GENERATOR N/A 12/30/2019    Procedure: REPLACEMENT, PULSE GENERATOR, NEUROSTIMULATOR, GASTRIC, OPEN (battery exchange);  Surgeon: Robert Kumar MD;  Location: [facility] OR [address] [place] [address];  Service: General;  Laterality: N/A;    REPLACEMENT OF GASTRIC NEUROSTIMULATOR GENERATOR N/A 1/28/2022    Procedure: REPLACEMENT, PULSE GENERATOR, NEUROSTIMULATOR, GASTRIC, Open;  Surgeon: Robert Kumar MD;  Location: [facility] OR [address] [place] [address];  Service: General;  Laterality: N/A;    REPLACEMENT OF GASTRIC NEUROSTIMULATOR GENERATOR N/A 6/23/2023    Procedure: REPLACEMENT, PULSE GENERATOR, NEUROSTIMULATOR, GASTRIC;  Surgeon: Robert Kumar MD;  Location: [facility] OR [address] [place] [address];  Service: General;  Laterality: N/A;  No narcotics    sleeve gastrectomy      TONSILLECTOMY       Family History   Problem Relation Name Age of Onset    Hypothyroidism Mother      Cirrhosis Father      No Known Problems Brother      Asthma Daughter carson     Sleep apnea Daughter dacota     Irritable bowel syndrome Maternal Aunt chu     Diverticulitis Maternal Aunt chu     Celiac disease Neg Hx      Colon cancer Neg Hx      Esophageal cancer Neg Hx      Stomach cancer Neg Hx      Rectal cancer Neg Hx      Ulcerative colitis Neg Hx      Crohn's disease Neg Hx       Social History     Tobacco Use     "Smoking status: Former     Current packs/day: 0.00     Types: Cigarettes     Quit date: 2015     Years since quittin.2    Smokeless tobacco: Never   Substance Use Topics    Alcohol use: No    Drug use: No        Review of Systems:  ROS    OBJECTIVE:     Vital Signs (Most Recent)  /69   Pulse 65   Ht 5' 9" (1.753 m)   Wt 45.7 kg (100 lb 12 oz)   BMI 14.88 kg/m²     Physical Exam:  General: White female in no distress   Neuro: Alert and oriented x 4.  Moves all extremities.     HEENT: No icterus.  Trachea midline  Respiratory: Respirations are even and unlabored  Cardiac: Regular rate  Abdomen: Soft, non-distended, non-tender, well-healed robotic port sites/G-tube sites/J-tube sites/RLQ incision   Extremities: Warm dry and intact  Skin: No rashes  Anorectal: Perianal skin healthy, no fissures, normal tone on exam, no masses, normal relaxation of puborectalis    Imaging:   SBFT (2024, report only)  Gastrojejunostomy in good position. No significant gastroesophageal reflux. Small bowel transit time of 2 hours.     CT abd/pel (2024- report only)  No acute process in the abdomen or pelvis on this noncontrast exam.     MRI/MRA Abdomen (2024- report only)  1. Left renal cyst.   2. Otherwise unremarkable MRI and MRA a of the abdomen.     ASSESSMENT/PLAN:     43yo F w/ complex prior surgical history related to gastroparesis.  Referred to us because of concern about adhesions in her colon that could be delaying transit and causing nausea/pain (which she also had chronically). There does seem to be a difference in her small bowel transit time between 2024 study (pre-op) and Oct 2024 study (post-op).  However, her op report suggests that the procedure was done robotically without significant abdominal adhesions.  It would be unusual for an adhesion to cause a colon blockage in this setting.  Higher suspicion for stricture at distal anastomosis of RYGB (she has a known stricture at the G-J).  " Will discuss w/ Dr Peters and see if he will consider double-balloon colonoscopy.     Emerald Gonsales MD  Staff Surgeon  Colon & Rectal Surgery

## 2024-11-14 ENCOUNTER — TELEPHONE (OUTPATIENT)
Dept: GASTROENTEROLOGY | Facility: CLINIC | Age: 44
End: 2024-11-14
Payer: COMMERCIAL

## 2024-11-14 NOTE — TELEPHONE ENCOUNTER
Message routed to endo schedulers.   ----- Message from Donald sent at 11/14/2024  3:09 PM CST -----  Regarding: Call requested  Contact: [phone]  Hi,     Who called: The pt       Reason: Pt was told by Dr. Gonsales that she would hear from Dr. Peters office to schedule a double balloon Colonoscopy. Pt is asking for a call to discuss.  [phone]      Provider's name:      Additional Information:Thank you.

## 2024-11-15 ENCOUNTER — PATIENT MESSAGE (OUTPATIENT)
Dept: SURGERY | Facility: CLINIC | Age: 44
End: 2024-11-15
Payer: COMMERCIAL

## 2024-11-15 NOTE — TELEPHONE ENCOUNTER
Dr. Peters  Does the pt need a DBE or single balloon?  If single balloon please advise on orders if DBE I will add AES to message to that they can contact pt

## 2024-11-19 ENCOUNTER — TELEPHONE (OUTPATIENT)
Dept: SURGERY | Facility: CLINIC | Age: 44
End: 2024-11-19
Payer: COMMERCIAL

## 2024-11-19 DIAGNOSIS — R10.9 ABDOMINAL PAIN, UNSPECIFIED ABDOMINAL LOCATION: Primary | ICD-10-CM

## 2024-11-19 NOTE — TELEPHONE ENCOUNTER
Spoke with patient to help schedule CT. [facility] and [facility] are not on our list of places to schedule, but are affiliated with [facility]. Per request of patient, will email order to email on file and she will have CT done at facility near home. Patient aware that if we cannot view images, the disc and report will need to be mailed.

## 2024-11-21 ENCOUNTER — TELEPHONE (OUTPATIENT)
Dept: SURGERY | Facility: CLINIC | Age: 44
End: 2024-11-21
Payer: COMMERCIAL

## 2024-11-21 NOTE — TELEPHONE ENCOUNTER
Spoke with patient  Appointment offered and accepted for  Virtual 11/26 at 5:45  Pt verbalized understanding to all and has no further questions at this time.

## 2024-11-21 NOTE — TELEPHONE ENCOUNTER
----- Message from HOLLIS Gunderson sent at 11/21/2024  1:32 PM CST -----  Please do schedule this one.  Thank you!  ----- Message -----  From: Shannon Balderas RN  Sent: 11/21/2024   1:17 PM CST  To: HOLLIS Fatima    Do you want to schedule this virtual or do you want me to?    Thanks,  C  ----- Message -----  From: Robert Kumar MD  Sent: 11/20/2024   3:17 PM CST  To: Shannon Balderas RN    Virtual is ok.  ----- Message -----  From: Shannon Balderas RN  Sent: 11/20/2024   1:38 PM CST  To: Robert Kumar MD    I think Chhaya asked you about this yesterday, but please let me know how to proceed?  ----- Message -----  From: Shannon Balderas RN  Sent: 11/20/2024   1:37 PM CST  To: Chhaya Morillo RN; Shannon Balderas RN    I think she needs a virtual with Kumar first because Motility is likely scheduled out for quite a while. He sent her to [facility] so I'll check with him first, just to be sure.  Thanks for sending to me.    C  ----- Message -----  From: Chhaya Morillo RN  Sent: 11/20/2024  12:49 PM CST  To: Shannon Balderas RN    Haven't heard back from José.  But she is still struggling with symptoms.  Fran has decided not to do the double balloon endoscopy.  Would motility referral be beneficial.  A virtual with José???  ----- Message -----  From: Chhaya Morillo RN  Sent: 11/15/2024   3:50 PM CST  To: José Hoffman Staff    Hey-    She came by yesterday since she was seeing Dr. Gonsales(I think she ordered a double balloon endoscopy), but she had asked if you could look through her chart.  She had another operation done by Dr. Giordano at [facility].  Still c/o a lot of abdominal issues.      Thanks,  Chhaya

## 2024-11-26 ENCOUNTER — OFFICE VISIT (OUTPATIENT)
Dept: SURGERY | Facility: CLINIC | Age: 44
End: 2024-11-26
Payer: COMMERCIAL

## 2024-11-26 ENCOUNTER — PATIENT MESSAGE (OUTPATIENT)
Dept: SURGERY | Facility: CLINIC | Age: 44
End: 2024-11-26

## 2024-11-26 DIAGNOSIS — K31.84 GASTROPARESIS: Primary | ICD-10-CM

## 2024-11-26 PROCEDURE — 1159F MED LIST DOCD IN RCRD: CPT | Mod: CPTII,95,, | Performed by: SURGERY

## 2024-11-26 PROCEDURE — 99215 OFFICE O/P EST HI 40 MIN: CPT | Mod: 95,,, | Performed by: SURGERY

## 2024-11-26 PROCEDURE — 1160F RVW MEDS BY RX/DR IN RCRD: CPT | Mod: CPTII,95,, | Performed by: SURGERY

## 2024-11-26 RX ORDER — FAMOTIDINE 20 MG/1
20 TABLET, FILM COATED ORAL 2 TIMES DAILY
Qty: 60 TABLET | Refills: 6 | Status: SHIPPED | OUTPATIENT
Start: 2024-11-26

## 2024-11-26 RX ORDER — ERYTHROMYCIN 500 MG/1
250 TABLET, COATED ORAL EVERY 6 HOURS
Qty: 60 TABLET | Refills: 3 | Status: SHIPPED | OUTPATIENT
Start: 2024-11-26

## 2024-11-26 NOTE — PROGRESS NOTES
The patient location is: home  The chief complaint leading to consultation is: gastroparesis    Visit type: audiovisual      49 minutes of total time spent on the encounter, which includes face to face time and non-face to face time preparing to see the patient (eg, review of tests), Obtaining and/or reviewing separately obtained history, Documenting clinical information in the electronic or other health record, Independently interpreting results (not separately reported) and communicating results to the patient/family/caregiver, or Care coordination (not separately reported).         Each patient to whom he or she provides medical services by telemedicine is:  (1) informed of the relationship between the physician and patient and the respective role of any other health care provider with respect to management of the patient; and (2) notified that he or she may decline to receive medical services by telemedicine and may withdraw from such care at any time.    Notes:     45y/o with bmi 15.19 (weight stable from last visit), fibromyalgia and s/p gastric stimulator 10/7/16 with replacement of battery 3/22/19, 12/30/19, 1/28/22, 6/23/23, removal stimulator 12/30/23, J tube 12/16/16 (she didn't tolerate it so she may have small bowel dysmotility also), j tube was removed for infection, pylorolasty 1/27/17, sleeve gastrectomy 5/10/17, robotic conversion to bypass with hh repair 6/24/23, replacement j tube and robotic g tube 2/21/22 (removed later), s/p portacath 2/3/23 and replacement 6/23/23 all for gastroparesis.       Interval history 7/11/23: She has some soreness at her port site.  She continues to have extremely severe symptoms.     Her child has heart trouble.  They have a Sean Luther, a service dog to detect low blood pressure and is in training.  She says her dog got Chaga's from eating an insect and has a pacemaker.  Her oldest daughter is considering crna and working in the icu.    Interval history 11/26/24:   "Since I last saw her she has undergone removal gstim and conversion to gastric bypass.  Since then she has undergone egd with dilation.  She is taking phenergan bid, not taking zofran, protonix bid and carafate tid (liquid). Mvi, vit d, folate, zinc, iron and calcium.  She is not taking pain medication.  She is not using thc derivatives or vaping.  She has diarrhea since her last surgery and more rarely constipation.  She says she is 99.8lb and was 108 in January.  She is 5'5".  Bmi 16.5.  She has severe gerd and says she has gastritis.  She says it was difficult to do the colonoscopy due to possible adhesions.  Overall she says she is 100% worse than prior to surgery and dilation appears to have worsened her.  She is not seeing psych.     Post Gastric Pacemaker Symptom Monitor  Severity/Frequency  Vomiting-4/4  Nausea-4/4  Early Satiety-4/4  Bloating-2/2-3  Postprandial fullness-3/4  Epigastric pain-4/4  Epigastric burning-3/4  Conclusion: Mostly extremely severe    GERD Questionnaire   bid PPI, carafate tid  has Typical heartburn  has Regurgitation  no Dysphagia solids  no Dysphagia liquids  has Hoarseness  has Sore throat  no Cough  no Asthma  no Chest pain  has Water brash  no Globus  has Nausea  has Vomiting    Nutrition:  She is not able to eat much at all.  She is trying Gatorade 0.      shows multiple butalbitol rx and recent narcotics rx.       Diagnostic Results:  CT 2022 ok, tubes in place.  No mention of abscess at the g tube site.  Ct and MRA 1/2024 reviewed, no acute problems  Ugi 2020 reviewed, reflux, no hh, no achalasia  Ugisbf 10/2024 reviewed, slow small bowel transit time, s/p bypass  Egd 2019 reviewed, pyloroplasty with suture, 5 cm hh, sleeve, endoflip normal  Egd 2022 reviewed, gastritis  Egd 8/2024 reviewed, gastritis, esophagitis  Egd 10/2024 reviewed  Motility 2019 reviewed, egj outflow obstruction, rumination syndrome, high lesp with incomplete relaxation     Assessment and " plan  Gastroparesis with protein calorie malnutrition and s/p multiple procedures for this including bypass.  I think reversal of bypass would likely make her worse and would have increased risk of anastomotic leak.  To see bariatric nutrition for help with dumping and diet help. I suggest seeing a therapist and have them consider remeron.  Start SIBO treatment (try for at least a month).  Try erythromycin (try for a week or 2).  Add pepcid to antacid regimen.  Obtain results of vaginal u/s, ct (with films to rule out internal hernia and inflated j tube balloon) and future mri pelvis.  Follow up 2 months.  Will need follow up egd at some point with her gi.  Consider ct enterography or long endoscope to better evaluate the small bowel.

## 2024-11-27 ENCOUNTER — PATIENT MESSAGE (OUTPATIENT)
Dept: SURGERY | Facility: CLINIC | Age: 44
End: 2024-11-27
Payer: COMMERCIAL

## 2024-11-27 DIAGNOSIS — R10.9 ABDOMINAL PAIN, UNSPECIFIED ABDOMINAL LOCATION: Primary | ICD-10-CM

## 2024-11-27 DIAGNOSIS — K31.84 GASTROPARESIS: ICD-10-CM

## 2024-11-27 DIAGNOSIS — Z98.84 HISTORY OF GASTRIC BYPASS: ICD-10-CM

## 2024-11-30 ENCOUNTER — TELEPHONE (OUTPATIENT)
Dept: ENDOSCOPY | Facility: HOSPITAL | Age: 44
End: 2024-11-30
Payer: COMMERCIAL

## 2024-11-30 NOTE — TELEPHONE ENCOUNTER
Stephane Peters MD Muse, [place], MA  Cc: P CELINA Hendricks [facility] Schedulers; P Fran Calderón Staff  Caller: Unspecified (2 weeks ago)  No procedure.  Discussed with Dr. Gonsales who has already spoken with the patient.          Previous Messages    Patient Calls  (Newest Message First)  View All Conversations on this Encounter   Stephane Peters MD  You; CELINA Singh Schedulers; Fran Calderón Staff10 days ago       No procedure.  Discussed with Dr. Gonsales who has already spoken with the patient.      You routed conversation to Stephane Peters MD; CELINA Singh Schedulers; Fran Calderón Staff2 weeks ago     You2 weeks ago     DANYA Peters  Does the pt need a DBE or single balloon?  If single balloon please advise on orders if DBE I will add AES to message to that they can contact pt         Note     Vansesa Nenaabdullahi Jeffries [phone]  You2 weeks ago     Marlene Yeboah routed conversation to You2 weeks ago     Howie Shin MA  [facility] Schedulers2 weeks ago     DW  Please contact the patient to assist. Thanks     Howie Shin MA2 weeks ago     DW  Message routed to endo schedulers.   ----- Message from Donald sent at 11/14/2024  3:09 PM CST -----  Regarding: Call requested  Contact: [phone]  Hi,      Who called: The pt         Reason: Pt was told by Dr. Gonsales that she would hear from Dr. Peters office to schedule a double balloon Colonoscopy. Pt is asking for a call to discuss.    [phone]        Provider's name:        Additional Information:Thank you.         Note

## 2024-12-04 ENCOUNTER — TELEPHONE (OUTPATIENT)
Dept: ENDOSCOPY | Facility: HOSPITAL | Age: 44
End: 2024-12-04
Payer: COMMERCIAL

## 2024-12-04 DIAGNOSIS — Z12.11 SPECIAL SCREENING FOR MALIGNANT NEOPLASMS, COLON: Primary | ICD-10-CM

## 2024-12-04 NOTE — TELEPHONE ENCOUNTER
Nelida Gore MA JM    11/30/24  7:25 AM  Note  Per Dr Peters note pt no longer needs procedure

## 2024-12-10 NOTE — TELEPHONE ENCOUNTER
"Spoke with patient  She thought she was still having the "double balloon colonoscopy" on 12/19/24 and is very upset that she is not.  She stated someone called her after [facility] told her it was cancelled and scheduled her for 12/19/24.  I advised that from the best I could tell in the notes, that that was never a firm date because it needed to be ok'd by Dr. Peters first but between that discussion and 11/27 Dr. Peters cancelled the procedure.  She would like for me to find out what happened and have someone call her to explain.    Will route to ALONDRA Cunningham and JAZMINE Espino for guidance   "

## 2024-12-11 ENCOUNTER — PATIENT MESSAGE (OUTPATIENT)
Dept: SURGERY | Facility: CLINIC | Age: 44
End: 2024-12-11
Payer: COMMERCIAL

## 2024-12-18 ENCOUNTER — OFFICE VISIT (OUTPATIENT)
Dept: SURGERY | Facility: CLINIC | Age: 44
End: 2024-12-18
Payer: COMMERCIAL

## 2024-12-18 VITALS — HEIGHT: 65 IN | BODY MASS INDEX: 16.36 KG/M2 | WEIGHT: 98.19 LBS

## 2024-12-18 DIAGNOSIS — K31.84 GASTROPARESIS: Primary | ICD-10-CM

## 2024-12-18 PROCEDURE — 3008F BODY MASS INDEX DOCD: CPT | Mod: CPTII,,, | Performed by: SURGERY

## 2024-12-18 PROCEDURE — 1160F RVW MEDS BY RX/DR IN RCRD: CPT | Mod: CPTII,,, | Performed by: SURGERY

## 2024-12-18 PROCEDURE — 99213 OFFICE O/P EST LOW 20 MIN: CPT | Mod: S$PBB,,, | Performed by: SURGERY

## 2024-12-18 PROCEDURE — 1159F MED LIST DOCD IN RCRD: CPT | Mod: CPTII,,, | Performed by: SURGERY

## 2024-12-18 RX ORDER — PANTOPRAZOLE SODIUM 40 MG/1
40 TABLET, DELAYED RELEASE ORAL
COMMUNITY

## 2024-12-18 RX ORDER — FOLIC ACID 1 MG/1
1000 TABLET ORAL EVERY MORNING
COMMUNITY
Start: 2024-11-24

## 2024-12-18 NOTE — PROGRESS NOTES
History & Physical    SUBJECTIVE:     History of Present Illness:    44-year-old female well known to me from previous port insertions that has been dealing with severe gastroparesis for many years and now needs a new port placed for TPN to supplement her nutrition.  She has had ports placed both on the left than right and these would have to be removed after a period of time due to sepsis.  At this time she needs another port placed for TPN.    Chief Complaint   Patient presents with    Consult         Review of patient's allergies indicates:  Review of patient's allergies indicates:   Allergen Reactions    Iodine and iodide containing products Swelling, Hives, Itching, Rash and Shortness Of Breath    Domperidone Itching    Latex, natural rubber Rash    Iodine     Scopolamine      Patch- caused burn under patch       Current Outpatient Medications on File Prior to Visit   Medication Sig Dispense Refill    b complex vitamins capsule Take 1 capsule by mouth once daily.      butalbitaL-acetaminop-caf-cod -78-30 mg Cap Take 1 capsule by mouth every 4 to 6 hours as needed.      erythromycin base (E-MYCIN) 500 MG tablet Take 0.5 tablets (250 mg total) by mouth every 6 (six) hours. 60 tablet 3    famotidine (PEPCID AC) 20 MG tablet Take 1 tablet (20 mg total) by mouth 2 (two) times daily. 60 tablet 6    folic acid (FOLVITE) 1 MG tablet Take 1,000 mcg by mouth every morning.      multivitamin Liqd Take by mouth once daily.      promethazine (PHENERGAN) 25 MG tablet TAKE 1 TABLET(25 MG) BY MOUTH EVERY 6 HOURS AS NEEDED FOR NAUSEA 60 tablet 6    promethazine (PHENERGAN) 6.25 mg/5 mL syrup Take 12.5 mg by mouth every 6 (six) hours as needed for Nausea.      sucralfate (CARAFATE) 100 mg/mL suspension Take 1 g by mouth 4 (four) times daily.      chlorproMAZINE (THORAZINE) 25 MG tablet Take 25 mg by mouth every 4 (four) hours as needed. (Patient not taking: Reported on 12/18/2024)      pantoprazole (PROTONIX) 40 MG tablet  Take 40 mg by mouth.      rifAXIMin (XIFAXAN) 550 mg Tab Take 1 tablet (550 mg total) by mouth 2 (two) times daily. (Patient not taking: Reported on 2024) 60 tablet 2     Current Facility-Administered Medications on File Prior to Visit   Medication Dose Route Frequency Provider Last Rate Last Admin    0.9%  NaCl infusion   Intravenous Continuous Rehan Mensah MD   Stopped at 23 0932       Past Medical History:   Diagnosis Date    Fibromyalgia     Gastroparesis     Headache     Rumination      Past Surgical History:   Procedure Laterality Date    APPENDECTOMY      breast implants removed Bilateral      SECTION      CHOLECYSTECTOMY      COSMETIC SURGERY      ESOPHAGEAL MANOMETRY WITH MEASUREMENT OF IMPEDANCE N/A 10/30/2019    Procedure: MANOMETRY, ESOPHAGUS, WITH IMPEDANCE MEASUREMENT;  Surgeon: Patti Orozco MD;  Location: [address]);  Service: Endoscopy;  Laterality: N/A;  LATEX ALLERGY  r/o rumination  Motility Studies:  Hold Narcotics x 1 days   Hold TCA x 1 days  10/24 - pt confirmed appt-BB    ESOPHAGOGASTRODUODENOSCOPY N/A 3/22/2019    Procedure: EGD (ESOPHAGOGASTRODUODENOSCOPY)-dual lumen scope to remove intragastric suture.;  Surgeon: Robert Kumar MD;  Location: [facility] OR [address] [place] [address];  Service: General;  Laterality: N/A;    ESOPHAGOGASTRODUODENOSCOPY N/A 10/30/2019    Procedure: EGD (ESOPHAGOGASTRODUODENOSCOPY);  Surgeon: Patti Orozco MD;  Location: [address]);  Service: Endoscopy;  Laterality: N/A;  LATEX ALLERGY  EGD with EndoFlip   4th Floor, scheduled on 2nd floor due to availability  Full liquid diet 3 days and clear liquid diet x 1 day prior to procedure  Propofol only. No fentanyl or benzodiazepine during sedation. If additional sedation need    GASTRIC STIMULATOR IMPLANT SURGERY      GASTROSTOMY-JEJEUNOSTOMY TUBE CHANGE/PLACEMENT  08/10/2016    HYSTERECTOMY      INSERTION OF TUNNELED CENTRAL VENOUS CATHETER (CVC) WITH SUBCUTANEOUS PORT Left  2/3/2023    Procedure: [id number];  Surgeon: Robert Kumar MD;  [facility];  Service: General;  Laterality: Left;    INSERTION OF TUNNELED CENTRAL VENOUS CATHETER (CVC) WITH SUBCUTANEOUS PORT N/A 6/23/2023    Procedure: [id number];  Surgeon: Robert Kumar MD;  [facility];  Service: General;  Laterality: N/A;    laparoscopic jejunostomy tube      removed ~ 2019    PROGRAMMING-STIMULATOR  6/23/2023    Procedure: PROGRAMMING-STIMULATOR;  Surgeon: Robert Kumar MD;  Location: [facility] OR [address] [place] [address];  Service: General;;    REPLACEMENT OF GASTRIC NEUROSTIMULATOR GENERATOR N/A 3/22/2019    Procedure: REPLACEMENT, PULSE GENERATOR, NEUROSTIMULATOR, GASTRIC-battery exchange only;  Surgeon: Robert Kumar MD;  [facility];  Service: General;  Laterality: N/A;    REPLACEMENT OF GASTRIC NEUROSTIMULATOR GENERATOR N/A 12/30/2019    Procedure: REPLACEMENT, PULSE GENERATOR, NEUROSTIMULATOR, GASTRIC, OPEN (battery exchange);  Surgeon: Robert Kumar MD;  Location: [facility] OR [address] [place] [address];  Service: General;  Laterality: N/A;    REPLACEMENT OF GASTRIC NEUROSTIMULATOR GENERATOR N/A 1/28/2022    Procedure: REPLACEMENT, PULSE GENERATOR, NEUROSTIMULATOR, GASTRIC, Open;  Surgeon: Robert Kumar MD;  Location: [facility] OR [address] [place] [address];  Service: General;  Laterality: N/A;    REPLACEMENT OF GASTRIC NEUROSTIMULATOR GENERATOR N/A 6/23/2023    Procedure: REPLACEMENT, PULSE GENERATOR, NEUROSTIMULATOR, GASTRIC;  Surgeon: Robert Kumar MD;  Location: [facility] OR [address] [place] [address];  Service: General;  Laterality: N/A;  No narcotics    sleeve gastrectomy      TONSILLECTOMY       Family History   Problem Relation Name Age of Onset    Hypothyroidism Mother      Cirrhosis Father      No Known Problems Brother      Asthma Daughter carson     Sleep apnea Daughter dacota     Irritable bowel syndrome Maternal Aunt chu     Diverticulitis Maternal Aunt chu     Celiac  disease Neg Hx      Colon cancer Neg Hx      Esophageal cancer Neg Hx      Stomach cancer Neg Hx      Rectal cancer Neg Hx      Ulcerative colitis Neg Hx      Crohn's disease Neg Hx         Social History     Socioeconomic History    Marital status:     Number of children: 2   Tobacco Use    Smoking status: Former     Current packs/day: 0.00     Types: Cigarettes     Quit date: 2015     Years since quittin.3    Smokeless tobacco: Never   Substance and Sexual Activity    Alcohol use: No    Drug use: No     Social Drivers of Health     Financial Resource Strain: Low Risk  (2024)    Overall Financial Resource Strain (CARDIA)     Difficulty of Paying Living Expenses: Not very hard   Food Insecurity: No Food Insecurity (2024)    Hunger Vital Sign     Worried About Running Out of Food in the Last Year: Never true     Ran Out of Food in the Last Year: Never true   Transportation Needs: No Transportation Needs (2024)    Received from [facility]sidiaries and Affiliates    PRAPARE - Transportation     Lack of Transportation (Medical): No     Lack of Transportation (Non-Medical): No   Physical Activity: Sufficiently Active (2024)    Exercise Vital Sign     Days of Exercise per Week: 5 days     Minutes of Exercise per Session: 60 min   Stress: No Stress Concern Present (2024)    [employer] - Occupational Stress Questionnaire     Feeling of Stress : Only a little   Housing Stability: Unknown (2024)    Housing Stability Vital Sign     Unable to Pay for Housing in the Last Year: No          Review of Systems   Constitutional:  Positive for weight loss.   Respiratory: Negative.     Cardiovascular: Negative.    Gastrointestinal:  Positive for nausea and vomiting.   Musculoskeletal: Negative.    Endo/Heme/Allergies: Negative.    Psychiatric/Behavioral: Negative.         OBJECTIVE:     There were no  vitals filed for this visit.              Physical Exam:  Physical Exam  Constitutional:       Appearance: Normal appearance.   HENT:      Head: Normocephalic and atraumatic.   Cardiovascular:      Rate and Rhythm: Normal rate and regular rhythm.   Pulmonary:      Effort: Pulmonary effort is normal.      Breath sounds: Normal breath sounds.   Abdominal:      General: Abdomen is flat. Bowel sounds are normal.      Palpations: Abdomen is soft.   Musculoskeletal:         General: No swelling. Normal range of motion.      Cervical back: Normal range of motion.   Skin:     General: Skin is warm and dry.      Coloration: Skin is not jaundiced.   Neurological:      General: No focal deficit present.      Mental Status: She is alert and oriented to person, place, and time.   Psychiatric:         Mood and Affect: Mood normal.         Behavior: Behavior normal.             ASSESSMENT/PLAN:   Gastroparesis with the associated malnutrition.  Needs venous access port for TPN  PLAN:  Patient knows well about venous access ports in it we again discussed the risk and benefits associated with port placement.  Surgery scheduled December 31, 2024.

## 2024-12-19 LAB
ANION GAP SERPL CALC-SCNC: 3 MMOL/L (ref 3–11)
BASOPHILS NFR BLD: 1.2 % (ref 0–3)
BUN SERPL-MCNC: 8 MG/DL (ref 7–18)
BUN/CREAT SERPL: 10.81 RATIO (ref 7–18)
CALCIUM SERPL-MCNC: 9.1 MG/DL (ref 8.8–10.5)
CHLORIDE SERPL-SCNC: 104 MMOL/L (ref 100–108)
CO2 SERPL-SCNC: 34 MMOL/L (ref 21–32)
CREAT SERPL-MCNC: 0.74 MG/DL (ref 0.55–1.02)
EOSINOPHIL NFR BLD: 2.2 % (ref 1–3)
ERYTHROCYTE [DISTWIDTH] IN BLOOD BY AUTOMATED COUNT: 13.7 % (ref 12.5–18)
GFR ESTIMATION: 104
GLUCOSE SERPL-MCNC: 72 MG/DL (ref 70–110)
HCT VFR BLD AUTO: 32 % (ref 37–47)
HGB BLD-MCNC: 10 G/DL (ref 12–16)
LYMPHOCYTES NFR BLD: 40.4 % (ref 25–40)
MCH RBC QN AUTO: 26.2 PG (ref 27–31.2)
MCHC RBC AUTO-ENTMCNC: 31.3 G/DL (ref 31.8–35.4)
MCV RBC AUTO: 83.8 FL (ref 80–97)
MONOCYTES NFR BLD: 8.3 % (ref 1–15)
NEUTROPHILS # BLD AUTO: 1.93 10*3/UL (ref 1.8–7.7)
NEUTROPHILS NFR BLD: 47.4 % (ref 37–80)
NUCLEATED RED BLOOD CELLS: 0 %
PLATELETS: 314 10*3/UL (ref 142–424)
POTASSIUM SERPL-SCNC: 3.5 MMOL/L (ref 3.6–5.2)
RBC # BLD AUTO: 3.82 10*6/UL (ref 4.2–5.4)
SODIUM BLD-SCNC: 141 MMOL/L (ref 135–145)
WBC # BLD: 4.1 10*3/UL (ref 4.6–10.2)

## 2024-12-31 ENCOUNTER — OUTSIDE PLACE OF SERVICE (OUTPATIENT)
Dept: SURGERY | Facility: CLINIC | Age: 44
End: 2024-12-31

## 2025-01-01 ENCOUNTER — NURSE TRIAGE (OUTPATIENT)
Dept: ADMINISTRATIVE | Facility: CLINIC | Age: 45
End: 2025-01-01
Payer: COMMERCIAL

## 2025-01-01 ENCOUNTER — PATIENT MESSAGE (OUTPATIENT)
Dept: SURGERY | Facility: CLINIC | Age: 45
End: 2025-01-01
Payer: COMMERCIAL

## 2025-01-01 NOTE — TELEPHONE ENCOUNTER
Patient says she had surgery yesterday, port placement, and the pain meds are not working and she can not get sleep. She says it is hydrocodone/acetaminophen 5/325mg. Vomiting x 2 in the past 24 hrs but she says this has been happening with her gastroparesis and is not new. Last urinated about 2 hrs ago. Pain level 10/10. Last took pain med 2.5 hrs ago. On call provider, Dr. Duncan but ER . Helen Leong gave info. Spoke to on call provider, Dr. Duncan. He states, if patient is still in severe pain after taking pain med she should go to the ER to be evaluated. Patient advised and states she will not go to ER, she will just deal with the pain. Please contact caller directly to discuss any further care advice.    Reason for Disposition   [1] SEVERE post-op pain (e.g., excruciating, pain scale 8-10) AND [2] not controlled with pain medications    Additional Information   Negative: [1] Widespread rash AND [2] bright red, sunburn-like   Negative: [1] SEVERE headache AND [2] after spinal (epidural) anesthesia   Negative: [1] Vomiting AND [2] persists > 4 hours   Negative: [1] Vomiting AND [2] abdomen looks much more swollen than usual   Negative: [1] Drinking very little AND [2] dehydration suspected (e.g., no urine > 12 hours, very dry mouth, very lightheaded)   Negative: Patient sounds very sick or weak to the triager   Negative: Sounds like a serious complication to the triager   Negative: Fever > 100.4 F (38.0 C)    Protocols used: Post-Op Symptoms and [id number]

## 2025-01-02 ENCOUNTER — TELEPHONE (OUTPATIENT)
Dept: SURGERY | Facility: CLINIC | Age: 45
End: 2025-01-02
Payer: COMMERCIAL

## 2025-01-02 NOTE — TELEPHONE ENCOUNTER
I let patient know Dr. Boswell is in surgery today and I will get with him when he gets into office to discuss calling out a different medication for her pain. Patient understood.    ----- Message from Rakesh sent at 1/2/2025 11:46 AM CST -----  Contact: CHRISTIE GUAJARDO [[id number]]  ..Type:  Patient Requesting Call    Who Called:CHRISTIE GUAJARDO [[id number]]  Does the patient know what this is regarding?:medication he prescribed isn't working   Would the patient rather a call back or a response via [facility]? call  Best Call Back Number:[phone]    Additional Information: patient in pain and the meds he prescribed her isn't working needs something else called in       [id number] - [place], [place]  [address] AT [place] [address] [address]  Phone: [phone] Fax: [phone]

## 2025-01-08 ENCOUNTER — OFFICE VISIT (OUTPATIENT)
Dept: SURGERY | Facility: CLINIC | Age: 45
End: 2025-01-08
Payer: COMMERCIAL

## 2025-01-08 DIAGNOSIS — Z98.890 POST-OPERATIVE STATE: Primary | ICD-10-CM

## 2025-01-08 PROCEDURE — 1160F RVW MEDS BY RX/DR IN RCRD: CPT | Mod: CPTII,,, | Performed by: SURGERY

## 2025-01-08 PROCEDURE — 1159F MED LIST DOCD IN RCRD: CPT | Mod: CPTII,,, | Performed by: SURGERY

## 2025-01-08 PROCEDURE — 99024 POSTOP FOLLOW-UP VISIT: CPT | Mod: ,,, | Performed by: SURGERY

## 2025-01-08 NOTE — PROGRESS NOTES
HPI:  Postop revisit status post right jugular venous access port insertion.    PHYSICAL EXAM:  Incisions are clean and dry and all sutures removed  ASSESSMENT:    Stable postop  PLAN:      Revisit as needed

## 2025-01-15 ENCOUNTER — OFFICE VISIT (OUTPATIENT)
Dept: GASTROENTEROLOGY | Facility: CLINIC | Age: 45
End: 2025-01-15
Payer: COMMERCIAL

## 2025-01-15 DIAGNOSIS — R19.8 STRAINING DURING BOWEL MOVEMENTS: ICD-10-CM

## 2025-01-15 DIAGNOSIS — R11.2 NAUSEA AND VOMITING, UNSPECIFIED VOMITING TYPE: Primary | ICD-10-CM

## 2025-01-15 DIAGNOSIS — K21.9 GASTROESOPHAGEAL REFLUX DISEASE, UNSPECIFIED WHETHER ESOPHAGITIS PRESENT: ICD-10-CM

## 2025-01-15 DIAGNOSIS — R10.9 ABDOMINAL PAIN, UNSPECIFIED ABDOMINAL LOCATION: ICD-10-CM

## 2025-01-15 PROCEDURE — 98002 SYNCH AUDIO-VIDEO NEW MOD 45: CPT | Mod: 95,,, | Performed by: INTERNAL MEDICINE

## 2025-01-15 RX ORDER — DICYCLOMINE HYDROCHLORIDE 20 MG/1
20 TABLET ORAL EVERY 6 HOURS PRN
Qty: 120 TABLET | Refills: 6 | Status: SHIPPED | OUTPATIENT
Start: 2025-01-15

## 2025-01-15 RX ORDER — PANTOPRAZOLE SODIUM 40 MG/1
40 TABLET, DELAYED RELEASE ORAL 2 TIMES DAILY
Qty: 180 TABLET | Refills: 3 | Status: SHIPPED | OUTPATIENT
Start: 2025-01-15 | End: 2026-01-15

## 2025-01-15 RX ORDER — MIRTAZAPINE 15 MG/1
15 TABLET, FILM COATED ORAL NIGHTLY
Qty: 30 TABLET | Refills: 11 | Status: SHIPPED | OUTPATIENT
Start: 2025-01-15 | End: 2026-01-15

## 2025-01-15 NOTE — PROGRESS NOTES
The patient location is: [place]  The chief complaint leading to consultation is: nausea    Visit type: audiovisual    Face to Face time with patient: 22 minutes  30 minutes of total time spent on the encounter, which includes face to face time and non-face to face time preparing to see the patient (eg, review of tests), Obtaining and/or reviewing separately obtained history, Documenting clinical information in the electronic or other health record, Independently interpreting results (not separately reported) and communicating results to the patient/family/caregiver, or Care coordination (not separately reported).         Each patient to whom he or she provides medical services by telemedicine is:  (1) informed of the relationship between the physician and patient and the respective role of any other health care provider with respect to management of the patient; and (2) notified that he or she may decline to receive medical services by telemedicine and may withdraw from such care at any time.    Notes:       [facility]    Referral from Dr. Kumar    CC: nausea    HPI 44 y.o. female with gastroparesis s/p gastric stimulator placement and removal, J tube placement and removal, pyloroplasty, sleeve gastrectomy then conversion to bypass with portocath placement who presents with chronic, daily, painful nausea with associated vomiting every time she eats and significant abdominal pain with eating.    When she saw Dr. Kumar in Nov he started her on a trial of erythromycin and rifaximin for SIBO but her symptoms are unchanged.    Historically she has constipation but since her bypass she has diarrhea.  She still has to strain with having a BM with diarrhea.    For her nausea phenergan helps but she only takes this at time.  Zofran does not work, scopalamine patch burned her skin.    She has GERD.  She is taking Protonix 40mg daily and carafate but she still has GERD symptoms.    She often has  right lower abdominal pain.    She reports after her bypass she was able to tolerate jello, pudding and soup which was a big improvement.  She then had an EGD to evaluate her anatomy.  Stenosis of her GJ anastomosis was noted and dilated.  After that her symptoms worsened and her abdominal pain with eating started.    She had a colonoscopy shortly after that.  It was a difficult colon but the endoscopist did reach the cecum.  We discussed this together today.  I feel that it was likely difficult due to her body habitus and no further evaluation of this is needed.    Past Medical History  Past Medical History:   Diagnosis Date    Fibromyalgia     Gastroparesis     Headache     Rumination        Physical Examination  There were no vitals taken for this visit.  General appearance: alert, cooperative, no distress  HENT: Normocephalic, atraumatic, neck symmetrical, no nasal discharge   Neurologic: Alert and oriented X 3, no facial droop or dysarthria    Labs:  Lab Results   Component Value Date    WBC 4.1 (L) 12/19/2024    HGB 10.0 (L) 12/19/2024    HCT 32.0 (L) 12/19/2024    MCV 83.8 12/19/2024     02/13/2024         CMP  Sodium   Date Value Ref Range Status   12/19/2024 141 135 - 145 mmol/L Final     Potassium   Date Value Ref Range Status   12/19/2024 3.5 (L) 3.6 - 5.2 mmol/L Final     Chloride   Date Value Ref Range Status   12/19/2024 104 100 - 108 mmol/L Final     CO2   Date Value Ref Range Status   12/19/2024 34 (H) 21 - 32 mmol/L Final     Glucose   Date Value Ref Range Status   12/19/2024 72 70 - 110 mg/dL Final     BUN   Date Value Ref Range Status   12/19/2024 8 7 - 18 mg/dL Final     Creatinine   Date Value Ref Range Status   12/19/2024 0.74 0.55 - 1.02 mg/dL Final     Calcium   Date Value Ref Range Status   12/19/2024 9.1 8.8 - 10.5 mg/dL Final     Total Protein   Date Value Ref Range Status   02/24/2022 5.7 (L) 6.0 - 8.4 g/dL Final     Albumin   Date Value Ref Range Status   02/24/2022 3.2 (L) 3.5 -  5.2 g/dL Final     Total Bilirubin   Date Value Ref Range Status   02/24/2022 0.3 0.1 - 1.0 mg/dL Final     Comment:     For infants and newborns, interpretation of results should be based  on gestational age, weight and in agreement with clinical  observations.    Premature Infant recommended reference ranges:  Up to 24 hours.............<8.0 mg/dL  Up to 48 hours............<12.0 mg/dL  3-5 days..................<15.0 mg/dL  6-29 days.................<15.0 mg/dL       Alkaline Phosphatase   Date Value Ref Range Status   02/24/2022 73 55 - 135 U/L Final     AST   Date Value Ref Range Status   02/24/2022 17 10 - 40 U/L Final     ALT   Date Value Ref Range Status   02/24/2022 10 10 - 44 U/L Final     Anion Gap   Date Value Ref Range Status   12/19/2024 3.0 3.0 - 11.0 mmol/L Final         Assessment:   1. Nausea and vomiting, unspecified vomiting type    2. Abdominal pain, unspecified abdominal location    3. Straining during bowel movements    4. Gastroesophageal reflux disease, unspecified whether esophagitis present        Plan:  -Start Remeron 15mg nightly.  - Start bentyl as needed for abdominal pain with eating and her right lower quadrant abdominal pain.  -Increase Protonix to 40mg twice per day.  -Referral to Pelvic floor physical therapy for her straining issues.    Tiara Shine MD

## 2025-01-16 ENCOUNTER — PATIENT MESSAGE (OUTPATIENT)
Dept: GASTROENTEROLOGY | Facility: CLINIC | Age: 45
End: 2025-01-16
Payer: COMMERCIAL

## 2025-01-17 ENCOUNTER — TELEPHONE (OUTPATIENT)
Dept: BARIATRICS | Facility: CLINIC | Age: 45
End: 2025-01-17
Payer: COMMERCIAL

## 2025-01-17 NOTE — TELEPHONE ENCOUNTER
----- Message from JUDE Shaver sent at 11/15/2024  3:47 PM CST -----  Hey-    She came by yesterday since she was seeing Dr. Gonsales(I think she ordered a double balloon endoscopy), but she had asked if you could look through her chart.  She had another operation done by Dr. Giordano at [facility].  Still c/o a lot of abdominal issues.      Thanks,  Chhaya

## 2025-01-28 ENCOUNTER — TELEPHONE (OUTPATIENT)
Dept: BARIATRICS | Facility: CLINIC | Age: 45
End: 2025-01-28
Payer: COMMERCIAL

## 2025-01-28 ENCOUNTER — OFFICE VISIT (OUTPATIENT)
Dept: SURGERY | Facility: CLINIC | Age: 45
End: 2025-01-28
Payer: COMMERCIAL

## 2025-01-28 DIAGNOSIS — R11.2 NAUSEA AND VOMITING, UNSPECIFIED VOMITING TYPE: Primary | ICD-10-CM

## 2025-01-28 PROCEDURE — 1160F RVW MEDS BY RX/DR IN RCRD: CPT | Mod: CPTII,95,, | Performed by: SURGERY

## 2025-01-28 PROCEDURE — 1159F MED LIST DOCD IN RCRD: CPT | Mod: CPTII,95,, | Performed by: SURGERY

## 2025-01-28 PROCEDURE — 98006 SYNCH AUDIO-VIDEO EST MOD 30: CPT | Mod: 95,,, | Performed by: SURGERY

## 2025-01-28 NOTE — LETTER
January 28, 2025        Jeancarlos Angel MD  [address]  [address]  [facility] [address]             [facility]  [address]  [facility] [address]  Phone: [phone]   Patient: Nena Mendez   MR Number: [id number]   YOB: 1980   Date of Visit: 1/28/2025       Dear Dr. Angel:    Thank you for referring Nena Mendez to me for evaluation. Attached you will find relevant portions of my assessment and plan of care.    If you have questions, please do not hesitate to call me. I look forward to following Nena Mendez along with you.    Sincerely,      Robert Kumar MD            CC  No Recipients    Enclosure

## 2025-01-28 NOTE — TELEPHONE ENCOUNTER
"Torri Finch RN in [place] stated Dr. Kumar requested pt to see RD ASAP.   Torri communicated that Dr. Kumar stated, "Gastroparesis with protein calorie malnutrition and s/p multiple procedures for this including bypass.  I think reversal of bypass would likely make her worse and would have increased risk of anastomotic leak.  To see bariatric nutrition for help with dumping and diet help."    Called and spoke with the pt.  Discussed the FC.  Scheduled pt to see RD virtually. Will send nutrition guidebook to pt through portal. Pt stated she is currently NPO and moving towards TPN.    "

## 2025-01-28 NOTE — PROGRESS NOTES
The patient location is: in [facility]  The chief complaint leading to consultation is: gastroparesis    Visit type: audiovisual      21 minutes of total time spent on the encounter, which includes face to face time and non-face to face time preparing to see the patient (eg, review of tests), Obtaining and/or reviewing separately obtained history, Documenting clinical information in the electronic or other health record, Independently interpreting results (not separately reported) and communicating results to the patient/family/caregiver, or Care coordination (not separately reported).         Each patient to whom he or she provides medical services by telemedicine is:  (1) informed of the relationship between the physician and patient and the respective role of any other health care provider with respect to management of the patient; and (2) notified that he or she may decline to receive medical services by telemedicine and may withdraw from such care at any time.    Notes:     43y/o with bmi 15.19 (weight stable from last visit), fibromyalgia and s/p gastric stimulator 10/7/16 with replacement of battery 3/22/19, 12/30/19, 1/28/22, 6/23/23, removal stimulator 12/30/23, J tube 12/16/16 (she didn't tolerate it so she may have small bowel dysmotility also), j tube was removed for infection, pylorolasty 1/27/17, sleeve gastrectomy 5/10/17, robotic conversion to bypass with hh repair 6/24/23, replacement j tube and robotic g tube 2/21/22 (removed later), s/p portacath 2/3/23, 6/23/23 and 12/2024 all for gastroparesis.        Interval history 7/11/23: She has some soreness at her port site.  She continues to have extremely severe symptoms.     Her child has heart trouble.  They have a Sean Luther, a service dog to detect low blood pressure and is in training.  She says her dog got Chaga's from eating an insect and has a pacemaker.  Her oldest daughter is considering crna and working in the icu.     Interval history  "11/26/24:  Since I last saw her she has undergone removal gstim and conversion to gastric bypass.  Since then she has undergone egd with dilation.  She is taking phenergan bid, not taking zofran, protonix bid and carafate tid (liquid). Mvi, vit d, folate, zinc, iron and calcium.  She is not taking pain medication.  She is not using thc derivatives or vaping.  She has diarrhea since her last surgery and more rarely constipation.  She says she is 99.8lb and was 108 in January.  She is 5'5".  Bmi 16.5.  She has severe gerd and says she has gastritis.  She says it was difficult to do the colonoscopy due to possible adhesions.  Overall she says she is 100% worse than prior to surgery and dilation appears to have worsened her.  She is not seeing psych.     Post Gastric Pacemaker Symptom Monitor  Severity/Frequency  Vomiting-4/4  Nausea-4/4  Early Satiety-4/4  Bloating-2/2-3  Postprandial fullness-3/4  Epigastric pain-4/4  Epigastric burning-3/4  Conclusion: Mostly extremely severe     GERD Questionnaire   bid PPI, carafate tid  has Typical heartburn  has Regurgitation  no Dysphagia solids  no Dysphagia liquids  has Hoarseness  has Sore throat  no Cough  no Asthma  no Chest pain  has Water brash  no Globus  has Nausea  has Vomiting     Nutrition:  She is not able to eat much at all.  She is trying Gatorade 0.    Interval history 1/28/25:  She says her symptoms are about the same and she is also having headaches.  She says sugars are the worst so she may have an element of dumping.  GI started her on remeron and bentyl and increased ppi.  She says she has increased vomiting since she last had an egd with dilation.  She has had a port placed for tpn and hasn't been accessed.  Her weight is 97lb.  She says xifacin (for sibo) and erythromycin (for dysmotility) didn't help.    Her daughter is showing pigs.      shows multiple butalbitol rx and recent narcotics rx.       Diagnostic Results:  CT 2022 ok, tubes in place.  No " mention of abscess at the g tube site.  Ct 2024 reviewed, no acute process  Ct and MRA 1/2024 reviewed, no acute problems  Ugi 2020 reviewed, reflux, no hh, no achalasia  Ugisbf 10/2024 reviewed, slow small bowel transit time, s/p bypass  U/s pelvis 2024 she states showed small bowel movement (with ugisbf ok I don't know what this means)  Cscope 2024 she states it was difficult to get done do to twisting  Egd 2019 reviewed, pyloroplasty with suture, 5 cm hh, sleeve, endoflip normal  Egd 2022 reviewed, gastritis  Egd 8/2024 reviewed, gastritis, esophagitis  Egd 10/2024 reviewed  Motility 2019 reviewed, egj outflow obstruction, rumination syndrome, high lesp with incomplete relaxation     Assessment and plan  Gastroparesis with protein calorie malnutrition and s/p multiple procedures for this including bypass.  Awaiting starting tpn.  Obtain recent egd report and ct films.  Set up for double balloon endoscopy.  Consider ct enterography.

## 2025-01-29 ENCOUNTER — PATIENT MESSAGE (OUTPATIENT)
Dept: SURGERY | Facility: CLINIC | Age: 45
End: 2025-01-29
Payer: COMMERCIAL

## 2025-01-29 NOTE — TELEPHONE ENCOUNTER
Messaged Shannon Balderas RN and Torri Finch RN in [place] on how to proceed.  Pt unable to be seen in Bariatrics until after FC on 2/15/25. Determine to send questions to Dr. Kumar.

## 2025-01-30 NOTE — TELEPHONE ENCOUNTER
Discussed with Norma Posadas LCSW.  Secure chat sent to:       Sent update to Shannon Balderas RN.

## 2025-01-31 ENCOUNTER — TELEPHONE (OUTPATIENT)
Dept: SURGERY | Facility: CLINIC | Age: 45
End: 2025-01-31
Payer: COMMERCIAL

## 2025-01-31 ENCOUNTER — TELEPHONE (OUTPATIENT)
Dept: GASTROENTEROLOGY | Facility: CLINIC | Age: 45
End: 2025-01-31
Payer: COMMERCIAL

## 2025-01-31 DIAGNOSIS — R11.2 NAUSEA AND VOMITING, UNSPECIFIED VOMITING TYPE: Primary | ICD-10-CM

## 2025-01-31 DIAGNOSIS — K31.84 GASTROPARESIS: ICD-10-CM

## 2025-01-31 DIAGNOSIS — R10.9 ABDOMINAL PAIN, UNSPECIFIED ABDOMINAL LOCATION: ICD-10-CM

## 2025-01-31 DIAGNOSIS — E46 MALNUTRITION, UNSPECIFIED TYPE: Primary | ICD-10-CM

## 2025-01-31 NOTE — TELEPHONE ENCOUNTER
----- Message from JUDE Ortega sent at 1/31/2025  8:00 AM CST -----  Regarding: Double Balloon Endo for possible obstructin or PUD in the remnant stomach  Good morning, Nesha!    Dr. Kumar would like this patient to have a Double Balloon Endo for possible obstructin or PUD in the remnant stomach.  Are you the correct person to reach out to?  Please let me know.    Thanks,  Shannon

## 2025-01-31 NOTE — TELEPHONE ENCOUNTER
Received referral from Dr. Kumar for antegrade double-balloon enteroscopy for assessment of the duodenal limb and remnant stomach for nausea and vomiting in this post-gastric bypass patient.      Case request entered and will have staff contact patient.

## 2025-02-01 ENCOUNTER — TELEPHONE (OUTPATIENT)
Dept: ENDOSCOPY | Facility: HOSPITAL | Age: 45
End: 2025-02-01
Payer: COMMERCIAL

## 2025-02-01 NOTE — TELEPHONE ENCOUNTER
Spoke to patient to schedule procedure(s) Antegrade Double Balloon       Physician to perform procedure(s) Dr. KIRTI Peters  Date of Procedure (s) 2/20/25  Arrival Time 6:30 AM  Time of Procedure(s) 7:30 AM   Location of Procedure(s) [facility]nd Floor  Type of Rx Prep sent to patient: Other  Instructions provided to patient via [facility]    Patient was informed on the following information and verbalized understanding. Screening questionnaire reviewed with patient and complete. If procedure requires anesthesia, a responsible adult needs to be present to accompany the patient home, patient cannot drive after receiving anesthesia. Appointment details are tentative, especially check-in time. Patient will receive a prep-op call 7 days prior to confirm check-in time for procedure. If applicable the patient should contact their pharmacy to verify Rx for procedure prep is ready for pick-up. Patient was advised to call the scheduling department at [phone] if pharmacy states no Rx is available. Patient was advised to call the endoscopy scheduling department if any questions or concerns arise.      [facility] Scheduling Department

## 2025-02-17 ENCOUNTER — CLINICAL SUPPORT (OUTPATIENT)
Dept: BARIATRICS | Facility: CLINIC | Age: 45
End: 2025-02-17
Payer: COMMERCIAL

## 2025-02-17 DIAGNOSIS — Z98.84 S/P BARIATRIC SURGERY: ICD-10-CM

## 2025-02-17 DIAGNOSIS — R63.0 ANOREXIA: ICD-10-CM

## 2025-02-17 DIAGNOSIS — Z71.3 DIETARY COUNSELING AND SURVEILLANCE: Primary | ICD-10-CM

## 2025-02-17 DIAGNOSIS — R63.6 UNDERWEIGHT (BMI < 18.5): ICD-10-CM

## 2025-02-17 DIAGNOSIS — E46 MALNUTRITION, UNSPECIFIED TYPE: ICD-10-CM

## 2025-02-17 NOTE — PROGRESS NOTES
"NUTRITIONAL CONSULT    Referring Physician: Robert Kumar M.D.   Reason for MNT Referral: Initial assessment for  post bypass weight loss,  Pt currently had  work-up for the following:fibromyalgia and s/p gastric stimulator 10/7/16 with replacement of battery 3/22/19, 12/30/19, 1/28/22, 6/23/23, removal stimulator 12/30/23, J tube 12/16/16 (she didn't tolerate it so she may have small bowel dysmotility also), j tube was removed for infection, pylorolasty 1/27/17, sleeve gastrectomy 5/10/17, robotic conversion to bypass with hh repair 6/24/23, replacement j tube and robotic g tube 2/21/22 (removed later), s/p portacath 2/3/23, 6/23/23 and 12/2024 all for gastroparesis.        PAST MEDICAL HISTORY:   44 y.o. female    Weight history includes .  Weight loss attempts include Sleeve 2017 for gastroparesis.and bypass in 2023    Past Medical History:   Diagnosis Date    Fibromyalgia     Gastroparesis     Headache     Rumination        CLINICAL DATA:  44 y.o.-year-old White female.  Height: 5'5"  Weight: 96 lbs steady for past couple of weeks was up to 110 lbs November 2024= 43.5449 kg  IBW: 141 lbs  BMI: 16.0        DAILY NUTRITIONAL NEEDS: for weight maintenance and/or regain 9449-9794 Calories   55-66 Grams Protein    NUTRITION & HEALTH HISTORY:drinks 32 oz yeti cup per day  Greatest challenge:  cannot eat any foods  Current diet recall:  Nothing patient can eat   Nothing with sugar  Sugar free gatorade with peptides           Current Diet:  Meal pattern: no solids  Protein supplements: cannot tolerate- vomits them back up    Beverages: sugar-free beverages/powders/dropsdiet  cranwatermelon juice or gaterade zero watermelon flavored      Exercise:      Restrictions to Exercise:  weakness due to no oral intake except sf beverages    Vitamins / Minerals / Herbs:   Bariatric multivitamin  Folic  Vitamin D  K+  B complex  B-1  Magnseium  Zinc  B-12 injections    Labs:   No recent    Food Allergies: "   Iodine    Social:    Alcohol: None.  Smoking: None.    ASSESSMENT:    Pt is currently diagnosed with protein calorie malnutrition due to lack of oral intake.    Barriers to Education: physical or mental condition    Stage of change: action    NUTRITION DIAGNOSIS:    Malnutrition related to Inadequate calorie intake and Inadequate protein intake as evidence by BMI and inadequate oral intake of both solids and fluids    BARIATRIC DIET DISCUSSION/PLAN:  Discussed diet post-surgery and related to patient's food record.  When patient is able to consume foods, discuss foods that cause dumping syndrome and foods that slow down gastric emptying.  Pt should avoid these foods.  Reviewed nutrition guidelines   Answered all questions.  When patient is able to consume foods, patient should:  1200-calorie diet.  1500-calorie diet.  5-6 meals per day  increase protein  Increase fluid intake to 48 oz then to 64 oz as tolerated    RECOMMENDATIONS:  Pt has been educated on the bariatric lifestyle         Patient verbalized understanding.    Communicated nutrition plan with bariatric team.    SESSION TIME:  60 minutes

## 2025-02-18 ENCOUNTER — PATIENT MESSAGE (OUTPATIENT)
Dept: BARIATRICS | Facility: CLINIC | Age: 45
End: 2025-02-18
Payer: COMMERCIAL

## 2025-02-20 ENCOUNTER — ANESTHESIA (OUTPATIENT)
Dept: ENDOSCOPY | Facility: HOSPITAL | Age: 45
End: 2025-02-20
Payer: COMMERCIAL

## 2025-02-20 ENCOUNTER — PATIENT MESSAGE (OUTPATIENT)
Dept: SURGERY | Facility: CLINIC | Age: 45
End: 2025-02-20
Payer: COMMERCIAL

## 2025-02-20 ENCOUNTER — ANESTHESIA EVENT (OUTPATIENT)
Dept: ENDOSCOPY | Facility: HOSPITAL | Age: 45
End: 2025-02-20
Payer: COMMERCIAL

## 2025-02-20 ENCOUNTER — HOSPITAL ENCOUNTER (OUTPATIENT)
Facility: HOSPITAL | Age: 45
Discharge: HOME | End: 2025-02-20
Attending: INTERNAL MEDICINE | Admitting: INTERNAL MEDICINE
Payer: COMMERCIAL

## 2025-02-20 VITALS
SYSTOLIC BLOOD PRESSURE: 108 MMHG | WEIGHT: 96 LBS | TEMPERATURE: 98 F | DIASTOLIC BLOOD PRESSURE: 60 MMHG | OXYGEN SATURATION: 100 % | HEART RATE: 53 BPM | RESPIRATION RATE: 20 BRPM | BODY MASS INDEX: 15.99 KG/M2 | HEIGHT: 65 IN

## 2025-02-20 DIAGNOSIS — R11.2 NAUSEA AND VOMITING: ICD-10-CM

## 2025-02-20 DIAGNOSIS — R11.2 NAUSEA AND VOMITING, UNSPECIFIED VOMITING TYPE: Primary | ICD-10-CM

## 2025-02-20 PROCEDURE — 37000008 HC ANESTHESIA 1ST 15 MINUTES: Performed by: INTERNAL MEDICINE

## 2025-02-20 PROCEDURE — 44376 SMALL BOWEL ENDOSCOPY: CPT | Performed by: INTERNAL MEDICINE

## 2025-02-20 PROCEDURE — 37000009 HC ANESTHESIA EA ADD 15 MINS: Performed by: INTERNAL MEDICINE

## 2025-02-20 PROCEDURE — 63600175 PHARM REV CODE 636 W HCPCS

## 2025-02-20 PROCEDURE — 44376 SMALL BOWEL ENDOSCOPY: CPT | Mod: 22,,, | Performed by: INTERNAL MEDICINE

## 2025-02-20 PROCEDURE — 25000003 PHARM REV CODE 250

## 2025-02-20 RX ORDER — DEXAMETHASONE SODIUM PHOSPHATE 4 MG/ML
INJECTION, SOLUTION INTRA-ARTICULAR; INTRALESIONAL; INTRAMUSCULAR; INTRAVENOUS; SOFT TISSUE
Status: DISCONTINUED | OUTPATIENT
Start: 2025-02-20 | End: 2025-02-20

## 2025-02-20 RX ORDER — DEXMEDETOMIDINE HYDROCHLORIDE 100 UG/ML
INJECTION, SOLUTION INTRAVENOUS
Status: DISCONTINUED | OUTPATIENT
Start: 2025-02-20 | End: 2025-02-20

## 2025-02-20 RX ORDER — FENTANYL CITRATE 50 UG/ML
INJECTION, SOLUTION INTRAMUSCULAR; INTRAVENOUS
Status: DISCONTINUED | OUTPATIENT
Start: 2025-02-20 | End: 2025-02-20

## 2025-02-20 RX ORDER — LIDOCAINE HYDROCHLORIDE 20 MG/ML
INJECTION INTRAVENOUS
Status: DISCONTINUED | OUTPATIENT
Start: 2025-02-20 | End: 2025-02-20

## 2025-02-20 RX ORDER — HALOPERIDOL 5 MG/ML
0.5 INJECTION INTRAMUSCULAR EVERY 10 MIN PRN
Status: COMPLETED | OUTPATIENT
Start: 2025-02-20 | End: 2025-02-20

## 2025-02-20 RX ORDER — FENTANYL CITRATE 50 UG/ML
25 INJECTION, SOLUTION INTRAMUSCULAR; INTRAVENOUS EVERY 5 MIN PRN
Status: DISCONTINUED | OUTPATIENT
Start: 2025-02-20 | End: 2025-02-20 | Stop reason: HOSPADM

## 2025-02-20 RX ORDER — ONDANSETRON HYDROCHLORIDE 2 MG/ML
INJECTION, SOLUTION INTRAVENOUS
Status: DISCONTINUED | OUTPATIENT
Start: 2025-02-20 | End: 2025-02-20

## 2025-02-20 RX ORDER — GLUCAGON 1 MG
1 KIT INJECTION
Status: DISCONTINUED | OUTPATIENT
Start: 2025-02-20 | End: 2025-02-20 | Stop reason: HOSPADM

## 2025-02-20 RX ORDER — PROPOFOL 10 MG/ML
VIAL (ML) INTRAVENOUS
Status: DISCONTINUED | OUTPATIENT
Start: 2025-02-20 | End: 2025-02-20

## 2025-02-20 RX ORDER — ROCURONIUM BROMIDE 10 MG/ML
INJECTION, SOLUTION INTRAVENOUS
Status: DISCONTINUED | OUTPATIENT
Start: 2025-02-20 | End: 2025-02-20

## 2025-02-20 RX ORDER — SODIUM CHLORIDE 0.9 % (FLUSH) 0.9 %
10 SYRINGE (ML) INJECTION
Status: DISCONTINUED | OUTPATIENT
Start: 2025-02-20 | End: 2025-02-20 | Stop reason: HOSPADM

## 2025-02-20 RX ORDER — SUCCINYLCHOLINE CHLORIDE 20 MG/ML
INJECTION INTRAMUSCULAR; INTRAVENOUS
Status: DISCONTINUED | OUTPATIENT
Start: 2025-02-20 | End: 2025-02-20

## 2025-02-20 RX ORDER — SODIUM CHLORIDE 9 MG/ML
INJECTION, SOLUTION INTRAVENOUS CONTINUOUS
Status: DISCONTINUED | OUTPATIENT
Start: 2025-02-20 | End: 2025-02-20 | Stop reason: HOSPADM

## 2025-02-20 RX ADMIN — ROCURONIUM BROMIDE 10 MG: 10 INJECTION INTRAVENOUS at 09:02

## 2025-02-20 RX ADMIN — PROPOFOL 100 MG: 10 INJECTION, EMULSION INTRAVENOUS at 09:02

## 2025-02-20 RX ADMIN — SUGAMMADEX 200 MG: 100 INJECTION, SOLUTION INTRAVENOUS at 10:02

## 2025-02-20 RX ADMIN — PROPOFOL 150 MCG/KG/MIN: 10 INJECTION, EMULSION INTRAVENOUS at 09:02

## 2025-02-20 RX ADMIN — ONDANSETRON 4 MG: 2 INJECTION INTRAMUSCULAR; INTRAVENOUS at 09:02

## 2025-02-20 RX ADMIN — LIDOCAINE HYDROCHLORIDE 50 MG: 20 INJECTION INTRAVENOUS at 09:02

## 2025-02-20 RX ADMIN — DEXMEDETOMIDINE 4 MCG: 100 INJECTION, SOLUTION, CONCENTRATE INTRAVENOUS at 10:02

## 2025-02-20 RX ADMIN — SODIUM CHLORIDE: 9 INJECTION, SOLUTION INTRAVENOUS at 09:02

## 2025-02-20 RX ADMIN — SUCCINYLCHOLINE CHLORIDE 100 MG: 20 INJECTION, SOLUTION INTRAMUSCULAR; INTRAVENOUS at 09:02

## 2025-02-20 RX ADMIN — ROCURONIUM BROMIDE 40 MG: 10 INJECTION INTRAVENOUS at 09:02

## 2025-02-20 RX ADMIN — PROPOFOL 50 MG: 10 INJECTION, EMULSION INTRAVENOUS at 09:02

## 2025-02-20 RX ADMIN — HALOPERIDOL LACTATE 0.5 MG: 5 INJECTION, SOLUTION INTRAMUSCULAR at 10:02

## 2025-02-20 RX ADMIN — FENTANYL CITRATE 50 MCG: 50 INJECTION, SOLUTION INTRAMUSCULAR; INTRAVENOUS at 09:02

## 2025-02-20 RX ADMIN — SODIUM CHLORIDE: 9 INJECTION, SOLUTION INTRAVENOUS at 10:02

## 2025-02-20 RX ADMIN — DEXAMETHASONE SODIUM PHOSPHATE 4 MG: 4 INJECTION, SOLUTION INTRAMUSCULAR; INTRAVENOUS at 09:02

## 2025-02-20 NOTE — TRANSFER OF CARE
"Anesthesia Transfer of Care Note    Patient: Nena Mendez    Procedure(s) Performed: Procedure(s) (LRB):  ENTEROSCOPY, DOUBLE BALLOON, ANTEGRADE (N/A)    Patient location: [facility]    Anesthesia Type: general    Transport from OR: Transported from OR on 6-10 L/min O2 by face mask with adequate spontaneous ventilation    Post pain: adequate analgesia    Post assessment: no apparent anesthetic complications and tolerated procedure well    Post vital signs: stable    Level of consciousness: awake and alert    Nausea/Vomiting: no nausea/vomiting    Complications: none    Transfer of care protocol was followed      Last vitals: Visit Vitals  BP (!) 117/57 (Patient Position: Lying)   Pulse 60   Temp 36.5 °C (97.7 °F) (Temporal)   Resp 16   Ht 5' 5" (1.651 m)   Wt 43.5 kg (96 lb)   SpO2 99%   Breastfeeding No   BMI 15.98 kg/m²     "

## 2025-02-20 NOTE — ANESTHESIA PREPROCEDURE EVALUATION
[facility]  Anesthesia Pre-Operative Evaluation   2025        Patient Name: Nena Mendez  YOB: 1980  MRN: [id number]    Subjective  Pre-operative evaluation for Procedure(s) (LRB):  ENTEROSCOPY, DOUBLE BALLOON, ANTEGRADE (N/A)    Nena Mendez is a 44 y.o. female w/ a significant PMHx of.     Past Medical History:   Diagnosis Date    Fibromyalgia     Gastroparesis     Headache     Rumination        Current Inpatient Medications:       Medications Ordered Prior to Encounter[1]    Past Surgical History:   Procedure Laterality Date    APPENDECTOMY      breast implants removed Bilateral      SECTION      CHOLECYSTECTOMY      COSMETIC SURGERY      ESOPHAGEAL MANOMETRY WITH MEASUREMENT OF IMPEDANCE N/A 10/30/2019    Procedure: MANOMETRY, ESOPHAGUS, WITH IMPEDANCE MEASUREMENT;  Surgeon: Patti Orozco MD;  Location: [facility] ([address] [place] [address]);  Service: Endoscopy;  Laterality: N/A;  LATEX ALLERGY  r/o rumination  Motility Studies:  Hold Narcotics x 1 days   Hold TCA x 1 days  10/24 - pt confirmed appt-BB    ESOPHAGOGASTRODUODENOSCOPY N/A 3/22/2019    Procedure: EGD (ESOPHAGOGASTRODUODENOSCOPY)-dual lumen scope to remove intragastric suture.;  Surgeon: Robert Kumar MD;  Location: [facility] OR [address] [place] [address];  Service: General;  Laterality: N/A;    ESOPHAGOGASTRODUODENOSCOPY N/A 10/30/2019    Procedure: EGD (ESOPHAGOGASTRODUODENOSCOPY);  Surgeon: Patti Orozco MD;  Location: [address]);  Service: Endoscopy;  Laterality: N/A;  LATEX ALLERGY  EGD with EndoFlip   4th Floor, scheduled on 2nd floor due to availability  Full liquid diet 3 days and clear liquid diet x 1 day prior to procedure  Propofol only. No fentanyl or benzodiazepine during sedation. If additional sedation need    GASTRIC STIMULATOR IMPLANT SURGERY      GASTROSTOMY-JEJEUNOSTOMY TUBE CHANGE/PLACEMENT  08/10/2016    HYSTERECTOMY      INSERTION OF TUNNELED CENTRAL VENOUS CATHETER (CVC) WITH  SUBCUTANEOUS PORT Left 2/3/2023    Procedure: [id number]-CATH;  Surgeon: Robert Kumar MD;  [facility];  Service: General;  Laterality: Left;    INSERTION OF TUNNELED CENTRAL VENOUS CATHETER (CVC) WITH SUBCUTANEOUS PORT N/A 6/23/2023    Procedure: [id number];  Surgeon: Robert Kumar MD;  [facility];  Service: General;  Laterality: N/A;    laparoscopic jejunostomy tube      removed ~ 2019    PROGRAMMING-STIMULATOR  6/23/2023    Procedure: PROGRAMMING-STIMULATOR;  Surgeon: Robert Kumar MD;  Location: [facility] OR [address] [place] [address];  Service: General;;    REPLACEMENT OF GASTRIC NEUROSTIMULATOR GENERATOR N/A 3/22/2019    Procedure: REPLACEMENT, PULSE GENERATOR, NEUROSTIMULATOR, GASTRIC-battery exchange only;  Surgeon: Robert Kumar MD;  [facility];  Service: General;  Laterality: N/A;    REPLACEMENT OF GASTRIC NEUROSTIMULATOR GENERATOR N/A 12/30/2019    Procedure: REPLACEMENT, PULSE GENERATOR, NEUROSTIMULATOR, GASTRIC, OPEN (battery exchange);  Surgeon: Robert Kumar MD;  Location: [facility] OR [address] [place] [address];  Service: General;  Laterality: N/A;    REPLACEMENT OF GASTRIC NEUROSTIMULATOR GENERATOR N/A 1/28/2022    Procedure: REPLACEMENT, PULSE GENERATOR, NEUROSTIMULATOR, GASTRIC, Open;  Surgeon: Robert Kumar MD;  Location: [facility] OR [address] [place] [address];  Service: General;  Laterality: N/A;    REPLACEMENT OF GASTRIC NEUROSTIMULATOR GENERATOR N/A 6/23/2023    Procedure: REPLACEMENT, PULSE GENERATOR, NEUROSTIMULATOR, GASTRIC;  Surgeon: Robert Kumar MD;  Location: [facility] OR [address] [place] [address];  Service: General;  Laterality: N/A;  No narcotics    sleeve gastrectomy      TONSILLECTOMY         Social History:  Tobacco Use: Medium Risk (1/28/2025)    Patient History     Smoking Tobacco Use: Former     Smokeless Tobacco Use: Never     Passive Exposure: Not on file       Alcohol Use: Not At Risk (2/13/2025)    AUDIT-C     Frequency of Alcohol Consumption:  Never     Average Number of Drinks: Patient does not drink     Frequency of Binge Drinking: Never       Objective    Vital Signs Range:  BMI Readings from Last 1 Encounters:   12/18/24 16.34 kg/m²               Significant Labs:        Component Value Date/Time    WBC 4.1 (L) 12/19/2024 1020    HGB 10.0 (L) 12/19/2024 1020    HCT 32.0 (L) 12/19/2024 1020     02/13/2024 1218     05/11/2023 1530     12/19/2024 1020    K 3.5 (L) 12/19/2024 1020     12/19/2024 1020    CO2 34 (H) 12/19/2024 1020    GLU 72 12/19/2024 1020    BUN 8 12/19/2024 1020    CREATININE 0.74 12/19/2024 1020    MG 2.0 02/24/2022 0603    PHOS 3.9 02/24/2022 0603    CALCIUM 9.1 12/19/2024 1020    ALBUMIN 3.2 (L) 02/24/2022 0603    PROT 5.7 (L) 02/24/2022 0603    ALKPHOS 73 02/24/2022 0603    BILITOT 0.3 02/24/2022 0603    AST 17 02/24/2022 0603    ALT 10 02/24/2022 0603        Please see Results Review for additional labs.     Diagnostic Studies: All relevant studies, reviewed.    EKG:   No results found for this or any previous visit.    ECHO:  No results found for this or any previous visit.        Pre-op Assessment    I have reviewed the Patient Summary Reports.     I have reviewed the Nursing Notes.    I have reviewed the Medications.     Review of Systems  Anesthesia Hx:  No problems with previous Anesthesia   History of prior surgery of interest to airway management or planning: gastric bypass. Previous anesthesia: General           Hematology/Oncology:  Hematology Normal   Oncology Normal                                   EENT/Dental:  EENT/Dental Normal           Cardiovascular:  Cardiovascular Normal                                              Pulmonary:  Pulmonary Normal                       Renal/:  Renal/ Normal                 Hepatic/GI:        Gastroparesis s/p gastric stimulator 10/7/16 with replacement of battery 3/22/19, 12/30/19, 1/28/22, 6/23/23, removal stimulator 12/30/23, J tube 12/16/16 (she  didn't tolerate it so she may have small bowel dysmotility also), j tube was removed for infection, pylorolasty 1/27/17, sleeve gastrectomy 5/10/17, robotic conversion to bypass with hh repair 6/24/23, replacement j tube and robotic g tube 2/21/22 (removed later), s/p portacath 2/3/23, 6/23/23 and 12/2024 all for gastroparesis              Neurological:    Neuromuscular Disease,  Headaches                                 Endocrine:  Endocrine Normal            Psych:  Psychiatric Normal                    Physical Exam  General: Well nourished    Airway:  Mallampati: II   Mouth Opening: Normal  TM Distance: Normal  Tongue: Normal  Neck ROM: Normal ROM        Anesthesia Plan  Type of Anesthesia, risks & benefits discussed:    Anesthesia Type: Gen ETT  Intra-op Monitoring Plan: Standard ASA Monitors  Post Op Pain Control Plan: multimodal analgesia and IV/PO Opioids PRN  Induction:  IV  Airway Plan: Direct and Video, Post-Induction  Informed Consent: Informed consent signed with the Patient and all parties understand the risks and agree with anesthesia plan.  All questions answered.   ASA Score: 3  Day of Surgery Review of History & Physical: H&P Update referred to the surgeon/provider.    Ready For Surgery From Anesthesia Perspective.     .           [1]   Current Facility-Administered Medications on File Prior to Encounter   Medication Dose Route Frequency Provider Last Rate Last Admin    0.9%  NaCl infusion   Intravenous Continuous Rehan Mensah MD   Stopped at 06/23/23 0932     Current Outpatient Medications on File Prior to Encounter   Medication Sig Dispense Refill    b complex vitamins capsule Take 1 capsule by mouth once daily.      butalbitaL-acetaminop-caf-cod -22-30 mg Cap Take 1 capsule by mouth every 4 to 6 hours as needed.      chlorproMAZINE (THORAZINE) 25 MG tablet Take 25 mg by mouth every 4 (four) hours as needed. (Patient not taking: Reported on 12/18/2024)      dicyclomine (BENTYL) 20 mg  tablet Take 1 tablet (20 mg total) by mouth every 6 (six) hours as needed (abdominal pain and cramping). 120 tablet 6    erythromycin base (E-MYCIN) 500 MG tablet Take 0.5 tablets (250 mg total) by mouth every 6 (six) hours. 60 tablet 3    famotidine (PEPCID AC) 20 MG tablet Take 1 tablet (20 mg total) by mouth 2 (two) times daily. 60 tablet 6    folic acid (FOLVITE) 1 MG tablet Take 1,000 mcg by mouth every morning.      mirtazapine (REMERON) 15 MG tablet Take 1 tablet (15 mg total) by mouth every evening. 30 tablet 11    multivitamin Liqd Take by mouth once daily.      pantoprazole (PROTONIX) 40 MG tablet Take 40 mg by mouth.      pantoprazole (PROTONIX) 40 MG tablet Take 1 tablet (40 mg total) by mouth 2 (two) times daily. 180 tablet 3    promethazine (PHENERGAN) 25 MG tablet TAKE 1 TABLET(25 MG) BY MOUTH EVERY 6 HOURS AS NEEDED FOR NAUSEA 60 tablet 6    promethazine (PHENERGAN) 6.25 mg/5 mL syrup Take 12.5 mg by mouth every 6 (six) hours as needed for Nausea.      rifAXIMin (XIFAXAN) 550 mg Tab Take 1 tablet (550 mg total) by mouth 2 (two) times daily. (Patient not taking: Reported on 12/18/2024) 60 tablet 2    sucralfate (CARAFATE) 100 mg/mL suspension Take 1 g by mouth 4 (four) times daily.

## 2025-02-20 NOTE — ANESTHESIA POSTPROCEDURE EVALUATION
Anesthesia Post Evaluation    Patient: Nena Mendez    Procedure(s) Performed: Procedure(s) (LRB):  ENTEROSCOPY, DOUBLE BALLOON, ANTEGRADE (N/A)    Final Anesthesia Type: general      Patient location during evaluation: PACU  Patient participation: Yes- Able to Participate  Level of consciousness: awake and alert and oriented  Post-procedure vital signs: reviewed and stable  Pain management: adequate  Airway patency: patent    PONV status at discharge: No PONV  Anesthetic complications: no      Cardiovascular status: blood pressure returned to baseline  Respiratory status: unassisted, room air and spontaneous ventilation  Hydration status: euvolemic  Follow-up not needed.              Vitals Value Taken Time   /58 02/20/25 11:02   Temp 36.5 °C (97.7 °F) 02/20/25 10:40   Pulse 45 02/20/25 11:06   Resp 19 02/20/25 11:06   SpO2 100 % 02/20/25 11:06   Vitals shown include unfiled device data.      No case tracking events are documented in the log.      Pain/Baljit Score: Baljit Score: 8 (2/20/2025 10:45 AM)

## 2025-02-20 NOTE — ANESTHESIA PROCEDURE NOTES
Intubation    Date/Time: 2/20/2025 9:18 AM    Performed by: Grecia Melara CRNA  Authorized by: Rolando Estrada MD    Intubation:     Induction:  Rapid sequence induction    Intubated:  Postinduction    Mask Ventilation:  Not attempted    Attempts:  1    Attempted By:  CRNA    Method of Intubation:  Video laryngoscopy    Blade:  White 3    Laryngeal View Grade: Grade IIA - cords partially seen      Difficult Airway Encountered?: No      Complications:  None    Airway Device:  Oral endotracheal tube    Airway Device Size:  7.0    Style/Cuff Inflation:  Cuffed (inflated to minimal occlusive pressure)    Tube secured:  22    Secured at:  The teeth    Placement Verified By:  Capnometry and Revisualization with laryngoscopy    Complicating Factors:  None    Findings Post-Intubation:  BS equal bilateral and atraumatic/condition of teeth unchanged

## 2025-02-20 NOTE — [employer]ATION PATIENT INSTRUCTIONS
Discharge Summary/Instructions after an Endoscopic Procedure  Patient Name: Nena Mendez  Patient MRN: [id number]  Patient YOB: 1980 Thursday, February 20, 2025  Stephane Peters MD  Dear patient,  As a result of recent federal legislation (The Federal Cures Act), you may   receive lab or pathology results from your procedure in your [facility]   account before your physician is able to contact you. Your physician or   their representative will relay the results to you with their   recommendations at their soonest availability.  Thank you,  RESTRICTIONS:  During your procedure today, you received medications for sedation.  These   medications may affect your judgment, balance and coordination.  Therefore,   for 24 hours, you have the following restrictions:   - DO NOT drive a car, operate machinery, make legal/financial decisions,   sign important papers or drink alcohol.    ACTIVITY:  Today: no heavy lifting, straining or running due to procedural   sedation/anesthesia.  The following day: return to full activity including work.  DIET:  Eat and drink normally unless instructed otherwise.     TREATMENT FOR COMMON SIDE EFFECTS:  - Mild abdominal pain, nausea, belching, bloating or excessive gas:  rest,   eat lightly and use a heating pad.  - Sore Throat: treat with throat lozenges and/or gargle with warm salt   water.  - Because air was used during the procedure, expelling large amounts of air   from your rectum or belching is normal.  - If a bowel prep was taken, you may not have a bowel movement for 1-3 days.    This is normal.  SYMPTOMS TO WATCH FOR AND REPORT TO YOUR PHYSICIAN:  1. Abdominal pain or bloating, other than gas cramps.  2. Chest pain.  3. Back pain.  4. Signs of infection such as: chills or fever occurring within 24 hours   after the procedure.  5. Rectal bleeding, which would show as bright red, maroon, or black stools.   (A tablespoon of blood from the rectum is not serious, especially  if   hemorrhoids are present.)  6. Vomiting.  7. Weakness or dizziness.  GO DIRECTLY TO THE NEAREST EMERGENCY ROOM IF YOU HAVE ANY OF THE FOLLOWING:      Difficulty breathing              Chills and/or fever over 101 F   Persistent vomiting and/or vomiting blood   Severe abdominal pain   Severe chest pain   Black, tarry stools   Bleeding- more than one tablespoon   Any other symptom or condition that you feel may need urgent attention  Your doctor recommends these additional instructions:  If any biopsies were taken, your doctors clinic will contact you in 1 to 2   weeks with any results.  - Discharge patient to home.   - Patient has a contact number available for emergencies.  The signs and   symptoms of potential delayed complications were discussed with the   patient.  Return to normal activities tomorrow.  Written discharge   instructions were provided to the patient.   - Resume previous diet.   - Continue present medications.   - Return to referring physician.  For questions, problems or results please call your physician - Stephane Peters MD at Work:  [phone].  [facility], EMERGENCY ROOM PHONE NUMBER: [phone]  IF A COMPLICATION OR EMERGENCY SITUATION ARISES AND YOU ARE UNABLE TO REACH   YOUR PHYSICIAN - GO DIRECTLY TO THE EMERGENCY ROOM.  Stephane Peters MD  2/20/2025 11:02:59 AM  This report has been verified and signed electronically.  Dear patient,  As a result of recent federal legislation (The Federal Cures Act), you may   receive lab or pathology results from your procedure in your [facility]   account before your physician is able to contact you. Your physician or   their representative will relay the results to you with their   recommendations at their soonest availability.  Thank you,  PROVATION

## 2025-02-20 NOTE — H&P
Short Stay Endoscopy History and Physical      Procedure - Antegrade double-balloon enteroscopy  ASA - per anesthesia  Mallampati - per anesthesia  History of Anesthesia problems - no  Family history Anesthesia problems - no   Plan of anesthesia - General    HPI:  This is a 44 y.o. female here for evaluation of altered anatomy with symptoms of nausea, vomiting, and abdominal pain.       ROS:  Constitutional: No fevers, chills, No weight loss  CV: No chest pain  Pulm: No cough, No shortness of breath  GI: see HPI    Medical History:  has a past medical history of Fibromyalgia, Gastroparesis, Headache, and Rumination.    Surgical History:  has a past surgical history that includes Appendectomy; Cholecystectomy; Cosmetic surgery;  section; Hysterectomy; Tonsillectomy; Gastrostomy-jejeunostomy tube change/placement (08/10/2016); Gastric stimulator implant surgery; laparoscopic jejunostomy tube; sleeve gastrectomy; Replacement of gastric neurostimulator generator (N/A, 3/22/2019); Esophagogastroduodenoscopy (N/A, 3/22/2019); Esophagogastroduodenoscopy (N/A, 10/30/2019); Esophageal manometry with measurement of impedance (N/A, 10/30/2019); Replacement of gastric neurostimulator generator (N/A, 2019); breast implants removed (Bilateral, ); Replacement of gastric neurostimulator generator (N/A, 2022); Insertion of tunneled central venous catheter (CVC) with subcutaneous port (Left, 2/3/2023); Insertion of tunneled central venous catheter (CVC) with subcutaneous port (N/A, 2023); Replacement of gastric neurostimulator generator (N/A, 2023); and programming-stimulator (2023).    Family History: family history includes Asthma in her daughter; Cirrhosis in her father; Diverticulitis in her maternal aunt; Hypothyroidism in her mother; Irritable bowel syndrome in her maternal aunt; No Known Problems in her brother; Sleep apnea in her daughter.    Social History:  reports that she quit  "smoking about 9 years ago. Her smoking use included cigarettes. She has never used smokeless tobacco. She reports that she does not drink alcohol and does not use drugs.    Review of patient's allergies indicates:   Allergen Reactions    Iodine and iodide containing products Swelling, Hives, Itching, Rash and Shortness Of Breath    Domperidone Itching    Latex, natural rubber Rash    Iodine     Scopolamine      Patch- caused burn under patch       Medications:   Prescriptions Prior to Admission[1]      Physical Exam:    Vital Signs:   Vitals:    02/20/25 0845   BP: (!) 117/57   Pulse: 60   Resp: 16   Temp: 97.7 °F (36.5 °C)       General Appearance: Well appearing in no acute distress  Eyes:  PERRL  Lungs: CTA bilaterally  Heart:  Reg rate and rhythm  Abdomen: Soft, non tender, non distended with positive bowel sounds.      Labs:  Lab Results   Component Value Date    WBC 4.1 (L) 12/19/2024    HGB 10.0 (L) 12/19/2024    HCT 32.0 (L) 12/19/2024    MCV 83.8 12/19/2024     02/13/2024        BMP  Lab Results   Component Value Date     12/19/2024    K 3.5 (L) 12/19/2024     12/19/2024    CO2 34 (H) 12/19/2024    BUN 8 12/19/2024    CREATININE 0.74 12/19/2024    CALCIUM 9.1 12/19/2024    ANIONGAP 3.0 12/19/2024    ESTGFRAFRICA >60.0 03/15/2022    EGFRNONAA >60.0 03/15/2022     No results found for: "INR", "PROTIME"       Assessment:  44 y.o. female with nausea, vomiting, and abdominal pain in setting of gastric bypass anatomy.     Plan:  Proceed with antegrade double-balloon enteroscopy today.  I have explained the risks and benefits of endoscopy procedures to the patient including but not limited to bleeding, perforation, infection, and death.  All questions and answered.        Stephane Peters MD         [1]   Medications Prior to Admission   Medication Sig Dispense Refill Last Dose/Taking    erythromycin base (E-MYCIN) 500 MG tablet Take 0.5 tablets (250 mg total) by mouth every 6 (six) hours. 60 " tablet 3 2/19/2025    famotidine (PEPCID AC) 20 MG tablet Take 1 tablet (20 mg total) by mouth 2 (two) times daily. 60 tablet 6 2/19/2025    folic acid (FOLVITE) 1 MG tablet Take 1,000 mcg by mouth every morning.   2/19/2025    mirtazapine (REMERON) 15 MG tablet Take 1 tablet (15 mg total) by mouth every evening. 30 tablet 11 2/19/2025    multivitamin Liqd Take by mouth once daily.   2/19/2025    pantoprazole (PROTONIX) 40 MG tablet Take 40 mg by mouth.   2/19/2025    promethazine (PHENERGAN) 25 MG tablet TAKE 1 TABLET(25 MG) BY MOUTH EVERY 6 HOURS AS NEEDED FOR NAUSEA 60 tablet 6 2/19/2025    sucralfate (CARAFATE) 100 mg/mL suspension Take 1 g by mouth 4 (four) times daily.   2/19/2025    b complex vitamins capsule Take 1 capsule by mouth once daily.       butalbitaL-acetaminop-caf-cod -94-30 mg Cap Take 1 capsule by mouth every 4 to 6 hours as needed.       chlorproMAZINE (THORAZINE) 25 MG tablet Take 25 mg by mouth every 4 (four) hours as needed. (Patient not taking: Reported on 12/18/2024)       dicyclomine (BENTYL) 20 mg tablet Take 1 tablet (20 mg total) by mouth every 6 (six) hours as needed (abdominal pain and cramping). 120 tablet 6     pantoprazole (PROTONIX) 40 MG tablet Take 1 tablet (40 mg total) by mouth 2 (two) times daily. 180 tablet 3     promethazine (PHENERGAN) 6.25 mg/5 mL syrup Take 12.5 mg by mouth every 6 (six) hours as needed for Nausea.       rifAXIMin (XIFAXAN) 550 mg Tab Take 1 tablet (550 mg total) by mouth 2 (two) times daily. (Patient not taking: Reported on 12/18/2024) 60 tablet 2

## 2025-02-20 NOTE — PROGRESS NOTES
Patient is ready for discharge. Patient stable alert and oriented. Port left accessed for TPN later today. No complaints of pain. Discussed discharge plan. Reviewed medications and side effects, appointments, and answered questions with patient and family.

## 2025-02-21 ENCOUNTER — RESULTS FOLLOW-UP (OUTPATIENT)
Dept: SURGERY | Facility: CLINIC | Age: 45
End: 2025-02-21
Payer: COMMERCIAL

## 2025-02-21 DIAGNOSIS — Z88.8 ALLERGY TO IODINE: ICD-10-CM

## 2025-02-21 DIAGNOSIS — K90.9 INTESTINAL MALABSORPTION, UNSPECIFIED TYPE: Primary | ICD-10-CM

## 2025-02-21 PROCEDURE — 99999 PR PBB SHADOW E&M-EST. PATIENT-LVL II: CPT | Mod: PBBFAC,,,

## 2025-02-21 RX ORDER — DIPHENHYDRAMINE HCL 50 MG
CAPSULE ORAL
Qty: 1 CAPSULE | Refills: 0 | Status: CANCELLED | OUTPATIENT
Start: 2025-02-21

## 2025-02-21 RX ORDER — PREDNISONE 50 MG/1
TABLET ORAL
Qty: 3 TABLET | Refills: 0 | Status: SHIPPED | OUTPATIENT
Start: 2025-02-21

## 2025-02-21 NOTE — PROGRESS NOTES
Discussed these results with Mrs. Mendez.  Ordered CT enterrography with pre-test (OTC) benadryl and prednisone for iodine allergy.

## 2025-02-21 NOTE — TELEPHONE ENCOUNTER
Calling to ask about TPN, iodine allergy, discuss EGD     First dose started today.  Will have HH nurse coming by on Monday to go over TPN, re-access port, and draw labs  She can take benadryl and prednisone prior to contrast tests  Can access port for contrast   Taking protonix 40mg bid, pepcid 20mg bid, carafate tid.   Reviewed EGD results.  She asks if the scope can pass through the JJ anastomosis because it is being externally manipulated.  I told her I did not know and I would get back to her with an answer.    Mrs. Mendez verbalized understanding to all information provided and voiced no further questions, comments, or concerns.

## 2025-02-21 NOTE — TELEPHONE ENCOUNTER
----- Message from Robert Kumar MD sent at 2/21/2025 11:44 AM CST -----  See if she can do ct enterography which I think she hasn't had yet.  Start her on bid ppi, bid pepcid and qid liquid or crushed carafate.  Is she doing better on tpn?  ----- Message -----  From: Nupur, Lab In [facility]  Sent: 2/20/2025  11:03 AM CST  To: Robert Kumar MD

## 2025-02-28 ENCOUNTER — PATIENT MESSAGE (OUTPATIENT)
Dept: SURGERY | Facility: CLINIC | Age: 45
End: 2025-02-28
Payer: COMMERCIAL

## 2025-03-03 NOTE — TELEPHONE ENCOUNTER
I called Mrs. Mendez to see how she is feeling.  Her fevers (102+) and low BPs (70s/40s) have continued since discharge from the ER.  She is still symptomatic with fatigue, weakness, malaise, diarrhea, migraine, and both abdominal pain that is typical for her (epigastric) and atypical (RUQ/RLQ).  Denies left shoulder pain.  She is worried about sepsis.  She will let us know if there is anything we can do for her.  I will follow up with her as well.

## 2025-03-05 ENCOUNTER — OUTSIDE PLACE OF SERVICE (OUTPATIENT)
Dept: SURGERY | Facility: CLINIC | Age: 45
End: 2025-03-05
Payer: COMMERCIAL

## 2025-03-05 PROCEDURE — 99222 1ST HOSP IP/OBS MODERATE 55: CPT | Mod: 25,,, | Performed by: SURGERY

## 2025-03-06 ENCOUNTER — OUTSIDE PLACE OF SERVICE (OUTPATIENT)
Dept: SURGERY | Facility: CLINIC | Age: 45
End: 2025-03-06
Payer: COMMERCIAL

## 2025-03-10 NOTE — PROGRESS NOTES
The patient location is: home  The chief complaint leading to consultation is: gastroparesis  Visit type: Virtual visit with synchronous audio and video  Total time spent with patient: 20 minutes more than half the time used for counseling the patient    Each patient to whom he or she provides medical services by telemedicine is:  (1) informed of the relationship between the physician and patient and the respective role of any other health care provider with respect to management of the patient; and (2) notified that he or she may decline to receive medical services by telemedicine and may withdraw from such care at any time.    Subjective:       Patient ID: Nena Mendez is a 39 y.o. female.    Chief Complaint: No chief complaint on file.    HPI S/p gastric stimulator 10/7/16 with replacement of batter 3/22/19 and 12/30/19, J tube 12/16/16 (she didn't tolerate it so she may have small bowel dysmotility also), pylorolasty 1/27/17 and sleeve gastrectomy 5/10/17 all for gastroparesis.  She was feeling ok when I called postop 12/31/19.  She says her symptoms are worse.  She has severe nausea.  She has been losing weight and drinking tea.  She is getting infusions and vitamin d (she has to pay for these herself-$214 a time) once a week.  She is on the last phase for appeal to get tpn.  She is working from home (2 days a week as school is closed).  She gets some pain around the stimulator site when she moves in certain positions.  Review of Systems    Objective:      Physical Exam   Constitutional: She appears well-developed and well-nourished.   Psychiatric: She has a normal mood and affect. Her behavior is normal. Judgment and thought content normal.       Assessment:       1. S/P lap gastric neurostimulator placement      Symptoms are severe and she is taking very little in po and losing weight   2. 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 Possible autoimmune disease  4. Stimulator  pocket pain, likely due to the wires, not much we can do about it at this time  Plan:      She is a good candidate for tpn as she is not tolerating oral feeds and didn't tolerate j tube feeds.  Awaiting follow up with gyn, Dr. Orozco and nutrition.  She is scheduled for a ct and I suggest ges.  Follow up 3 months.     no

## 2025-03-11 ENCOUNTER — PATIENT MESSAGE (OUTPATIENT)
Dept: SURGERY | Facility: CLINIC | Age: 45
End: 2025-03-11
Payer: COMMERCIAL

## 2025-03-28 ENCOUNTER — OFFICE VISIT (OUTPATIENT)
Dept: NEUROLOGY | Facility: CLINIC | Age: 45
End: 2025-03-28
Payer: COMMERCIAL

## 2025-03-28 DIAGNOSIS — E46 MALNUTRITION, UNSPECIFIED TYPE: ICD-10-CM

## 2025-03-28 DIAGNOSIS — R11.2 NAUSEA AND VOMITING, UNSPECIFIED VOMITING TYPE: ICD-10-CM

## 2025-03-30 NOTE — PROGRESS NOTES
Neurology Telemedicine Note     Name: Nena Mendez  MRN: [id number]   CSN: [id number]      Date: 03/30/2025    The patient location is: Home  The chief complaint leading to consultation is: headaches  Visit type: Virtual visit with synchronous audio and video    TOTAL TIME SPENT: 30 mins    Each patient to whom I provide medical services by telemedicine is:  (1) informed of the relationship between the physician and patient and the respective role of any other health care provider with respect to management of the patient; and (2) notified that they may decline to receive medical services by telemedicine and may withdraw from such care at any time.    History of Present Illness (HPI):  Patient is a 44-year-old female with history of multiple bouts of sepsis due to gastroparesis, with multiple feeding tubes. Headaches for the last few years, has headaches 3/4 times per week associated with light and sound sensitivity, nausea and vomiting. Headaches likely worse in the setting of malnutrition and dehydration given recent infection and feeding tube dysfunction. She's had headaches since her youth. No focal deficits and her headaches remain of similar characteristics. She was just started on quillipta for migraines. Will start her on B2 and continue quillipta. Will recommend focusing on her current malnourishment and dehydration and continuing quillipta given she only started it last week.     Nonmotor/Premotor ROS: as per HPI, and all other systems are negative    Past Medical History: The patient  has a past medical history of Fibromyalgia, Gastroparesis, Headache, and Rumination.    Social History: The patient  reports that she quit smoking about 9 years ago. Her smoking use included cigarettes. She has never used smokeless tobacco. She reports that she does not drink alcohol and does not use drugs.    Family History: Their family history includes Asthma in her daughter; Cirrhosis in her father; Diverticulitis in  her maternal aunt; Hypothyroidism in her mother; Irritable bowel syndrome in her maternal aunt; No Known Problems in her brother; Sleep apnea in her daughter.    Allergies: Iodine and iodide containing products; Domperidone; Latex, natural rubber; Iodine; and Scopolamine     Meds: Medications Ordered Prior to Encounter[1]    Exam:  Vital Signs deferred with home visit    Constitutional  Well-developed, frail, cachectic    Eyes  No scleral icterus   ENT  Moist oral mucosa   Cardiovascular  No lower extremity edema    Respiratory  No labored breathing    Skin  No rashes   Hematologic  No bruising   Psychiatric  Normal mood and affect   Other  GI/ deferred    Neurological     * Mental status  Alert and oriented to person, place, time, and situation; no dysarthria; no aphasia; normal recent and remote memory; follows commands   * Cranial nerves     - CN II  Pupils midposition and symmetric   - CN III, IV, VI  Extraocular movements full, no nystagmus visualized   - CN V  Unable to test   - CN VII  Face strong and symmetric bilaterally    - CN VIII  Hearing grossly intact with conversation    - CN IX, X  Palate raises midline and symmetric    - CN XI  Strong shoulder shrug B    - CN XII  Tongue appears midline    * Motor  Normal bulk by appearance, no drift    * Sensory  Not tested objectively, no changes described by the patient   * Deep tendon reflexes  Not tested   * Coord/Movemt/Gait No hypophonic speech.  No facial masking.  No B bradykinesia.  No tremor with rest, posture, kinesis, or intention.   No chorea, athetosis, dystonia, myoclonus, or tics.   No motor impersistence.  Gait is deferred for safety.       Medical Record Review:  - Lab Results:  No visits with results within 3 Month(s) from this visit.   Latest known visit with results is:   Orders Only on 12/19/2024   Component Date Value Ref Range Status    Sodium 12/19/2024 141  135 - 145 mmol/L Final    Potassium 12/19/2024 3.5 (L)  3.6 - 5.2 mmol/L Final     Chloride 12/19/2024 104  100 - 108 mmol/L Final    CO2 12/19/2024 34 (H)  21 - 32 mmol/L Final    Anion Gap 12/19/2024 3.0  3.0 - 11.0 mmol/L Final    BUN 12/19/2024 8  7 - 18 mg/dL Final    Creatinine 12/19/2024 0.74  0.55 - 1.02 mg/dL Final    GFR ESTIMATION 12/19/2024 104  >60 Final    BUN/Creatinine Ratio 12/19/2024 10.81  7 - 18 Ratio Final    Glucose 12/19/2024 72  70 - 110 mg/dL Final    Calcium 12/19/2024 9.1  8.8 - 10.5 mg/dL Final       Diagnoses/MDM:   Patient is a 44-year-old female with history of multiple bouts of sepsis due to gastroparesis, with multiple feeding tubes. Headaches for the last few years, has headaches 3/4 times per week associated with light and sound sensitivity, nausea and vomiting. Headaches likely worse in the setting of malnutrition and dehydration given recent infection and feeding tube dysfunction. She's had headaches since her youth. No focal deficits and her headaches remain of similar characteristics. She was just started on quillipta for migraines. Will start her on B2 and continue quillipta. Will recommend focusing on her current malnourishment and dehydration and continuing quillipta given she only started it last week.       I spent 30 minutes with the patient with >50% of the time spent with counseling and education regarding:    This is a consult performed through audio-visual using [employer] claudia. Pt and provider are in different locations. History and physical exam are limited due to the nature of this encounter.       Ravi Silva MD             [1]   Current Outpatient Medications on File Prior to Visit   Medication Sig Dispense Refill    b complex vitamins capsule Take 1 capsule by mouth once daily.      butalbitaL-acetaminop-caf-cod -06-30 mg Cap Take 1 capsule by mouth every 4 to 6 hours as needed.      chlorproMAZINE (THORAZINE) 25 MG tablet Take 25 mg by mouth every 4 (four) hours as needed. (Patient not taking: Reported on 12/18/2024)       dicyclomine (BENTYL) 20 mg tablet Take 1 tablet (20 mg total) by mouth every 6 (six) hours as needed (abdominal pain and cramping). 120 tablet 6    erythromycin base (E-MYCIN) 500 MG tablet Take 0.5 tablets (250 mg total) by mouth every 6 (six) hours. 60 tablet 3    famotidine (PEPCID AC) 20 MG tablet Take 1 tablet (20 mg total) by mouth 2 (two) times daily. 60 tablet 6    folic acid (FOLVITE) 1 MG tablet Take 1,000 mcg by mouth every morning.      mirtazapine (REMERON) 15 MG tablet Take 1 tablet (15 mg total) by mouth every evening. 30 tablet 11    multivitamin Liqd Take by mouth once daily.      pantoprazole (PROTONIX) 40 MG tablet Take 40 mg by mouth.      pantoprazole (PROTONIX) 40 MG tablet Take 1 tablet (40 mg total) by mouth 2 (two) times daily. 180 tablet 3    predniSONE (DELTASONE) 50 MG Tab Take 50mg by mouth at 13 hours, 7 hours, and 1 hour before contrast administration. 3 tablet 0    promethazine (PHENERGAN) 25 MG tablet TAKE 1 TABLET(25 MG) BY MOUTH EVERY 6 HOURS AS NEEDED FOR NAUSEA 60 tablet 6    promethazine (PHENERGAN) 6.25 mg/5 mL syrup Take 12.5 mg by mouth every 6 (six) hours as needed for Nausea.      rifAXIMin (XIFAXAN) 550 mg Tab Take 1 tablet (550 mg total) by mouth 2 (two) times daily. (Patient not taking: Reported on 12/18/2024) 60 tablet 2    sucralfate (CARAFATE) 100 mg/mL suspension Take 1 g by mouth 4 (four) times daily.       Current Facility-Administered Medications on File Prior to Visit   Medication Dose Route Frequency Provider Last Rate Last Admin    0.9%  NaCl infusion   Intravenous Continuous Rehan Mensah MD   Stopped at 06/23/23 9507

## 2025-04-15 ENCOUNTER — PATIENT MESSAGE (OUTPATIENT)
Dept: GASTROENTEROLOGY | Facility: CLINIC | Age: 45
End: 2025-04-15
Payer: COMMERCIAL

## 2025-04-18 ENCOUNTER — PATIENT MESSAGE (OUTPATIENT)
Dept: GASTROENTEROLOGY | Facility: CLINIC | Age: 45
End: 2025-04-18
Payer: COMMERCIAL

## 2025-05-07 ENCOUNTER — DOCUMENTATION ONLY (OUTPATIENT)
Dept: SURGERY | Facility: CLINIC | Age: 45
End: 2025-05-07
Payer: COMMERCIAL

## 2025-05-07 ENCOUNTER — TELEPHONE (OUTPATIENT)
Dept: SURGERY | Facility: CLINIC | Age: 45
End: 2025-05-07
Payer: COMMERCIAL

## 2025-05-07 NOTE — TELEPHONE ENCOUNTER
----- Message from Angelica sent at 5/6/2025 12:09 PM CDT -----  Regarding: Appt Access  Contact: [phone]  [facility], calling to schedule appt to establish care. Patient is in need of feeding tube insertion. Pls call [phone]

## 2025-05-22 ENCOUNTER — OFFICE VISIT (OUTPATIENT)
Dept: SURGERY | Facility: CLINIC | Age: 45
End: 2025-05-22
Payer: COMMERCIAL

## 2025-05-22 DIAGNOSIS — K31.84 GASTROPARESIS: Primary | ICD-10-CM

## 2025-05-22 DIAGNOSIS — R11.2 NAUSEA AND VOMITING, UNSPECIFIED VOMITING TYPE: ICD-10-CM

## 2025-05-22 NOTE — PROGRESS NOTES
The patient location is: [place]  The chief complaint leading to consultation is: gastroparesis    Visit type: audiovisual      23 minutes of total time spent on the encounter, which includes face to face time and non-face to face time preparing to see the patient (eg, review of tests), Obtaining and/or reviewing separately obtained history, Documenting clinical information in the electronic or other health record, Independently interpreting results (not separately reported) and communicating results to the patient/family/caregiver, or Care coordination (not separately reported).         Each patient to whom he or she provides medical services by telemedicine is:  (1) informed of the relationship between the physician and patient and the respective role of any other health care provider with respect to management of the patient; and (2) notified that he or she may decline to receive medical services by telemedicine and may withdraw from such care at any time.    Notes:     43y/o with bmi 15.19 (weight stable from last visit), fibromyalgia and s/p gastric stimulator 10/7/16 with replacement of battery 3/22/19, 12/30/19, 1/28/22, 6/23/23, removal stimulator 12/30/23, J tube 12/16/16 (she didn't tolerate it so she may have small bowel dysmotility also), j tube was removed for infection, pylorolasty 1/27/17, sleeve gastrectomy 5/10/17, robotic conversion to bypass with hh repair 6/24/23, replacement j tube and robotic g tube 2/21/22 (removed later), s/p portacath 2/3/23, 6/23/23 and 12/2024 all for gastroparesis.        Interval history 7/11/23: She has some soreness at her port site.  She continues to have extremely severe symptoms.     Her child has heart trouble.  They have a Sean Luther, a service dog to detect low blood pressure and is in training.  She says her dog got Chaga's from eating an insect and has a pacemaker.  Her oldest daughter is considering crna and working in the icu.     Interval history  "11/26/24:  Since I last saw her she has undergone removal gstim and conversion to gastric bypass.  Since then she has undergone egd with dilation.  She is taking phenergan bid, not taking zofran, protonix bid and carafate tid (liquid). Mvi, vit d, folate, zinc, iron and calcium.  She is not taking pain medication.  She is not using thc derivatives or vaping.  She has diarrhea since her last surgery and more rarely constipation.  She says she is 99.8lb and was 108 in January.  She is 5'5".  Bmi 16.5.  She has severe gerd and says she has gastritis.  She says it was difficult to do the colonoscopy due to possible adhesions.  Overall she says she is 100% worse than prior to surgery and dilation appears to have worsened her.  She is not seeing psych.     Post Gastric Pacemaker Symptom Monitor  Severity/Frequency  Vomiting-4/4  Nausea-4/4  Early Satiety-4/4  Bloating-2/2-3  Postprandial fullness-3/4  Epigastric pain-4/4  Epigastric burning-3/4  Conclusion: Mostly extremely severe     GERD Questionnaire   bid PPI, carafate tid  has Typical heartburn  has Regurgitation  no Dysphagia solids  no Dysphagia liquids  has Hoarseness  has Sore throat  no Cough  no Asthma  no Chest pain  has Water brash  no Globus  has Nausea  has Vomiting     Nutrition:  She is not able to eat much at all.  She is trying Gatorade 0.     Interval history 1/28/25:  She says her symptoms are about the same and she is also having headaches.  She says sugars are the worst so she may have an element of dumping.  GI started her on remeron and bentyl and increased ppi.  She says she has increased vomiting since she last had an egd with dilation.  She has had a port placed for tpn and hasn't been accessed.  Her weight is 97lb.  She says xifacin (for sibo) and erythromycin (for dysmotility) didn't help.    Interval history 5/22/25: She says symptoms are similar.  She still has swelling of her abdomen and rlq.  She says she is 102lb.     Her daughter is " showing pigs.  Her other daughter is an RN in icu and thinking about becoming a crna.  One of her nieces is going to [employer] and hopefully med school.        shows multiple butalbitol rx and recent narcotics rx.       Diagnostic Results:  CT 2022 ok, tubes in place.  No mention of abscess at the g tube site.  Ct 2024 reviewed, no acute process  Ct and MRA 1/2024 reviewed, no acute problems  Ct 2025 reviewed, films viewed, no acute finding to cause her symptoms  Ugi 2020 reviewed, reflux, no hh, no achalasia  Ugisbf 10/2024 reviewed, slow small bowel transit time, s/p bypass  U/s pelvis 2024 she states showed small bowel movement (with ugisbf ok I don't know what this means)  Cscope 2024 she states it was difficult to get done do to twisting  Egd 2019 reviewed, pyloroplasty with suture, 5 cm hh, sleeve, endoflip normal  Egd 2022 reviewed, gastritis  Egd 8/2024 reviewed, gastritis, esophagitis  Egd 10/2024 reviewed  Double balloon endoscopy 2025 reviewed, possible jejunal anastomosis abnormality, otherwise ok  Motility 2019 reviewed, egj outflow obstruction, rumination syndrome, high lesp with incomplete relaxation     Assessment and plan  Gastroparesis with protein calorie malnutrition and s/p multiple procedures for this including bypass.  For diagnostic lap with robot (internal hernia, j-j abnormality, adhesions, any indicated procedure).  I told her her malnutrition could make this more risky and she would like to proceed anyway.

## 2025-07-10 ENCOUNTER — ANESTHESIA EVENT (OUTPATIENT)
Dept: SURGERY | Facility: HOSPITAL | Age: 45
End: 2025-07-10
Payer: COMMERCIAL

## 2025-07-10 ENCOUNTER — TELEPHONE (OUTPATIENT)
Dept: SURGERY | Facility: CLINIC | Age: 45
End: 2025-07-10
Payer: COMMERCIAL

## 2025-07-10 NOTE — ANESTHESIA PREPROCEDURE EVALUATION
[facility]  Anesthesia Pre-Operative Evaluation     Patient Name: Nena Mendez  YOB: 1980  MRN: [id number]  [facility]: [id number]      Code Status: Prior   Date of Procedure: 7/11/2025  Anesthesia: General/Regional Procedure: Procedure(s) (LRB):  XI ROBOTIC LAPAROSCOPY,DIAGNOSTIC (internal hernia, j-j abnormality, adhesions, any indicated procedure). (N/A)  Pre-Operative Diagnosis: Nausea and vomiting, unspecified vomiting type [R11.2]  Abdominal pain, unspecified abdominal location [R10.9]  Proceduralist: Surgeons and Role:     * Robert Kumar MD - Primary          SUBJECTIVE:     Pre-operative evaluation for Procedure(s) (LRB):  XI ROBOTIC LAPAROSCOPY,DIAGNOSTIC (internal hernia, j-j abnormality, adhesions, any indicated procedure). (N/A)     07/10/2025    Nena Mendez is a 45 y.o. female with GERD and significant gastroparesis who is s/p gastric stimulator and removal, J-tube which was removed due to infection, pyloroplasty, sleeve gastrectomy, robotic conversion to bypass with hiatal hernia repair presenting for the above procedure.     No notes on file.     Patient now presents for the above procedure(s).       No current facility-administered medications for this encounter.        NPO status: NPO since midnight    Pertinent medications: Protonix      Prev airway:       Intubation     Date/Time: 2/20/2025 9:18 AM     Performed by: Grecia Melara CRNA  Authorized by: Rolando Estrada MD    Intubation:     Induction:  Rapid sequence induction    Intubated:  Postinduction    Mask Ventilation:  Not attempted    Attempts:  1    Attempted By:  CRNA    Method of Intubation:  Video laryngoscopy    Blade:  White 3    Laryngeal View Grade: Grade IIA - cords partially seen      Difficult Airway Encountered?: No      Complications:  None    Airway Device:  Oral endotracheal tube    Airway Device Size:  7.0    Style/Cuff Inflation:  Cuffed (inflated to minimal occlusive  pressure)    Tube secured:  22    Secured at:  The teeth    Placement Verified By:  Capnometry and Revisualization with laryngoscopy    Complicating Factors:  None    Findings Post-Intubation:  BS equal bilateral and atraumatic/condition of teeth unchanged                ________________________________________  No results found for this or any previous visit.    ________________________________________    LDA:   Port A Cath Single Lumen 23 0805 Internal Jugular Right (Active)   Number of days: 748            Gastrostomy/Enterostomy 17 Jejunostomy tube (Active)   Number of days: 3086       Drips: None documented.      Problem List[1]    Review of patient's allergies indicates:   Allergen Reactions    Iodine and iodide containing products Swelling, Hives, Itching, Rash and Shortness Of Breath    Domperidone Itching    Latex, natural rubber Rash    Iodine     Scopolamine      Patch- caused burn under patch       Current Inpatient Medications:       Medications Ordered Prior to Encounter[2]    Past Surgical History:   Procedure Laterality Date    APPENDECTOMY      breast implants removed Bilateral      SECTION      CHOLECYSTECTOMY      COSMETIC SURGERY      ENTEROSCOPY, DOUBLE BALLOON, ANTEGRADE N/A 2025    Procedure: ENTEROSCOPY, DOUBLE BALLOON, ANTEGRADE;  Surgeon: Stephane Peters MD;  [facility]  Service: Endoscopy;  Laterality: N/A;   portal-tt   pre call attemptd/LVM/mleone   - precall complete - EH    ESOPHAGEAL MANOMETRY WITH MEASUREMENT OF IMPEDANCE N/A 10/30/2019    Procedure: MANOMETRY, ESOPHAGUS, WITH IMPEDANCE MEASUREMENT;  Surgeon: Patti Orozco MD;  Location: [facility] (2ND FLR);  Service: Endoscopy;  Laterality: N/A;  LATEX ALLERGY  r/o rumination  Motility Studies:  Hold Narcotics x 1 days   Hold TCA x 1 days  10/24 - pt confirmed appt-BB    ESOPHAGOGASTRODUODENOSCOPY N/A 3/22/2019    Procedure: EGD (ESOPHAGOGASTRODUODENOSCOPY)-dual lumen  scope to remove intragastric suture.;  Surgeon: Robert Kumar MD;  Location: [facility] OR [address] [place] [address];  Service: General;  Laterality: N/A;    ESOPHAGOGASTRODUODENOSCOPY N/A 10/30/2019    Procedure: EGD (ESOPHAGOGASTRODUODENOSCOPY);  Surgeon: Patti Orozco MD;  Location: [address]);  Service: Endoscopy;  Laterality: N/A;  LATEX ALLERGY  EGD with EndoFlip   4th Floor, scheduled on 2nd floor due to availability  Full liquid diet 3 days and clear liquid diet x 1 day prior to procedure  Propofol only. No fentanyl or benzodiazepine during sedation. If additional sedation need    GASTRIC STIMULATOR IMPLANT SURGERY      GASTROSTOMY-JEJEUNOSTOMY TUBE CHANGE/PLACEMENT  08/10/2016    HYSTERECTOMY      INSERTION OF TUNNELED CENTRAL VENOUS CATHETER (CVC) WITH SUBCUTANEOUS PORT Left 2/3/2023    Procedure: VZRKPHJOW-DAUN-P-CATH;  Surgeon: Robert Kumar MD;  Location: [address];  Service: General;  Laterality: Left;    INSERTION OF TUNNELED CENTRAL VENOUS CATHETER (CVC) WITH SUBCUTANEOUS PORT N/A 6/23/2023    Procedure: ARYSWSHXP-GUTS-C-CATH;  Surgeon: Robert Kumar MD;  Location: [address];  Service: General;  Laterality: N/A;    laparoscopic jejunostomy tube      removed ~ 2019    PROGRAMMING-STIMULATOR  6/23/2023    Procedure: PROGRAMMING-STIMULATOR;  Surgeon: Robert Kumar MD;  Location: [address];  Service: General;;    REPLACEMENT OF GASTRIC NEUROSTIMULATOR GENERATOR N/A 3/22/2019    Procedure: REPLACEMENT, PULSE GENERATOR, NEUROSTIMULATOR, GASTRIC-battery exchange only;  Surgeon: Robert Kumar MD;  Location: [address];  Service: General;  Laterality: N/A;    REPLACEMENT OF GASTRIC NEUROSTIMULATOR GENERATOR N/A 12/30/2019    Procedure: REPLACEMENT, PULSE GENERATOR, NEUROSTIMULATOR, GASTRIC, OPEN (battery exchange);  Surgeon: Robert Kumar MD;  Location: [address];  Service: General;  Laterality: N/A;    REPLACEMENT OF GASTRIC  NEUROSTIMULATOR GENERATOR N/A 1/28/2022    Procedure: REPLACEMENT, PULSE GENERATOR, NEUROSTIMULATOR, GASTRIC, Open;  Surgeon: Robert Kumar MD;  Location: [facility] OR [address] [place] [address];  Service: General;  Laterality: N/A;    REPLACEMENT OF GASTRIC NEUROSTIMULATOR GENERATOR N/A 6/23/2023    Procedure: REPLACEMENT, PULSE GENERATOR, NEUROSTIMULATOR, GASTRIC;  Surgeon: Robert Kumar MD;  Location: [facility] OR [address] [place] [address];  Service: General;  Laterality: N/A;  No narcotics    sleeve gastrectomy      TONSILLECTOMY         Social History:  Tobacco Use: Medium Risk (5/22/2025)    Patient History     Smoking Tobacco Use: Former     Smokeless Tobacco Use: Never     Passive Exposure: Not on file       Alcohol Use: Not At Risk (2/13/2025)    AUDIT-C     Frequency of Alcohol Consumption: Never     Average Number of Drinks: Patient does not drink     Frequency of Binge Drinking: Never       OBJECTIVE:     Vital Signs Range:  BMI Readings from Last 1 Encounters:   02/20/25 15.98 kg/m²               Significant Labs:        Component Value Date/Time    WBC 4.1 (L) 12/19/2024 1020    HGB 10.0 (L) 12/19/2024 1020    HCT 32.0 (L) 12/19/2024 1020     02/13/2024 1218     05/11/2023 1530     12/19/2024 1020    K 3.5 (L) 12/19/2024 1020     12/19/2024 1020    CO2 34 (H) 12/19/2024 1020    GLU 72 12/19/2024 1020    BUN 8 12/19/2024 1020    CREATININE 0.74 12/19/2024 1020    MG 2.0 02/24/2022 0603    PHOS 3.9 02/24/2022 0603    CALCIUM 9.1 12/19/2024 1020    ALBUMIN 3.2 (L) 02/24/2022 0603    PROT 5.7 (L) 02/24/2022 0603    ALKPHOS 73 02/24/2022 0603    BILITOT 0.3 02/24/2022 0603    AST 17 02/24/2022 0603    ALT 10 02/24/2022 0603        Please see Results Review for additional labs.     Diagnostic Studies: No relevant studies.    EKG:   No results found for this or any previous visit.    ECHO:  See subjective, if available.      ASSESSMENT/PLAN:           Pre-op Assessment    I have reviewed the Patient Summary  Reports.     I have reviewed the Nursing Notes. I have reviewed the NPO Status.   I have reviewed the Medications.     Review of Systems  Anesthesia Hx:  No problems with previous Anesthesia   History of prior surgery of interest to airway management or planning: gastric bypass. Previous anesthesia: General        Denies Family Hx of Anesthesia complications.    Denies Personal Hx of Anesthesia complications.                    Social:  Former Smoker       EENT/Dental:  EENT/Dental Normal           Cardiovascular:  Cardiovascular Normal                                              Pulmonary:  Pulmonary Normal                       Hepatic/GI:     GERD                Neurological:    Neuromuscular Disease,  Headaches      Dx of Headaches                         Neuromuscular Disease   Psych:  Psychiatric History                  Physical Exam  General: Well nourished and Cooperative        Anesthesia Plan  Type of Anesthesia, risks & benefits discussed:    Anesthesia Type: Gen ETT  Intra-op Monitoring Plan: Standard ASA Monitors  Post Op Pain Control Plan: multimodal analgesia and IV/PO Opioids PRN  Induction:  IV  Airway Plan: Direct and Video, Post-Induction  Informed Consent: Informed consent signed with the Patient and all parties understand the risks and agree with anesthesia plan.  All questions answered.   ASA Score: 3  Day of Surgery Review of History & Physical: H&P Update referred to the surgeon/provider.  Anesthesia Plan Notes: Chart reviewed. Patient seen and examined. Anesthesia plan discussed and questions answered. E-consent signed. Alan Duval MD    Ready For Surgery From Anesthesia Perspective.     .           [1]   Patient Active Problem List  Diagnosis    S/P lap gastric neurostimulator placement     Malnutrition    Jejunostomy tube site pain    S/P laparoscopic sleeve gastrectomy    Nausea and vomiting    Encounter for insertion of tunneled central venous catheter (CVC) with port   [2]   Current  Facility-Administered Medications on File Prior to Encounter   Medication Dose Route Frequency Provider Last Rate Last Admin    0.9%  NaCl infusion   Intravenous Continuous Rehan Mensah MD   Stopped at 06/23/23 0932     Current Outpatient Medications on File Prior to Encounter   Medication Sig Dispense Refill    b complex vitamins capsule Take 1 capsule by mouth once daily.      butalbitaL-acetaminop-caf-cod -80-30 mg Cap Take 1 capsule by mouth every 4 to 6 hours as needed.      chlorproMAZINE (THORAZINE) 25 MG tablet Take 25 mg by mouth every 4 (four) hours as needed. (Patient not taking: Reported on 12/18/2024)      dicyclomine (BENTYL) 20 mg tablet Take 1 tablet (20 mg total) by mouth every 6 (six) hours as needed (abdominal pain and cramping). 120 tablet 6    famotidine (PEPCID AC) 20 MG tablet Take 1 tablet (20 mg total) by mouth 2 (two) times daily. 60 tablet 6    folic acid (FOLVITE) 1 MG tablet Take 1,000 mcg by mouth every morning.      mirtazapine (REMERON) 15 MG tablet Take 1 tablet (15 mg total) by mouth every evening. 30 tablet 11    multivitamin Liqd Take by mouth once daily.      pantoprazole (PROTONIX) 40 MG tablet Take 1 tablet (40 mg total) by mouth 2 (two) times daily. 180 tablet 3    promethazine (PHENERGAN) 25 MG tablet TAKE 1 TABLET(25 MG) BY MOUTH EVERY 6 HOURS AS NEEDED FOR NAUSEA 60 tablet 6    promethazine (PHENERGAN) 6.25 mg/5 mL syrup Take 12.5 mg by mouth every 6 (six) hours as needed for Nausea.      rifAXIMin (XIFAXAN) 550 mg Tab Take 1 tablet (550 mg total) by mouth 2 (two) times daily. (Patient not taking: Reported on 12/18/2024) 60 tablet 2    sucralfate (CARAFATE) 100 mg/mL suspension Take 1 g by mouth 4 (four) times daily. (Patient not taking: Reported on 7/10/2025)

## 2025-07-11 ENCOUNTER — ANESTHESIA (OUTPATIENT)
Dept: SURGERY | Facility: HOSPITAL | Age: 45
End: 2025-07-11
Payer: COMMERCIAL

## 2025-07-11 ENCOUNTER — HOSPITAL ENCOUNTER (INPATIENT)
Facility: HOSPITAL | Age: 45
LOS: 1 days | Discharge: HOME | DRG: 336 | End: 2025-07-12
Attending: SURGERY | Admitting: SURGERY
Payer: COMMERCIAL

## 2025-07-11 DIAGNOSIS — Z98.84 S/P LAPAROSCOPIC SLEEVE GASTRECTOMY: ICD-10-CM

## 2025-07-11 DIAGNOSIS — K31.84 GASTROPARESIS: ICD-10-CM

## 2025-07-11 DIAGNOSIS — K31.84 GASTROPARESIS: Primary | ICD-10-CM

## 2025-07-11 LAB — PHOSPHATE SERPL-MCNC: 3.1 MG/DL (ref 2.7–4.5)

## 2025-07-11 PROCEDURE — 25000003 PHARM REV CODE 250

## 2025-07-11 PROCEDURE — 63600175 PHARM REV CODE 636 W HCPCS

## 2025-07-11 PROCEDURE — 36000711: Performed by: SURGERY

## 2025-07-11 PROCEDURE — 63600175 PHARM REV CODE 636 W HCPCS: Performed by: STUDENT IN AN ORGANIZED HEALTH CARE EDUCATION/TRAINING PROGRAM

## 2025-07-11 PROCEDURE — 27000221 HC OXYGEN, UP TO 24 HOURS

## 2025-07-11 PROCEDURE — 37000008 HC ANESTHESIA 1ST 15 MINUTES: Performed by: SURGERY

## 2025-07-11 PROCEDURE — 37000009 HC ANESTHESIA EA ADD 15 MINS: Performed by: SURGERY

## 2025-07-11 PROCEDURE — 94761 N-INVAS EAR/PLS OXIMETRY MLT: CPT

## 2025-07-11 PROCEDURE — 0DNA4ZZ RELEASE JEJUNUM, PERCUTANEOUS ENDOSCOPIC APPROACH: ICD-10-PCS | Performed by: SURGERY

## 2025-07-11 PROCEDURE — 71000033 HC RECOVERY, INTIAL HOUR: Performed by: SURGERY

## 2025-07-11 PROCEDURE — 71000015 HC POSTOP RECOV 1ST HR: Performed by: SURGERY

## 2025-07-11 PROCEDURE — 64461 PVB THORACIC SINGLE INJ SITE: CPT

## 2025-07-11 PROCEDURE — 71000016 HC POSTOP RECOV ADDL HR: Performed by: SURGERY

## 2025-07-11 PROCEDURE — 36000710: Performed by: SURGERY

## 2025-07-11 PROCEDURE — 27201423 OPTIME MED/SURG SUP & DEVICES STERILE SUPPLY: Performed by: SURGERY

## 2025-07-11 PROCEDURE — 84100 ASSAY OF PHOSPHORUS: CPT

## 2025-07-11 PROCEDURE — 8E0W4CZ ROBOTIC ASSISTED PROCEDURE OF TRUNK REGION, PERCUTANEOUS ENDOSCOPIC APPROACH: ICD-10-PCS | Performed by: SURGERY

## 2025-07-11 PROCEDURE — 11000001 HC ACUTE MED/SURG PRIVATE ROOM

## 2025-07-11 RX ORDER — ACETAMINOPHEN 500 MG
1000 TABLET ORAL EVERY 8 HOURS
Status: DISCONTINUED | OUTPATIENT
Start: 2025-07-11 | End: 2025-07-12

## 2025-07-11 RX ORDER — KETAMINE HCL IN 0.9 % NACL 50 MG/5 ML
SYRINGE (ML) INTRAVENOUS
Status: DISCONTINUED | OUTPATIENT
Start: 2025-07-11 | End: 2025-07-11

## 2025-07-11 RX ORDER — HYDROMORPHONE HYDROCHLORIDE 1 MG/ML
0.2 INJECTION, SOLUTION INTRAMUSCULAR; INTRAVENOUS; SUBCUTANEOUS EVERY 5 MIN PRN
Status: DISCONTINUED | OUTPATIENT
Start: 2025-07-11 | End: 2025-07-11

## 2025-07-11 RX ORDER — CEFAZOLIN SODIUM 1 G/3ML
INJECTION, POWDER, FOR SOLUTION INTRAMUSCULAR; INTRAVENOUS
Status: DISCONTINUED | OUTPATIENT
Start: 2025-07-11 | End: 2025-07-11

## 2025-07-11 RX ORDER — DROPERIDOL 2.5 MG/ML
0.62 INJECTION, SOLUTION INTRAMUSCULAR; INTRAVENOUS ONCE AS NEEDED
Status: COMPLETED | OUTPATIENT
Start: 2025-07-11 | End: 2025-07-11

## 2025-07-11 RX ORDER — PROCHLORPERAZINE EDISYLATE 5 MG/ML
2.5 INJECTION INTRAMUSCULAR; INTRAVENOUS ONCE
Status: COMPLETED | OUTPATIENT
Start: 2025-07-11 | End: 2025-07-11

## 2025-07-11 RX ORDER — GLUCAGON 1 MG
1 KIT INJECTION
Status: DISCONTINUED | OUTPATIENT
Start: 2025-07-11 | End: 2025-07-11 | Stop reason: HOSPADM

## 2025-07-11 RX ORDER — LIDOCAINE HYDROCHLORIDE 20 MG/ML
INJECTION, SOLUTION EPIDURAL; INFILTRATION; INTRACAUDAL; PERINEURAL
Status: DISCONTINUED | OUTPATIENT
Start: 2025-07-11 | End: 2025-07-11

## 2025-07-11 RX ORDER — BUPIVACAINE HYDROCHLORIDE 7.5 MG/ML
INJECTION, SOLUTION EPIDURAL; RETROBULBAR
Status: COMPLETED | OUTPATIENT
Start: 2025-07-11 | End: 2025-07-11

## 2025-07-11 RX ORDER — ONDANSETRON HYDROCHLORIDE 2 MG/ML
INJECTION, SOLUTION INTRAVENOUS
Status: DISCONTINUED | OUTPATIENT
Start: 2025-07-11 | End: 2025-07-11

## 2025-07-11 RX ORDER — FAMOTIDINE 20 MG/1
20 TABLET, FILM COATED ORAL 2 TIMES DAILY
Status: DISCONTINUED | OUTPATIENT
Start: 2025-07-11 | End: 2025-07-12 | Stop reason: HOSPADM

## 2025-07-11 RX ORDER — SODIUM CHLORIDE 0.9 % (FLUSH) 0.9 %
10 SYRINGE (ML) INJECTION
Status: DISCONTINUED | OUTPATIENT
Start: 2025-07-11 | End: 2025-07-11 | Stop reason: HOSPADM

## 2025-07-11 RX ORDER — METHOCARBAMOL 500 MG/1
500 TABLET, FILM COATED ORAL 4 TIMES DAILY
Status: DISCONTINUED | OUTPATIENT
Start: 2025-07-11 | End: 2025-07-12

## 2025-07-11 RX ORDER — ROCURONIUM BROMIDE 10 MG/ML
INJECTION, SOLUTION INTRAVENOUS
Status: DISCONTINUED | OUTPATIENT
Start: 2025-07-11 | End: 2025-07-11

## 2025-07-11 RX ORDER — PROCHLORPERAZINE EDISYLATE 5 MG/ML
INJECTION INTRAMUSCULAR; INTRAVENOUS
Status: COMPLETED
Start: 2025-07-11 | End: 2025-07-11

## 2025-07-11 RX ORDER — MIDAZOLAM HYDROCHLORIDE 1 MG/ML
.5-4 INJECTION, SOLUTION INTRAMUSCULAR; INTRAVENOUS
Status: DISCONTINUED | OUTPATIENT
Start: 2025-07-11 | End: 2025-07-11 | Stop reason: HOSPADM

## 2025-07-11 RX ORDER — LIDOCAINE HYDROCHLORIDE 10 MG/ML
1 INJECTION, SOLUTION EPIDURAL; INFILTRATION; INTRACAUDAL; PERINEURAL ONCE
Status: DISCONTINUED | OUTPATIENT
Start: 2025-07-11 | End: 2025-07-12 | Stop reason: HOSPADM

## 2025-07-11 RX ORDER — ACETAMINOPHEN 500 MG
1000 TABLET ORAL
Status: COMPLETED | OUTPATIENT
Start: 2025-07-11 | End: 2025-07-11

## 2025-07-11 RX ORDER — TALC
6 POWDER (GRAM) TOPICAL NIGHTLY PRN
Status: DISCONTINUED | OUTPATIENT
Start: 2025-07-11 | End: 2025-07-12 | Stop reason: HOSPADM

## 2025-07-11 RX ORDER — ENOXAPARIN SODIUM 100 MG/ML
40 INJECTION SUBCUTANEOUS EVERY 24 HOURS
Status: DISCONTINUED | OUTPATIENT
Start: 2025-07-11 | End: 2025-07-12 | Stop reason: HOSPADM

## 2025-07-11 RX ORDER — FENTANYL CITRATE 50 UG/ML
25-200 INJECTION, SOLUTION INTRAMUSCULAR; INTRAVENOUS
Status: DISCONTINUED | OUTPATIENT
Start: 2025-07-11 | End: 2025-07-11

## 2025-07-11 RX ORDER — PROMETHAZINE HYDROCHLORIDE 25 MG/1
25 TABLET ORAL EVERY 6 HOURS PRN
Status: DISCONTINUED | OUTPATIENT
Start: 2025-07-11 | End: 2025-07-12 | Stop reason: HOSPADM

## 2025-07-11 RX ORDER — OXYCODONE HYDROCHLORIDE 10 MG/1
10 TABLET ORAL EVERY 4 HOURS PRN
Status: DISCONTINUED | OUTPATIENT
Start: 2025-07-11 | End: 2025-07-11

## 2025-07-11 RX ORDER — SODIUM CHLORIDE 9 MG/ML
INJECTION, SOLUTION INTRAVENOUS CONTINUOUS
Status: DISCONTINUED | OUTPATIENT
Start: 2025-07-11 | End: 2025-07-12 | Stop reason: HOSPADM

## 2025-07-11 RX ORDER — DEXAMETHASONE SODIUM PHOSPHATE 4 MG/ML
INJECTION, SOLUTION INTRA-ARTICULAR; INTRALESIONAL; INTRAMUSCULAR; INTRAVENOUS; SOFT TISSUE
Status: DISCONTINUED | OUTPATIENT
Start: 2025-07-11 | End: 2025-07-11

## 2025-07-11 RX ORDER — GABAPENTIN 300 MG/1
300 CAPSULE ORAL 3 TIMES DAILY
Status: DISCONTINUED | OUTPATIENT
Start: 2025-07-11 | End: 2025-07-12 | Stop reason: HOSPADM

## 2025-07-11 RX ORDER — PROPOFOL 10 MG/ML
VIAL (ML) INTRAVENOUS
Status: DISCONTINUED | OUTPATIENT
Start: 2025-07-11 | End: 2025-07-11

## 2025-07-11 RX ORDER — ONDANSETRON 8 MG/1
8 TABLET, ORALLY DISINTEGRATING ORAL EVERY 8 HOURS PRN
Status: DISCONTINUED | OUTPATIENT
Start: 2025-07-11 | End: 2025-07-12 | Stop reason: HOSPADM

## 2025-07-11 RX ADMIN — GABAPENTIN 300 MG: 300 CAPSULE ORAL at 09:07

## 2025-07-11 RX ADMIN — FAMOTIDINE 20 MG: 20 TABLET, FILM COATED ORAL at 09:07

## 2025-07-11 RX ADMIN — SODIUM CHLORIDE: 9 INJECTION, SOLUTION INTRAVENOUS at 09:07

## 2025-07-11 RX ADMIN — SUGAMMADEX 200 MG: 100 INJECTION, SOLUTION INTRAVENOUS at 01:07

## 2025-07-11 RX ADMIN — DROPERIDOL 0.62 MG: 2.5 INJECTION, SOLUTION INTRAMUSCULAR; INTRAVENOUS at 02:07

## 2025-07-11 RX ADMIN — PROPOFOL 150 MG: 10 INJECTION, EMULSION INTRAVENOUS at 12:07

## 2025-07-11 RX ADMIN — PROCHLORPERAZINE EDISYLATE 2.5 MG: 5 INJECTION INTRAMUSCULAR; INTRAVENOUS at 02:07

## 2025-07-11 RX ADMIN — SODIUM CHLORIDE, SODIUM ACETATE ANHYDROUS, SODIUM GLUCONATE, POTASSIUM CHLORIDE, AND MAGNESIUM CHLORIDE: 526; 222; 502; 37; 30 INJECTION, SOLUTION INTRAVENOUS at 12:07

## 2025-07-11 RX ADMIN — ACETAMINOPHEN 1000 MG: 500 TABLET ORAL at 09:07

## 2025-07-11 RX ADMIN — BUPIVACAINE HYDROCHLORIDE 30 ML: 7.5 INJECTION, SOLUTION EPIDURAL; RETROBULBAR at 10:07

## 2025-07-11 RX ADMIN — METHOCARBAMOL 500 MG: 500 TABLET ORAL at 09:07

## 2025-07-11 RX ADMIN — CEFAZOLIN 2 G: 330 INJECTION, POWDER, FOR SOLUTION INTRAMUSCULAR; INTRAVENOUS at 12:07

## 2025-07-11 RX ADMIN — Medication 20 MG: at 12:07

## 2025-07-11 RX ADMIN — PROPOFOL 20 MG: 10 INJECTION, EMULSION INTRAVENOUS at 12:07

## 2025-07-11 RX ADMIN — ONDANSETRON 4 MG: 2 INJECTION INTRAMUSCULAR; INTRAVENOUS at 01:07

## 2025-07-11 RX ADMIN — DEXAMETHASONE SODIUM PHOSPHATE 4 MG: 4 INJECTION INTRA-ARTICULAR; INTRALESIONAL; INTRAMUSCULAR; INTRAVENOUS; SOFT TISSUE at 12:07

## 2025-07-11 RX ADMIN — MIDAZOLAM 2 MG: 1 INJECTION INTRAMUSCULAR; INTRAVENOUS at 10:07

## 2025-07-11 RX ADMIN — ACETAMINOPHEN 1000 MG: 500 TABLET ORAL at 10:07

## 2025-07-11 RX ADMIN — PROMETHAZINE HYDROCHLORIDE 25 MG: 25 TABLET ORAL at 04:07

## 2025-07-11 RX ADMIN — LIDOCAINE HYDROCHLORIDE 40 MG: 20 INJECTION, SOLUTION EPIDURAL; INFILTRATION; INTRACAUDAL at 12:07

## 2025-07-11 RX ADMIN — GABAPENTIN 300 MG: 300 CAPSULE ORAL at 04:07

## 2025-07-11 RX ADMIN — METHOCARBAMOL 500 MG: 500 TABLET ORAL at 03:07

## 2025-07-11 RX ADMIN — ROCURONIUM BROMIDE 100 MG: 10 INJECTION, SOLUTION INTRAVENOUS at 12:07

## 2025-07-11 NOTE — BRIEF OP NOTE
Operative Note       Surgery Date: 7/11/2025     Surgeons and Role:     * Robert Kumar MD - Primary     * Aida Mcbride MD - Resident - Assisting    Pre-op Diagnosis:  Nausea and vomiting, unspecified vomiting type [R11.2]  Abdominal pain, unspecified abdominal location [R10.9]    Post-op Diagnosis:  Nausea and vomiting, unspecified vomiting type [R11.2]  Abdominal pain, unspecified abdominal location [R10.9]    Procedure(s) (LRB):  XI ROBOTIC LAPAROSCOPY,DIAGNOSTIC; ROBOTIC LYSIS OF ADHESIONS AND TAKE DOWN OG GTUBE SITE (N/A)    Anesthesia: General/Regional    Procedure in Detail/Findings:  Possible obstruction due to prior g tube site and this was taken down.  Dense adhesion at jj taken down.  No internal hernia defects.    Estimated Blood Loss: Minimal           Specimens (From admission, onward)      None          Implants: * No implants in log *           Disposition: PACU - hemodynamically stable.           Condition: Good    Attestation:  I was present and scrubbed for the entire procedure.

## 2025-07-11 NOTE — OP NOTE
DATE OF PROCEDURE: 7/11/2025    PRE OP DIAGNOSIS: Nausea and vomiting, unspecified vomiting type [R11.2]  Abdominal pain, unspecified abdominal location [R10.9]    POST OP DIAGNOSIS: Nausea and vomiting, unspecified vomiting type [R11.2]  Abdominal pain, unspecified abdominal location [R10.9]    PROCEDURE: Procedure(s) (LRB):  XI ROBOTIC LAPAROSCOPY,DIAGNOSTIC; ROBOTIC LYSIS OF ADHESIONS AND TAKE DOWN OG GTUBE SITE (N/A)    Surgeons and Role:     * Robert Kumar MD - Primary     * Aida Mcbride MD - Resident - Assisting    ANESTHESIA: General.     Procedure:    The patient was placed under general anesthesia and the abdomen prepped and draped in usual manner.  Four trocar sites were marked centered around the navel transversely at 7 cm intervals.  Using the right most trocar site we made a skin incision and entered the abdomen with a 5 mm Optiview under direct vision and pneumoperitoneum to 15 mmHg CO2 gas was obtained.  Robotic trocars were then placed under direct vision through the marked sites.  The primary trocar was exchanged for robotic trocar and the robot docked.  There was a prior G-tube site in the left upper quadrant that appeared to partially constrict the Tyrone limb we took adhesions down around it with a synchro seal and then divided from the abdominal wall with a Endo-PRASHANTH.  The remaining stomach which was miniscule attached to the anterior abdominal wall was taken down sharply and removed from the abdomen through 1 of the trocar sites.  We then followed the Tyrone limb down to the jejunojejunostomy back up to the ligament of Treitz and down distally for quite a bit of length.  Of note there were no internal hernia defects underneath the Tyrone limb or at the jejunojejunostomy however there was a very dense adhesion at the jejunojejunostomy attaching it down to the pelvis.  We took this down with a synchro seal.  We noticed that the biliopancreatic limb appeared at the jejunojejunostomy  likely due to this adhesion.  She saw no evidence of intussusception and this area and we did not see a long blind limb at the gastrojejunostomy.  We inspected the abdomen for hemostasis.  The fascia at larger trocar site which was the right lateral trocar site used for the PRASHANTH was reapproximated with a transfascial suture stitch.  The skin incisions were closed with 4-0 plain catgut and reinforced with Dermabond.  The patient tolerated procedure well was brought to recovery room in stable condition.  Sponge and needle counts were appropriate.  Blood loss was minimal, complications none and pathology none.      This dictation was done with voice recognition software and there may be errors.

## 2025-07-11 NOTE — TRANSFER OF CARE
Anesthesia Transfer of Care Note    Patient: Nena Mendez    Procedure(s) Performed: Procedure(s) (LRB):  XI ROBOTIC LAPAROSCOPY,DIAGNOSTIC; ROBOTIC LYSIS OF ADHESIONS AND TAKE DOWN OG GTUBE SITE (N/A)    Patient location: PACU    Anesthesia Type: general    Transport from OR: Transported from OR on 6-10 L/min O2 by face mask with adequate spontaneous ventilation    Post pain: adequate analgesia    Post assessment: no apparent anesthetic complications and tolerated procedure well    Post vital signs: stable    Level of consciousness: awake    Nausea/Vomiting: no nausea/vomiting    Complications: none    Transfer of care protocol was followed      Last vitals: Visit Vitals  /62 (BP Location: Left arm, Patient Position: Lying)   Pulse 60   Temp 36.8 °C (98.2 °F) (Temporal)   Resp 15   Wt 47.9 kg (105 lb 9.6 oz)   SpO2 100%   Breastfeeding No   BMI 17.57 kg/m²

## 2025-07-11 NOTE — ANESTHESIA PROCEDURE NOTES
BL HAY SS    Patient location during procedure: pre-op   Block not for primary anesthetic.  Reason for block: at surgeon's request and post-op pain management   Post-op Pain Location: abdominal pain   Start time: 7/11/2025 10:15 AM  Timeout: 7/11/2025 10:14 AM   End time: 7/11/2025 10:19 AM    Staffing  Authorizing Provider: David Rojas MD  Performing Provider: Merari Patel MD    Staffing  Performed by: Merari Patel MD  Authorized by: David Rojas MD    Preanesthetic Checklist  Completed: patient identified, IV checked, site marked, risks and benefits discussed, surgical consent, monitors and equipment checked, pre-op evaluation and timeout performed  Peripheral Block  Patient position: sitting  Prep: ChloraPrep  Patient monitoring: heart rate, cardiac monitor, continuous pulse ox, continuous capnometry and frequent blood pressure checks  Block type: erector spinae plane  Laterality: bilateral  Injection technique: single shot  Interspace: T8-9    Needle  Needle type: Tuohy   Needle gauge: 17 G  Needle length: 3.5 in  Needle localization: anatomical landmarks and ultrasound guidance   -ultrasound image captured on disc.  Assessment  Injection assessment: negative aspiration, negative parasthesia and local visualized surrounding nerve  Paresthesia pain: none  Heart rate change: no  Slow fractionated injection: yes  Pain Tolerance: comfortable throughout block and no complaints  Medications:    Medications: bupivacaine (pf) (MARCAINE) injection 0.75% - Perineural, Other   30 mL - 7/11/2025 10:19:00 AM    Additional Notes  Patient tolerated well.  See [facility] RN record for vitals. Administered 30cc of 0.375% bupivacaine with 100mcg epi, 50mcg clonidine, and 1mg dexamethasone bilaterally.

## 2025-07-11 NOTE — H&P
"General Surgery H&P     HPI: 43y/o with bmi 15.19 (weight stable from last visit), fibromyalgia and s/p gastric stimulator 10/7/16 with replacement of battery 3/22/19, 12/30/19, 1/28/22, 6/23/23, removal stimulator 12/30/23, J tube 12/16/16 (she didn't tolerate it so she may have small bowel dysmotility also), j tube was removed for infection, pylorolasty 1/27/17, sleeve gastrectomy 5/10/17, robotic conversion to bypass with hh repair 6/24/23, replacement j tube and robotic g tube 2/21/22 (removed later), s/p portacath 2/3/23, 6/23/23 and 12/2024 all for gastroparesis.        Interval history 7/11/23: She has some soreness at her port site.  She continues to have extremely severe symptoms.     Interval history 11/26/24:  Since I last saw her she has undergone removal gstim and conversion to gastric bypass.  Since then she has undergone egd with dilation.  She is taking phenergan bid, not taking zofran, protonix bid and carafate tid (liquid). Mvi, vit d, folate, zinc, iron and calcium.  She is not taking pain medication.  She is not using thc derivatives or vaping.  She has diarrhea since her last surgery and more rarely constipation.  She says she is 99.8lb and was 108 in January.  She is 5'5".  Bmi 16.5.  She has severe gerd and says she has gastritis.  She says it was difficult to do the colonoscopy due to possible adhesions.  Overall she says she is 100% worse than prior to surgery and dilation appears to have worsened her.  She is not seeing psych.     Post Gastric Pacemaker Symptom Monitor  Severity/Frequency  Vomiting-4/4  Nausea-4/4  Early Satiety-4/4  Bloating-2/2-3  Postprandial fullness-3/4  Epigastric pain-4/4  Epigastric burning-3/4  Conclusion: Mostly extremely severe     GERD Questionnaire   bid PPI, carafate tid  has Typical heartburn  has Regurgitation  no Dysphagia solids  no Dysphagia liquids  has Hoarseness  has Sore throat  no Cough  no Asthma  no Chest pain  has Water brash  no Globus  has " Nausea  has Vomiting     Interval history 1/28/25:  She says her symptoms are about the same and she is also having headaches.  She says sugars are the worst so she may have an element of dumping.  GI started her on remeron and bentyl and increased ppi.  She says she has increased vomiting since she last had an egd with dilation.  She has had a port placed for tpn and hasn't been accessed.  Her weight is 97lb.  She says xifacin (for sibo) and erythromycin (for dysmotility) didn't help.     Interval history 5/22/25: She says symptoms are similar.  She still has swelling of her abdomen and rlq.  She says she is 102lb.    Physical Exam  Constitutional:       General: No acute distress.     Appearance: Normal appearance.   HENT:      Head: Normocephalic and atraumatic.      Nose: Nose normal.      Mouth/Throat:      Mouth: Mucous membranes are moist.   Eyes:      Extraocular Movements: Extraocular movements intact.   Cardiovascular:      Rate and Rhythm: Normal rate.   Pulmonary:      Effort: Pulmonary effort is normal. No respiratory distress.   Abdominal:      Palpations: Abdomen is soft.      Tenderness: There is mild TTP.   Musculoskeletal:         General: Normal range of motion.      Cervical back: Normal range of motion.   Skin:     General: Skin is warm.      Coloration: Skin is not jaundiced.   Neurological:      General: No focal deficit present.      Mental Status: Alert and oriented to person, place, and time.        Diagnostic Results:  CT 2022 ok, tubes in place.  No mention of abscess at the g tube site.  Ct 2024 reviewed, no acute process  Ct and MRA 1/2024 reviewed, no acute problems  Ct 2025 reviewed, films viewed, no acute finding to cause her symptoms  Ugi 2020 reviewed, reflux, no hh, no achalasia  Ugisbf 10/2024 reviewed, slow small bowel transit time, s/p bypass  U/s pelvis 2024 she states showed small bowel movement (with ugisbf ok I don't know what this means)  Cscope 2024 she states it was  difficult to get done do to twisting  Egd 2019 reviewed, pyloroplasty with suture, 5 cm hh, sleeve, endoflip normal  Egd 2022 reviewed, gastritis  Egd 8/2024 reviewed, gastritis, esophagitis  Egd 10/2024 reviewed  Double balloon endoscopy 2025 reviewed, possible jejunal anastomosis abnormality, otherwise ok  Motility 2019 reviewed, egj outflow obstruction, rumination syndrome, high lesp with incomplete relaxation     Assessment and plan  Gastroparesis with protein calorie malnutrition and s/p multiple procedures for this including bypass.      - to OR for robo diagnostic lap, possible JJ revision, eval for internal hernias  - consent obtained    Aida Mcbride MD  [facility]  General Surgery PGY-3

## 2025-07-11 NOTE — ANESTHESIA PROCEDURE NOTES
Intubation    Date/Time: 7/11/2025 12:09 PM    Performed by: Zen Peter MD  Authorized by: Alan Duval MD    Intubation:     Induction:  Intravenous    Intubated:  Postinduction    Mask Ventilation:  Easy mask    Attempts:  2    Attempted By:  Resident anesthesiologist    Method of Intubation:  Video laryngoscopy    Blade:  White 3    Laryngeal View Grade: Grade I - full view of cords      Attempted By (2nd Attempt):  Staff anesthesiologist    Method of Intubation (2nd Attempt):  Video laryngoscopy and bougie    Blade (2nd Attempt):  White 3    Laryngeal View Grade (2nd Attempt): Grade I - full view of cords      Difficult Airway Encountered?: No      Complications:  None    Airway Device:  Oral endotracheal tube    Airway Device Size:  7.0    Style/Cuff Inflation:  Cuffed (inflated to minimal occlusive pressure)    Tube secured:  22    Secured at:  The lips    Placement Verified By:  Capnometry    Complicating Factors:  Anterior larynx    Findings Post-Intubation:  BS equal bilateral and atraumatic/condition of teeth unchanged

## 2025-07-11 NOTE — ANESTHESIA POSTPROCEDURE EVALUATION
Anesthesia Post Evaluation    Patient: Nena Mendez    Procedure(s) Performed: Procedure(s) (LRB):  XI ROBOTIC LAPAROSCOPY,DIAGNOSTIC; ROBOTIC LYSIS OF ADHESIONS AND TAKE DOWN OG GTUBE SITE (N/A)    Final Anesthesia Type: general      Patient participation: Yes- Able to Participate  Level of consciousness: awake and alert  Post-procedure vital signs: reviewed and stable  Pain control: Pain has been treated.  Airway patency: patent    PONV status: Absent or treated.  Anesthetic complications: no      Cardiovascular status: hemodynamically stable  Respiratory status: unassisted  Hydration status: euvolemic  Follow-up not needed.              Vitals Value Taken Time   /76 07/11/25 15:16   Temp 36.3 °C (97.3 °F) 07/11/25 13:35   Pulse 57 07/11/25 15:22   Resp 12 07/11/25 15:22   SpO2 100 % 07/11/25 15:22   Vitals shown include unfiled device data.      Event Time   Out of Recovery 14:00:00         Pain/Baljit Score: Pain Rating Prior to Med Admin: 0 (7/11/2025 10:45 AM)  Pain Rating Post Med Admin: 0 (7/11/2025 11:30 AM)  Baljit Score: 9 (7/11/2025  2:00 PM)

## 2025-07-11 NOTE — BRIEF OP NOTE
[facility] ([facility])  Brief Operative Note    SUMMARY     Surgery Date: 7/11/2025     Surgeons and Role:     * Robert Kumar MD - Primary     * Aida Mcbride MD - Resident - Assisting        Pre-op Diagnosis:  Nausea and vomiting, unspecified vomiting type [R11.2]  Abdominal pain, unspecified abdominal location [R10.9]    Post-op Diagnosis:  Post-Op Diagnosis Codes:     * Nausea and vomiting, unspecified vomiting type [R11.2]     * Abdominal pain, unspecified abdominal location [R10.9]    Procedure(s) (LRB):  XI ROBOTIC LAPAROSCOPY,DIAGNOSTIC; ROBOTIC LYSIS OF ADHESIONS AND TAKE DOWN OG GTUBE SITE (N/A)    Anesthesia: General/Regional    Implants:  * No implants in log *    Operative Findings: Portion of remnant stomach in adhesion to abdominal wall. Taken down with PRASHANTH. One adhesion also found near JJ anastomosis was divided.     Estimated Blood Loss: minimal    Estimated Blood Loss has been documented.         Specimens:   Specimen (24h ago, onward)      None          * No specimens in log *    [id number]

## 2025-07-11 NOTE — NURSING TRANSFER
Nursing Transfer Note      7/11/2025   3:53 PM    Reason patient is being transferred: post-procedure    Transfer To: 511    Transfer via stretcher    Transfer with none    Transported by PCT    Telemetry: Rhythm NSR  Order for Tele Monitor? No    Medicines sent: none    Any special needs or follow-up needed: routine    Patient belongings transferred with patient: Yes    Chart send with patient: Yes    Notified: spouse, daughter

## 2025-07-12 ENCOUNTER — PATIENT MESSAGE (OUTPATIENT)
Dept: SURGERY | Facility: CLINIC | Age: 45
End: 2025-07-12
Payer: COMMERCIAL

## 2025-07-12 VITALS
RESPIRATION RATE: 17 BRPM | DIASTOLIC BLOOD PRESSURE: 61 MMHG | WEIGHT: 101 LBS | SYSTOLIC BLOOD PRESSURE: 101 MMHG | TEMPERATURE: 98 F | HEART RATE: 65 BPM | BODY MASS INDEX: 16.83 KG/M2 | HEIGHT: 65 IN | OXYGEN SATURATION: 100 %

## 2025-07-12 DIAGNOSIS — Z98.890 POST-OPERATIVE STATE: ICD-10-CM

## 2025-07-12 DIAGNOSIS — R10.31 RIGHT LOWER QUADRANT ABDOMINAL PAIN: Primary | ICD-10-CM

## 2025-07-12 LAB
ABSOLUTE EOSINOPHIL (OHS): 0.03 K/UL
ABSOLUTE MONOCYTE (OHS): 0.75 K/UL (ref 0.3–1)
ABSOLUTE NEUTROPHIL COUNT (OHS): 7.57 K/UL (ref 1.8–7.7)
ANION GAP (OHS): 8 MMOL/L (ref 8–16)
BASOPHILS # BLD AUTO: 0.04 K/UL
BASOPHILS NFR BLD AUTO: 0.4 %
BUN SERPL-MCNC: 5 MG/DL (ref 6–20)
CALCIUM SERPL-MCNC: 8.8 MG/DL (ref 8.7–10.5)
CHLORIDE SERPL-SCNC: 108 MMOL/L (ref 95–110)
CO2 SERPL-SCNC: 26 MMOL/L (ref 23–29)
CREAT SERPL-MCNC: 0.7 MG/DL (ref 0.5–1.4)
ERYTHROCYTE [DISTWIDTH] IN BLOOD BY AUTOMATED COUNT: 13.8 % (ref 11.5–14.5)
GFR SERPLBLD CREATININE-BSD FMLA CKD-EPI: >60 ML/MIN/1.73/M2
GLUCOSE SERPL-MCNC: 75 MG/DL (ref 70–110)
HCT VFR BLD AUTO: 32.6 % (ref 37–48.5)
HGB BLD-MCNC: 10.5 GM/DL (ref 12–16)
IMM GRANULOCYTES # BLD AUTO: 0.04 K/UL (ref 0–0.04)
IMM GRANULOCYTES NFR BLD AUTO: 0.4 % (ref 0–0.5)
LYMPHOCYTES # BLD AUTO: 1.64 K/UL (ref 1–4.8)
MAGNESIUM SERPL-MCNC: 2 MG/DL (ref 1.6–2.6)
MCH RBC QN AUTO: 28.2 PG (ref 27–31)
MCHC RBC AUTO-ENTMCNC: 32.2 G/DL (ref 32–36)
MCV RBC AUTO: 87 FL (ref 82–98)
NUCLEATED RBC (/100WBC) (OHS): 0 /100 WBC
PLATELET # BLD AUTO: 310 K/UL (ref 150–450)
PMV BLD AUTO: 9.5 FL (ref 9.2–12.9)
POTASSIUM SERPL-SCNC: 4.7 MMOL/L (ref 3.5–5.1)
RBC # BLD AUTO: 3.73 M/UL (ref 4–5.4)
RELATIVE EOSINOPHIL (OHS): 0.3 %
RELATIVE LYMPHOCYTE (OHS): 16.3 % (ref 18–48)
RELATIVE MONOCYTE (OHS): 7.4 % (ref 4–15)
RELATIVE NEUTROPHIL (OHS): 75.2 % (ref 38–73)
SODIUM SERPL-SCNC: 142 MMOL/L (ref 136–145)
WBC # BLD AUTO: 10.07 K/UL (ref 3.9–12.7)

## 2025-07-12 PROCEDURE — 25000003 PHARM REV CODE 250

## 2025-07-12 PROCEDURE — 80048 BASIC METABOLIC PNL TOTAL CA: CPT

## 2025-07-12 PROCEDURE — 36415 COLL VENOUS BLD VENIPUNCTURE: CPT

## 2025-07-12 PROCEDURE — 85025 COMPLETE CBC W/AUTO DIFF WBC: CPT

## 2025-07-12 PROCEDURE — 83735 ASSAY OF MAGNESIUM: CPT

## 2025-07-12 RX ORDER — METHOCARBAMOL 750 MG/1
750 TABLET, FILM COATED ORAL 4 TIMES DAILY
Qty: 40 TABLET | Refills: 0 | Status: SHIPPED | OUTPATIENT
Start: 2025-07-12 | End: 2025-07-22

## 2025-07-12 RX ORDER — METHOCARBAMOL 750 MG/1
750 TABLET, FILM COATED ORAL 4 TIMES DAILY
Status: DISCONTINUED | OUTPATIENT
Start: 2025-07-12 | End: 2025-07-12 | Stop reason: HOSPADM

## 2025-07-12 RX ORDER — ACETAMINOPHEN 500 MG
1000 TABLET ORAL EVERY 6 HOURS
COMMUNITY
Start: 2025-07-12

## 2025-07-12 RX ORDER — ACETAMINOPHEN 500 MG
1000 TABLET ORAL EVERY 6 HOURS
Status: DISCONTINUED | OUTPATIENT
Start: 2025-07-12 | End: 2025-07-12 | Stop reason: HOSPADM

## 2025-07-12 RX ORDER — GABAPENTIN 300 MG/1
300 CAPSULE ORAL 3 TIMES DAILY
Qty: 90 CAPSULE | Refills: 0 | Status: SHIPPED | OUTPATIENT
Start: 2025-07-12 | End: 2025-08-11

## 2025-07-12 RX ADMIN — METHOCARBAMOL 750 MG: 750 TABLET ORAL at 08:07

## 2025-07-12 RX ADMIN — PROMETHAZINE HYDROCHLORIDE 25 MG: 25 TABLET ORAL at 05:07

## 2025-07-12 RX ADMIN — FAMOTIDINE 20 MG: 20 TABLET, FILM COATED ORAL at 08:07

## 2025-07-12 RX ADMIN — ACETAMINOPHEN 1000 MG: 500 TABLET ORAL at 11:07

## 2025-07-12 RX ADMIN — GABAPENTIN 300 MG: 300 CAPSULE ORAL at 08:07

## 2025-07-12 RX ADMIN — ACETAMINOPHEN 1000 MG: 500 TABLET ORAL at 05:07

## 2025-07-12 NOTE — PLAN OF CARE
Patient will discharge home with family.  No sw needs at discharge.   07/12/25 1022   Final Note   Assessment Type Discharge Planning Assessment   Anticipated Discharge Disposition Home   What phone number can be called within the next 1-3 days to see how you are doing after discharge? [phone]   Hospital Resources/Appts/Education Provided Appointments scheduled and added to AVS   Post-Acute Status   Discharge Delays None known at this time     [facility] - [facility]  Discharge Final Note    Primary Care Provider: Jeancarlos Angel MD    Expected Discharge Date: 7/12/2025    Final Discharge Note (most recent)       Final Note - 07/12/25 1022          Final Note    Assessment Type Discharge Planning Assessment     Anticipated Discharge Disposition Home or Self Care     What phone number can be called within the next 1-3 days to see how you are doing after discharge? [phone]     Hospital Resources/Appts/Education Provided Appointments scheduled and added to AVS        Post-Acute Status    Discharge Delays None known at this time                     Important Message from Medicare

## 2025-07-12 NOTE — PLAN OF CARE
Problem: Adult Inpatient Plan of Care  Goal: Plan of Care Review  Outcome: Progressing  Goal: Patient-Specific Goal (Individualized)  Outcome: Progressing  Goal: Absence of Hospital-Acquired Illness or Injury  Outcome: Progressing  Goal: Optimal Comfort and Wellbeing  Outcome: Progressing  Goal: Readiness for Transition of Care  Outcome: Progressing     Problem: Adult Inpatient Plan of Care  Goal: Plan of Care Review  Outcome: Progressing     Problem: Adult Inpatient Plan of Care  Goal: Optimal Comfort and Wellbeing  Outcome: Progressing  PRN medication effective for pain and nausea . Explained plan of care, verbalized understanding.  Spouse at bedside assisted in care. Pt ambulated hallways x2 and to bathroom to void  . No injury during shift, Side rails up x 2, call light by bedside.

## 2025-07-12 NOTE — HOSPITAL COURSE
Please see the preoperative H&P and other available documentation for full details related to history leading up to this admission.  Briefly, Nena Mendez is a 45 y.o. female who was admitted on 7/11/2025 following scheduled elective surgery for Nausea and vomiting, unspecified vomiting type [R11.2]  Abdominal pain, unspecified abdominal location [R10.9].  Following a complete preoperative discussion of the risks and benefits of surgery with signed informed consent, the patient was taken to the operating room on 7/11/2025 and underwent the above stated procedures.  The patient tolerated surgery well and there were no complications.  Please see the operative report for full intraoperative findings and details.  Postoperatively, the patient did well and was transferred from the PACU to the 5th floor POSS unit in stable condition where they overall had a stable and relatively uncomplicated hospital course.  Labs and vital signs remained essentially stable and appropriate throughout course, and repletion of electrolytes occurred as indicated.  Diet was advanced per usual surgical pathway at appropriate timing, and the patient was transitioned to oral pain medications without problem.  Currently, the patient is doing well at 1 Day Post-Op, and is stable and appropriate for discharge home at this time.

## 2025-07-12 NOTE — DISCHARGE SUMMARY
[facility] - [facility]  General Surgery  Discharge Summary      Patient Name: Nena Mendez  MRN: [id number]  Admission Date: 7/11/2025  Hospital Length of Stay: 1 days  Discharge Date and Time: 07/12/2025 10:01 AM  Attending Physician: Robert Kumar, *   Discharging Provider: Jaswant Sims MD  Primary Care Provider: Jeancarlos Angel MD    HPI:   No notes on file    Procedure(s) (LRB):  XI ROBOTIC LAPAROSCOPY,DIAGNOSTIC; ROBOTIC LYSIS OF ADHESIONS AND TAKE DOWN OG GTUBE SITE (N/A)      Indwelling Lines/Drains at time of discharge:   Lines/Drains/Airways       None                 Hospital Course: Please see the preoperative H&P and other available documentation for full details related to history leading up to this admission.  Briefly, Nena Mendez is a 45 y.o. female who was admitted on 7/11/2025 following scheduled elective surgery for Nausea and vomiting, unspecified vomiting type [R11.2]  Abdominal pain, unspecified abdominal location [R10.9].  Following a complete preoperative discussion of the risks and benefits of surgery with signed informed consent, the patient was taken to the operating room on 7/11/2025 and underwent the above stated procedures.  The patient tolerated surgery well and there were no complications.  Please see the operative report for full intraoperative findings and details.  Postoperatively, the patient did well and was transferred from the PACU to the 5th floor POSS unit in stable condition where they overall had a stable and relatively uncomplicated hospital course.  Labs and vital signs remained essentially stable and appropriate throughout course, and repletion of electrolytes occurred as indicated.  Diet was advanced per usual surgical pathway at appropriate timing, and the patient was transitioned to oral pain medications without problem.  Currently, the patient is doing well at 1 Day Post-Op, and is stable and appropriate for discharge home at this time.       Goals  of Care Treatment Preferences:  Code Status: Full Code    Physical Exam  Vitals reviewed.   Constitutional:       General: She is not in acute distress.     Appearance: She is not toxic-appearing.   HENT:      Head: Normocephalic.   Eyes:      Extraocular Movements: Extraocular movements intact.   Cardiovascular:      Rate and Rhythm: Normal rate and regular rhythm.   Abdominal:      General: There is no distension.      Palpations: Abdomen is soft.      Tenderness: There is no guarding or rebound.      Comments: Mild do-incisional tenderness. Lap sites C/D/I   Neurological:      General: No focal deficit present.      Mental Status: She is alert and oriented to person, place, and time.          Consults:     Significant Diagnostic Studies: N/A    Pending Diagnostic Studies:       None          Final Active Diagnoses:    Diagnosis Date Noted POA    PRINCIPAL PROBLEM:  S/P lap gastric neurostimulator placement  [K31.84] 08/04/2016 Yes      Problems Resolved During this Admission:      Discharged Condition: good    Disposition: Home or Self Care    Follow Up:    Patient Instructions:      Diet full liquid   Order Comments: Full liquid diet for 3 days then slowly advance to regular diet as tolerated     Lifting restrictions   Order Comments: Do not lift anything heavier than 15 lbs for 6 weeks following surgery, or until you are cleared at your post-operative appointment     No dressing needed     Notify your health care provider if you experience any of the following:  temperature >100.4     Notify your health care provider if you experience any of the following:  persistent nausea and vomiting or diarrhea     Notify your health care provider if you experience any of the following:  severe uncontrolled pain     Notify your health care provider if you experience any of the following:  redness, tenderness, or signs of infection (pain, swelling, redness, odor or green/yellow discharge around incision site)     Notify  your health care provider if you experience any of the following:  difficulty breathing or increased cough     Notify your health care provider if you experience any of the following:  severe persistent headache     Notify your health care provider if you experience any of the following:  worsening rash     Notify your health care provider if you experience any of the following:  persistent dizziness, light-headedness, or visual disturbances     Notify your health care provider if you experience any of the following:  increased confusion or weakness     Activity as tolerated   Order Comments: You may shower starting 24 hours after surgery allowing warm soapy water to run over incisions, but do not scrub or submerge (ie no baths) you incisions.     Medications:  Reconciled Home Medications:      Medication List        START taking these medications      acetaminophen 500 MG tablet  Commonly known as: TYLENOL  Take 2 tablets (1,000 mg total) by mouth every 6 (six) hours.     gabapentin 300 MG capsule  Commonly known as: NEURONTIN  Take 1 capsule (300 mg total) by mouth 3 (three) times daily.     methocarbamoL 750 MG Tab  Commonly known as: ROBAXIN  Take 1 tablet (750 mg total) by mouth 4 (four) times daily. for 10 days            CONTINUE taking these medications      b complex vitamins capsule  Take 1 capsule by mouth once daily.     famotidine 20 MG tablet  Commonly known as: PEPCID AC  Take 1 tablet (20 mg total) by mouth 2 (two) times daily.     folic acid 1 MG tablet  Commonly known as: FOLVITE  Take 1,000 mcg by mouth every morning.     mirtazapine 15 MG tablet  Commonly known as: REMERON  Take 1 tablet (15 mg total) by mouth every evening.     multivitamin Liqd  Take by mouth once daily.     pantoprazole 40 MG tablet  Commonly known as: PROTONIX  Take 1 tablet (40 mg total) by mouth 2 (two) times daily.     * promethazine 6.25 mg/5 mL syrup  Commonly known as: PHENERGAN  Take 12.5 mg by mouth every 6 (six)  hours as needed for Nausea.     * promethazine 25 MG tablet  Commonly known as: PHENERGAN  TAKE 1 TABLET(25 MG) BY MOUTH EVERY 6 HOURS AS NEEDED FOR NAUSEA           * This list has 2 medication(s) that are the same as other medications prescribed for you. Read the directions carefully, and ask your doctor or other care provider to review them with you.                STOP taking these medications      butalbitaL-acetaminop-caf-cod -64-30 mg Cap     chlorproMAZINE 25 MG tablet  Commonly known as: THORAZINE     dicyclomine 20 mg tablet  Commonly known as: BENTYL            ASK your doctor about these medications      rifAXIMin 550 mg Tab  Commonly known as: XIFAXAN  Take 1 tablet (550 mg total) by mouth 2 (two) times daily.     sucralfate 100 mg/mL suspension  Commonly known as: CARAFATE  Take 1 g by mouth 4 (four) times daily.            Time spent on the discharge of patient: 5 minutes    Jaswant Sims MD  General Surgery  [facility] - Surgery

## 2025-07-18 RX ORDER — PREDNISONE 50 MG/1
TABLET ORAL
Qty: 3 TABLET | Refills: 0 | Status: SHIPPED | OUTPATIENT
Start: 2025-07-18

## 2025-07-18 RX ORDER — DIPHENHYDRAMINE HCL 50 MG
CAPSULE ORAL
Qty: 1 CAPSULE | Refills: 0 | Status: SHIPPED | OUTPATIENT
Start: 2025-07-18

## 2025-07-18 NOTE — TELEPHONE ENCOUNTER
Spoke with Nena.      Ordered CT abd/pel with IV/PO contrast ASAP.  Sent prednisone and benadryl for her to take prior to contrast.  She requested the imaging be completed at [facility] in [place].  Ordered per her request.    Ms. Stacys verbalized understanding to all information provided and voiced no further questions, comments, or concerns.

## 2025-07-23 ENCOUNTER — TELEPHONE (OUTPATIENT)
Dept: SURGERY | Facility: CLINIC | Age: 45
End: 2025-07-23
Payer: COMMERCIAL

## 2025-08-05 ENCOUNTER — OFFICE VISIT (OUTPATIENT)
Dept: SURGERY | Facility: CLINIC | Age: 45
End: 2025-08-05
Payer: COMMERCIAL

## 2025-08-05 DIAGNOSIS — R11.2 NAUSEA AND VOMITING, UNSPECIFIED VOMITING TYPE: Primary | ICD-10-CM

## 2025-08-05 PROCEDURE — 1159F MED LIST DOCD IN RCRD: CPT | Mod: CPTII,95,, | Performed by: SURGERY

## 2025-08-05 PROCEDURE — 99024 POSTOP FOLLOW-UP VISIT: CPT | Mod: 95,,, | Performed by: SURGERY

## 2025-08-05 PROCEDURE — 1160F RVW MEDS BY RX/DR IN RCRD: CPT | Mod: CPTII,95,, | Performed by: SURGERY

## 2025-08-05 NOTE — LETTER
August 5, 2025        Jeancarlos Angel MD  [address]  [address]  [facility] [address]             [facility]  [address]  [facility] [address]  Phone: [phone]   Patient: Nena Mendez   MR Number: [id number]   YOB: 1980   Date of Visit: 8/5/2025       Dear Dr. Angel:    Thank you for referring Nena Mendez to me for evaluation. Attached you will find relevant portions of my assessment and plan of care.    If you have questions, please do not hesitate to call me. I look forward to following Nena Mendez along with you.    Sincerely,      Robert Kumar MD            CC  No Recipients    Enclosure

## 2025-08-05 NOTE — PROGRESS NOTES
The patient location is: [place]  The chief complaint leading to consultation is: nausea and vomiting    Visit type: audiovisual      23 minutes of total time spent on the encounter, which includes face to face time and non-face to face time preparing to see the patient (eg, review of tests), Obtaining and/or reviewing separately obtained history, Documenting clinical information in the electronic or other health record, Independently interpreting results (not separately reported) and communicating results to the patient/family/caregiver, or Care coordination (not separately reported).         Each patient to whom he or she provides medical services by telemedicine is:  (1) informed of the relationship between the physician and patient and the respective role of any other health care provider with respect to management of the patient; and (2) notified that he or she may decline to receive medical services by telemedicine and may withdraw from such care at any time.    Notes:     46y/o with bmi 15.19 (weight stable from last visit), fibromyalgia and s/p gastric stimulator 10/7/16 with replacement of battery 3/22/19, 12/30/19, 1/28/22, 6/23/23, removal stimulator 12/30/23, J tube 12/16/16 (she didn't tolerate it so she may have small bowel dysmotility also), j tube was removed for infection, pylorolasty 1/27/17, sleeve gastrectomy 5/10/17, robotic conversion to bypass with hh repair 6/24/23, replacement j tube and robotic g tube 2/21/22 (removed later), s/p robotic lysis of adhesions with limited small bowel resection 7/11/25, s/p portacath 2/3/23, 6/23/23 and 12/2024 all for gastroparesis.        Interval history 7/11/23: She has some soreness at her port site.  She continues to have extremely severe symptoms.     Her child has heart trouble.  They have a Sean Luther, a service dog to detect low blood pressure and is in training.  She says her dog got Chaga's from eating an insect and has a pacemaker.  Her oldest  "daughter is considering crna and working in the icu.     Interval history 11/26/24:  Since I last saw her she has undergone removal gstim and conversion to gastric bypass.  Since then she has undergone egd with dilation.  She is taking phenergan bid, not taking zofran, protonix bid and carafate tid (liquid). Mvi, vit d, folate, zinc, iron and calcium.  She is not taking pain medication.  She is not using thc derivatives or vaping.  She has diarrhea since her last surgery and more rarely constipation.  She says she is 99.8lb and was 108 in January.  She is 5'5".  Bmi 16.5.  She has severe gerd and says she has gastritis.  She says it was difficult to do the colonoscopy due to possible adhesions.  Overall she says she is 100% worse than prior to surgery and dilation appears to have worsened her.  She is not seeing psych.     Post Gastric Pacemaker Symptom Monitor  Severity/Frequency  Vomiting-4/4  Nausea-4/4  Early Satiety-4/4  Bloating-2/2-3  Postprandial fullness-3/4  Epigastric pain-4/4  Epigastric burning-3/4  Conclusion: Mostly extremely severe     GERD Questionnaire   bid PPI, carafate tid  has Typical heartburn  has Regurgitation  no Dysphagia solids  no Dysphagia liquids  has Hoarseness  has Sore throat  no Cough  no Asthma  no Chest pain  has Water brash  no Globus  has Nausea  has Vomiting     Nutrition:  She is not able to eat much at all.  She is trying Gatorade 0.     Interval history 1/28/25:  She says her symptoms are about the same and she is also having headaches.  She says sugars are the worst so she may have an element of dumping.  GI started her on remeron and bentyl and increased ppi.  She says she has increased vomiting since she last had an egd with dilation.  She has had a port placed for tpn and hasn't been accessed.  Her weight is 97lb.  She says xifacin (for sibo) and erythromycin (for dysmotility) didn't help.     Interval history 5/22/25: She says symptoms are similar.  She still has " swelling of her abdomen and rlq.  She says she is 102lb.    Interval history 8/5/25, postop anival:  She had 2 cts in the last 2 weeks and was told they were normal.  She has protrusion of the rlq that occurs off and on.  She is wondering if the bowels are twisting.  This happens usually once a week but can be more frequently.  It is improved by bowel habits but she has to force this.  There are no aggravating factors.  She still has nausea and vomiting but can tolerate smoothies now and feels her gastroparesis symptoms are slightly improved by surgery.     Her daughter is showing pigs, she will get new ones soon.  She just started driving.  Her other daughter is an RN in icu (in [place]) and thinking about becoming a crna.  One of her nieces is going to [employer] as a freshman and hopefully med school later on.        shows multiple butalbitol rx and recent narcotics rx.       Diagnostic Results prior to revision surgery 7/2025:  CT 2022 ok, tubes in place.  No mention of abscess at the g tube site.  Ct 2024 reviewed, no acute process  Ct and MRA 1/2024 reviewed, no acute problems  Ct 2025 reviewed, films viewed, no acute finding to cause her symptoms  Ugi 2020 reviewed, reflux, no hh, no achalasia  Ugisbf 10/2024 reviewed, slow small bowel transit time, s/p bypass  U/s pelvis 2024 she states showed small bowel movement (with ugisbf ok I don't know what this means)  Cscope 2024 she states it was difficult to get done do to twisting  Egd 2019 reviewed, pyloroplasty with suture, 5 cm hh, sleeve, endoflip normal  Egd 2022 reviewed, gastritis  Egd 8/2024 reviewed, gastritis, esophagitis  Egd 10/2024 reviewed  Double balloon endoscopy 2025 reviewed, possible jejunal anastomosis abnormality, otherwise ok  Motility 2019 reviewed, egj outflow obstruction, rumination syndrome, high lesp with incomplete relaxation     Assessment and plan  Gastroparesis with protein calorie malnutrition and s/p multiple procedures for this  including bypass. Rlq pain and bulging.  She has been diagnosed with IBS and spastic colon and it could be this.  Obtain results and films of 2 recent cts.  I have sent a note to [facility] to ask about possible sigmoid volvulus.  I am not sure what to do to work this up further as I didn't see anything in the area during the last robotic surgery.  Follow up with GI.  Follow up 3 months.

## 2025-08-06 ENCOUNTER — PATIENT MESSAGE (OUTPATIENT)
Dept: SURGERY | Facility: CLINIC | Age: 45
End: 2025-08-06
Payer: COMMERCIAL

## 2025-08-06 ENCOUNTER — PATIENT MESSAGE (OUTPATIENT)
Dept: GASTROENTEROLOGY | Facility: CLINIC | Age: 45
End: 2025-08-06
Payer: COMMERCIAL

## 2025-08-06 DIAGNOSIS — R10.9 ABDOMINAL CRAMPING: Primary | ICD-10-CM

## 2025-08-06 RX ORDER — DICYCLOMINE HYDROCHLORIDE 20 MG/1
20 TABLET ORAL EVERY 6 HOURS PRN
Qty: 120 TABLET | Refills: 3 | Status: SHIPPED | OUTPATIENT
Start: 2025-08-06

## (undated) DEVICE — COVERS PROBE NR-48 STERILE

## (undated) DEVICE — STAPLER SUREFORM 60 SPU

## (undated) DEVICE — NDL HYPO REG 25G X 1 1/2

## (undated) DEVICE — SEE MEDLINE ITEM 152487

## (undated) DEVICE — SUT PROLENE 0 MO6 30IN BLUE

## (undated) DEVICE — ADHESIVE DERMABOND ADVANCED

## (undated) DEVICE — PENCIL SMK EVAC CONNECTOR 10FT

## (undated) DEVICE — SPONGE IV DRAIN 4X4 STERILE

## (undated) DEVICE — SEAL UNIVERSAL 5MM-8MM XI

## (undated) DEVICE — SUT CTD VICRYL 4-0 BR PS-2

## (undated) DEVICE — INTRODUCER LEAD 14FR

## (undated) DEVICE — OBTURATOR BLADELESS 8MM XI CLR

## (undated) DEVICE — SET TRI-LUMEN FILTERED TUBE

## (undated) DEVICE — DRAPE INCISE IOBAN 2 23X23IN

## (undated) DEVICE — BINDER ABDOMINAL 9 30-45

## (undated) DEVICE — BLADE SURG CARBON STEEL SZ11

## (undated) DEVICE — RELOAD ECHELON FLEX GRN 60MM

## (undated) DEVICE — BANDAGE ADHESIVE

## (undated) DEVICE — TROCAR ENDOPATH XCEL 12MM 10CM

## (undated) DEVICE — TROCAR ENDOPATH XCEL 5MM 7.5CM

## (undated) DEVICE — CLAMP KNURLED SHUTOFF LG.

## (undated) DEVICE — APPLICATOR CHLORAPREP ORN 26ML

## (undated) DEVICE — ELECTRODE REM PLYHSV RETURN 9

## (undated) DEVICE — SUT VICRYL 3-0 27 SH

## (undated) DEVICE — GOWN POLY REINF BRTH SLV XL

## (undated) DEVICE — TUBE SET SINGLE LUMEN FILTERED

## (undated) DEVICE — SEE MEDLINE ITEM 157125

## (undated) DEVICE — DRAPE INCISE IOBAN 2 23X17IN

## (undated) DEVICE — SUT 0 VICRYL / UR6 (J603)

## (undated) DEVICE — DRAPE ABDOMINAL TIBURON 14X11

## (undated) DEVICE — SUT VICRYL 2-0 SH VCP317H

## (undated) DEVICE — TROCAR ENDOPATH XCEL 12X100MM

## (undated) DEVICE — SYS SEE SHARP SCP ANTIFG LNG

## (undated) DEVICE — SEE MEDLINE ITEM 157117

## (undated) DEVICE — CANNULA ENDOPATH XCEL 5X100MM

## (undated) DEVICE — LUBRICANT SURGILUBE 2 OZ

## (undated) DEVICE — SUT SILK 3-0 SH 18IN BLACK

## (undated) DEVICE — SUT VICRYL PLUS 4-0 P3 18IN

## (undated) DEVICE — DRAPE C-ARM ELAS CLIP 42X120IN

## (undated) DEVICE — GOWN SURGICAL X-LARGE

## (undated) DEVICE — TRAY MINOR GEN SURG

## (undated) DEVICE — COVER PROBE NL STRL 3.6X96IN

## (undated) DEVICE — DRESSING SPONGE 16PLY 4X4 NS

## (undated) DEVICE — SEE MEDLINE ITEM 157128

## (undated) DEVICE — TRAY CATH UM FOLEY SIL W 16FR

## (undated) DEVICE — Device

## (undated) DEVICE — ADHESIVE MASTISOL VIAL 48/BX

## (undated) DEVICE — SOL NACL 0.9% INJ PF/50151

## (undated) DEVICE — GAUZE SPONGE 4X4 12PLY

## (undated) DEVICE — TROCAR ENDOPATH XCEL 5X100MM

## (undated) DEVICE — SOL ELECTROLUBE ANTI-STIC

## (undated) DEVICE — ELECTRODE EXTENDED BLADE

## (undated) DEVICE — SUT 2/0 30IN SILK BLK BRAI

## (undated) DEVICE — COVER TIP CURVED SCISSORS XI

## (undated) DEVICE — NDL SUTURING FOR LAP

## (undated) DEVICE — SUT GUT PL. 4-0 27 FS-2

## (undated) DEVICE — TUBING SUC UNIV W/CONN 12FT

## (undated) DEVICE — DRAPE LAP TIBURON 77X122IN

## (undated) DEVICE — SUT VICRYL CTD 2-0 GI 27 SH

## (undated) DEVICE — KIT ROBOTIC 4 ARM DA VINCI SI

## (undated) DEVICE — TROCAR ENDOPATH XCEL 5X75MM

## (undated) DEVICE — DRAPE T THYROID STERILE

## (undated) DEVICE — TUBING SUCTION STERILE

## (undated) DEVICE — SEE MEDLINE ITEM 146313

## (undated) DEVICE — COVER PROXIMA MAYO STAND

## (undated) DEVICE — JELLY LUBRICANT 1 OZ TUBE

## (undated) DEVICE — SPONGE COTTON TRAY 4X4IN

## (undated) DEVICE — SYR 10CC LUER LOCK

## (undated) DEVICE — TRAY MINOR GEN SURG OMC

## (undated) DEVICE — ELECTRODE MEGADYNE RETURN DUAL

## (undated) DEVICE — SYR 50ML CATH TIP

## (undated) DEVICE — DRAPE HALF SURGICAL 40X58IN

## (undated) DEVICE — SEE MEDLINE ITEM 152622

## (undated) DEVICE — BOWL STERILE LARGE 32OZ

## (undated) DEVICE — RELOAD SUREFORM 60 3.5 BLU 6R

## (undated) DEVICE — CONTAINER SPECIMEN STRL 4OZ

## (undated) DEVICE — DRESSING TRANS 4X4 TEGADERM

## (undated) DEVICE — SOL NS 1000CC

## (undated) DEVICE — CLOSURE SKIN STERI STRIP 1/2X4

## (undated) DEVICE — TUBE FEEDING JEJUNAL 12FR 60CM

## (undated) DEVICE — DRESSING AQUACEL AG ADV 3.5X6

## (undated) DEVICE — STAPLER ECHELON FLEX 60MM 44CM

## (undated) DEVICE — RELOAD ECHELON FLEX BLU 60MM

## (undated) DEVICE — DEVICE SYNCHROSEAL DA VINCI

## (undated) DEVICE — DRAPE STERI INSTRUMENT 1018

## (undated) DEVICE — SUT CHROMIC 2-0 SH 27IN BRN

## (undated) DEVICE — CLOSURE SKIN STERI STRIP 1/8X3

## (undated) DEVICE — TROCAR ENDOPATH XCEL 11MM 10CM

## (undated) DEVICE — SUT VICRYL+ 27 UR-6 VIOL

## (undated) DEVICE — SEE MEDLINE ITEM 156902

## (undated) DEVICE — KIT ANTIFOG W/SPONG & FLUID

## (undated) DEVICE — SUT STRATAFIX PDO 2-0 SH

## (undated) DEVICE — TUBING HF INSUFFLATION W/ FLTR

## (undated) DEVICE — DRAPE SCOPE PILLOW WARMER

## (undated) DEVICE — KIT ANTIFOG

## (undated) DEVICE — SUT MONOCRYL 4-0 PS-2

## (undated) DEVICE — DRAPE COLUMN DAVINCI XI

## (undated) DEVICE — DRESSING AQUACEL AG 3.5X10IN

## (undated) DEVICE — COVER LIGHT HANDLE

## (undated) DEVICE — NDL SPINAL SPINOCAN 22GX3.5

## (undated) DEVICE — DRAPE ARM DAVINCI XI

## (undated) DEVICE — ELECTRODE BLADE INSULATED 1 IN

## (undated) DEVICE — PENCIL ROCKER SWITCH 10FT CORD

## (undated) DEVICE — DRAPE T TRNSVRS LAP 102X78X121

## (undated) DEVICE — SHEARS HARMONIC 5CM 36CM